# Patient Record
Sex: MALE | Race: WHITE | Employment: FULL TIME | URBAN - METROPOLITAN AREA
[De-identification: names, ages, dates, MRNs, and addresses within clinical notes are randomized per-mention and may not be internally consistent; named-entity substitution may affect disease eponyms.]

---

## 2017-07-24 ENCOUNTER — TRANSFERRED RECORDS (OUTPATIENT)
Dept: HEALTH INFORMATION MANAGEMENT | Facility: CLINIC | Age: 22
End: 2017-07-24

## 2017-07-25 ENCOUNTER — MEDICAL CORRESPONDENCE (OUTPATIENT)
Dept: HEALTH INFORMATION MANAGEMENT | Facility: CLINIC | Age: 22
End: 2017-07-25

## 2017-07-25 ENCOUNTER — PRE VISIT (OUTPATIENT)
Dept: SURGERY | Facility: CLINIC | Age: 22
End: 2017-07-25

## 2017-07-25 NOTE — TELEPHONE ENCOUNTER
1.  Date/reason for appt: 8/3/17- Pilonidal Cyst     2.  Referring provider: OZIEL FERMIN     3.  Call to patient (Yes / No - short description): No - pt is referred.     4.  Previous care at / records requested from:     1. [employer]- faxed cover sheet

## 2017-08-03 ENCOUNTER — ANESTHESIA EVENT (OUTPATIENT)
Dept: SURGERY | Facility: AMBULATORY SURGERY CENTER | Age: 22
End: 2017-08-03

## 2017-08-03 ENCOUNTER — ALLIED HEALTH/NURSE VISIT (OUTPATIENT)
Dept: SURGERY | Facility: CLINIC | Age: 22
End: 2017-08-03

## 2017-08-03 ENCOUNTER — OFFICE VISIT (OUTPATIENT)
Dept: SURGERY | Facility: CLINIC | Age: 22
End: 2017-08-03

## 2017-08-03 ENCOUNTER — TELEPHONE (OUTPATIENT)
Dept: SURGERY | Facility: CLINIC | Age: 22
End: 2017-08-03

## 2017-08-03 VITALS
WEIGHT: 187.4 LBS | DIASTOLIC BLOOD PRESSURE: 80 MMHG | HEART RATE: 83 BPM | TEMPERATURE: 98.3 F | BODY MASS INDEX: 27.76 KG/M2 | SYSTOLIC BLOOD PRESSURE: 124 MMHG | OXYGEN SATURATION: 96 % | HEIGHT: 69 IN

## 2017-08-03 VITALS
RESPIRATION RATE: 14 BRPM | TEMPERATURE: 98.3 F | OXYGEN SATURATION: 96 % | HEIGHT: 69 IN | BODY MASS INDEX: 27.76 KG/M2 | HEART RATE: 83 BPM | WEIGHT: 187.39 LBS | DIASTOLIC BLOOD PRESSURE: 88 MMHG | SYSTOLIC BLOOD PRESSURE: 124 MMHG

## 2017-08-03 DIAGNOSIS — L05.01 PILONIDAL CYST WITH ABSCESS: Primary | ICD-10-CM

## 2017-08-03 DIAGNOSIS — Z01.818 PREOP EXAMINATION: Primary | ICD-10-CM

## 2017-08-03 ASSESSMENT — ENCOUNTER SYMPTOMS: ORTHOPNEA: 0

## 2017-08-03 ASSESSMENT — PAIN SCALES - GENERAL: PAINLEVEL: NO PAIN (0)

## 2017-08-03 ASSESSMENT — LIFESTYLE VARIABLES: TOBACCO_USE: 0

## 2017-08-03 NOTE — ANESTHESIA PREPROCEDURE EVALUATION
Anesthesia Evaluation     . Pt has had prior anesthetic. Type: MAC and General (Seems to require a little more medication for MAC procedures.)    No history of anesthetic complications          ROS/MED HX    ENT/Pulmonary:  - neg pulmonary ROS    (-) tobacco use and recent URI   Neurologic:  - neg neurologic ROS     Cardiovascular:  - neg cardiovascular ROS      (-) taking anticoagulants/antiplatelets, MACIAS and orthopnea/PND   METS/Exercise Tolerance:     Hematologic:  - neg hematologic  ROS       Musculoskeletal:  - neg musculoskeletal ROS       GI/Hepatic:     (+) Other GI/Hepatic Pilonidal cyst      Renal/Genitourinary:  - ROS Renal section negative       Endo:  - neg endo ROS       Psychiatric:     (+) psychiatric history anxiety and depression      Infectious Disease:  - neg infectious disease ROS       Malignancy:      - no malignancy   Other:    - neg other ROS                 Physical Exam  Normal systems: dental    Airway   Mallampati: I  TM distance: >3 FB  Neck ROM: full    Dental     Cardiovascular   Rhythm and rate: regular and normal  (-) no peripheral edema and no murmur    Pulmonary    breath sounds clear to auscultation               PAC Discussion and Assessment    ASA Classification: 1  Case is suitable for: ASC  Anesthetic techniques and relevant risks discussed: MAC with GA as backup  Invasive monitoring and risk discussed: No  Types:   Possibility and Risk of blood transfusion discussed: No  NPO instructions given:   Additional anesthetic preparation and risks discussed:   Needs early admission to pre-op area:   Other:     PAC Resident/NP Anesthesia Assessment:  Neo Saeed is a 21 year old male scheduled to undergo Lay Open Pilonidal Cystectomy on 8/4/17 with Dr. Jenaro Cuba.    He has the following specific operative considerations:   The patient is a young, healthy, non-smoking, physically active male who is preparing for a relatively low-risk surgery.  No further evaluation needed  prior to this procedure.      Revised Cardiac Risk Index: 0.4% risk of major adverse cardiac event.  VTE risk: 0.5%  MAYKEL risk: Low  PONV risk score= 1.  (If > 2, anti-emetic intervention is recommended.)  Anesthesia considerations: Refer to PAC assessment in the anesthesia records.      Reviewed and Signed by PAC Mid-Level Provider/Resident  Mid-Level Provider/Resident: Mey Santiago CNP  Date: 8/3/17  Time: 1815    Attending Anesthesiologist Anesthesia Assessment:        Anesthesiologist:   Date:   Time:   Pass/Fail:   Disposition:     PAC Pharmacist Assessment:        Pharmacist:   Date:   Time:      Anesthesia Plan      History & Physical Review  History and physical reviewed and following examination; no interval change.    ASA Status:  1 .    NPO Status:  > 8 hours    Plan for MAC with Intravenous and Propofol induction. Maintenance will be TIVA.  Reason for MAC:  Deep or markedly invasive procedure (G8)  PONV prophylaxis:  Ondansetron (or other 5HT-3) and Dexamethasone or Solumedrol       Postoperative Care  Postoperative pain management:  IV analgesics, Oral pain medications and Multi-modal analgesia.      Consents  Anesthetic plan, risks, benefits and alternatives discussed with:  Patient..            ANESTHESIA PREOP EVALUATION    PROCEDURE: Procedure(s):  Lay Open Pilonidal Cystectomy - Wound Class: II-Clean Contaminated    HPI: Neo Saeed is a 21 year old male who presents for above procedure.    PAST MEDICAL HISTORY:    Past Medical History:   Diagnosis Date     Allergic state      Anxiety        PAST SURGICAL HISTORY:    Past Surgical History:   Procedure Laterality Date     DENTAL SURGERY      Yolyn teeth extraction     ENDOSCOPY       HERNIA REPAIR       HERNIA REPAIR         PAST ANESTHESIA HISTORY:     No personal or family h/o anesthesia problems    SOCIAL HISTORY:       Social History   Substance Use Topics     Smoking status: Never Smoker     Smokeless tobacco: Never Used     Alcohol use  Yes      Comment: Couple of drinks once a week       ALLERGIES:     No Known Allergies    MEDICATIONS:       (Not in a hospital admission)    Current Outpatient Prescriptions   Medication Sig Dispense Refill     VITAMIN D, CHOLECALCIFEROL, PO Take by mouth every morning       FEXOFENADINE HCL PO Take 180 mg by mouth every morning        FLUOXETINE HCL PO Take 20 mg by mouth every morning          Current Outpatient Prescriptions Ordered in [facility]   Medication Sig Dispense Refill     VITAMIN D, CHOLECALCIFEROL, PO Take by mouth every morning       FEXOFENADINE HCL PO Take 180 mg by mouth every morning        FLUOXETINE HCL PO Take 20 mg by mouth every morning        Current Facility-Administered Medications Ordered in Epic   Medication Dose Route Frequency Provider Last Rate Last Dose     cefoTEtan (CEFOTAN) 2 g vial to attach to  ml bag  2 g Intravenous Pre-Op/Pre-procedure x 1 dose Jenaro Cuba MD         cefoTEtan (CEFOTAN) 2 g vial to attach to  ml bag  2 g Intravenous See Admin Instructions Jenaro Cuba MD         lidocaine 1 % 1 mL  1 mL Other Q1H PRN Mando Jefferson MD         lidocaine (LMX4) kit   Topical Q1H PRN Mando Jefferson MD         sodium chloride (PF) 0.9% PF flush 3 mL  3 mL Intracatheter Q1H PRN Mando Jefferson MD         sodium chloride (PF) 0.9% PF flush 3 mL  3 mL Intracatheter Q8H Mando Jefferson MD         lactated ringers infusion   Intravenous Continuous Mando Jefferson MD           PHYSICAL EXAM:    Vitals: T 97.8, P 76, /81, R 16, SpO2 96%, Weight   Wt Readings from Last 2 Encounters:   17 83.9 kg (185 lb)   17 85 kg (187 lb 6.3 oz)       See doc flowsheet    Temp: 36.6  C (97.8  F) Temp  Min: 36.6  C (97.8  F)  Max: 36.8  C (98.3  F)  Resp: 16 Resp  Min: 14  Max: 16  SpO2: 96 % SpO2  Min: 96 %  Max: 96 %  Pulse: 76 Pulse  Min: 76  Max: 83    No Data Recorded  BP: 121/81 Systolic (24hrs), Av , Min:121 , Max:124   Diastolic (24hrs),  Av, Min:81, Max:88      NPO STATUS: see doc flowsheet    LABS:    BMP:  No results for input(s): NA, POTASSIUM, CHLORIDE, CO2, BUN, CR, GLC, GLORY in the last 14555 hours.    LFTs:   No results for input(s): PROTTOTAL, ALBUMIN, BILITOTAL, ALKPHOS, AST, ALT, BILIDIRECT in the last 20434 hours.    CBC:   No results for input(s): WBC, RBC, HGB, HCT, MCV, MCH, MCHC, RDW, PLT in the last 70229 hours.    Coags:  No results for input(s): INR, PTT, FIBR in the last 56806 hours.    Imaging:  No orders to display       Mando Jefferson MD  Anesthesiology Staff  Pager [phone]    2017  10:48 AM                  .

## 2017-08-03 NOTE — PATIENT INSTRUCTIONS
Preparing for Your Surgery      Name:  Neo Saeed   MRN:  [id number]   :  1995   Today's Date:  8/3/2017     Arriving for surgery:  Surgery date:  17  Surgery time:  11:10AM  Arrival time:  9:40AM  Please come to:     [facility]  [address] [place] [id number]     Parking is available in front of the [facility] building from 5:30AM to 8:00PM.  -  Proceed to the 5th floor to check into the Ambulatory Surgery Center.              >> There will be patient concierges on the 1st and 5th floor, for assistance or an escort, if you would like.              >> Please call [phone] with any questions.    What can I eat or drink?  -  You may have solid food or milk products until 8 hours prior to your surgery. 3AM  -  You may have water, apple juice or 7up/Sprite until 2 hours prior to your surgery. 9AM    Which medicines can I take?  -  Do NOT take these medications in the morning, the day of surgery:  Vitamin D    -  Please take these medications the day of surgery:  Please take your allegra and Fluoxetine the morning of your procedure, prior to arrival.    How do I prepare myself?  -  Take two showers: one the night before surgery; and one the morning of surgery.         Use Scrubcare or Hibiclens to wash from neck down.  You may use your own shampoo and conditioner. No other hair products.   -  Do NOT use lotion, powder, deodorant, or antiperspirant the day of your surgery.  -  Do NOT wear any jewelry.  -Do not bring your own medications to the hospital, except for inhalers and eye drops.  -  Bring your ID and insurance card.    Questions or Concerns:  If you have questions or concerns, please call the  Preoperative Assessment Center, Monday-Friday 7AM-7PM:  [phone]

## 2017-08-03 NOTE — LETTER
8/3/2017      RE: Noe Saeed  [address] [facility] [address]       Colon and Rectal Surgery Consult Clinic Note    Date: 8/3/2017     Referring provider:  Arron Murray MD  [address] [address]     RE: Neo Saeed  : 1995  ZONIA: 8/3/2017    Neo Saeed is a very pleasant 21 year old male without a significant past medical history with a recent diagnosis of pilonidal abscess.  Given these findings they were subsequently sent to the Colon and Rectal Surgery Clinic for an opinion on this and a new patient consultation.     Mr. Saeed reports having issues with a pilonidal cyst for the past year. This had drained blood and possibly some purulent drainage on and off for the past year. Last  after playing golf he developed increasing pain near his tailbone and the following day had quite a bit of drainage. He was seen at his PCP and started on antibiotics. Pain increased and he returned the following day with I&D performed. He continues to have a small amount of drainage but this has improved and pain is minimal. He denies any fevers or chills. He is not a smoker. He has not had any prior procedures at  This site. He is otherwise healthy and active. He runs and plays golf and tennis. 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     Assessment/Plan: 21 year old male without a significant past medical history with pilonidal abscess. Drained abscess present to the left of the roland cleft with several pilonidal pits in the roland cleft. We discussed management options including conservative management to try to prevent recurrence versus surgical management. Neo feels that he has been symptomatic for the past year and would like more definitive management. We discussed lay open pilonidal cystectomy since he will be starting classes soon and is fairly active. He feels he would be  "able to manage a wound well on his own or would be able to come to clinic if needed, although he does not have anyone here that could help him with wound care. He does not smoke. Discussed the risks of surgery including pain, infection, bleeding, poor wound healing, and the possibility of recurrence. He states an understanding of these risks and wishes to proceed with surgical management.  Will set him up for EUA with pilonidal cystectomy.    Medical history:  Past Medical History:   Diagnosis Date     Allergic state      Anxiety        Surgical history:  No past surgical history on file.    Problem list:  There are no active problems to display for this patient.      Medications:  Current Outpatient Prescriptions   Medication Sig Dispense Refill     FEXOFENADINE HCL PO        FLUOXETINE HCL PO          Allergies:  No Known Allergies    Family history:  No family history on file.    Social history:  Social History   Substance Use Topics     Smoking status: Never Smoker     Smokeless tobacco: Not on file     Alcohol use Yes    Marital status: single.  Occupation: student.    Nursing Notes:   Jaimee Franklin LPN  8/3/2017  7:09 AM  Signed  No chief complaint on file.      Vitals:    17 0707   BP: 124/80   Pulse: 83   Temp: 98.3  F (36.8  C)   TempSrc: Oral   SpO2: 96%   Weight: 187 lb 6.4 oz   Height: 5' 9\"       Body mass index is 27.67 kg/(m^2).      Jaimee RAI LPN                             Physical Examination:  /80  Pulse 83  Temp 98.3  F (36.8  C) (Oral)  Ht 5' 9\"  Wt 187 lb 6.4 oz  SpO2 96%  BMI 27.67 kg/m2  General: alert, oriented, in no acute distress, sitting comfortably  HEENT: mucous membranes moist  Perianal external examination:  Small open wound to the left of the roland cleft without underlying induration of erythema. Several pilonidal pits present in the  cleft. No surrounding erythema.    Total face to face time was 30 minutes, >50% counseling.    EFREN Carvajal, " NP-C  Colon and Rectal Surgery   [facility]    This note was created using speech recognition software and may contain unintended word substitutions.      Rossy Uribe APRN CNP

## 2017-08-03 NOTE — MR AVS SNAPSHOT
After Visit Summary   8/3/2017    Neo Saeed    MRN: [id number]           Patient Information     Date Of Birth          1995        Visit Information        Provider Department      8/3/2017 5:30 PM Rn, [facility] Preoperative Assessment [facility]        Care Instructions    Preparing for Your Surgery      Name:  Neo Saeed   MRN:  [id number]   :  1995   Today's Date:  8/3/2017     Arriving for surgery:  Surgery date:  17  Surgery time:  11:10AM  Arrival time:  9:40AM  Please come to:     [facility]  [address] [place] [id number]     Parking is available in front of the [facility] building from 5:30AM to 8:00PM.  -  Proceed to the 5th floor to check into the Ambulatory Surgery Center.              >> There will be patient concierges on the 1st and 5th floor, for assistance or an escort, if you would like.              >> Please call [phone] with any questions.    What can I eat or drink?  -  You may have solid food or milk products until 8 hours prior to your surgery. 3AM  -  You may have water, apple juice or 7up/Sprite until 2 hours prior to your surgery. 9AM    Which medicines can I take?  -  Do NOT take these medications in the morning, the day of surgery:  Vitamin D    -  Please take these medications the day of surgery:  Please take your allegra and Fluoxetine the morning of your procedure, prior to arrival.    How do I prepare myself?  -  Take two showers: one the night before surgery; and one the morning of surgery.         Use Scrubcare or Hibiclens to wash from neck down.  You may use your own shampoo and conditioner. No other hair products.   -  Do NOT use lotion, powder, deodorant, or antiperspirant the day of your surgery.  -  Do NOT wear any jewelry.  -Do not bring your own medications to the hospital, except for inhalers and eye drops.  -  Bring your ID and insurance card.    Questions or  Concerns:  If you have questions or concerns, please call the  Preoperative Assessment Center, Monday-Friday 7AM-7PM:  [phone]                    Follow-ups after your visit        Your next 10 appointments already scheduled     Aug 03, 2017  5:30 PM CDT   (Arrive by 5:15 PM)   PAC RN ASSESSMENT with Kaley Pac Rn   [facility] Preoperative [facility] ([facility])    [address] [place] [address]  4th Floor  [facility] [address] [phone]            Aug 04, 2017   Procedure with Jenaro Cuba MD   [facility] ([facility])    [address] [place] [address]  5th Floor  [facility] [address] [phone]           Located in the Clinics and Surgery Center at [address] [place] [address].   parking is very convenient and highly recommended.  is a $6 flat rate fee.  Both  and self parkers should enter the main arrival plaza from [facility]; parking attendants will direct you based on your parking preference.              Who to contact     Please call your clinic at [phone] to:    Ask questions about your health    Make or cancel appointments    Discuss your medicines    Learn about your test results    Speak to your doctor   If you have compliments or concerns about an experience at your clinic, or if you wish to file a complaint, please contact [facility] Patient Relations at [phone] or email us at [email or web address]         Additional Information About Your Visit        Public Solutionhart Information     1-800-DOCTORS is an electronic gateway that provides easy, online access to your medical records. With 1-800-DOCTORS, you can request a clinic appointment, read your test results, renew a prescription or communicate with your care team.     To sign up for 1-800-DOCTORS visit the website at [email or web address]   You will be asked to enter the access code listed below, as  well as some personal information. Please follow the directions to create your username and password.     Your access code is: [email or web address]  Expires: 10/23/2017  6:30 AM     Your access code will  in 90 days. If you need help or a new code, please contact your [facility] or call [phone] for assistance.        Care EveryWhere ID     This is your Care EveryWhere ID. This could be used by other organizations to access your [place] medical records  [id number]         Blood Pressure from Last 3 Encounters:   17 124/88   17 124/80   04/10/15 106/64    Weight from Last 3 Encounters:   17 85 kg (187 lb 6.3 oz)   17 85 kg (187 lb 6.4 oz)   04/10/15 74.8 kg (165 lb) (67 %)*     * Growth percentiles are based on [facility] 2-20 Years data.              Today, you had the following     No orders found for display       Primary Care Provider    Md Other Clinic                Equal Access to Services     [facility]ANA [facility] : Hadii aad ku hadasho Soomaali, waaxda luqadaha, qaybta kaalmada adeegyacandelaria, kelly call . So [facility] [phone].    ATENCIÓN: Si habla español, tiene a barrios disposición servicios gratuitos de asistencia lingüística. Llame al [phone].    We comply with applicable federal civil rights laws and [place] laws. We do not discriminate on the basis of race, color, national origin, age, disability sex, sexual orientation or gender identity.            Thank you!     Thank you for choosing [facility] PREOPERATIVE [facility]ESSMENT [facility]  for your care. Our goal is always to provide you with excellent care. Hearing back from our patients is one way we can continue to improve our services. Please take a few minutes to complete the written survey that you may receive in the mail after your visit with us. Thank you!             Your Updated Medication List - Protect others around you: Learn how to safely use, store and throw away your medicines  at www.disposemymeds.org.          This list is accurate as of: 8/3/17  4:57 PM.  Always use your most recent med list.                   Brand Name Dispense Instructions for use Diagnosis    FEXOFENADINE HCL PO      Take 180 mg by mouth every morning        FLUOXETINE HCL PO      Take 20 mg by mouth every morning        VITAMIN D (CHOLECALCIFEROL) PO      Take by mouth every morning

## 2017-08-03 NOTE — PROGRESS NOTES
Colon and Rectal Surgery Consult Clinic Note    Date: 8/3/2017     Referring provider:  Arron Murray MD  [address] [address]     RE: Neo Saeed  : 1995  ZONIA: 8/3/2017    Neo Saeed is a very pleasant 21 year old male without a significant past medical history with a recent diagnosis of pilonidal abscess.  Given these findings they were subsequently sent to the Colon and Rectal Surgery Clinic for an opinion on this and a new patient consultation.     Mr. Saeed reports having issues with a pilonidal cyst for the past year. This had drained blood and possibly some purulent drainage on and off for the past year. Last  after playing golf he developed increasing pain near his tailbone and the following day had quite a bit of drainage. He was seen at his PCP and started on antibiotics. Pain increased and he returned the following day with I&D performed. He continues to have a small amount of drainage but this has improved and pain is minimal. He denies any fevers or chills. He is not a smoker. He has not had any prior procedures at  This site. He is otherwise healthy and active. He runs and plays golf and tennis. He is finishing his last semester of undergrad here at the [employer] and will start classes again in one month.     Assessment/Plan: 21 year old male without a significant past medical history with pilonidal abscess. Drained abscess present to the left of the  cleft with several pilonidal pits in the roland cleft. We discussed management options including conservative management to try to prevent recurrence versus surgical management. Neo feels that he has been symptomatic for the past year and would like more definitive management. We discussed lay open pilonidal cystectomy since he will be starting classes soon and is fairly active. He feels he would be able to manage a wound well on his own or would be able to come to clinic if  "needed, although he does not have anyone here that could help him with wound care. He does not smoke. Discussed the risks of surgery including pain, infection, bleeding, poor wound healing, and the possibility of recurrence. He states an understanding of these risks and wishes to proceed with surgical management.  Will set him up for EUA with pilonidal cystectomy.    Medical history:  Past Medical History:   Diagnosis Date     Allergic state      Anxiety        Surgical history:  No past surgical history on file.    Problem list:  There are no active problems to display for this patient.      Medications:  Current Outpatient Prescriptions   Medication Sig Dispense Refill     FEXOFENADINE HCL PO        FLUOXETINE HCL PO          Allergies:  No Known Allergies    Family history:  No family history on file.    Social history:  Social History   Substance Use Topics     Smoking status: Never Smoker     Smokeless tobacco: Not on file     Alcohol use Yes    Marital status: single.  Occupation: student.    Nursing Notes:   Jaimee Franklin LPN  8/3/2017  7:09 AM  Signed  No chief complaint on file.      Vitals:    17 0707   BP: 124/80   Pulse: 83   Temp: 98.3  F (36.8  C)   TempSrc: Oral   SpO2: 96%   Weight: 187 lb 6.4 oz   Height: 5' 9\"       Body mass index is 27.67 kg/(m^2).      Jaimee RAI LPN                             Physical Examination:  /80  Pulse 83  Temp 98.3  F (36.8  C) (Oral)  Ht 5' 9\"  Wt 187 lb 6.4 oz  SpO2 96%  BMI 27.67 kg/m2  General: alert, oriented, in no acute distress, sitting comfortably  HEENT: mucous membranes moist  Perianal external examination:  Small open wound to the left of the  cleft without underlying induration of erythema. Several pilonidal pits present in the roland cleft. No surrounding erythema.    Total face to face time was 30 minutes, >50% counseling.    EFREN Carvajal, NP-C  Colon and Rectal Surgery   [facility]    This note was created using speech recognition software and may contain unintended word substitutions.

## 2017-08-03 NOTE — TELEPHONE ENCOUNTER
Per the request of Rossy Castillo NP, patient is scheduled for OR procedure with Dr. Cuba 08/04/17 at 11:00 am (9:30 am arrival).  Called and spoke with patient.  Patient confirms surgery date, time, and arrival time.  Informed patient he will need a  to bring him home after the procedure, and he will also need to get a pre-op physical done today.  Patient is scheduled to see PAC at 4:30 pm today.  Informed patient I will provide him with a surgery packet at that time.  Rossy Castillo NP and Dr. Cuba updated.

## 2017-08-03 NOTE — NURSING NOTE
"No chief complaint on file.      Vitals:    08/03/17 0707   BP: 124/80   Pulse: 83   Temp: 98.3  F (36.8  C)   TempSrc: Oral   SpO2: 96%   Weight: 187 lb 6.4 oz   Height: 5' 9\"       Body mass index is 27.67 kg/(m^2).      Jaimee RAI LPN                          "

## 2017-08-03 NOTE — MR AVS SNAPSHOT
"              After Visit Summary   8/3/2017    Neo Saeed    MRN: [id number]           Patient Information     Date Of Birth          1995        Visit Information        Provider Department      8/3/2017 7:00 AM Rossy Man APRN [facility] Rectal [facility]        Today's Diagnoses     Pilonidal cyst with abscess    -  1       Follow-ups after your visit        Who to contact     Please call your clinic at [phone] to:    Ask questions about your health    Make or cancel appointments    Discuss your medicines    Learn about your test results    Speak to your doctor   If you have compliments or concerns about an experience at your clinic, or if you wish to file a complaint, please contact [facility] Patient Relations at [phone] or email us at [email or web address]         Additional Information About Your Visit        MyChart Information     Graine de Cadeaux is an electronic gateway that provides easy, online access to your medical records. With Graine de Cadeaux, you can request a clinic appointment, read your test results, renew a prescription or communicate with your care team.     To sign up for Graine de Cadeaux visit the website at [email or web address]   You will be asked to enter the access code listed below, as well as some personal information. Please follow the directions to create your username and password.     Your access code is: [email or web address]  Expires: 10/23/2017  6:30 AM     Your access code will  in 90 days. If you need help or a new code, please contact your [facility] or call [phone] for assistance.        Care EveryWhere ID     This is your Care EveryWhere ID. This could be used by other organizations to access your [facility] medical records  [id number]        Your Vitals Were     Pulse Temperature Height Pulse Oximetry BMI (Body Mass Index)       83 98.3  F (36.8  C) (Oral) 5' 9\" 96% 27.67 " kg/m2        Blood Pressure from Last 3 Encounters:   08/03/17 124/80   04/10/15 106/64    Weight from Last 3 Encounters:   08/03/17 187 lb 6.4 oz   04/10/15 165 lb (67 %)*     * Growth percentiles are based on [facility] 2-20 Years data.              Today, you had the following     No orders found for display       Primary Care Provider    Md Other Clinic                Equal Access to Services     [facility]: Hadii aad ku hadasho Soomaali, waaxda luqadaha, qaybta kaalmada adeegyada, waxay idiin hayaan adeeg maryjudy lamehdi . So [facility] [phone].    ATENCIÓN: Si habla español, tiene a barrios disposición servicios gratuitos de asistencia lingüística. Llame al [phone].    We comply with applicable federal civil rights laws and [place] laws. We do not discriminate on the basis of race, color, national origin, age, disability sex, sexual orientation or gender identity.            Thank you!     Thank you for choosing [facility] RE[facility]  for your care. Our goal is always to provide you with excellent care. Hearing back from our patients is one way we can continue to improve our services. Please take a few minutes to complete the written survey that you may receive in the mail after your visit with us. Thank you!             Your Updated Medication List - Protect others around you: Learn how to safely use, store and throw away your medicines at www.disposemymeds.org.          This list is accurate as of: 8/3/17  8:05 AM.  Always use your most recent med list.                   Brand Name Dispense Instructions for use Diagnosis    FEXOFENADINE HCL PO           FLUOXETINE HCL PO

## 2017-08-03 NOTE — H&P
Pre-Operative H & P     Date of Encounter: 8/3/2017  Primary Care Physician:  Jas Ji Md    CC: Pilonidal Cyst.      HPI:  Neo Saeed is a 21 year old male who presents for pre-operative H & P in preparation for Lay Open Pilonidal Cystectomy on 17 with Dr. Jenaro Cuba at [facility].     The patient was evaluated by GAGE Man NP with Colon and Rectal Surgery on 8/3/17 in regards to a recent diagnosis of pilonidal abscess.  The patient reported that he has been having issues with a pilonidal cyst for the past year, with intermittent bloody and purulent drainage.  After playing golf last , he developed increasing pain near his tailbone, and had quite a bit of drainage the following day.  He was evaluated by his primary provider and started on antibiotics.  He returned the following day due to incresed pain, and an I & D was performed.  He continues to have some drainage, but the pain is minimal.  A small open wound to the left of the  cleft, without underlying induration or erythema, as well as several pilonidal pits in the roland cleft were noted on physical exam.  Arrangements are now being made for the above procedure.  History is obtained from the patient and the medical record.    Past Medical History:  Past Medical History:   Diagnosis Date     Allergic state      Anxiety      Past Surgical History:  Past Surgical History:   Procedure Laterality Date     DENTAL SURGERY      [place] teeth extraction     ENDOSCOPY       HERNIA REPAIR       HERNIA REPAIR       Hx of Blood transfusions/reactions: Denies.     Hx of abnormal bleeding or anti-platelet use: Denies.    Menstrual history: No LMP for male patient.    Steroid use in the last year: Denies.    Personal or FH of difficulty with anesthesia: Denies.    Prior to admission medications  Current Outpatient Prescriptions   Medication Sig Dispense Refill     VITAMIN D, CHOLECALCIFEROL, PO Take by mouth  every morning       FEXOFENADINE HCL PO Take 180 mg by mouth every morning        FLUOXETINE HCL PO Take 20 mg by mouth every morning        Allergies  No Known Allergies    Social History  Social History     Social History     Marital status: Single     Spouse name: N/A     Number of children: N/A     Years of education: N/A     Occupational History     Not on file.     Social History Main Topics     Smoking status: Never Smoker     Smokeless tobacco: Never Used     Alcohol use Yes      Comment: Couple of drinks once a week     Drug use: No     Sexual activity: Yes     Partners: Male     Birth control/ protection: Condom     Other Topics Concern     Not on file     Social History Narrative     Family History  Family History   Problem Relation Age of Onset     Hyperlipidemia Father      DIABETES Maternal Grandmother      Bipolar Disorder Maternal Grandmother      Parkinsonism Maternal Grandfather      Hyperlipidemia Paternal Grandfather      Coronary Artery Disease Paternal Grandfather      CANCER Paternal Grandfather      Review of Systems  Functional status: Independent in ADL's.  >4 METS.     The complete review of systems is negative other than noted in the HPI or here.   Constitutional: Denies recent changes in weight, sleeping patterns, or fevers/chills.  Eyes: No recent vision changes.  EENT: Denies recent changes in hearing, mouth pain, or difficulty swallowing.  Cardiovascular: Denies chest pain, MACIAS or orthopnea, or palpitations.  Respiratory: Denies shortness of breath or significant cough.    GI: Denies nausea/vomiting or diarrhea/constipation.    : Denies dysuria.    Musculoskeletal: Denies joint pain or swelling.    Skin: Denies rashes.  Pilonidal cyst to the left of the  cleft, with some bloody drainage.  Hematologic: Denies easy bruising or bleeding.    Neurologic: Denies migraines, seizures, dizziness, numbness/tingling.  Psychiatric: Denies changes in mood or affect.      /88  Pulse  "83  Temp 98.3  F (36.8  C) (Oral)  Resp 14  Ht 1.753 m (5' 9\")  Wt 85 kg (187 lb 6.3 oz)  SpO2 96%  BMI 27.67 kg/m2    187 lbs 6.26 oz  5' 9\"   Body mass index is 27.67 kg/(m^2).    Physical Exam  Constitutional: Patient awake, seated upright in a chair, in no apparent distress.  Appears stated age.  Eyes: Pupils equal, round and reactive to light.  Extra ocular muscles intact. Sclera clear.  Conjunctiva normal.  HENT: Head normocephalic.  Oral pharynx intact with moist mucous membranes.  Dentition intact.  No thyromegaly appreciated.   Respiratory: Lung sounds clear to auscultation bilaterally.  No rales, rhonchi, or wheezing noted.    Cardiovascular: S1, S2, regular rate and rhythm.  No murmurs, rubs, or gallops noted. Radial and pedal pulses palpable, bilaterally.  No edema noted.   GI: Bowel sounds present.  Abdomen rounded, soft, non-tender to light palpation.  No hepatosplenomegaly or masses palpated.   Genitourinary: Exam deferred.  Lymph/Hematologic: No cervical or supraclavicular lymphadenopathy noted.  No excessive bruising noted.    Skin: Color appropriate for race, warm, dry.    Musculoskeletal: Full extension of the neck.  No redness, warmth, or swelling of the joints noted. Gross motor strength is normal.    Neurologic: Alert, oriented to name, place and time. Cranial nerves II-XII are grossly intact. Gait is normal.      Neuropsychiatric: Calm, cooperative. Normal affect.     Assessment and Plan  Neo Saeed is a 21 year old male scheduled to undergo Lay Open Pilonidal Cystectomy on 8/4/17 with Dr. Jenaro Cuba.    He has the following specific operative considerations:   The patient is a young, healthy, non-smoking, physically active male who is preparing for a relatively low-risk surgery.  No further evaluation needed prior to this procedure.      Revised Cardiac Risk Index: 0.4% risk of major adverse cardiac event.  VTE risk: 0.5%  MAYKEL risk: Low  PONV risk score= 1.  (If > 2, " anti-emetic intervention is recommended.)  Anesthesia considerations: Refer to PAC assessment in the anesthesia records.    Mey Santiago NP  Preoperative Assessment Center  [facility]  Phone: [phone]  Fax: [phone]

## 2017-08-04 ENCOUNTER — SURGERY (OUTPATIENT)
Age: 22
End: 2017-08-04

## 2017-08-04 ENCOUNTER — ANESTHESIA (OUTPATIENT)
Dept: SURGERY | Facility: AMBULATORY SURGERY CENTER | Age: 22
End: 2017-08-04

## 2017-08-04 ENCOUNTER — HOSPITAL ENCOUNTER (OUTPATIENT)
Facility: AMBULATORY SURGERY CENTER | Age: 22
End: 2017-08-04
Attending: COLON & RECTAL SURGERY

## 2017-08-04 VITALS
TEMPERATURE: 98 F | SYSTOLIC BLOOD PRESSURE: 108 MMHG | BODY MASS INDEX: 27.4 KG/M2 | HEART RATE: 76 BPM | DIASTOLIC BLOOD PRESSURE: 64 MMHG | HEIGHT: 69 IN | OXYGEN SATURATION: 100 % | WEIGHT: 185 LBS | RESPIRATION RATE: 16 BRPM

## 2017-08-04 DIAGNOSIS — L05.91 PILONIDAL CYST: Primary | ICD-10-CM

## 2017-08-04 RX ORDER — ACETAMINOPHEN 325 MG/1
650 TABLET ORAL 4 TIMES DAILY
Qty: 100 TABLET | Refills: 0 | Status: SHIPPED | OUTPATIENT
Start: 2017-08-04 | End: 2017-08-18

## 2017-08-04 RX ORDER — BUPIVACAINE HYDROCHLORIDE 2.5 MG/ML
INJECTION, SOLUTION EPIDURAL; INFILTRATION; INTRACAUDAL PRN
Status: DISCONTINUED | OUTPATIENT
Start: 2017-08-04 | End: 2017-08-04 | Stop reason: HOSPADM

## 2017-08-04 RX ORDER — LIDOCAINE 40 MG/G
CREAM TOPICAL
Status: DISCONTINUED | OUTPATIENT
Start: 2017-08-04 | End: 2017-08-04 | Stop reason: HOSPADM

## 2017-08-04 RX ORDER — OXYCODONE HYDROCHLORIDE 5 MG/1
5 TABLET ORAL ONCE
Status: COMPLETED | OUTPATIENT
Start: 2017-08-04 | End: 2017-08-04

## 2017-08-04 RX ORDER — ACETAMINOPHEN 325 MG/1
975 TABLET ORAL ONCE
Status: COMPLETED | OUTPATIENT
Start: 2017-08-04 | End: 2017-08-04

## 2017-08-04 RX ORDER — ACETAMINOPHEN 160 MG
TABLET,DISINTEGRATING ORAL PRN
Status: DISCONTINUED | OUTPATIENT
Start: 2017-08-04 | End: 2017-08-04 | Stop reason: HOSPADM

## 2017-08-04 RX ORDER — ONDANSETRON 2 MG/ML
INJECTION INTRAMUSCULAR; INTRAVENOUS PRN
Status: DISCONTINUED | OUTPATIENT
Start: 2017-08-04 | End: 2017-08-04

## 2017-08-04 RX ORDER — PROPOFOL 10 MG/ML
INJECTION, EMULSION INTRAVENOUS PRN
Status: DISCONTINUED | OUTPATIENT
Start: 2017-08-04 | End: 2017-08-04

## 2017-08-04 RX ORDER — OXYCODONE HYDROCHLORIDE 5 MG/1
5-10 TABLET ORAL
Qty: 30 TABLET | Refills: 0 | Status: SHIPPED | OUTPATIENT
Start: 2017-08-04 | End: 2018-03-27

## 2017-08-04 RX ORDER — CEFOTETAN DISODIUM 2 G/20ML
2 INJECTION, POWDER, FOR SOLUTION INTRAMUSCULAR; INTRAVENOUS SEE ADMIN INSTRUCTIONS
Status: DISCONTINUED | OUTPATIENT
Start: 2017-08-04 | End: 2017-08-04 | Stop reason: HOSPADM

## 2017-08-04 RX ORDER — DEXAMETHASONE SODIUM PHOSPHATE 4 MG/ML
INJECTION, SOLUTION INTRA-ARTICULAR; INTRALESIONAL; INTRAMUSCULAR; INTRAVENOUS; SOFT TISSUE PRN
Status: DISCONTINUED | OUTPATIENT
Start: 2017-08-04 | End: 2017-08-04

## 2017-08-04 RX ORDER — BACITRACIN 500 [USP'U]/G
OINTMENT OPHTHALMIC PRN
Status: DISCONTINUED | OUTPATIENT
Start: 2017-08-04 | End: 2017-08-04 | Stop reason: HOSPADM

## 2017-08-04 RX ORDER — PROPOFOL 10 MG/ML
INJECTION, EMULSION INTRAVENOUS CONTINUOUS PRN
Status: DISCONTINUED | OUTPATIENT
Start: 2017-08-04 | End: 2017-08-04

## 2017-08-04 RX ORDER — CEFOTETAN DISODIUM 2 G/20ML
2 INJECTION, POWDER, FOR SOLUTION INTRAMUSCULAR; INTRAVENOUS
Status: COMPLETED | OUTPATIENT
Start: 2017-08-04 | End: 2017-08-04

## 2017-08-04 RX ORDER — METRONIDAZOLE 250 MG/1
250 TABLET ORAL 3 TIMES DAILY
Qty: 15 TABLET | Refills: 0 | Status: SHIPPED | OUTPATIENT
Start: 2017-08-04 | End: 2017-08-10

## 2017-08-04 RX ORDER — IBUPROFEN 800 MG/1
800 TABLET, FILM COATED ORAL 3 TIMES DAILY
Qty: 42 TABLET | Refills: 0 | Status: SHIPPED | OUTPATIENT
Start: 2017-08-04 | End: 2017-08-18

## 2017-08-04 RX ORDER — KETAMINE HYDROCHLORIDE 10 MG/ML
INJECTION, SOLUTION INTRAMUSCULAR; INTRAVENOUS PRN
Status: DISCONTINUED | OUTPATIENT
Start: 2017-08-04 | End: 2017-08-04

## 2017-08-04 RX ORDER — ONDANSETRON 4 MG/1
4 TABLET, ORALLY DISINTEGRATING ORAL
Status: DISCONTINUED | OUTPATIENT
Start: 2017-08-04 | End: 2017-08-05 | Stop reason: HOSPADM

## 2017-08-04 RX ORDER — SODIUM CHLORIDE, SODIUM LACTATE, POTASSIUM CHLORIDE, CALCIUM CHLORIDE 600; 310; 30; 20 MG/100ML; MG/100ML; MG/100ML; MG/100ML
INJECTION, SOLUTION INTRAVENOUS CONTINUOUS
Status: DISCONTINUED | OUTPATIENT
Start: 2017-08-04 | End: 2017-08-04 | Stop reason: HOSPADM

## 2017-08-04 RX ADMIN — KETAMINE HYDROCHLORIDE 20 MG: 10 INJECTION, SOLUTION INTRAMUSCULAR; INTRAVENOUS at 11:45

## 2017-08-04 RX ADMIN — BACITRACIN 1 TUBE: 500 OINTMENT OPHTHALMIC at 12:05

## 2017-08-04 RX ADMIN — PROPOFOL 30 MG: 10 INJECTION, EMULSION INTRAVENOUS at 11:37

## 2017-08-04 RX ADMIN — BUPIVACAINE HYDROCHLORIDE 30 ML: 2.5 INJECTION, SOLUTION EPIDURAL; INFILTRATION; INTRACAUDAL at 11:54

## 2017-08-04 RX ADMIN — Medication 100 ML: at 12:28

## 2017-08-04 RX ADMIN — PROPOFOL 50 MG: 10 INJECTION, EMULSION INTRAVENOUS at 11:25

## 2017-08-04 RX ADMIN — KETAMINE HYDROCHLORIDE 20 MG: 10 INJECTION, SOLUTION INTRAMUSCULAR; INTRAVENOUS at 11:28

## 2017-08-04 RX ADMIN — KETAMINE HYDROCHLORIDE 20 MG: 10 INJECTION, SOLUTION INTRAMUSCULAR; INTRAVENOUS at 11:37

## 2017-08-04 RX ADMIN — DEXAMETHASONE SODIUM PHOSPHATE 4 MG: 4 INJECTION, SOLUTION INTRA-ARTICULAR; INTRALESIONAL; INTRAMUSCULAR; INTRAVENOUS; SOFT TISSUE at 11:19

## 2017-08-04 RX ADMIN — CEFOTETAN DISODIUM 2 G: 2 INJECTION, POWDER, FOR SOLUTION INTRAMUSCULAR; INTRAVENOUS at 11:19

## 2017-08-04 RX ADMIN — ACETAMINOPHEN 975 MG: 325 TABLET ORAL at 10:45

## 2017-08-04 RX ADMIN — SODIUM CHLORIDE, SODIUM LACTATE, POTASSIUM CHLORIDE, CALCIUM CHLORIDE: 600; 310; 30; 20 INJECTION, SOLUTION INTRAVENOUS at 11:19

## 2017-08-04 RX ADMIN — PROPOFOL 30 MG: 10 INJECTION, EMULSION INTRAVENOUS at 11:45

## 2017-08-04 RX ADMIN — PROPOFOL 100 MCG/KG/MIN: 10 INJECTION, EMULSION INTRAVENOUS at 11:25

## 2017-08-04 RX ADMIN — ONDANSETRON 4 MG: 2 INJECTION INTRAMUSCULAR; INTRAVENOUS at 11:19

## 2017-08-04 RX ADMIN — OXYCODONE HYDROCHLORIDE 5 MG: 5 TABLET ORAL at 13:03

## 2017-08-04 NOTE — BRIEF OP NOTE
[facility]    Brief Operative Note    Pre-operative diagnosis: Pilonidal Cyst  Post-operative diagnosis * No post-op diagnosis entered *  Procedure: Procedure(s):  Lay Open Pilonidal Cystectomy - Wound Class: IV-Dirty or Infected  Surgeon: Surgeon(s) and Role:     * Jenaro Cuba MD - Primary  Anesthesia: Monitor Anesthesia Care   Estimated blood loss: 5mL.  Drains: None  Specimens: * No specimens in log *  Findings:   Small pilonidal cyst associated with midline pit/fistula. No active infection but abundant hair in cyst.  Complications: None.  Implants: None.

## 2017-08-04 NOTE — IP AVS SNAPSHOT
MRN:[id number]                      After Visit Summary   8/4/2017    Neo Saeed    MRN: [id number]           Thank you!     Thank you for choosing [facility] for your care. Our goal is always to provide you with excellent care. Hearing back from our patients is one way we can continue to improve our services. Please take a few minutes to complete the written survey that you may receive in the mail after you visit with us. Thank you!        Patient Information     Date Of Birth          1995        About your hospital stay     You were admitted on:  August 4, 2017 You last received care in the:  [facility]    You were discharged on:  August 4, 2017       Who to Call     For medical emergencies, please call 911.  For non-urgent questions about your medical care, please call your primary care provider or clinic, None  For questions related to your surgery, please call your surgery clinic        Attending Provider     Provider Specialty    Jenaro Cuba MD Colon and Rectal Surgery       Primary Care Provider    Md Other Clinic      After Care Instructions     Diet Instructions       Resume pre-procedure diet            Discharge Instructions       Follow up appointment as instructed by Surgeon and/or RN            Dressing       Pack moist 4x4 gauze in wound 1-2 times per day an cover with dry gauze and tape.            No Alcohol       For 24 hours after procedure and if taking narcotic pain medications or Metronidazole (FLAGYL).            No sitting       No sitting for 2 weeks after surgery.                  Further instructions from your care team       [facility]  Home Care Following Anesthesia  For 24 hours after surgery:  1. Get plenty of rest.  A responsible adult must stay with you for at least 24 hours after you leave the surgery center.  2. Do not drive or use heavy equipment.  If you have weakness or  tingling, don't drive or use heavy equipment until this feeling goes away.   3. Do not drink alcohol.   4. Avoid strenuous or risky activities.  Ask for help when climbing stairs.  5. You may feel lightheaded.  IF so, sit for a few minutes before standing.  Have someone help you get up.   6. If you have nausea (feel sick to your stomach): Drink only clear liquids such as apple juice, ginger ale, broth or 7-Up.  Rest may also help.  Be sure to drink enough fluids.  Move to a regular diet as you feel able.   7. You may have a slight fever.  Call the doctor if your fever is over 100 F (37.7 C) (taken under the tongue) or lasts longer than 24 hours.  8. You may have a dry mouth, a sore throat, muscle aches or trouble sleeping. These should go away after 24 hours.  9. Do not make important or legal decisions.               Tips for taking pain medications  To get the best pain relief possible, remember these points:    Take pain medications as directed, before pain becomes severe.    Pain medication can upset your stomach: taking it with food may help.    Constipation is a common side effect of pain medication. Drink plenty of  fluids.    Eat foods high in fiber. Take a stool softener if recommended by your doctor or pharmacist.    Do not drink alcohol, drive or operate machinery while taking pain medications.    Ask about other ways to control pain, such as with heat, ice or relaxation.    Tylenol/Acetaminophen Consumption  To help encourage the safe use of acetaminophen, the makers of TYLENOL  have lowered the maximum daily dose for single-ingredient Extra Strength TYLENOL  (acetaminophen) products sold in the [place] from 8 pills per day (4,000 mg) to 6 pills per day (3,000 mg). The dosing interval has also changed from 2 pills every 4-6 hours to 2 pills every 6 hours.    If you feel your pain relief is insufficient, you may take Tylenol/Acetaminophen in addition to your narcotic pain medication.     Be careful not to  "exceed 3,000 mg of Tylenol/Acetaminophen in a 24 hour period from all sources.    If you are taking extra strength Tylenol/acetaminophen (500 mg), the maximum dose is 6 tablets in 24 hours.    If you are taking regular strength acetaminophen (325 mg), the maximum dose is 9 tablets in 24 hours.    Call a doctor for any of the followin. Signs of infection (fever, growing tenderness at the surgery site, a large amount of drainage or bleeding, severe pain, foul-smelling drainage, redness, swelling).  2. It has been over 8 to 10 hours since surgery and you are still not able to urinate (pass water).  3. Headache for over 24 hours.  4. Numbness, tingling or weakness the day after surgery (if you had spinal anesthesia).  Your doctor is:  Dr. Jenaro Cuba, Colon Rectal: [phone]                    Or dial [phone] and ask for the resident on call for:  Colon Rectal  For emergency care, call the:  [place] [facility]:  [phone] (TTY for hearing impaired: [phone])                  Pending Results     No orders found from 2017 to 2017.            Admission Information     Date & Time Provider Department Dept. Phone    2017 Jenaro Cuba MD [facility] [phone]      Your Vitals Were     Blood Pressure Pulse Temperature Respirations Height Weight    107/64 76 97.1  F (36.2  C) (Temporal) 16 1.753 m (5' 9\") 83.9 kg (185 lb)    Pulse Oximetry BMI (Body Mass Index)                98% 27.32 kg/m2          Prescribe Wellness Information     Prescribe Wellness is an electronic gateway that provides easy, online access to your medical records. With Prescribe Wellness, you can request a clinic appointment, read your test results, renew a prescription or communicate with your care team.     To sign up for Prescribe Wellness visit the website at [email or web address]   You will be asked to enter the access code listed below, as well as some personal information. Please follow the " directions to create your username and password.     Your access code is: [email or web address]  Expires: 10/23/2017  6:30 AM     Your access code will  in 90 days. If you need help or a new code, please contact your [facility] or call [phone] for assistance.        Care EveryWhere ID     This is your Care EveryWhere ID. This could be used by other organizations to access your [facility] medical records  [id number]        Equal Access to Services     JOANA CLAYTON : Hadii malaika ku hadasho Soomaali, waaxda luqadaha, qaybta kaalmada adeegyada, waxay elbertin radha call . So [facility] [phone].    ATENCIÓN: Si habla español, tiene a barrios disposición servicios gratuitos de asistencia lingüística. Llame al [phone].    We comply with applicable federal civil rights laws and [place] laws. We do not discriminate on the basis of race, color, national origin, age, disability sex, sexual orientation or gender identity.               Review of your medicines      START taking        Dose / Directions    acetaminophen 325 MG tablet   Commonly known as:  TYLENOL   Used for:  Pilonidal cyst        Dose:  650 mg   Take 2 tablets (650 mg) by mouth 4 times daily for 14 days Alternate with ibuprofen (ADVIL/MOTRIN), IF ordered.   Quantity:  100 tablet   Refills:  0       ibuprofen 800 MG tablet   Commonly known as:  ADVIL/MOTRIN   Used for:  Pilonidal cyst        Dose:  800 mg   Take 1 tablet (800 mg) by mouth 3 times daily for 14 days Alternate with acetaminophen (TYLENOL), IF ordered.   Quantity:  42 tablet   Refills:  0       metroNIDAZOLE 250 MG tablet   Commonly known as:  FLAGYL   Used for:  Pilonidal cyst        Dose:  250 mg   Take 1 tablet (250 mg) by mouth 3 times daily for 5 days   Quantity:  15 tablet   Refills:  0       oxyCODONE 5 MG IR tablet   Commonly known as:  ROXICODONE   Used for:  Pilonidal cyst        Dose:  5-10 mg   Take 1-2 tablets (5-10 mg) by mouth every 3  hours as needed for severe pain   Quantity:  30 tablet   Refills:  0         CONTINUE these medicines which have NOT CHANGED        Dose / Directions    FEXOFENADINE HCL PO        Dose:  180 mg   Take 180 mg by mouth every morning   Refills:  0       FLUOXETINE HCL PO        Dose:  20 mg   Take 20 mg by mouth every morning   Refills:  0       VITAMIN D (CHOLECALCIFEROL) PO        Take by mouth every morning   Refills:  0            Where to get your medicines      These medications were sent to [place], [place] - [address]  [address], [facility] [address]    Hours:  TRANSPLANT PHONE NUMBER [phone] Phone:  [phone]     acetaminophen 325 MG tablet    ibuprofen 800 MG tablet    metroNIDAZOLE 250 MG tablet         Some of these will need a paper prescription and others can be bought over the counter. Ask your nurse if you have questions.     Bring a paper prescription for each of these medications     oxyCODONE 5 MG IR tablet                Protect others around you: Learn how to safely use, store and throw away your medicines at www.disposemymeds.org.             Medication List: This is a list of all your medications and when to take them. Check marks below indicate your daily home schedule. Keep this list as a reference.      Medications           Morning Afternoon Evening Bedtime As Needed    acetaminophen 325 MG tablet   Commonly known as:  TYLENOL   Take 2 tablets (650 mg) by mouth 4 times daily for 14 days Alternate with ibuprofen (ADVIL/MOTRIN), IF ordered.   Last time this was given:  975 mg on 8/4/2017 10:45 AM                                FEXOFENADINE HCL PO   Take 180 mg by mouth every morning                                FLUOXETINE HCL PO   Take 20 mg by mouth every morning                                ibuprofen 800 MG tablet   Commonly known as:  ADVIL/MOTRIN   Take 1 tablet (800 mg) by mouth 3 times daily for 14 days  Alternate with acetaminophen (TYLENOL), IF ordered.                                metroNIDAZOLE 250 MG tablet   Commonly known as:  FLAGYL   Take 1 tablet (250 mg) by mouth 3 times daily for 5 days                                oxyCODONE 5 MG IR tablet   Commonly known as:  ROXICODONE   Take 1-2 tablets (5-10 mg) by mouth every 3 hours as needed for severe pain                                VITAMIN D (CHOLECALCIFEROL) PO   Take by mouth every morning

## 2017-08-04 NOTE — ANESTHESIA CARE TRANSFER NOTE
Patient: Neo Saeed    Procedure(s):  Lay Open Pilonidal Cystectomy - Wound Class: IV-Dirty or Infected    Diagnosis: Pilonidal Cyst  Diagnosis Additional Information: No value filed.    Anesthesia Type:   MAC     Note:  Airway :Room Air  Patient transferred to:Phase II  Comments: atient to Phase II on RA/native airway and is somnolent but has a patent airway. Report to RN and transfer of care. BP: 107/64 HR: 81 Temp: 97.1 SpO2: 96% in RA RR: 13      Vitals: (Last set prior to Anesthesia Care Transfer)    CRNA VITALS  8/4/2017 1151 - 8/4/2017 1221      8/4/2017             Resp Rate (set): 10                Electronically Signed By: EFREN Andres CRNA  August 4, 2017  12:21 PM

## 2017-08-04 NOTE — IP AVS SNAPSHOT
[facility]    [address] [place] [id number]    Phone:  [phone]    Fax:  [phone]                                       After Visit Summary   8/4/2017    Neo Saeed    MRN: [id number]           After Visit Summary Signature Page     I have received my discharge instructions, and my questions have been answered. I have discussed any challenges I see with this plan with the nurse or doctor.    ..........................................................................................................................................  Patient/Patient Representative Signature      ..........................................................................................................................................  Patient Representative Print Name and Relationship to Patient    ..................................................               ................................................  Date                                            Time    ..........................................................................................................................................  Reviewed by Signature/Title    ...................................................              ..............................................  Date                                                            Time

## 2017-08-04 NOTE — ANESTHESIA POSTPROCEDURE EVALUATION
Patient: Neo Saeed    Procedure(s):  Lay Open Pilonidal Cystectomy - Wound Class: IV-Dirty or Infected    Diagnosis:Pilonidal Cyst  Diagnosis Additional Information: No value filed.    Anesthesia Type:  MAC    Note:  Anesthesia Post Evaluation    Patient location during evaluation: Phase 2  Patient participation: Able to fully participate in evaluation  Level of consciousness: awake and alert  Pain management: adequate  Airway patency: patent  Cardiovascular status: acceptable  Respiratory status: acceptable  Hydration status: acceptable  PONV: none     Anesthetic complications: None          Last vitals:  Vitals:    08/04/17 1220 08/04/17 1230 08/04/17 1245   BP: 107/64 114/63 115/60   Pulse:      Resp: 16 16 16   Temp: 36.2  C (97.1  F)  37  C (98.6  F)   SpO2: 98% 98% 98%         Electronically Signed By: Mando Jefferson MD  August 4, 2017  1:02 PM

## 2017-08-04 NOTE — DISCHARGE INSTRUCTIONS
[facility]  Home Care Following Anesthesia  For 24 hours after surgery:  1. Get plenty of rest.  A responsible adult must stay with you for at least 24 hours after you leave the surgery center.  2. Do not drive or use heavy equipment.  If you have weakness or tingling, don't drive or use heavy equipment until this feeling goes away.   3. Do not drink alcohol.   4. Avoid strenuous or risky activities.  Ask for help when climbing stairs.  5. You may feel lightheaded.  IF so, sit for a few minutes before standing.  Have someone help you get up.   6. If you have nausea (feel sick to your stomach): Drink only clear liquids such as apple juice, ginger ale, broth or 7-Up.  Rest may also help.  Be sure to drink enough fluids.  Move to a regular diet as you feel able.   7. You may have a slight fever.  Call the doctor if your fever is over 100 F (37.7 C) (taken under the tongue) or lasts longer than 24 hours.  8. You may have a dry mouth, a sore throat, muscle aches or trouble sleeping. These should go away after 24 hours.  9. Do not make important or legal decisions.               Tips for taking pain medications  To get the best pain relief possible, remember these points:    Take pain medications as directed, before pain becomes severe.    Pain medication can upset your stomach: taking it with food may help.    Constipation is a common side effect of pain medication. Drink plenty of  fluids.    Eat foods high in fiber. Take a stool softener if recommended by your doctor or pharmacist.    Do not drink alcohol, drive or operate machinery while taking pain medications.    Ask about other ways to control pain, such as with heat, ice or relaxation.    Tylenol/Acetaminophen Consumption  To help encourage the safe use of acetaminophen, the makers of TYLENOL  have lowered the maximum daily dose for single-ingredient Extra Strength TYLENOL  (acetaminophen) products sold in the [place] from 8  pills per day (4,000 mg) to 6 pills per day (3,000 mg). The dosing interval has also changed from 2 pills every 4-6 hours to 2 pills every 6 hours.    If you feel your pain relief is insufficient, you may take Tylenol/Acetaminophen in addition to your narcotic pain medication.     Be careful not to exceed 3,000 mg of Tylenol/Acetaminophen in a 24 hour period from all sources.    If you are taking extra strength Tylenol/acetaminophen (500 mg), the maximum dose is 6 tablets in 24 hours.    If you are taking regular strength acetaminophen (325 mg), the maximum dose is 9 tablets in 24 hours.    Call a doctor for any of the followin. Signs of infection (fever, growing tenderness at the surgery site, a large amount of drainage or bleeding, severe pain, foul-smelling drainage, redness, swelling).  2. It has been over 8 to 10 hours since surgery and you are still not able to urinate (pass water).  3. Headache for over 24 hours.  4. Numbness, tingling or weakness the day after surgery (if you had spinal anesthesia).  Your doctor is:  Dr. Jenaro Cuba, Colon Rectal: [phone]                    Or dial [phone] and ask for the resident on call for:  Colon Rectal  For emergency care, call the:  [place] [facility]:  [phone] (TTY for hearing impaired: [phone])

## 2017-08-04 NOTE — OP NOTE
"PREOPERATIVE DIAGNOSIS:  Pilonidal cyst.      POSTOPERATIVE DIAGNOSIS:  Pilonidal cyst.      ANESTHESIA:  MAC plus local anesthesia.      PROCEDURES PERFORMED:   1.  Evaluation under anesthesia.   2.  Pilonidal cystectomy.      SURGEON:  Jenaro Cuba MD      ASSISTANT:  None.     DESCRIPTION OF OPERATION:  After obtaining informed consent, the patient was brought to the operating room and placed in the prone jackknife position.  MAC anesthesia was gently induced without difficulty.  The patient received appropriate preoperative antibiotic prophylaxis as well as mechanical DVT prophylaxis.  Bilateral lower extremity pneumatic compression devices were applied, and all pressure points were cushioned.  The  cleft area was clipped.  This same area was prepped and draped in a standard sterile fashion.  A \"time-out\" was performed. A total of 30 mL of bupivacaine 0.25% without epinephrine was injected for local anesthesia. The patient had a small pilonidal cyst measuring approximately 1.5 x 1.5 cm at the upper left portion of the  cleft.  This was associated with a midline pit/fistula that was approximately 2-3 cm away.  The cyst was excised completely.  There was an abundant amount of hair in the cyst; this was all removed.  After we excised the cyst, it was clear that the fistula that was connecting to the midline pit was all one cavity.  The skin bridge, including the midline pit, was excised.  This led to a wound that was approximately 4.5 x 2 x 2 cm.  After removing all of the hair, the granulation tissue was curetted.  There were no additional fistulas or midline pits.  The wound was irrigated with dilute peroxide.  Hemostasis was corroborated.  After this, we released circumferential skin flaps for approximately 1.5 cm.  This was performed with monopolar electrocautery.  The entire wound was marsupialized using a running locking 3-0 chromic suture.  Hemostasis was corroborated.  Instrument, sponge and " needle counts were all correct as reported to me.  Bacitracin was applied as well as a sterile dressing.  The patient tolerated the procedure well.   COMPLICATIONS:  None immediately.      ESTIMATED BLOOD LOSS:  5 mL      REPLACEMENT:  300 mL of crystalloid      SPECIMENS:  None.      DRAINS/TUBES:  None.      FINDINGS:  Small pilonidal cyst associated with a midline pit/fistula.  No active infection, but there was an abundant amount of hair in the cyst.      DISPOSITION:  PACU.         DOUGIE TOPETE MD             D: 2017 12:24   T: 2017 12:45   MT: juan      Name:     MARCY ALFARO   MRN:      [id number]        Account:        [id number]   :      1995           Procedure Date: 2017      Document: [id number]       cc: Rossy Uribe MSN, NP-C       [facility] Surgery Billing

## 2017-08-05 ENCOUNTER — TELEPHONE (OUTPATIENT)
Dept: OTHER | Facility: CLINIC | Age: 22
End: 2017-08-05

## 2017-08-05 NOTE — TELEPHONE ENCOUNTER
"08/05/2017    General Surgery Telephone Note    Was called by patient at 0945 regarding bleeding from surgical site. Dr Cuba did a pilonidal cystectomy yesterday. Procedure went well. This AM he note that the site was bleeding \"a lot\" and blood was running down his leg. He heavily packed the area with gauze and then called. Doesn't know how long until the gauze saturates because he just packed it. Denies light headedness, dizziness, fevers, chills or worsening pain. I instructed the patient to keep the wound packed and apply direct pressure. If the bleeding does not improve in the next hour, he should present to a local ED to be further evaluated. He verbalized his understanding and agreement with the plan.    Zana Graves MD  General Surgery PGY-3    "

## 2017-08-07 ENCOUNTER — CARE COORDINATION (OUTPATIENT)
Dept: SURGERY | Facility: CLINIC | Age: 22
End: 2017-08-07

## 2017-08-07 NOTE — PROGRESS NOTES
Pt states he is doing well following pilonidal cyst surgery 8/4.  He is alternating tylenol with ibuprofen and this is giving him adequate pain relief.  He is doing dressing changes 2-3x/day without problem.  States there is pinkish drng on dressings when he removes it.      He asks about sitting after this procedure, and I told him his body should be his guide--if he is having too much pain by sitting, he should get up and move around.  Pt states understanding.

## 2017-08-07 NOTE — PROGRESS NOTES
Per task, called and spoke with patient to schedule post-op appointment with Rossy Castillo NP.  Patient is scheduled for this Thursday 08/10/17 at 8:45 am.  Informed patient that he does not need to see Dr. Cuba on Monday at 4:30 pm (per Dr. Cuab), and that we would schedule his next post-op appointment on Thursday.  Also instructed patient to check in at clinic 4K.   Patient is in agreement with this plan, and has my direct number for additional questions or concerns.

## 2017-08-10 ENCOUNTER — OFFICE VISIT (OUTPATIENT)
Dept: SURGERY | Facility: CLINIC | Age: 22
End: 2017-08-10

## 2017-08-10 VITALS
WEIGHT: 184.3 LBS | SYSTOLIC BLOOD PRESSURE: 113 MMHG | BODY MASS INDEX: 27.3 KG/M2 | DIASTOLIC BLOOD PRESSURE: 74 MMHG | HEIGHT: 69 IN | HEART RATE: 93 BPM | OXYGEN SATURATION: 97 % | TEMPERATURE: 97.4 F

## 2017-08-10 DIAGNOSIS — L05.01 PILONIDAL CYST WITH ABSCESS: ICD-10-CM

## 2017-08-10 DIAGNOSIS — Z09 FOLLOW-UP EXAMINATION FOLLOWING SURGERY: Primary | ICD-10-CM

## 2017-08-10 PROBLEM — S31.809A: Status: ACTIVE | Noted: 2017-08-10

## 2017-08-10 PROBLEM — Z87.2 H/O PILONIDAL CYST: Status: ACTIVE | Noted: 2017-08-10

## 2017-08-10 ASSESSMENT — PAIN SCALES - GENERAL: PAINLEVEL: NO PAIN (0)

## 2017-08-10 NOTE — MR AVS SNAPSHOT
After Visit Summary   8/10/2017    Neo Saeed    MRN: [id number]           Patient Information     Date Of Birth          1995        Visit Information        Provider Department      8/10/2017 8:45 AM Rossy Man APRN [facility] Colon and Rectal Surgery        Today's Diagnoses     Follow-up examination following surgery    -  1    Pilonidal cyst with abscess           Follow-ups after your visit        Your next 10 appointments already scheduled     Aug 11, 2017  7:00 AM CDT   RETURN WOUND NURSE VISIT with Vika Baer RN   [facility] Wound Ostomy ([facility])    [address]  [address] [facility] [address] [phone]              Who to contact     Please call your clinic at [phone] to:    Ask questions about your health    Make or cancel appointments    Discuss your medicines    Learn about your test results    Speak to your doctor   If you have compliments or concerns about an experience at your clinic, or if you wish to file a complaint, please contact [facility] Patient Relations at [phone] or email us at [email or web address]         Additional Information About Your Visit        MyChart Information     [employer] is an electronic gateway that provides easy, online access to your medical records. With [employer], you can request a clinic appointment, read your test results, renew a prescription or communicate with your care team.     To sign up for [employer] visit the website at [email or web address]   You will be asked to enter the access code listed below, as well as some personal information. Please follow the directions to create your username and password.     Your access code is: [email or web address]  Expires: 10/23/2017  6:30 AM     Your access code will  in 90 days. If you need help or a new code, please contact your [facility] Clinic or call  "[phone] for assistance.        Care EveryWhere ID     This is your Care EveryWhere ID. This could be used by other organizations to access your [place] medical records  [id number]        Your Vitals Were     Pulse Temperature Height Pulse Oximetry BMI (Body Mass Index)       93 97.4  F (36.3  C) (Oral) 5' 9\" 97% 27.22 kg/m2        Blood Pressure from Last 3 Encounters:   08/10/17 113/74   08/04/17 108/64   08/03/17 124/88    Weight from Last 3 Encounters:   08/10/17 184 lb 4.8 oz   08/04/17 185 lb   08/03/17 187 lb 6.3 oz              Today, you had the following     No orders found for display       Primary Care Provider    Md Other Clinic                Equal Access to Services     JOANA CLAYTON : Hadii malaika rayo Socortez, waaxda luqadaha, qaybta kaalmada adeegyada, kelly call . So [facility] [phone].    ATENCIÓN: Si habla español, tiene a barrios disposición servicios gratuitos de asistencia lingüística. Llame al [phone].    We comply with applicable federal civil rights laws and [place] laws. We do not discriminate on the basis of race, color, national origin, age, disability sex, sexual orientation or gender identity.            Thank you!     Thank you for choosing [facility] COLON AND RECTAL SURGERY  for your care. Our goal is always to provide you with excellent care. Hearing back from our patients is one way we can continue to improve our services. Please take a few minutes to complete the written survey that you may receive in the mail after your visit with us. Thank you!             Your Updated Medication List - Protect others around you: Learn how to safely use, store and throw away your medicines at www.disposemymeds.org.          This list is accurate as of: 8/10/17  9:35 AM.  Always use your most recent med list.                   Brand Name Dispense Instructions for use Diagnosis    acetaminophen 325 MG tablet    TYLENOL    100 tablet    Take 2 tablets (650 " mg) by mouth 4 times daily for 14 days Alternate with ibuprofen (ADVIL/MOTRIN), IF ordered.    Pilonidal cyst       FEXOFENADINE HCL PO      Take 180 mg by mouth every morning        FLUOXETINE HCL PO      Take 20 mg by mouth every morning        ibuprofen 800 MG tablet    ADVIL/MOTRIN    42 tablet    Take 1 tablet (800 mg) by mouth 3 times daily for 14 days Alternate with acetaminophen (TYLENOL), IF ordered.    Pilonidal cyst       oxyCODONE 5 MG IR tablet    ROXICODONE    30 tablet    Take 1-2 tablets (5-10 mg) by mouth every 3 hours as needed for severe pain    Pilonidal cyst       VITAMIN D (CHOLECALCIFEROL) PO      Take by mouth every morning

## 2017-08-10 NOTE — LETTER
8/10/2017      RE: Neo Saeed  [address] [facility] [address]       Colon and Rectal Surgery Postoperative Clinic Note    RE: Neo Saeed  : 1995  ZONIA: 8/10/2017    Neo Saeed is a very pleasant 21 year old male with pilonidal cyst who is now status post EUA, pilonidal cystectomy on 17 with Dr. Cuba. He presents today for follow up.    Interval history: Neo has been doing well since returning home. He had some bleeding initially after surgery but this has since stopped. He continues to have quite a bit of drainage, however. He is changing the dressing twice a day but has to put several gauze pads on due to the amount of drainage. He states that the drainage is clear. He denies any purulent drainage, fevers, or chills. He only has minor discomfort but no significant pain. No difficulties with bowel movements.    Assessment/Plan:  21 year old male with pilonidal cyst who is now status post EUA, pilonidal cystectomy on 17 with Dr. Cuba. On exam the wound bed looks healthy and remains open but he does have quite a bit of drainage and skin irritation surrounding the wound. The skin irritation is likely from the moisture on his skin. I asked our [facility] nurse, Vika, to see Neo as well. She will guide wound care and plans to see him closely in follow up. Advised him to avoid prolonged sitting but he can return to work next week. Would like to see him in follow up again in 2-3 weeks but will check in during his visits with Vika if there are any concerns.  Patient's questions were answered to his stated satisfaction and he is in agreement with this plan.    Medical history:  Past Medical History:   Diagnosis Date     Allergic state      Anxiety        Surgical history:  Past Surgical History:   Procedure Laterality Date     DENTAL SURGERY      [place] teeth extraction     ENDOSCOPY       HERNIA REPAIR       HERNIA REPAIR         Problem list:  There are no active  "problems to display for this patient.      Medications:  Current Outpatient Prescriptions   Medication Sig Dispense Refill     acetaminophen (TYLENOL) 325 MG tablet Take 2 tablets (650 mg) by mouth 4 times daily for 14 days Alternate with ibuprofen (ADVIL/MOTRIN), IF ordered. 100 tablet 0     ibuprofen (ADVIL/MOTRIN) 800 MG tablet Take 1 tablet (800 mg) by mouth 3 times daily for 14 days Alternate with acetaminophen (TYLENOL), IF ordered. 42 tablet 0     VITAMIN D, CHOLECALCIFEROL, PO Take by mouth every morning       FEXOFENADINE HCL PO Take 180 mg by mouth every morning        FLUOXETINE HCL PO Take 20 mg by mouth every morning        oxyCODONE (ROXICODONE) 5 MG IR tablet Take 1-2 tablets (5-10 mg) by mouth every 3 hours as needed for severe pain (Patient not taking: Reported on 8/10/2017) 30 tablet 0       Allergies:  No Known Allergies    Family history:  Family History   Problem Relation Age of Onset     Hyperlipidemia Father      DIABETES Maternal Grandmother      Bipolar Disorder Maternal Grandmother      Parkinsonism Maternal Grandfather      Hyperlipidemia Paternal Grandfather      Coronary Artery Disease Paternal Grandfather      CANCER Paternal Grandfather        Social history:  Social History   Substance Use Topics     Smoking status: Never Smoker     Smokeless tobacco: Never Used     Alcohol use Yes      Comment: Couple of drinks once a week     Marital status: single.    Nursing Notes:   Maricarmen Lewis CMA  8/10/2017  8:49 AM  Signed  Chief Complaint   Patient presents with     Surgical Followup     Post op       Vitals:    08/10/17 0846   BP: 113/74   Pulse: 93   Temp: 97.4  F (36.3  C)   TempSrc: Oral   SpO2: 97%   Weight: 184 lb 4.8 oz   Height: 5' 9\"       Body mass index is 27.22 kg/(m^2).  Maricarmen Lewis CMA                             Physical Examination:  /74  Pulse 93  Temp 97.4  F (36.3  C) (Oral)  Ht 5' 9\"  Wt 184 lb 4.8 oz  SpO2 97%  BMI 27.22 kg/m2  General: alert, " oriented, in no acute distress, sitting comfortably  HEENT: mucous membranes moist  Perianal external examination:  Surgical wound in the  cleft remains open with marsupialization sutures in place. Wound bed with some slough and granulation tissue present. A moderate amount of serous drainage present. Rash surrounding wound.            Digital rectal examination: Was deferred.    Anoscopy: Was deferred.    Total face to face time was 15 minutes, >50% counseling.  This is a postop visit.    EFREN Carvajal, NP-C  Colon and Rectal Surgery  [facility]    This note was created using speech recognition software and may contain unintended word substitutions.            EFREN Carvajal CNP

## 2017-08-10 NOTE — NURSING NOTE
"Chief Complaint   Patient presents with     Surgical Followup     Post op       Vitals:    08/10/17 0846   BP: 113/74   Pulse: 93   Temp: 97.4  F (36.3  C)   TempSrc: Oral   SpO2: 97%   Weight: 184 lb 4.8 oz   Height: 5' 9\"       Body mass index is 27.22 kg/(m^2).  Maricarmen Lewis CMA                          "

## 2017-08-10 NOTE — PROGRESS NOTES
Colon and Rectal Surgery Postoperative Clinic Note    RE: Neo Saeed  : 1995  ZONIA: 8/10/2017    Neo Saeed is a very pleasant 21 year old male with pilonidal cyst who is now status post EUA, pilonidal cystectomy on 17 with Dr. Cuba. He presents today for follow up.    Interval history: Neo has been doing well since returning home. He had some bleeding initially after surgery but this has since stopped. He continues to have quite a bit of drainage, however. He is changing the dressing twice a day but has to put several gauze pads on due to the amount of drainage. He states that the drainage is clear. He denies any purulent drainage, fevers, or chills. He only has minor discomfort but no significant pain. No difficulties with bowel movements.    Assessment/Plan:  21 year old male with pilonidal cyst who is now status post EUA, pilonidal cystectomy on 17 with Dr. Cuba. On exam the wound bed looks healthy and remains open but he does have quite a bit of drainage and skin irritation surrounding the wound. The skin irritation is likely from the moisture on his skin. I asked our [facility] nurse, Vika, to see Neo as well. She will guide wound care and plans to see him closely in follow up. Advised him to avoid prolonged sitting but he can return to work next week. Would like to see him in follow up again in 2-3 weeks but will check in during his visits with Vika if there are any concerns.  Patient's questions were answered to his stated satisfaction and he is in agreement with this plan.    Medical history:  Past Medical History:   Diagnosis Date     Allergic state      Anxiety        Surgical history:  Past Surgical History:   Procedure Laterality Date     DENTAL SURGERY      [place] teeth extraction     ENDOSCOPY       HERNIA REPAIR       HERNIA REPAIR         Problem list:  There are no active problems to display for this patient.      Medications:  Current Outpatient  "Prescriptions   Medication Sig Dispense Refill     acetaminophen (TYLENOL) 325 MG tablet Take 2 tablets (650 mg) by mouth 4 times daily for 14 days Alternate with ibuprofen (ADVIL/MOTRIN), IF ordered. 100 tablet 0     ibuprofen (ADVIL/MOTRIN) 800 MG tablet Take 1 tablet (800 mg) by mouth 3 times daily for 14 days Alternate with acetaminophen (TYLENOL), IF ordered. 42 tablet 0     VITAMIN D, CHOLECALCIFEROL, PO Take by mouth every morning       FEXOFENADINE HCL PO Take 180 mg by mouth every morning        FLUOXETINE HCL PO Take 20 mg by mouth every morning        oxyCODONE (ROXICODONE) 5 MG IR tablet Take 1-2 tablets (5-10 mg) by mouth every 3 hours as needed for severe pain (Patient not taking: Reported on 8/10/2017) 30 tablet 0       Allergies:  No Known Allergies    Family history:  Family History   Problem Relation Age of Onset     Hyperlipidemia Father      DIABETES Maternal Grandmother      Bipolar Disorder Maternal Grandmother      Parkinsonism Maternal Grandfather      Hyperlipidemia Paternal Grandfather      Coronary Artery Disease Paternal Grandfather      CANCER Paternal Grandfather        Social history:  Social History   Substance Use Topics     Smoking status: Never Smoker     Smokeless tobacco: Never Used     Alcohol use Yes      Comment: Couple of drinks once a week     Marital status: single.    Nursing Notes:   Maricarmen Lewis CMA  8/10/2017  8:49 AM  Signed  Chief Complaint   Patient presents with     Surgical Followup     Post op       Vitals:    08/10/17 0846   BP: 113/74   Pulse: 93   Temp: 97.4  F (36.3  C)   TempSrc: Oral   SpO2: 97%   Weight: 184 lb 4.8 oz   Height: 5' 9\"       Body mass index is 27.22 kg/(m^2).  Maricarmen Lewis CMA                             Physical Examination:  /74  Pulse 93  Temp 97.4  F (36.3  C) (Oral)  Ht 5' 9\"  Wt 184 lb 4.8 oz  SpO2 97%  BMI 27.22 kg/m2  General: alert, oriented, in no acute distress, sitting comfortably  HEENT: mucous membranes " moist  Perianal external examination:  Surgical wound in the roland cleft remains open with marsupialization sutures in place. Wound bed with some slough and granulation tissue present. A moderate amount of serous drainage present. Rash surrounding wound.            Digital rectal examination: Was deferred.    Anoscopy: Was deferred.    Total face to face time was 15 minutes, >50% counseling.  This is a postop visit.    EFREN Carvajal, NP-C  Colon and Rectal Surgery  [facility]    This note was created using speech recognition software and may contain unintended word substitutions.

## 2017-08-11 ENCOUNTER — OFFICE VISIT (OUTPATIENT)
Dept: WOUND CARE | Facility: CLINIC | Age: 22
End: 2017-08-11

## 2017-08-11 DIAGNOSIS — S31.809A: Primary | ICD-10-CM

## 2017-08-11 DIAGNOSIS — Z87.2 H/O PILONIDAL CYST: ICD-10-CM

## 2017-08-11 NOTE — MR AVS SNAPSHOT
After Visit Summary   8/11/2017    Neo Saeed    MRN: [id number]           Patient Information     Date Of Birth          1995        Visit Information        Provider Department      8/11/2017 7:00 AM Vika Baer RN [facility] Wound Ostomy        Today's Diagnoses     Open wnd of buttock, unspecified laterality, initial encounter    -  1    H/O pilonidal cyst           Follow-ups after your visit        Your next 10 appointments already scheduled     Aug 18, 2017  7:00 AM CDT   RETURN WOUND NURSE VISIT with Vika Baer RN   [facility] Wound Ostomy ([facility])    [address] [address] [phone]            Aug 25, 2017  7:00 AM CDT   RETURN WOUND NURSE VISIT with Vika Baer RN   [facility] Wound Ostomy ([facility])    [address] [place] [address] [phone]            Sep 01, 2017  7:00 AM CDT   RETURN WOUND NURSE VISIT with Vika Bare RN   [facility] Wound Ostomy ([facility])    [address] [place] [address] [phone]              Who to contact     Please call your clinic at [phone] to:    Ask questions about your health    Make or cancel appointments    Discuss your medicines    Learn about your test results    Speak to your doctor   If you have compliments or concerns about an experience at your clinic, or if you wish to file a complaint, please contact [facility] Patient Relations at [phone] or email us at [email or web address]         Additional Information About Your Visit        MyChart Information     Oxxy is an electronic gateway that provides easy, online access to your medical records. With Oxxy, you can request a clinic appointment, read your test results, renew a prescription or communicate with your care team.     To sign  up for Haztucestahart visit the website at [email or web address]   You will be asked to enter the access code listed below, as well as some personal information. Please follow the directions to create your username and password.     Your access code is: [email or web address]  Expires: 10/23/2017  6:30 AM     Your access code will  in 90 days. If you need help or a new code, please contact your [facility] or call [phone] for assistance.        Care EveryWhere ID     This is your Care EveryWhere ID. This could be used by other organizations to access your [place] medical records  [id number]         Blood Pressure from Last 3 Encounters:   08/10/17 113/74   17 108/64   17 124/88    Weight from Last 3 Encounters:   08/10/17 83.6 kg (184 lb 4.8 oz)   17 83.9 kg (185 lb)   17 85 kg (187 lb 6.3 oz)              Today, you had the following     No orders found for display       Primary Care Provider    Md Other Clinic                Equal Access to Services     [facility]: Hadii malaika Bryant, wanealda desire, qaybmariano arnett, kelly call . So [facility] [phone].    ATENCIÓN: Si habla español, tiene a barrios disposición servicios gratuitos de asistencia lingüística. Darriusame al [phone].    We comply with applicable federal civil rights laws and [place] laws. We do not discriminate on the basis of race, color, national origin, age, disability sex, sexual orientation or gender identity.            Thank you!     Thank you for choosing [facility] WOUND OSTOMY  for your care. Our goal is always to provide you with excellent care. Hearing back from our patients is one way we can continue to improve our services. Please take a few minutes to complete the written survey that you may receive in the mail after your visit with us. Thank you!             Your Updated Medication List - Protect others around you: Learn how to  safely use, store and throw away your medicines at www.disposemymeds.org.          This list is accurate as of: 8/11/17  7:18 AM.  Always use your most recent med list.                   Brand Name Dispense Instructions for use Diagnosis    acetaminophen 325 MG tablet    TYLENOL    100 tablet    Take 2 tablets (650 mg) by mouth 4 times daily for 14 days Alternate with ibuprofen (ADVIL/MOTRIN), IF ordered.    Pilonidal cyst       FEXOFENADINE HCL PO      Take 180 mg by mouth every morning        FLUOXETINE HCL PO      Take 20 mg by mouth every morning        ibuprofen 800 MG tablet    ADVIL/MOTRIN    42 tablet    Take 1 tablet (800 mg) by mouth 3 times daily for 14 days Alternate with acetaminophen (TYLENOL), IF ordered.    Pilonidal cyst       oxyCODONE 5 MG IR tablet    ROXICODONE    30 tablet    Take 1-2 tablets (5-10 mg) by mouth every 3 hours as needed for severe pain    Pilonidal cyst       VITAMIN D (CHOLECALCIFEROL) PO      Take by mouth every morning

## 2017-08-11 NOTE — PROGRESS NOTES
Patient comes to wound clinic for wound assessment per request of dr. Cuba. He has history of a Open surgical wound due to : PROCEDURES PERFORMED: 1.  Evaluation under anesthesia. 2.  Pilonidal cystectomy.  On 08/04/2014  Clean gloves are donned and current dressing removed. Previous treatment includes: seen by surgery  1 dayago  This is treatment week:1    Objective findings:      Location: midline gluteal cleft     Wound measured.: 5 cm x 2 cm x 1.5 cm , Full thickness.    Wound description: no sign of infection    Tenderness:usually    Odor: none     Drainage is copious, serosanguinous     Wound Base: granulation and dry cauterized tissue     Slough appearance:  adherent and dry   40  %    Periwound Skin: erosion of epidermis and erythema,      Color: pink and red     Subjective finding:     Pt states: sore and itchy    Patient is assessed for discomfort which is: moderate    Today's status of the wound: erythema from moisture improved from yesterday, wound bed dry and dark from previous causterization    Treatment: Removal of existing dressing, Visual inspection, Cleansing with scrubcare solution, Irrigation with saline solution, Wound packing with HydroferaBlue dressing ,Application of clean dressing, Non-excisional debridement (no cutting was done), however removal of debris was performed with swabs, gauze and/or wet to dry dressings.     Pt received the following instructions:    Cleansing with PCMX Soap.Irrigate with Normal Saline and water Primary Dressing HydroferaBlue dressing. Secondary Dressing: dry gauze  Secure with: no tape due to skin irriateion. Frequency:Twice daily but change outer gauze as needed for moisture control  Signs and symptoms of infection taught.  Patient needs to be seen 1 week.   Treated and follow-up appointment made. Rossy Castillo was available for supervision of care if needed or if questions should arise and regarding plan of care. Vika Baer RN,  CWON

## 2017-08-18 ENCOUNTER — OFFICE VISIT (OUTPATIENT)
Dept: WOUND CARE | Facility: CLINIC | Age: 22
End: 2017-08-18

## 2017-08-18 DIAGNOSIS — S31.809A: Primary | ICD-10-CM

## 2017-08-18 DIAGNOSIS — Z87.2 H/O PILONIDAL CYST: ICD-10-CM

## 2017-08-18 NOTE — PROGRESS NOTES
Patient comes to wound clinic for wound assessment per request of dr. Cuba. He has history of a Open surgical wound due to : PROCEDURES PERFORMED: 1.  Evaluation under anesthesia. 2.  Pilonidal cystectomy.  On 08/04/2014  Clean gloves are donned and current dressing removed. Previous treatment includes: seen by surgery  1 dayago  This is treatment week:1    Objective findings:      Location: midline gluteal cleft     Wound measured.: 3 cm x 2 cm x 1.5 cm , Full thickness.    Wound description: no sign of infection    Tenderness: improving     Odor: none     Drainage is copious, serosanguinous     Wound Base: granulation slough    Slough appearance:  adherent and dry 20%    Periwound Skin: much improved no more erythema,      Color: pink and red     Subjective finding:     Pt states: sore and itchy    Patient is assessed for discomfort which is: moderate    Today's status of the wound: erythema from moisture improved from last week, wound bed  but still shiney slough on the bottom of the wound    Treatment: Removal of existing dressing, Visual inspection, Cleansing with scrubcare solution, Irrigation with saline solution, Wound packing with mesalt dressing ,Application of clean dressing, Non-excisional debridement (no cutting was done), however removal of debris was performed with swabs, gauze and/or wet to dry dressings.     Pt received the following instructions:    Cleansing with PCMX Soap.Irrigate with Normal Saline and water Primary Dressing Mesalt. Secondary Dressing: dry gauze  Secure with: no tape due to skin irriateion. Frequency:Twice daily but change outer gauze as needed for moisture control  Signs and symptoms of infection taught.  Patient needs to be seen 1 week.   Treated and follow-up appointment made.Dr. Fuller was available for supervision of care if needed or if questions should arise and regarding plan of care. Vika Baer RN, CWON

## 2017-08-18 NOTE — MR AVS SNAPSHOT
After Visit Summary   8/18/2017    Neo Saeed    MRN: [id number]           Patient Information     Date Of Birth          1995        Visit Information        Provider Department      8/18/2017 7:00 AM Vika Baer RN [facility] Wound Ostomy        Today's Diagnoses     Open wnd of buttock, unspecified laterality, initial encounter    -  1    H/O pilonidal cyst           Follow-ups after your visit        Your next 10 appointments already scheduled     Aug 25, 2017  7:00 AM CDT   RETURN WOUND NURSE VISIT with Vika Baer RN   [facility] Wound Ostomy ([facility])    [address] [address] [phone]            Sep 01, 2017  7:00 AM CDT   RETURN WOUND NURSE VISIT with Vika Baer RN   [facility] Wound Ostomy ([facility])    [address] [place] [address] [phone]              Who to contact     Please call your clinic at [phone] to:    Ask questions about your health    Make or cancel appointments    Discuss your medicines    Learn about your test results    Speak to your doctor   If you have compliments or concerns about an experience at your clinic, or if you wish to file a complaint, please contact [facility] Physicians Patient Relations at [phone] or email us at [email or web address] [email or web address]         Additional Information About Your Visit        MyChart Information     OptiScan Biomedical is an electronic gateway that provides easy, online access to your medical records. With OptiScan Biomedical, you can request a clinic appointment, read your test results, renew a prescription or communicate with your care team.     To sign up for SeniorCaret visit the website at [email or web address]   You will be asked to enter the access code listed below, as well as some personal information. Please follow the directions to create your username and  password.     Your access code is: [email or web address]  Expires: 10/23/2017  6:30 AM     Your access code will  in 90 days. If you need help or a new code, please contact your [facility] or call [phone] for assistance.        Care EveryWhere ID     This is your Care EveryWhere ID. This could be used by other organizations to access your [place] medical records  [id number]         Blood Pressure from Last 3 Encounters:   08/10/17 113/74   17 108/64   17 124/88    Weight from Last 3 Encounters:   08/10/17 83.6 kg (184 lb 4.8 oz)   17 83.9 kg (185 lb)   17 85 kg (187 lb 6.3 oz)              Today, you had the following     No orders found for display       Primary Care Provider    Md Other Clinic                Equal Access to Services     [facility]: Hadii aad ku hadasho Socortez, waaxda luqadaha, qaybta kaalmada adekarynayada, kelly call . So [facility] [phone].    ATENCIÓN: Si habla español, tiene a barrios disposición servicios gratuitos de asistencia lingüística. Llame al [phone].    We comply with applicable federal civil rights laws and [place] laws. We do not discriminate on the basis of race, color, national origin, age, disability sex, sexual orientation or gender identity.            Thank you!     Thank you for choosing [facility] WOUND OSTOMY  for your care. Our goal is always to provide you with excellent care. Hearing back from our patients is one way we can continue to improve our services. Please take a few minutes to complete the written survey that you may receive in the mail after your visit with us. Thank you!             Your Updated Medication List - Protect others around you: Learn how to safely use, store and throw away your medicines at www.disposemymeds.org.          This list is accurate as of: 17  1:35 PM.  Always use your most recent med list.                   Brand Name Dispense Instructions for  use Diagnosis    acetaminophen 325 MG tablet    TYLENOL    100 tablet    Take 2 tablets (650 mg) by mouth 4 times daily for 14 days Alternate with ibuprofen (ADVIL/MOTRIN), IF ordered.    Pilonidal cyst       FEXOFENADINE HCL PO      Take 180 mg by mouth every morning        FLUOXETINE HCL PO      Take 20 mg by mouth every morning        ibuprofen 800 MG tablet    ADVIL/MOTRIN    42 tablet    Take 1 tablet (800 mg) by mouth 3 times daily for 14 days Alternate with acetaminophen (TYLENOL), IF ordered.    Pilonidal cyst       oxyCODONE 5 MG IR tablet    ROXICODONE    30 tablet    Take 1-2 tablets (5-10 mg) by mouth every 3 hours as needed for severe pain    Pilonidal cyst       VITAMIN D (CHOLECALCIFEROL) PO      Take by mouth every morning

## 2017-08-25 ENCOUNTER — OFFICE VISIT (OUTPATIENT)
Dept: WOUND CARE | Facility: CLINIC | Age: 22
End: 2017-08-25

## 2017-08-25 DIAGNOSIS — Z87.2 H/O PILONIDAL CYST: ICD-10-CM

## 2017-08-25 DIAGNOSIS — S31.809A: Primary | ICD-10-CM

## 2017-08-25 NOTE — PROGRESS NOTES
Patient comes to wound clinic for wound assessment per request of dr. Cuba. He has history of a Open surgical wound due to : PROCEDURES PERFORMED: 1.  Evaluation under anesthesia. 2.  Pilonidal cystectomy.  On 08/04/2014  Clean gloves are donned and current dressing removed. Previous treatment includes: HydroferaBlue dressing    This is treatment week: 4    Objective findings:      Location: midline gluteal cleft     Wound measured.: 3 cm x 2 cm x 1.5 cm , Full thickness.    Wound description: no sign of infection    Tenderness: none     Odor: none     Drainage is slight, serosanguinous some bleeding    Wound Base: granulation and non-granulation    Slough appearance:  none    Periwound Skin: much improved no more erythema,      Color: pink and red     Subjective finding:     Pt states: sore and itchy    Patient is assessed for discomfort which is: moderate    Today's status of the wound: erythema from moisture improved from last week, wound bed    Treatment: Removal of existing dressing, Visual inspection, Cleansing with Microklenz spray, , Wound packing with HydroferaBlue dressing  ,Application of clean dressing.    Pt received the following instructions:    Cleansing with PCMX Soap.Irrigate with Normal Saline and water Primary Dressing HydroferaBlue dressing. Secondary Dressing: dry gauze  Secure with: no tape due to skin irriateion. Frequency : once if HydroferaBlue dressing stays moist otherwise :Twice daily but change outer gauze as needed for moisture control  Signs and symptoms of infection taught.  Patient needs to be seen 1 week.   Treated and follow-up appointment made.Dr. Biggs was available for supervision of care if needed or if questions should arise and regarding plan of care. Vika Baer RN, CWON

## 2017-08-25 NOTE — MR AVS SNAPSHOT
After Visit Summary   2017    Neo Saeed    MRN: [id number]           Patient Information     Date Of Birth          1995        Visit Information        Provider Department      2017 7:00 AM Vika Baer RN [facility] Wound Ostomy        Today's Diagnoses     Open wnd of buttock, unspecified laterality, initial encounter    -  1    H/O pilonidal cyst           Follow-ups after your visit        Your next 10 appointments already scheduled     Sep 01, 2017  7:00 AM CDT   RETURN WOUND NURSE VISIT with JAUN [facility] Wound Ostomy ([facility])    [address] [facility]  4th Floor  [facility] [address] [phone]              Who to contact     Please call your clinic at [phone] to:    Ask questions about your health    Make or cancel appointments    Discuss your medicines    Learn about your test results    Speak to your doctor   If you have compliments or concerns about an experience at your clinic, or if you wish to file a complaint, please contact [facility] Physicians Patient Relations at [phone] or email us at Jean [email or web address]         Additional Information About Your Visit        MyChart Information     VirtualWorks Groupt is an electronic gateway that provides easy, online access to your medical records. With Native, you can request a clinic appointment, read your test results, renew a prescription or communicate with your care team.     To sign up for VirtualWorks Groupt visit the website at [email or web address]   You will be asked to enter the access code listed below, as well as some personal information. Please follow the directions to create your username and password.     Your access code is: [email or web address]  Expires: 10/23/2017  6:30 AM     Your access code will  in 90 days. If you need help or a new code, please contact your [facility] Clinic or call [phone] for  assistance.        Care EveryWhere ID     This is your Care EveryWhere ID. This could be used by other organizations to access your [place] medical records  [id number]         Blood Pressure from Last 3 Encounters:   08/10/17 113/74   08/04/17 108/64   08/03/17 124/88    Weight from Last 3 Encounters:   08/10/17 83.6 kg (184 lb 4.8 oz)   08/04/17 83.9 kg (185 lb)   08/03/17 85 kg (187 lb 6.3 oz)              Today, you had the following     No orders found for display       Primary Care Provider    Md Other Clinic                Equal Access to Services     [facility]: Hadii malaika Bryant, ronni phipps, reba arnett, kelly call . So [facility] [phone].    ATENCIÓN: Si habla español, tiene a barrios disposición servicios gratuitos de asistencia lingüística. Llame al [phone].    We comply with applicable federal civil rights laws and [place] laws. We do not discriminate on the basis of race, color, national origin, age, disability sex, sexual orientation or gender identity.            Thank you!     Thank you for choosing [facility] OSTOMY  for your care. Our goal is always to provide you with excellent care. Hearing back from our patients is one way we can continue to improve our services. Please take a few minutes to complete the written survey that you may receive in the mail after your visit with us. Thank you!             Your Updated Medication List - Protect others around you: Learn how to safely use, store and throw away your medicines at www.disposemymeds.org.          This list is accurate as of: 8/25/17  7:25 AM.  Always use your most recent med list.                   Brand Name Dispense Instructions for use Diagnosis    FEXOFENADINE HCL PO      Take 180 mg by mouth every morning        FLUOXETINE HCL PO      Take 20 mg by mouth every morning        oxyCODONE 5 MG IR tablet    ROXICODONE    30 tablet    Take 1-2 tablets (5-10 mg) by mouth  every 3 hours as needed for severe pain    Pilonidal cyst       VITAMIN D (CHOLECALCIFEROL) PO      Take by mouth every morning

## 2017-09-01 ENCOUNTER — OFFICE VISIT (OUTPATIENT)
Dept: WOUND CARE | Facility: CLINIC | Age: 22
End: 2017-09-01

## 2017-09-01 DIAGNOSIS — Z87.2 H/O PILONIDAL CYST: ICD-10-CM

## 2017-09-01 DIAGNOSIS — S31.809A: Primary | ICD-10-CM

## 2017-09-01 NOTE — MR AVS SNAPSHOT
After Visit Summary   2017    Neo Saeed    MRN: [id number]           Patient Information     Date Of Birth          1995        Visit Information        Provider Department      2017 7:00 AM Vika Baer RN [facility] Wound Ostomy        Today's Diagnoses     Open wnd of buttock, unspecified laterality, initial encounter    -  1    H/O pilonidal cyst           Follow-ups after your visit        Your next 10 appointments already scheduled     Sep 08, 2017  7:00 AM CDT   RETURN WOUND NURSE VISIT with JAUN [facility] Wound Ostomy ([facility] [place])    [address] [facility]  4th Floor  [facility] [address] [phone]              Who to contact     Please call your clinic at [phone] to:    Ask questions about your health    Make or cancel appointments    Discuss your medicines    Learn about your test results    Speak to your doctor   If you have compliments or concerns about an experience at your clinic, or if you wish to file a complaint, please contact [facility] Physicians Patient Relations at [phone] or email us at Jean [email or web address]         Additional Information About Your Visit        MyChart Information     5211gamet is an electronic gateway that provides easy, online access to your medical records. With [email or web address], you can request a clinic appointment, read your test results, renew a prescription or communicate with your care team.     To sign up for 5211gamet visit the website at [email or web address]   You will be asked to enter the access code listed below, as well as some personal information. Please follow the directions to create your username and password.     Your access code is: [email or web address]  Expires: 10/23/2017  6:30 AM     Your access code will  in 90 days. If you need help or a new code, please contact your [facility] or call [phone] for  assistance.        Care EveryWhere ID     This is your Care EveryWhere ID. This could be used by other organizations to access your [place] medical records  [id number]         Blood Pressure from Last 3 Encounters:   08/10/17 113/74   08/04/17 108/64   08/03/17 124/88    Weight from Last 3 Encounters:   08/10/17 83.6 kg (184 lb 4.8 oz)   08/04/17 83.9 kg (185 lb)   08/03/17 85 kg (187 lb 6.3 oz)              Today, you had the following     No orders found for display       Primary Care Provider    Md Other Clinic                Equal Access to Services     [facility]: Hadii malaika Bryant, ronni phipps, reba arnett, kelly call . So [facility] [phone].    ATENCIÓN: Si habla español, tiene a barrios disposición servicios gratuitos de asistencia lingüística. Llame al [phone].    We comply with applicable federal civil rights laws and [place] laws. We do not discriminate on the basis of race, color, national origin, age, disability sex, sexual orientation or gender identity.            Thank you!     Thank you for choosing [facility] OSTOMY  for your care. Our goal is always to provide you with excellent care. Hearing back from our patients is one way we can continue to improve our services. Please take a few minutes to complete the written survey that you may receive in the mail after your visit with us. Thank you!             Your Updated Medication List - Protect others around you: Learn how to safely use, store and throw away your medicines at www.disposemymeds.org.          This list is accurate as of: 9/1/17  8:06 AM.  Always use your most recent med list.                   Brand Name Dispense Instructions for use Diagnosis    FEXOFENADINE HCL PO      Take 180 mg by mouth every morning        FLUOXETINE HCL PO      Take 20 mg by mouth every morning        oxyCODONE 5 MG IR tablet    ROXICODONE    30 tablet    Take 1-2 tablets (5-10 mg) by mouth  every 3 hours as needed for severe pain    Pilonidal cyst       VITAMIN D (CHOLECALCIFEROL) PO      Take by mouth every morning

## 2017-09-01 NOTE — PROGRESS NOTES
Patient comes to wound clinic for wound assessment per request of dr. Cuba. He has history of a Open surgical wound due to : PROCEDURES PERFORMED: 1.  Evaluation under anesthesia. 2.  Pilonidal cystectomy.  On 08/04/2014  Clean gloves are donned and current dressing removed. Previous treatment includes: HydroferaBlue dressing    This is treatment week: 5    Objective findings:      Location: midline gluteal cleft     Wound measured.: 2.5 cm x 1.5 cm x 1.0 cm , Full thickness.    Wound description: no sign of infection    Tenderness: none     Odor: none     Drainage is slight, serosanguinous some bleeding    Wound Base: granulation and non-granulation    Slough appearance:  none    Periwound Skin: much improved no more erythema,      Color: pink and red     Subjective finding:     Pt states:not much discomfort    Patient is assessed for discomfort which is: moderate    Today's status of the wound: , wound bed  and more shallow  Treatment: Removal of existing dressing, Visual inspection, Cleansing with Microklenz spray, , Wound packing with HydroferaBlue dressing  ,Application of clean dressing.    Pt received the following instructions:    Cleansing with PCMX Soap.Irrigate with Normal Saline and water Primary Dressing HydroferaBlue dressing. Secondary Dressing: dry gauze  Secure with: no tape due to skin irriateion. Frequency : once if HydroferaBlue dressing stays moist otherwise :Twice daily but change outer gauze as needed for moisture control  Signs and symptoms of infection taught.  Patient needs to be seen 1 week.   Treated and follow-up appointment made.Dr. Biggs was available for supervision of care if needed or if questions should arise and regarding plan of care. Vika Baer RN, CWON

## 2017-09-08 ENCOUNTER — OFFICE VISIT (OUTPATIENT)
Dept: WOUND CARE | Facility: CLINIC | Age: 22
End: 2017-09-08

## 2017-09-08 DIAGNOSIS — S31.809A: Primary | ICD-10-CM

## 2017-09-08 DIAGNOSIS — Z87.2 H/O PILONIDAL CYST: ICD-10-CM

## 2017-09-08 NOTE — PROGRESS NOTES
Patient comes to wound clinic for wound assessment per request of dr. Cuba. He has history of a Open surgical wound due to : PROCEDURES PERFORMED: 1.  Evaluation under anesthesia. 2.  Pilonidal cystectomy.  On 08/04/2014  Clean gloves are donned and current dressing removed. Previous treatment includes: HydroferaBlue dressing  This is treatment week: 6    Objective findings:      Location: midline gluteal cleft     Wound measured.: 2. cm x 1.5 cm x 1.0 cm , Full thickness.    Wound description: no sign of infection    Tenderness: none     Odor: none     Drainage is slight, serosanguinous some bleeding    Wound Base: granulation and non-granulation    Slough appearance:  none    Periwound Skin: much improved no more erythema,      Color: pink and red     Subjective finding:     Pt states:not much discomfort    Patient is assessed for discomfort which is: moderate    Today's status of the wound: , wound bed  and more shallow  Treatment: Removal of existing dressing, Visual inspection, Cleansing with Microklenz spray, , Wound packing with HydroferaBlue dressing  ,Application of clean dressing.    Pt received the following instructions:    Cleansing with PCMX Soap.Irrigate with Normal Saline and water Primary Dressing HydroferaBlue dressing. Secondary Dressing: dry gauze  Secure with: no tape due to skin irriateion. Frequency : once if HydroferaBlue dressing stays moist otherwise :Twice daily but change outer gauze as needed for moisture control  Signs and symptoms of infection taught.  Patient needs to be seen 1 week.   Treated and follow-up appointment made.Dr. Biggs was available for supervision of care if needed or if questions should arise and regarding plan of care. Vika Baer RN, CWON

## 2017-09-08 NOTE — MR AVS SNAPSHOT
After Visit Summary   2017    Neo Saeed    MRN: [id number]           Patient Information     Date Of Birth          1995        Visit Information        Provider Department      2017 7:00 AM Vika Baer RN [facility] Wound Ostomy        Today's Diagnoses     Open wnd of buttock, unspecified laterality, initial encounter    -  1    H/O pilonidal cyst           Follow-ups after your visit        Your next 10 appointments already scheduled     Sep 21, 2017  7:00 AM CDT   RETURN WOUND NURSE VISIT with JAUN [facility] Wound Ostomy ([facility] [place])    [address]  4th Floor  [facility] [place] [address] [phone]              Who to contact     Please call your clinic at [phone] to:    Ask questions about your health    Make or cancel appointments    Discuss your medicines    Learn about your test results    Speak to your doctor   If you have compliments or concerns about an experience at your clinic, or if you wish to file a complaint, please contact [facility] Patient Relations at [phone] or email us at Jean [email or web address]         Additional Information About Your Visit        MyChart Information     What's Hott is an electronic gateway that provides easy, online access to your medical records. With iRates, you can request a clinic appointment, read your test results, renew a prescription or communicate with your care team.     To sign up for What's Hott visit the website at [email or web address]   You will be asked to enter the access code listed below, as well as some personal information. Please follow the directions to create your username and password.     Your access code is: [email or web address]  Expires: 10/23/2017  6:30 AM     Your access code will  in 90 days. If you need help or a new code, please contact your [facility] Clinic or call [phone] for  assistance.        Care EveryWhere ID     This is your Care EveryWhere ID. This could be used by other organizations to access your [place] medical records  [id number]         Blood Pressure from Last 3 Encounters:   08/10/17 113/74   08/04/17 108/64   08/03/17 124/88    Weight from Last 3 Encounters:   08/10/17 83.6 kg (184 lb 4.8 oz)   08/04/17 83.9 kg (185 lb)   08/03/17 85 kg (187 lb 6.3 oz)              Today, you had the following     No orders found for display       Primary Care Provider    Md Other Clinic                Equal Access to Services     [facility]: Hadii malaika Bryant, ronni phipps, reba arnett, kelly call . So [facility] [phone].    ATENCIÓN: Si habla español, tiene a barrios disposición servicios gratuitos de asistencia lingüística. Llame al [phone].    We comply with applicable federal civil rights laws and [place] laws. We do not discriminate on the basis of race, color, national origin, age, disability sex, sexual orientation or gender identity.            Thank you!     Thank you for choosing [facility]  for your care. Our goal is always to provide you with excellent care. Hearing back from our patients is one way we can continue to improve our services. Please take a few minutes to complete the written survey that you may receive in the mail after your visit with us. Thank you!             Your Updated Medication List - Protect others around you: Learn how to safely use, store and throw away your medicines at www.disposemymeds.org.          This list is accurate as of: 9/8/17  7:20 AM.  Always use your most recent med list.                   Brand Name Dispense Instructions for use Diagnosis    FEXOFENADINE HCL PO      Take 180 mg by mouth every morning        FLUOXETINE HCL PO      Take 20 mg by mouth every morning        oxyCODONE 5 MG IR tablet    ROXICODONE    30 tablet    Take 1-2 tablets (5-10 mg) by mouth  every 3 hours as needed for severe pain    Pilonidal cyst       VITAMIN D (CHOLECALCIFEROL) PO      Take by mouth every morning

## 2017-09-21 ENCOUNTER — OFFICE VISIT (OUTPATIENT)
Dept: WOUND CARE | Facility: CLINIC | Age: 22
End: 2017-09-21

## 2017-09-21 DIAGNOSIS — S31.809A: Primary | ICD-10-CM

## 2017-09-21 NOTE — PROGRESS NOTES
Patient comes to wound clinic for wound assessment per request of dr. Cuba. He has history of a Open surgical wound due to : PROCEDURES PERFORMED: 1.  Evaluation under anesthesia. 2.  Pilonidal cystectomy.  On 08/04/2014  Clean gloves are donned and current dressing removed. Previous treatment includes:  HydroferaBlue dressing  2 weeks ago  This is treatment week:8    Objective findings:      Location: midline gluteal cleft     Wound measured.: 1 cm x 0.5 cm x 0.2 cm , Full thickness.    Wound description: no sign of infection    Tenderness: none     Odor: none     Drainage is slight, serosanguinous some bleeding    Wound Base: granulation and non-granulation    Slough appearance:  none    Periwound Skin: much improved no more erythema,      Color: pink and red       Subjective finding:     Pt states:not much discomfort    Patient is assessed for discomfort which is: moderate    Today's status of the wound: shallow small area of granulation  Treatment: Removal of existing dressing, Visual inspection, Hair shaved Cleansing with Microklenz spray, , Wound covered  with HydroferaBlue dressing  ,Application of clean dressing.    Pt received the following instructions:    Cleansing with PCMX Soap.Irrigate with Normal Saline and water Primary Dressing HydroferaBlue dressing. Secondary Dressing: dry gauze  Secure with: no tape due to skin irriateion. Frequency : once if HydroferaBlue dressing stays moist otherwise :Twice daily but change outer gauze as needed for moisture control  Signs and symptoms of infection taught.  Patient needs to be seen 2 weeks.   Treated and follow-up appointment made.Dr. Biggs was available for supervision of care if needed or if questions should arise and regarding plan of care. Vika Baer RN, CWON

## 2017-09-21 NOTE — MR AVS SNAPSHOT
After Visit Summary   2017    Neo Saeed    MRN: [id number]           Patient Information     Date Of Birth          1995        Visit Information        Provider Department      2017 7:00 AM Vika Baer RN [facility] Wound Ostomy        Today's Diagnoses     Open wnd of buttock, unspecified laterality, initial encounter    -  1       Follow-ups after your visit        Your next 10 appointments already scheduled     Oct 05, 2017  7:00 AM CDT   RETURN WOUND NURSE VISIT with JAUN [facility] Wound Ostomy ([facility] [place])    [address] [place] [address]  [address] [facility] [address] [phone]              Who to contact     Please call your clinic at [phone] to:    Ask questions about your health    Make or cancel appointments    Discuss your medicines    Learn about your test results    Speak to your doctor   If you have compliments or concerns about an experience at your clinic, or if you wish to file a complaint, please contact [facility] Patient Relations at [phone] or email us at Jean [email or web address]         Additional Information About Your Visit        MyChart Information     Linksifyt is an electronic gateway that provides easy, online access to your medical records. With Tinfoil Security, you can request a clinic appointment, read your test results, renew a prescription or communicate with your care team.     To sign up for Linksifyt visit the website at [email or web address]   You will be asked to enter the access code listed below, as well as some personal information. Please follow the directions to create your username and password.     Your access code is: [email or web address]  Expires: 10/23/2017  6:30 AM     Your access code will  in 90 days. If you need help or a new code, please contact your [facility] or call [phone] for assistance.        Care  EveryWhere ID     This is your Care EveryWhere ID. This could be used by other organizations to access your [place] medical records  [id number]         Blood Pressure from Last 3 Encounters:   08/10/17 113/74   08/04/17 108/64   08/03/17 124/88    Weight from Last 3 Encounters:   08/10/17 83.6 kg (184 lb 4.8 oz)   08/04/17 83.9 kg (185 lb)   08/03/17 85 kg (187 lb 6.3 oz)              Today, you had the following     No orders found for display       Primary Care Provider    Provider Not In System                Equal Access to Services     [facility]: Hadii malaika Bryant, wacarie phipps, reba arnett, kelly call . So [facility] [phone].    ATENCIÓN: Si habla español, tiene a barrios disposición servicios gratuitos de asistencia lingüística. Llame al [phone].    We comply with applicable federal civil rights laws and [place] laws. We do not discriminate on the basis of race, color, national origin, age, disability sex, sexual orientation or gender identity.            Thank you!     Thank you for choosing [facility] OST[facility]  for your care. Our goal is always to provide you with excellent care. Hearing back from our patients is one way we can continue to improve our services. Please take a few minutes to complete the written survey that you may receive in the mail after your visit with us. Thank you!             Your Updated Medication List - Protect others around you: Learn how to safely use, store and throw away your medicines at www.disposemymeds.org.          This list is accurate as of: 9/21/17  7:43 AM.  Always use your most recent med list.                   Brand Name Dispense Instructions for use Diagnosis    FEXOFENADINE HCL PO      Take 180 mg by mouth every morning        FLUOXETINE HCL PO      Take 20 mg by mouth every morning        oxyCODONE 5 MG IR tablet    ROXICODONE    30 tablet    Take 1-2 tablets (5-10 mg) by mouth every 3 hours as  needed for severe pain    Pilonidal cyst       VITAMIN D (CHOLECALCIFEROL) PO      Take by mouth every morning

## 2017-10-05 ENCOUNTER — OFFICE VISIT (OUTPATIENT)
Dept: WOUND CARE | Facility: CLINIC | Age: 22
End: 2017-10-05

## 2017-10-05 DIAGNOSIS — S31.809A: Primary | ICD-10-CM

## 2017-10-05 NOTE — MR AVS SNAPSHOT
After Visit Summary   10/5/2017    Neo Saeed    MRN: [id number]           Patient Information     Date Of Birth          1995        Visit Information        Provider Department      10/5/2017 7:00 AM Vika Baer RN  [facility]ound Ostomy        Today's Diagnoses     Open wnd of buttock, unspecified laterality, initial encounter    -  1       Follow-ups after your visit        Who to contact     Please call your clinic at [phone] to:    Ask questions about your health    Make or cancel appointments    Discuss your medicines    Learn about your test results    Speak to your doctor   If you have compliments or concerns about an experience at your clinic, or if you wish to file a complaint, please contact [facility] Patient Relations at [phone] or email us at [email or web address]         Additional Information About Your Visit        MyChart Information     GemSharet is an electronic gateway that provides easy, online access to your medical records. With GiftRocket, you can request a clinic appointment, read your test results, renew a prescription or communicate with your care team.     To sign up for GemSharet visit the website at [email or web address]   You will be asked to enter the access code listed below, as well as some personal information. Please follow the directions to create your username and password.     Your access code is: [email or web address]  Expires: 10/23/2017  6:30 AM     Your access code will  in 90 days. If you need help or a new code, please contact your [facility] or call [phone] for assistance.        Care EveryWhere ID     This is your Care EveryWhere ID. This could be used by other organizations to access your [place] medical records  [id number]         Blood Pressure from Last 3 Encounters:   08/10/17 113/74   17 108/64   17 124/88    Weight from Last 3 Encounters:    08/10/17 83.6 kg (184 lb 4.8 oz)   08/04/17 83.9 kg (185 lb)   08/03/17 85 kg (187 lb 6.3 oz)              Today, you had the following     No orders found for display       Primary Care Provider    None Specified       No primary provider on file.        Equal Access to Services     [facility] : Hadii malaika dye hadhectoro Sovikkiali, waaxda luqadaha, qaybta kaalmada hope, kelly rizoannjudy call . So [facility] [phone].    ATENCIÓN: Si habla español, tiene a barrios disposición servicios gratuitos de asistencia lingüística. Llame al [phone].    We comply with applicable federal civil rights laws and [place] laws. We do not discriminate on the basis of race, color, national origin, age, disability, sex, sexual orientation, or gender identity.            Thank you!     Thank you for choosing [facility]  for your care. Our goal is always to provide you with excellent care. Hearing back from our patients is one way we can continue to improve our services. Please take a few minutes to complete the written survey that you may receive in the mail after your visit with us. Thank you!             Your Updated Medication List - Protect others around you: Learn how to safely use, store and throw away your medicines at www.disposemymeds.org.          This list is accurate as of: 10/5/17  7:23 AM.  Always use your most recent med list.                   Brand Name Dispense Instructions for use Diagnosis    FEXOFENADINE HCL PO      Take 180 mg by mouth every morning        FLUOXETINE HCL PO      Take 20 mg by mouth every morning        oxyCODONE 5 MG IR tablet    ROXICODONE    30 tablet    Take 1-2 tablets (5-10 mg) by mouth every 3 hours as needed for severe pain    Pilonidal cyst       VITAMIN D (CHOLECALCIFEROL) PO      Take by mouth every morning

## 2017-10-05 NOTE — PROGRESS NOTES
Patient comes to wound clinic for wound assessment per request of dr. Cuba. He has history of a Open surgical wound due to : PROCEDURES PERFORMED: 1.  Evaluation under anesthesia. 2.  Pilonidal cystectomy.  On 08/04/2014  Clean gloves are donned and current dressing removed. Previous treatment includes:  HydroferaBlue dressing  2 weeks ago  This is treatment week:10    Objective findings: healed      Location: midline gluteal cleft   Wound measured.: no measurable wound      Subjective finding:     Pt states:not much discomfort    Patient is assessed for discomfort which is: moderate    Today's status of the wound: slight maceration of scar tissue die to moisture but no wound or open epidermis observed  Treatment: Removal of existing dressing, Visual inspection, Hair shaved Cleansing with Microklenz spray, , Wound covered  with HydroferaBlue dressing  ,Application of clean dressing.    Pt received the following instructions:    Signs and symptoms of infection taught.  Keep gauze in gluteal cleft for another week to toughen up the scar. Rtc PRN.   Treated and follow-up appointment made.Dr. Stevens was available for supervision of care if needed or if questions should arise and regarding plan of care. Vika Baer RN, CWON

## 2018-03-27 ENCOUNTER — OFFICE VISIT (OUTPATIENT)
Dept: INTERNAL MEDICINE | Facility: CLINIC | Age: 23
End: 2018-03-27
Payer: COMMERCIAL

## 2018-03-27 VITALS
HEART RATE: 61 BPM | RESPIRATION RATE: 16 BRPM | SYSTOLIC BLOOD PRESSURE: 116 MMHG | DIASTOLIC BLOOD PRESSURE: 79 MMHG | BODY MASS INDEX: 29.43 KG/M2 | WEIGHT: 199.3 LBS

## 2018-03-27 DIAGNOSIS — Z23 NEED FOR PROPHYLACTIC VACCINATION WITH TETANUS-DIPHTHERIA (TD): Primary | ICD-10-CM

## 2018-03-27 DIAGNOSIS — F32.A DEPRESSION, UNSPECIFIED DEPRESSION TYPE: ICD-10-CM

## 2018-03-27 RX ORDER — FLUOXETINE 10 MG/1
20 CAPSULE ORAL EVERY MORNING
Qty: 90 CAPSULE | Refills: 3 | Status: SHIPPED | OUTPATIENT
Start: 2018-03-27 | End: 2018-07-25 | Stop reason: ALTCHOICE

## 2018-03-27 ASSESSMENT — ENCOUNTER SYMPTOMS
HOARSE VOICE: 1
TROUBLE SWALLOWING: 0
SINUS CONGESTION: 0
SMELL DISTURBANCE: 0
TASTE DISTURBANCE: 0
SINUS PAIN: 0
NECK MASS: 0
SORE THROAT: 0

## 2018-03-27 ASSESSMENT — PAIN SCALES - GENERAL: PAINLEVEL: NO PAIN (0)

## 2018-03-27 NOTE — PROGRESS NOTES
PRIMARY CARE CLINIC    Resident Clinic Note        Visit Date: March 27, 2018    Neo Saeed  MRN: [id number]  YOB: 1995    HPI:  Neo Saeed is a 22 year old male with PMH significant for depression/anxiety presenting to [facility].    He was bouncing between providers for his prescriptions while he was in college and was hoping to have a regular doctor.    Has been on fluoxetine since he was 15 years old. Has tried to come off it twice in the interim, both times he felt really terrible. He is not interested in trying this again today, but is in generally good spirits. Is concerned that he's been gaining weight over the last several years, wondering if fluoxetine is contributing to this.     ROS:  6 point ROS was reviewed and negative other than stated in HPI    Past medical history reviewed.    Past Medical History:   Diagnosis Date     Allergic state      Anxiety        No Known Allergies    Past Surgical History:   Procedure Laterality Date     CYSTECTOMY PILONIDAL N/A 8/4/2017    Procedure: CYSTECTOMY PILONIDAL;  Lay Open Pilonidal Cystectomy;  Surgeon: Jenaro Cuba MD;  Location:  OR     DENTAL SURGERY      [place] teeth extraction     ENDOSCOPY       HERNIA REPAIR       HERNIA REPAIR         Family History   Problem Relation Age of Onset     Hyperlipidemia Father      DIABETES Maternal Grandmother      Bipolar Disorder Maternal Grandmother      Parkinsonism Maternal Grandfather      Hyperlipidemia Paternal Grandfather      Coronary Artery Disease Paternal Grandfather      CANCER Paternal Grandfather        Social History     Social History     Marital status: Single     Spouse name: N/A     Number of children: N/A     Years of education: N/A     Occupational History     Not on file.     Social History Main Topics     Smoking status: Never Smoker     Smokeless tobacco: Never Used     Alcohol use Yes      Comment: Couple of drinks once a week     Drug use: No     Sexual  activity: Yes     Partners: Male     Birth control/ protection: Condom     Other Topics Concern     Not on file     Social History Narrative       Current Outpatient Prescriptions   Medication     FLUoxetine (PROZAC) 10 MG capsule     VITAMIN D, CHOLECALCIFEROL, PO     FEXOFENADINE HCL PO     No current facility-administered medications for this visit.        Vitals:  /79  Pulse 61  Resp 16  Wt 90.4 kg (199 lb 4.8 oz)  BMI 29.43 kg/m2    Physical Exam:  General: NAD  HEENT: Oral mucosa moist and non-erythematous, PERRLA, EOM intact  CV: RRR, normal S1S2, no m/c/r  Resp: Clear to auscultation bilaterally, no wheezes or crackles  Abd: Soft, non-tender, BS+, no masses appreciated  Extremities: WWP, no pedal edema  Neuro: AAOx3, no lateralizing symptoms or focal neurologic deficits    Assessment:  Neo Saeed is a 22 year old male with PMH significant for depression/anxiety presenting to [facility] care.    Plan:  #Anxiety/Depression  - Stable, will prescribe 6 months medications, follow up    #Weight loss  Patient concerned about weight, with BMI over 29. Despite weight, he appears healthy, with waist circumference between 32-34. Reviewed an average day of eating. Recommended tracking with [employer], avoiding alcohol.     Health Maintenance: TDAP today, patient declined STI screening.     Follow-up: 6months    Patient seen and discussed with Dr. Alfonso.     Huy Mendez MD, SHOBHA  PGY-2, Internal Medicine   [phone]       Pt was seen and examined with Dr. Mendez.  I agree with his documentation as noted above.    My additional comments: none    Chayito Alfonso MD

## 2018-03-27 NOTE — PATIENT INSTRUCTIONS
[facility]: [phone]     [facility] Medication Refill Request Information:  * Please contact your pharmacy regarding ANY request for medication refills.  ** [facility]ax = [phone]  * Please allow 3 business days for routine medication refills.  * Please allow 5 business days for controlled substance medication refills.     [facility] Test Result notification information:  *You will be notified with in 7-10 days of your appointment day regarding the results of your test.  If you are on MyChart you will be notified as soon as the provider has reviewed the results and signed off on them.      Follow up in 6 months

## 2018-03-27 NOTE — MR AVS SNAPSHOT
After Visit Summary   3/27/2018    Neo Saeed    MRN: [id number]           Patient Information     Date Of Birth          1995        Visit Information        Provider Department      3/27/2018 7:35 AM Huy Mendez MD [facility]        Today's Diagnoses     Need for prophylactic vaccination with tetanus-diphtheria (TD)    -  1    Depression, unspecified depression type          Care Instructions    Primary Care Center: [phone]     Primary Care Center Medication Refill Request Information:  * Please contact your pharmacy regarding ANY request for medication refills.  ** [facility] Prescription Fax = [phone]  * Please allow 3 business days for routine medication refills.  * Please allow 5 business days for controlled substance medication refills.     Primary Care Center Test Result notification information:  *You will be notified with in 7-10 days of your appointment day regarding the results of your test.  If you are on MyChart you will be notified as soon as the provider has reviewed the results and signed off on them.      Follow up in 6 months           Follow-ups after your visit        Who to contact     Please call your clinic at [phone] to:    Ask questions about your health    Make or cancel appointments    Discuss your medicines    Learn about your test results    Speak to your doctor            Additional Information About Your Visit        MyChart Information     MyChart is an electronic gateway that provides easy, online access to your medical records. With MyChart, you can request a clinic appointment, read your test results, renew a prescription or communicate with your care team.     To sign up for MyChart visit the website at [email or web address]   You will be asked to enter the access code listed below, as well as some personal information. Please follow the directions to create your username and password.     Your access code is:  [email or web address]  Expires: 2018  6:30 AM     Your access code will  in 90 days. If you need help or a new code, please contact your [facility] or call [phone] for assistance.        Care EveryWhere ID     This is your Care EveryWhere ID. This could be used by other organizations to access your [facility] medical records  [id number]        Your Vitals Were     Pulse Respirations BMI (Body Mass Index)             61 16 29.43 kg/m2          Blood Pressure from Last 3 Encounters:   18 116/79   08/10/17 113/74   17 108/64    Weight from Last 3 Encounters:   18 90.4 kg (199 lb 4.8 oz)   08/10/17 83.6 kg (184 lb 4.8 oz)   17 83.9 kg (185 lb)              We Performed the Following     TDAP ( BOOSTRIX AGES 10-64)          Today's Medication Changes          These changes are accurate as of 3/27/18  8:08 AM.  If you have any questions, ask your nurse or doctor.               These medicines have changed or have updated prescriptions.        Dose/Directions    FLUoxetine 10 MG capsule   Commonly known as:  PROzac   This may have changed:  medication strength   Used for:  Depression, unspecified depression type   Changed by:  Huy Mendez MD        Dose:  20 mg   Take 2 capsules (20 mg) by mouth every morning   Quantity:  90 capsule   Refills:  3         Stop taking these medicines if you haven't already. Please contact your care team if you have questions.     oxyCODONE IR 5 MG tablet   Commonly known as:  ROXICODONE   Stopped by:  Huy Mendez MD                Where to get your medicines      These medications were sent to [facility] - [facility], [place] - [address]  [address] [address]     Phone:  [phone]     FLUoxetine 10 MG capsule                Primary Care Provider Office Phone # Fax #    Huy Mendez [phone]       [address]  [address]  [facility] [address]        Equal Access to Services     [facility] FORREST : Hadii aad ku hadhectormagalys Bryant, wanealcandelaria phipps, harmeetkelly roque. So [facility] [phone].    ATENCIÓN: Si habla español, tiene a barrios disposición servicios gratuitos de asistencia lingüística. Llame al [phone].    We comply with applicable federal civil rights laws and [place] laws. We do not discriminate on the basis of race, color, national origin, age, disability, sex, sexual orientation, or gender identity.            Thank you!     Thank you for choosing [facility]  for your care. Our goal is always to provide you with excellent care. Hearing back from our patients is one way we can continue to improve our services. Please take a few minutes to complete the written survey that you may receive in the mail after your visit with us. Thank you!             Your Updated Medication List - Protect others around you: Learn how to safely use, store and throw away your medicines at www.disposemymeds.org.          This list is accurate as of 3/27/18  8:08 AM.  Always use your most recent med list.                   Brand Name Dispense Instructions for use Diagnosis    FEXOFENADINE HCL PO      Take 180 mg by mouth every morning        FLUoxetine 10 MG capsule    PROzac    90 capsule    Take 2 capsules (20 mg) by mouth every morning    Depression, unspecified depression type       VITAMIN D (CHOLECALCIFEROL) PO      Take by mouth every morning

## 2018-03-28 ASSESSMENT — PATIENT HEALTH QUESTIONNAIRE - PHQ9: SUM OF ALL RESPONSES TO PHQ QUESTIONS 1-9: 3

## 2018-07-24 NOTE — PROGRESS NOTES
[facility]  Primary Care Center   Rima LEFREN Rhodes CNP  07/25/2018      Chief Complaint:   Medication Request; Recheck Medication; and Consult       History of Present Illness:   Neo Saeed is a 22 year old male with a history of depression and anxiety who presents for evaluation of thyroid function and follow up on depression. His father has newly found hypothyroidism and he would like to be evaluated. Over the past two years the patient has been experiencing weight gain in spite of exercising three times a week and fatigue. He denies hair loss or skin changes. His bowel movements have never been normal, diarrhea is a long standing issue. +difficulty with temperature control. He denies feeling a rapid heart beat or palpitations.    He feels Prozac is not as effective as it has been in the past. He also tried to discontinue Prozac at one point which did not go well and he experienced withdrawal symptoms. He has difficulty staying asleep, he wakes around 3:00 and is not able to go back to sleep. His mother had a similar issue and was changed from Prozac to Wellbutrin with success. He travels often for work and hasn't seen a psychologist or therapist for a while.     PHQ-9 SCORE 3/27/2018 7/25/2018   Total Score 3 7     LETI-7 SCORE 7/25/2018   Total Score 5     Review of Systems:   Pertinent items are noted in HPI, remainder of complete ROS is negative.      Active Medications:     Current Outpatient Prescriptions:      buPROPion (WELLBUTRIN SR) 100 MG 12 hr tablet, Take 1 tablet (100 mg) by mouth 2 times daily, Disp: 60 tablet, Rfl: 1     FEXOFENADINE HCL PO, Take 180 mg by mouth every morning , Disp: , Rfl:     Prozac, 10mg per day    Allergies:   Review of patient's allergies indicates no known allergies.      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[place]  Stress relief through reading and exercise    Physical Exam:   /74  Pulse 76  Wt 90.5 kg (199 lb 9.6 oz)  BMI 29.48 kg/m2   Constitutional: Alert, oriented, pleasant, no acute distress  Head: Normocephalic, atraumatic  Ears: tympanic membranes pearly gray with positive light reflex  ENT: Oropharynx clear, moist mucus membranes, good dentition  Neck: Supple, no lymphadenopathy, no thyromegaly  Cardiovascular: Regular rate and rhythm, no murmurs, rubs or gallops, peripheral pulses full/symmetric  Respiratory: Good air movement bilaterally, lungs clear, no wheezes/rales/rhonchi  Psychiatric: normal mentation, affect and mood     Labs  Component      Latest Ref Rng & Units 7/25/2018   TSH      0.40 - 4.00 mU/L 1.24   Vitamin D Deficiency screening      20 - 75 ug/L 42   Hemoglobin      13.3 - 17.7 g/dL 15.7     Assessment and Plan:    Mild episode of recurrent major depressive disorder (H)   He agreed to consider seeing a therapist as his schedule allows, he often travels for work. Reviewed that medication is most effective when taken in conjunction with psychotherapy. Since he feels prozac is no longer working we agreed to wean off while adding Wellbutrin. Reviewed instructions for this.   - BEHAVIORAL / SPIRITUAL HEALTH ([facility] ONLY)  - buPROPion (WELLBUTRIN SR) 100 MG 12 hr tablet  Dispense: 60 tablet; Refill: 1    Fatigue, unspecified type  Patient notes fatigue, weight gain and has new found family history of hypothyroidism. Check thyroid function, along with vitamin D and Hb.   - TSH with free T4 reflex  - Vitamin D Deficiency  - Hemoglobin     Follow-up: in 4 weeks to reassess mood and med management         Scribe Disclosure:   I, Donnell Olson, am serving as a scribe to document services personally performed by EFREN Mark CNP at this visit, based upon the provider's statements to me. All documentation has been reviewed by the aforementioned provider prior to being entered into the official medical record.     Portions of this medical record were completed by a scribe. UPON MY REVIEW AND AUTHENTICATION BY ELECTRONIC SIGNATURE, this confirms (a) I performed the applicable clinical services, and (b) the record is accurate.     EFREN Mark CNP

## 2018-07-25 ENCOUNTER — OFFICE VISIT (OUTPATIENT)
Dept: INTERNAL MEDICINE | Facility: CLINIC | Age: 23
End: 2018-07-25
Payer: COMMERCIAL

## 2018-07-25 VITALS
SYSTOLIC BLOOD PRESSURE: 127 MMHG | DIASTOLIC BLOOD PRESSURE: 74 MMHG | HEART RATE: 76 BPM | BODY MASS INDEX: 29.48 KG/M2 | WEIGHT: 199.6 LBS

## 2018-07-25 DIAGNOSIS — R53.83 FATIGUE, UNSPECIFIED TYPE: ICD-10-CM

## 2018-07-25 DIAGNOSIS — F33.0 MILD EPISODE OF RECURRENT MAJOR DEPRESSIVE DISORDER (H): ICD-10-CM

## 2018-07-25 DIAGNOSIS — Z11.3 SCREENING EXAMINATION FOR VENEREAL DISEASE: Primary | ICD-10-CM

## 2018-07-25 LAB
DEPRECATED CALCIDIOL+CALCIFEROL SERPL-MC: 42 UG/L (ref 20–75)
HGB BLD-MCNC: 15.7 G/DL (ref 13.3–17.7)
TSH SERPL DL<=0.005 MIU/L-ACNC: 1.24 MU/L (ref 0.4–4)

## 2018-07-25 RX ORDER — FLUOXETINE 10 MG/1
20 CAPSULE ORAL EVERY MORNING
Qty: 90 CAPSULE | Refills: 3 | Status: CANCELLED | OUTPATIENT
Start: 2018-07-25

## 2018-07-25 RX ORDER — BUPROPION HYDROCHLORIDE 100 MG/1
100 TABLET, EXTENDED RELEASE ORAL 2 TIMES DAILY
Qty: 60 TABLET | Refills: 1 | Status: SHIPPED | OUTPATIENT
Start: 2018-07-25 | End: 2018-08-28

## 2018-07-25 ASSESSMENT — ANXIETY QUESTIONNAIRES
2. NOT BEING ABLE TO STOP OR CONTROL WORRYING: SEVERAL DAYS
7. FEELING AFRAID AS IF SOMETHING AWFUL MIGHT HAPPEN: SEVERAL DAYS
1. FEELING NERVOUS, ANXIOUS, OR ON EDGE: SEVERAL DAYS
GAD7 TOTAL SCORE: 5
IF YOU CHECKED OFF ANY PROBLEMS ON THIS QUESTIONNAIRE, HOW DIFFICULT HAVE THESE PROBLEMS MADE IT FOR YOU TO DO YOUR WORK, TAKE CARE OF THINGS AT HOME, OR GET ALONG WITH OTHER PEOPLE: NOT DIFFICULT AT ALL
5. BEING SO RESTLESS THAT IT IS HARD TO SIT STILL: NOT AT ALL
6. BECOMING EASILY ANNOYED OR IRRITABLE: SEVERAL DAYS
3. WORRYING TOO MUCH ABOUT DIFFERENT THINGS: SEVERAL DAYS

## 2018-07-25 ASSESSMENT — PATIENT HEALTH QUESTIONNAIRE - PHQ9: 5. POOR APPETITE OR OVEREATING: NOT AT ALL

## 2018-07-25 ASSESSMENT — PAIN SCALES - GENERAL: PAINLEVEL: NO PAIN (0)

## 2018-07-25 NOTE — PATIENT INSTRUCTIONS
[facility] Medication Refill Request Information:  * Please contact your pharmacy regarding ANY request for medication refills.  ** [facility] Prescription Fax = [phone]  * Please allow 3 business days for routine medication refills.  * Please allow 5 business days for controlled substance medication refills.     [facility] Test Result notification information:  *You will be notified with in 7-10 days of your appointment day regarding the results of your test.  If you are on MyChart you will be notified as soon as the provider has reviewed the results and signed off on them.    [facility]: [phone]     Bupropion Hydrochloride (Wellbutrin) Oral tablet [Depression/Mood Disorders]  What is this medicine?  BUPROPION (byoo PROE pee on) is used to treat depression.  This medicine may be used for other purposes; ask your health care provider or pharmacist if you have questions.  What should I tell my health care provider before I take this medicine?  They need to know if you have any of these conditions:    an eating disorder, such as anorexia or bulimia    bipolar disorder or psychosis    diabetes or high blood sugar, treated with medication    glaucoma    heart disease, previous heart attack, or irregular heart beat    head injury or brain tumor    high blood pressure    kidney or liver disease    seizures    suicidal thoughts or a previous suicide attempt    Tourette's syndrome    weight loss    an unusual or allergic reaction to bupropion, other medicines, foods, dyes, or preservatives    breast-feeding    pregnant or trying to become pregnant  How should I use this medicine?  Take this medicine by mouth with a glass of water. Follow the directions on the prescription label. You can take it with or without food. If it upsets your stomach, take it with food. Take your medicine at regular intervals. Do not take your medicine more often than directed. Do not stop taking this medicine suddenly  except upon the advice of your doctor. Stopping this medicine too quickly may cause serious side effects or your condition may worsen.  A special MedGuide will be given to you by the pharmacist with each prescription and refill. Be sure to read this information carefully each time.  Talk to your pediatrician regarding the use of this medicine in children. Special care may be needed.  Overdosage: If you think you have taken too much of this medicine contact a poison control center or emergency room at once.  NOTE: This medicine is only for you. Do not share this medicine with others.  What if I miss a dose?  If you miss a dose, take it as soon as you can. If it is less than four hours to your next dose, take only that dose and skip the missed dose. Do not take double or extra doses.  What may interact with this medicine?  Do not take this medicine with any of the following medications:    linezolid    MAOIs like Azilect, Carbex, Eldepryl, Marplan, Nardil, and Parnate    methylene blue (injected into a vein)    other medicines that contain bupropion like Zyban  This medicine may also interact with the following medications:    alcohol    certain medicines for anxiety or sleep    certain medicines for blood pressure like metoprolol, propranolol    certain medicines for depression or psychotic disturbances    certain medicines for HIV or AIDS like efavirenz, lopinavir, nelfinavir, ritonavir    certain medicines for irregular heart beat like propafenone, flecainide    certain medicines for Parkinson's disease like amantadine, levodopa    certain medicines for seizures like carbamazepine, phenytoin, phenobarbital    cimetidine    clopidogrel    cyclophosphamide    furazolidone    isoniazid    nicotine    orphenadrine    procarbazine    steroid medicines like prednisone or cortisone    stimulant medicines for attention disorders, weight loss, or to stay  awake    tamoxifen    theophylline    thiotepa    ticlopidine    tramadol    warfarin  This list may not describe all possible interactions. Give your health care provider a list of all the medicines, herbs, non-prescription drugs, or dietary supplements you use. Also tell them if you smoke, drink alcohol, or use illegal drugs. Some items may interact with your medicine.  What should I watch for while using this medicine?  Tell your doctor if your symptoms do not get better or if they get worse. Visit your doctor or health care professional for regular checks on your progress. Because it may take several weeks to see the full effects of this medicine, it is important to continue your treatment as prescribed by your doctor.  Patients and their families should watch out for new or worsening thoughts of suicide or depression. Also watch out for sudden changes in feelings such as feeling anxious, agitated, panicky, irritable, hostile, aggressive, impulsive, severely restless, overly excited and hyperactive, or not being able to sleep. If this happens, especially at the beginning of treatment or after a change in dose, call your health care professional.  Avoid alcoholic drinks while taking this medicine. Drinking excessive alcoholic beverages, using sleeping or anxiety medicines, or quickly stopping the use of these agents while taking this medicine may increase your risk for a seizure.  Do not drive or use heavy machinery until you know how this medicine affects you. This medicine can impair your ability to perform these tasks.  Do not take this medicine close to bedtime. It may prevent you from sleeping.  Your mouth may get dry. Chewing sugarless gum or sucking hard candy, and drinking plenty of water may help. Contact your doctor if the problem does not go away or is severe.  What side effects may I notice from receiving this medicine?  Side effects that you should report to your doctor or health care professional as  soon as possible:    allergic reactions like skin rash, itching or hives, swelling of the face, lips, or tongue    breathing problems    changes in vision    confusion    fast or irregular heartbeat    hallucinations    increased blood pressure    redness, blistering, peeling or loosening of the skin, including inside the mouth    seizures    suicidal thoughts or other mood changes    unusually weak or tired    vomiting  Side effects that usually do not require medical attention (report to your doctor or health care professional if they continue or are bothersome):    change in sex drive or performance    constipation    headache    loss of appetite    nausea    tremors    weight loss  This list may not describe all possible side effects. Call your doctor for medical advice about side effects. You may report side effects to FDA at 0-673-FDK-2688.  Where should I keep my medicine?  Keep out of the reach of children.  Store at room temperature between 15 and 25 degrees C (59 and 77 degrees F), away from direct sunlight and moisture. Keep tightly closed. Throw away any unused medicine after the expiration date.  NOTE:This sheet is a summary. It may not cover all possible information. If you have questions about this medicine, talk to your doctor, pharmacist, or health care provider. Copyright  2016 [employer]

## 2018-07-25 NOTE — NURSING NOTE
Chief Complaint   Patient presents with     Medication Request     pt here for med refill     Recheck Medication     pt would like to discuss medications     Consult     pt would like to have test done for thyroid       Deidre Cheema CMA at 6:58 AM on 7/25/2018.

## 2018-07-25 NOTE — MR AVS SNAPSHOT
After Visit Summary   7/25/2018    Neo Saeed    MRN: [id number]           Patient Information     Date Of Birth          1995        Visit Information        Provider Department      7/25/2018 7:00 AM Rima Sandoval APRN CNP M [facility]        Today's Diagnoses     Screening examination for venereal disease    -  1    Mild episode of recurrent major depressive disorder (H)        Fatigue, unspecified type          Care Instructions    Primary Care Center Medication Refill Request Information:  * Please contact your pharmacy regarding ANY request for medication refills.  ** [facility] Prescription Fax = [phone]  * Please allow 3 business days for routine medication refills.  * Please allow 5 business days for controlled substance medication refills.     Primary Care Center Test Result notification information:  *You will be notified with in 7-10 days of your appointment day regarding the results of your test.  If you are on MyChart you will be notified as soon as the provider has reviewed the results and signed off on them.    Primary Care Center: [phone]     Bupropion Hydrochloride (Wellbutrin) Oral tablet [Depression/Mood Disorders]  What is this medicine?  BUPROPION (byoo PROE pee on) is used to treat depression.  This medicine may be used for other purposes; ask your health care provider or pharmacist if you have questions.  What should I tell my health care provider before I take this medicine?  They need to know if you have any of these conditions:    an eating disorder, such as anorexia or bulimia    bipolar disorder or psychosis    diabetes or high blood sugar, treated with medication    glaucoma    heart disease, previous heart attack, or irregular heart beat    head injury or brain tumor    high blood pressure    kidney or liver disease    seizures    suicidal thoughts or a previous suicide attempt    Tourette's syndrome    weight loss    an unusual or  allergic reaction to bupropion, other medicines, foods, dyes, or preservatives    breast-feeding    pregnant or trying to become pregnant  How should I use this medicine?  Take this medicine by mouth with a glass of water. Follow the directions on the prescription label. You can take it with or without food. If it upsets your stomach, take it with food. Take your medicine at regular intervals. Do not take your medicine more often than directed. Do not stop taking this medicine suddenly except upon the advice of your doctor. Stopping this medicine too quickly may cause serious side effects or your condition may worsen.  A special MedGuide will be given to you by the pharmacist with each prescription and refill. Be sure to read this information carefully each time.  Talk to your pediatrician regarding the use of this medicine in children. Special care may be needed.  Overdosage: If you think you have taken too much of this medicine contact a poison control center or emergency room at once.  NOTE: This medicine is only for you. Do not share this medicine with others.  What if I miss a dose?  If you miss a dose, take it as soon as you can. If it is less than four hours to your next dose, take only that dose and skip the missed dose. Do not take double or extra doses.  What may interact with this medicine?  Do not take this medicine with any of the following medications:    linezolid    MAOIs like Azilect, Carbex, Eldepryl, Marplan, Nardil, and Parnate    methylene blue (injected into a vein)    other medicines that contain bupropion like Zyban  This medicine may also interact with the following medications:    alcohol    certain medicines for anxiety or sleep    certain medicines for blood pressure like metoprolol, propranolol    certain medicines for depression or psychotic disturbances    certain medicines for HIV or AIDS like efavirenz, lopinavir, nelfinavir, ritonavir    certain medicines for irregular heart beat  like propafenone, flecainide    certain medicines for Parkinson's disease like amantadine, levodopa    certain medicines for seizures like carbamazepine, phenytoin, phenobarbital    cimetidine    clopidogrel    cyclophosphamide    furazolidone    isoniazid    nicotine    orphenadrine    procarbazine    steroid medicines like prednisone or cortisone    stimulant medicines for attention disorders, weight loss, or to stay awake    tamoxifen    theophylline    thiotepa    ticlopidine    tramadol    warfarin  This list may not describe all possible interactions. Give your health care provider a list of all the medicines, herbs, non-prescription drugs, or dietary supplements you use. Also tell them if you smoke, drink alcohol, or use illegal drugs. Some items may interact with your medicine.  What should I watch for while using this medicine?  Tell your doctor if your symptoms do not get better or if they get worse. Visit your doctor or health care professional for regular checks on your progress. Because it may take several weeks to see the full effects of this medicine, it is important to continue your treatment as prescribed by your doctor.  Patients and their families should watch out for new or worsening thoughts of suicide or depression. Also watch out for sudden changes in feelings such as feeling anxious, agitated, panicky, irritable, hostile, aggressive, impulsive, severely restless, overly excited and hyperactive, or not being able to sleep. If this happens, especially at the beginning of treatment or after a change in dose, call your health care professional.  Avoid alcoholic drinks while taking this medicine. Drinking excessive alcoholic beverages, using sleeping or anxiety medicines, or quickly stopping the use of these agents while taking this medicine may increase your risk for a seizure.  Do not drive or use heavy machinery until you know how this medicine affects you. This medicine can impair your ability  to perform these tasks.  Do not take this medicine close to bedtime. It may prevent you from sleeping.  Your mouth may get dry. Chewing sugarless gum or sucking hard candy, and drinking plenty of water may help. Contact your doctor if the problem does not go away or is severe.  What side effects may I notice from receiving this medicine?  Side effects that you should report to your doctor or health care professional as soon as possible:    allergic reactions like skin rash, itching or hives, swelling of the face, lips, or tongue    breathing problems    changes in vision    confusion    fast or irregular heartbeat    hallucinations    increased blood pressure    redness, blistering, peeling or loosening of the skin, including inside the mouth    seizures    suicidal thoughts or other mood changes    unusually weak or tired    vomiting  Side effects that usually do not require medical attention (report to your doctor or health care professional if they continue or are bothersome):    change in sex drive or performance    constipation    headache    loss of appetite    nausea    tremors    weight loss  This list may not describe all possible side effects. Call your doctor for medical advice about side effects. You may report side effects to FDA at 9-238-FDA-7007.  Where should I keep my medicine?  Keep out of the reach of children.  Store at room temperature between 15 and 25 degrees C (59 and 77 degrees F), away from direct sunlight and moisture. Keep tightly closed. Throw away any unused medicine after the expiration date.  NOTE:This sheet is a summary. It may not cover all possible information. If you have questions about this medicine, talk to your doctor, pharmacist, or health care provider. Copyright  2016 Gold Standard                Follow-ups after your visit        Additional Services     BEHAVIORAL / SPIRITUAL HEALTH ([facility] ONLY)       The [facility] Behavioral / Spiritual Health Team  is available to support any patient who receives care at the [facility].  This team provides and/or connects patients and families to appropriate psychological, spiritual, and social work services.     The Behavioral / Spiritual Health Team  also provides consultation to medical providers and other care team members regarding patient behavioral/mental health issues, psychosocial concerns, or spiritual needs.  Services are provided by behavioral health clinicians, licensed psychologists, licensed social workers, and a .     The Behavioral / Spiritual Health Team will coordinate with the patient's medical care team to determine the appropriate response(s) to the current need.        Please provide the following information to assist us in addressing the current concern:    1. Reason for Referral? [facility], for therapy    2. Has clinical staff discussed this referral to the Behavioral / Spiritual Health Team with the patient and/or caregiver? Yes    3. Who should we contact on the patient's care team to discuss this issue further or to coordinate care? BRYON Sandoval CNP                  Follow-up notes from your care team     Return in about 4 weeks (around 8/22/2018).      Your next 10 appointments already scheduled     Jul 25, 2018  7:45 AM CDT   LAB with [facility] ([facility])    [address] [place] [address] [phone]           Please do not eat 10-12 hours before your appointment if you are coming in fasting for labs on lipids, cholesterol, or glucose (sugar). This does not apply to pregnant women. Water, hot tea and black coffee (with nothing added) are okay. Do not drink other fluids, diet soda or chew gum.            Aug 10, 2018 10:00 AM CDT   (Arrive by 9:45 AM)   [facility]    [address] [place] [address]  [address]   [phone]            Aug 17, 2018  7:15 AM CDT   (Arrive by 7:00 AM)   ACUTE/CHRONIC SINGLE CONDITION with EFREN Fox [facility] ([facility])    [address] [id number] [phone]              Future tests that were ordered for you today     Open Future Orders        Priority Expected Expires Ordered    TSH with free T4 reflex Routine 2018    Vitamin D Deficiency Routine 2018    Hemoglobin Routine 2018            Who to contact     Please call your clinic at [phone] to:    Ask questions about your health    Make or cancel appointments    Discuss your medicines    Learn about your test results    Speak to your doctor            Additional Information About Your Visit        Rormixhart Information     YinYangMap is an electronic gateway that provides easy, online access to your medical records. With YinYangMap, you can request a clinic appointment, read your test results, renew a prescription or communicate with your care team.     To sign up for YinYangMap visit the website at [email or web address]   You will be asked to enter the access code listed below, as well as some personal information. Please follow the directions to create your username and password.     Your access code is: [email or web address]  Expires: 10/8/2018  6:30 AM     Your access code will  in 90 days. If you need help or a new code, please contact your [facility] or call [phone] for assistance.        Care EveryWhere ID     This is your Care EveryWhere ID. This could be used by other organizations to access your [facility] medical records  [id number]        Your Vitals Were     Pulse BMI (Body Mass Index)                76 29.48 kg/m2           Blood Pressure from Last 3 Encounters:   18 127/74   18 116/79    08/10/17 113/74    Weight from Last 3 Encounters:   07/25/18 90.5 kg (199 lb 9.6 oz)   03/27/18 90.4 kg (199 lb 4.8 oz)   08/10/17 83.6 kg (184 lb 4.8 oz)              We Performed the Following     BEHAVIORAL / SPIRITUAL HEALTH ([facility] ONLY)          Today's Medication Changes          These changes are accurate as of 7/25/18  7:31 AM.  If you have any questions, ask your nurse or doctor.               Start taking these medicines.        Dose/Directions    buPROPion 100 MG 12 hr tablet   Commonly known as:  WELLBUTRIN SR   Used for:  Mild episode of recurrent major depressive disorder (H)   Started by:  Rima Sandoval APRN CNP        Dose:  100 mg   Take 1 tablet (100 mg) by mouth 2 times daily   Quantity:  60 tablet   Refills:  1         Stop taking these medicines if you haven't already. Please contact your care team if you have questions.     FLUoxetine 10 MG capsule   Commonly known as:  PROzac   Stopped by:  Rima Sandoval APRN CNP           VITAMIN D (CHOLECALCIFEROL) PO   Stopped by:  Rima Sandoval APRN CNP                Where to get your medicines      These medications were sent to [facility] [address] [address]     Phone:  [phone]     buPROPion 100 MG 12 hr tablet                Primary Care Provider Office Phone # Fax #    Huy Mendez [phone]       [address] [address]        Equal Access to Services     JOANA CLAYTON AH: Hadii aad ku hadasho Soomaali, waaxda luqadaha, qaybta kaalmada adeegyacandelaria, kelly idirenata perales. So [facility] [phone].    ATENCIÓN: Si habla español, tiene a barrios disposición servicios gratuitos de asistencia lingüística. Lois astorga [phone].    We comply with applicable federal civil rights laws and [place] laws. We do not discriminate on the basis of race, color, national origin, age,  disability, sex, sexual orientation, or gender identity.            Thank you!     Thank you for choosing [facility]  for your care. Our goal is always to provide you with excellent care. Hearing back from our patients is one way we can continue to improve our services. Please take a few minutes to complete the written survey that you may receive in the mail after your visit with us. Thank you!             Your Updated Medication List - Protect others around you: Learn how to safely use, store and throw away your medicines at www.disposemymeds.org.          This list is accurate as of 7/25/18  7:31 AM.  Always use your most recent med list.                   Brand Name Dispense Instructions for use Diagnosis    buPROPion 100 MG 12 hr tablet    WELLBUTRIN SR    60 tablet    Take 1 tablet (100 mg) by mouth 2 times daily    Mild episode of recurrent major depressive disorder (H)       FEXOFENADINE HCL PO      Take 180 mg by mouth every morning

## 2018-07-26 ASSESSMENT — PATIENT HEALTH QUESTIONNAIRE - PHQ9: SUM OF ALL RESPONSES TO PHQ QUESTIONS 1-9: 7

## 2018-07-26 ASSESSMENT — ANXIETY QUESTIONNAIRES: GAD7 TOTAL SCORE: 5

## 2018-08-10 ENCOUNTER — OFFICE VISIT (OUTPATIENT)
Dept: BEHAVIORAL HEALTH | Facility: CLINIC | Age: 23
End: 2018-08-10
Payer: COMMERCIAL

## 2018-08-10 DIAGNOSIS — F33.1 MAJOR DEPRESSIVE DISORDER, RECURRENT EPISODE, MODERATE (H): Primary | ICD-10-CM

## 2018-08-10 ASSESSMENT — PATIENT HEALTH QUESTIONNAIRE - PHQ9
SUM OF ALL RESPONSES TO PHQ QUESTIONS 1-9: 2
SUM OF ALL RESPONSES TO PHQ QUESTIONS 1-9: 2
10. IF YOU CHECKED OFF ANY PROBLEMS, HOW DIFFICULT HAVE THESE PROBLEMS MADE IT FOR YOU TO DO YOUR WORK, TAKE CARE OF THINGS AT HOME, OR GET ALONG WITH OTHER PEOPLE: NOT DIFFICULT AT ALL

## 2018-08-10 ASSESSMENT — ANXIETY QUESTIONNAIRES
GAD7 TOTAL SCORE: 2
7. FEELING AFRAID AS IF SOMETHING AWFUL MIGHT HAPPEN: NOT AT ALL
6. BECOMING EASILY ANNOYED OR IRRITABLE: SEVERAL DAYS
5. BEING SO RESTLESS THAT IT IS HARD TO SIT STILL: NOT AT ALL
GAD7 TOTAL SCORE: 2
3. WORRYING TOO MUCH ABOUT DIFFERENT THINGS: NOT AT ALL
1. FEELING NERVOUS, ANXIOUS, OR ON EDGE: NOT AT ALL
7. FEELING AFRAID AS IF SOMETHING AWFUL MIGHT HAPPEN: NOT AT ALL
4. TROUBLE RELAXING: SEVERAL DAYS
2. NOT BEING ABLE TO STOP OR CONTROL WORRYING: NOT AT ALL
GAD7 TOTAL SCORE: 2

## 2018-08-11 ASSESSMENT — ANXIETY QUESTIONNAIRES: GAD7 TOTAL SCORE: 2

## 2018-08-11 ASSESSMENT — PATIENT HEALTH QUESTIONNAIRE - PHQ9: SUM OF ALL RESPONSES TO PHQ QUESTIONS 1-9: 2

## 2018-08-17 ENCOUNTER — OFFICE VISIT (OUTPATIENT)
Dept: BEHAVIORAL HEALTH | Facility: CLINIC | Age: 23
End: 2018-08-17
Payer: COMMERCIAL

## 2018-08-17 ENCOUNTER — OFFICE VISIT (OUTPATIENT)
Dept: INTERNAL MEDICINE | Facility: CLINIC | Age: 23
End: 2018-08-17
Payer: COMMERCIAL

## 2018-08-17 VITALS
DIASTOLIC BLOOD PRESSURE: 70 MMHG | SYSTOLIC BLOOD PRESSURE: 121 MMHG | BODY MASS INDEX: 28.99 KG/M2 | RESPIRATION RATE: 18 BRPM | HEART RATE: 67 BPM | WEIGHT: 196.3 LBS

## 2018-08-17 DIAGNOSIS — R21 RASH AND NONSPECIFIC SKIN ERUPTION: ICD-10-CM

## 2018-08-17 DIAGNOSIS — F41.9 ANXIETY: ICD-10-CM

## 2018-08-17 DIAGNOSIS — F33.0 MAJOR DEPRESSIVE DISORDER, RECURRENT EPISODE, MILD (H): Primary | ICD-10-CM

## 2018-08-17 DIAGNOSIS — F33.1 MAJOR DEPRESSIVE DISORDER, RECURRENT EPISODE, MODERATE (H): Primary | ICD-10-CM

## 2018-08-17 ASSESSMENT — ANXIETY QUESTIONNAIRES
5. BEING SO RESTLESS THAT IT IS HARD TO SIT STILL: NOT AT ALL
3. WORRYING TOO MUCH ABOUT DIFFERENT THINGS: NOT AT ALL
7. FEELING AFRAID AS IF SOMETHING AWFUL MIGHT HAPPEN: NOT AT ALL
2. NOT BEING ABLE TO STOP OR CONTROL WORRYING: NOT AT ALL
GAD7 TOTAL SCORE: 2
1. FEELING NERVOUS, ANXIOUS, OR ON EDGE: SEVERAL DAYS
6. BECOMING EASILY ANNOYED OR IRRITABLE: NOT AT ALL
IF YOU CHECKED OFF ANY PROBLEMS ON THIS QUESTIONNAIRE, HOW DIFFICULT HAVE THESE PROBLEMS MADE IT FOR YOU TO DO YOUR WORK, TAKE CARE OF THINGS AT HOME, OR GET ALONG WITH OTHER PEOPLE: NOT DIFFICULT AT ALL

## 2018-08-17 ASSESSMENT — PATIENT HEALTH QUESTIONNAIRE - PHQ9: 5. POOR APPETITE OR OVEREATING: SEVERAL DAYS

## 2018-08-17 ASSESSMENT — PAIN SCALES - GENERAL: PAINLEVEL: NO PAIN (0)

## 2018-08-17 NOTE — MR AVS SNAPSHOT
After Visit Summary   8/17/2018    Neo Saeed    MRN: [id number]           Patient Information     Date Of Birth          1995        Visit Information        Provider Department      8/17/2018 7:15 AM Rima Sandoval APRN [facility]        Care Instructions    Primary Care Center Medication Refill Request Information:  * Please contact your pharmacy regarding ANY request for medication refills.  ** [facility] Prescription Fax = [phone]  * Please allow 3 business days for routine medication refills.  * Please allow 5 business days for controlled substance medication refills.     Primary Care Center Test Result notification information:  *You will be notified with in 7-10 days of your appointment day regarding the results of your test.  If you are on MyChart you will be notified as soon as the provider has reviewed the results and signed off on them.    [facility] [phone]             Follow-ups after your visit        Your next 10 appointments already scheduled     Aug 17, 2018  3:00 PM CDT   (Arrive by 2:45 PM)   Return Visit with GAGE Ulloa   [facility] ([facility])    [address] [place] [address] [phone]              Who to contact     Please call your clinic at [phone] to:    Ask questions about your health    Make or cancel appointments    Discuss your medicines    Learn about your test results    Speak to your doctor            Additional Information About Your Visit        MyChart Information     Moxiehart gives you secure access to your electronic health record. If you see a primary care provider, you can also send messages to your care team and make appointments. If you have questions, please call your primary care clinic.  If you do not have a primary care provider, please call [phone] and they will assist you.      InflowControl is an electronic  gateway that provides easy, online access to your medical records. With Seen, you can request a clinic appointment, read your test results, renew a prescription or communicate with your care team.     To access your existing account, please contact your [facility] or call [phone] for assistance.        Care EveryWhere ID     This is your Care EveryWhere ID. This could be used by other organizations to access your [facility] medical records  [id number]        Your Vitals Were     Pulse Respirations BMI (Body Mass Index)             67 18 28.99 kg/m2          Blood Pressure from Last 3 Encounters:   08/17/18 121/70   07/25/18 127/74   03/27/18 116/79    Weight from Last 3 Encounters:   08/17/18 89 kg (196 lb 4.8 oz)   07/25/18 90.5 kg (199 lb 9.6 oz)   03/27/18 90.4 kg (199 lb 4.8 oz)              Today, you had the following     No orders found for display       Primary Care Provider Office Phone # Fax #    Huy Mendez [phone]       [address]  [facility] [address]        Equal Access to Services     BELINDA CLAYTON : Hadii malaika rayo Annette, waaxda lutanwyaadaha, qaybta kaalmada adekarynayada, kelly call . So [facility] [phone].    ATENCIÓN: Si habla español, tiene a barrios disposición servicios gratuitos de asistencia lingüística. Llame al [phone].    We comply with applicable federal civil rights laws and [place] laws. We do not discriminate on the basis of race, color, national origin, age, disability, sex, sexual orientation, or gender identity.            Thank you!     Thank you for choosing [facility]  for your care. Our goal is always to provide you with excellent care. Hearing back from our patients is one way we can continue to improve our services. Please take a few minutes to complete the written survey that you may receive in the mail after your visit with us. Thank  you!             Your Updated Medication List - Protect others around you: Learn how to safely use, store and throw away your medicines at www.disposemymeds.org.          This list is accurate as of 8/17/18  7:43 AM.  Always use your most recent med list.                   Brand Name Dispense Instructions for use Diagnosis    buPROPion 100 MG 12 hr tablet    WELLBUTRIN SR    60 tablet    Take 1 tablet (100 mg) by mouth 2 times daily    Mild episode of recurrent major depressive disorder (H)       FEXOFENADINE HCL PO      Take 180 mg by mouth every morning

## 2018-08-17 NOTE — NURSING NOTE
Chief Complaint   Patient presents with     Recheck Medication     Patient is here for medication recheck/follow up on bupropion       Evangelista Jung CMA (AAMA) at 7:38 AM on 8/17/2018

## 2018-08-17 NOTE — PROGRESS NOTES
"[facility] (P[facility] referral)  August 17, 2018      Behavioral Health Clinician Progress Note    Patient Name: Neo Saeed              Service Type:  Individual      Service Location:   Face to Face in Clinic     Session Start Time: 310pm  Session End Time: 405pm      Session Length: 53 - 60      Attendees: Patient    Visit Activities (Refresh list every visit): [facility] Only    Diagnostic Assessment Date: none on file  Treatment Plan Review Date: none on file  See Flowsheets for today's PHQ-9 and LETI-7 results  Previous PHQ-9:   PHQ-9 SCORE 7/25/2018 8/10/2018 8/17/2018   Total Score MyChart - 2 (Minimal depression) -   Total Score 7 2 2     Previous LETI-7:   LETI-7 SCORE 7/25/2018 8/10/2018 8/17/2018   Total Score - 2 (minimal anxiety) -   Total Score 5 2 2       RAYMON LEVEL:  No flowsheet data found.    DATA  Extended Session (60+ minutes): No  Interactive Complexity: No  Crisis: No  [facility] Patient: No    Treatment Objective(s) Addressed in This Session:  Target Behavior(s): disease management/lifestyle changes      Patient  will experience a reduction in depressed mood, will develop more effective coping skills to manage depressive symptoms and will develop healthy cognitive patterns and beliefs, will experience a reduction in anxiety and will develop more effective coping skills to manage anxiety symptoms and will develop coping/problem-solving skills to facilitate more adaptive adjustment    Current Stressors / Issues:  [facility] met with Neo to provide psychotherapy support regarding depression, life adjustment, anxiety. Continued our conversation about his current stressors, hx of depression, life transitions..      Moving this weekend in with friends - feels good about it and excited.  Transition with med's is making things a \"bit off\" but overall he feels optimistic about this, as well.    Our initial conversation left him in a place of thinking about what he needs to deal with, he says. He has " "always viewed himself as \"the maladjusted one.\" He wants to work on continuing to understand his depression patterns, explore relationships and building a positive sense of himself, explore what makes a meaningful and satisfying life for himself. He says that he usually experiences more intense emotional dynamics during a transition of meds - about a month in his experience - and he is in this now.    Talked at some length about Dexter's college experience, social life with close friends, and upcoming meeting with a friend who has moved away.  Explored his sense of himself in these contexts, his goals.      I affirmed the steps this patient has taken to address physical and behavioral health issues, and offered continued behavioral health services or referral, now or in the future, as needed by the patient.    Progress on Treatment Objective(s) / Homework:  Satisfactory progress - ACTION (Actively working towards change); Intervened by reinforcing change plan / affirming steps taken    Psycho-education regarding mental health diagnoses and treatment options    Motivational Interviewing    Skills training    Explored specific skills useful to client in current situation    Skill areas include assertiveness, communication, conflict management, problem-solving, relaxation, etc.     Solution-Focused Therapy    Explored patterns in patient's behaviors and relationships and discussed options for new behaviors    Explored new options for problem-solving, communication, managing stress, etc.    Cognitive-behavioral Therapy    Discussed common cognitive distortions, identified them in patient's life    Explored ways to challenge, replace, and act against these cognitions    Explore behavioral changes that might benefit patient in improving mood and engage in meaningful activity    Acceptance and Commitment Therapy    Explored and identified important values in patient's life    Discussed ways to commit to behavioral activation " around these values    Psychodynamic psychotherapy    Discussed patient's emotional dynamics and issues and how they impact behaviors    Explored patient's history of relationships and how they impact present behaviors    Explored how to work with and make changes in these schemas and patterns    Narrative Therapy    Explored the patient's story of their life from their perspective,     Explored alternate ways of understanding their experience, identifying exceptions, developing new themes    Interpersonal Psychotherapy    Explored patterns in relationships that are effective or ineffective at helping patient reach their goals, find satisfying experience.    Discussed new patterns or behaviors to engage in for improved social functioning.    Behavioral Activation    Discussed steps patient can take to become more involved in meaningful activity    Identified barriers to these activities and explored possible solutions    Mindfulness-Based Strategies    Discussed skills based on development and application of mindfulness    Skills drawn from compassion-focused therapy, dialectical behavior therapy, mindfulness-based stress reduction, mindfulness-based cognitive therapy, etc.    Medication Review:  No problems reported to [facility] today.    Medication Compliance:  No problems reported to [facility] today.    Changes in Health Issues:  No problems reported to [facility] today.    Chemical Use Review:   Substance Use: Chemical use reviewed, no active concerns identified      Tobacco Use: No current tobacco use.      Assessment: Current Emotional / Mental Status (status of significant symptoms):  Risk status (Self / Other harm or suicidal ideation)  Patient denies a history of suicidal ideation, suicide attempts, self-injurious behavior, homicidal ideation, homicidal behavior and and other safety concerns  Patient denies current fears or concerns for personal safety.  Patient denies current or recent suicidal ideation or  behaviors.  Patient denies current or recent homicidal ideation or behaviors.  Patient denies current or recent self injurious behavior or ideation.  Patient denies other safety concerns.  A safety and risk management plan has not been developed at this time, however patient was encouraged to call [place] [phone] should there be a change in any of these risk factors.    Appearance:   Appropriate   Eye Contact:   Good   Psychomotor Behavior: Normal   Attitude:   Cooperative   Orientation:   All  Speech   Rate / Production: Normal    Volume:  Normal   Mood:    Anxious  Depressed - fluctuates  Affect:    Appropriate   Thought Content:  Clear  Rumination   Thought Form:  Coherent  Logical   Insight:    Good     Diagnoses:  1. Major depressive disorder, recurrent episode, moderate (H)      Collateral Reports Completed:  Will collaborate with care team as indicated during treatment    Plan: (Homework, other):  Patient was given information about behavioral services and encouraged to schedule a follow up appointment with the clinic [facility] as needed.  He was also given information about mental health symptoms and treatment options .  CD Recommendations: No indications of CD issues.  We agree to continue psychotherapy to address his depression, anxiety, life adjustment. GAGE Sears, [facility]

## 2018-08-17 NOTE — PROGRESS NOTES
[facility]  Primary Care Center   Rima Sandoval, EFREN CNP  08/17/2018      Chief Complaint:   Recheck Medication       History of Present Illness:   Neo Saeed is a 22 year old male with a history of anxiety and depression who presents alone for recheck of medication. The patient was last seen about 4 weeks ago for evaluation of depression. At that time we began to wean him off of Prozac while transitioning to Wellbutrin. He reports that he completed this wean four days ago and is now only taking Wellbutrin twice daily to manage his depression. He was able to meet with Javier Corley of behavioral health last week and is meeting with him again today to help with his depression/anxiety management as well.     Other concerns discussed:  1. Dry reddened patch on left side of nose   2. Moving to new apartment this weekend     Review of Systems:   Pertinent items are noted in HPI, remainder of complete ROS is negative.      Active Medications:      buPROPion (WELLBUTRIN SR) 100 MG 12 hr tablet, Take 1 tablet (100 mg) by mouth 2 times daily, Disp: 60 tablet, Rfl: 1     FEXOFENADINE HCL PO, Take 180 mg by mouth every morning , Disp: , Rfl:       Allergies:   Review of patient's allergies indicates no known allergies.      Past Medical History:  Allergic state  Anxiety  Pilonidal cyst     Past Surgical History:  Cystectomy pilonidal- 8/4/2017  Dental surgery, wisdom teeth extraction  Endoscopy  Hernia repair x2    Family History:   Hyperlipidemia- father, paternal grandfather  Hypothyroidism- father  Diabetes- maternal grandmother  Bipolar disorder- maternal grandmother  Parkinsonism- maternal grandmother  Coronary artery disease- paternal grandfather  Cancer- paternal grandfather      Social History:   The patient is single, a nonsmoker, and consumes a couple of drinks of alcohol weekly.      Physical Exam:   /70  Pulse 67  Resp 18  Wt 89 kg (196 lb 4.8 oz)  BMI 28.99 kg/m2   Constitutional: no distress,  comfortable, pleasant, well-groomed  Skin: no concerning lesions, no jaundice, temp normal, mildly erythematous dry patch adjacent to left maloney   Psychological: appropriate mood, demonstrates intact judgment and logical thought process        Assessment and Plan:  1. Major depressive disorder, recurrent episode, mild, Anxiety   Patient completed wean off of Prozac four days ago and is now taking Wellbutrin twice daily in conjunction with seeing Javier Corley of behavioral health to manage his depression. He was told that taking this medication and therapy together is generally effective, however if he feels this is not managing his symptoms well, there is room to increase medication. He was given documents that outlined red flags and what to do if he experiences those symptoms.     2. Rash and nonspecific skin eruption  He expressed minor concern for a dry patch of skin on the left side of his nose. Advised over the counter hydrocortisone cream, such as Cortaid, and follow up if does not resolve or worsens. Encouraged sunscreen use.        Follow-up: Patient should follow up in clinic in one year or on a PRN basis.        Scribe Disclosure:   I, Emma Fuentes, am serving as a scribe to document services personally performed by EFREN Mark CNP at this visit, based upon the provider's statements to me. All documentation has been reviewed by the aforementioned provider prior to being entered into the official medical record.     Portions of this medical record were completed by a scribe. UPON MY REVIEW AND AUTHENTICATION BY ELECTRONIC SIGNATURE, this confirms (a) I performed the applicable clinical services, and (b) the record is accurate.     EFREN Mark CNP

## 2018-08-17 NOTE — PATIENT INSTRUCTIONS
[facility] Medication Refill Request Information:  * Please contact your pharmacy regarding ANY request for medication refills.  ** [facility] Fax = [phone]  * Please allow 3 business days for routine medication refills.  * Please allow 5 business days for controlled substance medication refills.     [facility] Test Result notification information:  *You will be notified with in 7-10 days of your appointment day regarding the results of your test.  If you are on MyChart you will be notified as soon as the provider has reviewed the results and signed off on them.    [facility] [phone]

## 2018-08-17 NOTE — LETTER
My Depression Action Plan  Name: Neo Saeed   Date of Birth 1995  Date: 8/17/2018    My doctor: Huy Mendez   My clinic: [facility]  [address] [place] [address]          GREEN    ZONE   Good Control    What it looks like:     Things are going generally well. You have normal up s and down s. You may even feel depressed from time to time, but bad moods usually last less than a day.   What you need to do:  1. Continue to care for yourself (see self care plan)  2. Check your depression survival kit and update it as needed  3. Follow your physician s recommendations including any medication.  4. Do not stop taking medication unless you consult with your physician first.           YELLOW         ZONE Getting Worse    What it looks like:     Depression is starting to interfere with your life.     It may be hard to get out of bed; you may be starting to isolate yourself from others.    Symptoms of depression are starting to last most all day and this has happened for several days.     You may have suicidal thoughts but they are not constant.   What you need to do:     1. Call your care team, your response to treatment will improve if you keep your care team informed of your progress. Yellow periods are signs an adjustment may need to be made.     2. Continue your self-care, even if you have to fake it!    3. Talk to someone in your support network    4. Open up your depression survival kit           RED    ZONE Medical Alert - Get Help    What it looks like:     Depression is seriously interfering with your life.     You may experience these or other symptoms: You can t get out of bed most days, can t work or engage in other necessary activities, you have trouble taking care of basic hygiene, or basic responsibilities, thoughts of suicide or death that will not go away, self-injurious behavior.     What you need to do:  1. Call your care team and  request a same-day appointment. If they are not available (weekends or after hours) call your local crisis line, emergency room or 911.            Depression Self Care Plan / Survival Kit    Self-Care for Depression  Here s the deal. Your body and mind are really not as separate as most people think.  What you do and think affects how you feel and how you feel influences what you do and think. This means if you do things that people who feel good do, it will help you feel better.  Sometimes this is all it takes.  There is also a place for medication and therapy depending on how severe your depression is, so be sure to consult with your medical provider and/ or Behavioral Health Consultant if your symptoms are worsening or not improving.     In order to better manage my stress, I will:    Exercise  Get some form of exercise, every day. This will help reduce pain and release endorphins, the  feel good  chemicals in your brain. This is almost as good as taking antidepressants!  This is not the same as joining a gym and then never going! (they count on that by the way ) It can be as simple as just going for a walk or doing some gardening, anything that will get you moving.      Hygiene   Maintain good hygiene (Get out of bed in the morning, Make your bed, Brush your teeth, Take a shower, and Get dressed like you were going to work, even if you are unemployed).  If your clothes don't fit try to get ones that do.    Diet  I will strive to eat foods that are good for me, drink plenty of water, and avoid excessive sugar, caffeine, alcohol, and other mood-altering substances.  Some foods that are helpful in depression are: complex carbohydrates, B vitamins, flaxseed, fish or fish oil, fresh fruits and vegetables.    Psychotherapy  I agree to participate in Individual Therapy (if recommended).    Medication  If prescribed medications, I agree to take them.  Missing doses can result in serious side effects.  I understand that  drinking alcohol, or other illicit drug use, may cause potential side effects.  I will not stop my medication abruptly without first discussing it with my provider.    Staying Connected With Others  I will stay in touch with my friends, family members, and my primary care provider/team.    Use your imagination  Be creative.  We all have a creative side; it doesn t matter if it s oil painting, sand castles, or mud pies! This will also kick up the endorphins.    Witness Beauty  (AKA stop and smell the roses) Take a look outside, even in mid-winter. Notice colors, textures. Watch the squirrels and birds.     Service to others  Be of service to others.  There is always someone else in need.  By helping others we can  get out of ourselves  and remember the really important things.  This also provides opportunities for practicing all the other parts of the program.    Humor  Laugh and be silly!  Adjust your TV habits for less news and crime-drama and more comedy.    Control your stress  Try breathing deep, massage therapy, biofeedback, and meditation. Find time to relax each day.     My support system    Clinic Contact:  Phone number:    Contact 1: Dr. Huy Mendez Phone number: [phone]   Contact 2: Rima Sandoval CNP Phone number:    [facility]/:  Phone number:    Therapist: Javier Corley Phone number:    [facility] crisis center:    Phone number:    Other community support:  Phone number:

## 2018-08-18 ASSESSMENT — PATIENT HEALTH QUESTIONNAIRE - PHQ9: SUM OF ALL RESPONSES TO PHQ QUESTIONS 1-9: 2

## 2018-08-18 ASSESSMENT — ANXIETY QUESTIONNAIRES: GAD7 TOTAL SCORE: 2

## 2018-08-25 ENCOUNTER — NURSE TRIAGE (OUTPATIENT)
Dept: NURSING | Facility: CLINIC | Age: 23
End: 2018-08-25

## 2018-08-25 NOTE — TELEPHONE ENCOUNTER
"Pt states he needs doctor to write a prescription for Wellbutrin to replace an Rx that \"got lost in the mail\".  This is confusing because EHR shows Rx for Wellbutrin SR 100mg - take 1 tab BID Disp #60, Rx1 was sent by e-script to [facility] on 7/25. Explained to pt all he needs to do is contact [facility] for a refill because there is one refill on the Rx. Pt states he is not currently out of med but he is going out of town on Mon 8/27 so he needs an early refill as he will run out before his return. Advised pt to call clinic during open hours to request this early refill. He asked about on-call doctor and explained since he is not out of medication he should call his own clinic when they reopen on Mon 8/27. We only page an on-call doctor on the weekend for issues that cannot wait until the next clinic day. Pt voiced understanding and agreement. Melissa Garcia RN/FNA      Reason for Disposition    Caller requesting a NON-URGENT new prescription or refill and triager unable to refill per unit policy    Additional Information    Negative: Drug overdose and nurse unable to answer question    Negative: Caller requesting information not related to medicine    Negative: Caller requesting a prescription for Strep throat and has a positive culture result    Negative: Rash while taking a medication or within 3 days of stopping it    Negative: Immunization reaction suspected    Negative: [1] Asthma and [2] having symptoms of asthma (cough, wheezing, etc)    Negative: MORE THAN A DOUBLE DOSE of a prescription or over-the-counter (OTC) drug    Negative: [1] DOUBLE DOSE (an extra dose or lesser amount) of over-the-counter (OTC) drug AND [2] any symptoms (e.g., dizziness, nausea, pain, sleepiness)    Negative: [1] DOUBLE DOSE (an extra dose or lesser amount) of prescription drug AND [2] any symptoms (e.g., dizziness, nausea, pain, sleepiness)    Negative: Took another person's prescription drug    Negative: [1] DOUBLE " "DOSE (an extra dose or lesser amount) of prescription drug AND [2] NO symptoms (Exception: a double dose of antibiotics)    Negative: Diabetes drug error or overdose (e.g., insulin or extra dose)    Negative: [1] Request for URGENT new prescription or refill of \"essential\" medication (i.e., likelihood of harm to patient if not taken) AND [2] triager unable to fill per unit policy    Negative: [1] Prescription not at pharmacy AND [2] was prescribed today by PCP    Negative: Pharmacy calling with prescription questions and triager unable to answer question    Negative: Caller has URGENT medication question about med that PCP prescribed and triager unable to answer question    Negative: Caller has NON-URGENT medication question about med that PCP prescribed and triager unable to answer question    Protocols used: MEDICATION QUESTION CALL-ADULT-    "

## 2018-08-27 DIAGNOSIS — F33.0 MILD EPISODE OF RECURRENT MAJOR DEPRESSIVE DISORDER (H): ICD-10-CM

## 2018-08-27 RX ORDER — BUPROPION HYDROCHLORIDE 100 MG/1
100 TABLET, EXTENDED RELEASE ORAL 2 TIMES DAILY
Qty: 60 TABLET | Refills: 1 | Status: CANCELLED | OUTPATIENT
Start: 2018-08-27

## 2018-08-27 NOTE — TELEPHONE ENCOUNTER
[facility]    Phone Message    May a detailed message be left on voicemail: yes    Reason for Call: Medication Refill Request    Has the patient contacted the pharmacy for the refill? Yes   Name of medication being requested: buPROPion (WELLBUTRIN SR) 100 MG 12 hr tablet  Provider who prescribed the medication: Jaime  Pharmacy: [facility]  Date medication is needed: 8/30/18         Action Taken: Message routed to:  [facility] The last [facility] that was [facility] was lost in the mail - in order for them to fill a new one they need a new script - Please call the Pt with any questions

## 2018-08-28 RX ORDER — BUPROPION HYDROCHLORIDE 100 MG/1
100 TABLET, EXTENDED RELEASE ORAL 2 TIMES DAILY
Qty: 60 TABLET | Refills: 0 | Status: SHIPPED | OUTPATIENT
Start: 2018-08-28 | End: 2018-08-30

## 2018-08-28 NOTE — TELEPHONE ENCOUNTER
[facility] was called to confirm they have current order but when RX was mailed - it came back as undeliverable.    Patient was called to be given this information. He is aware of the mailing issue and will work with [facility] and the postal service on the issue.  However in the mean time is out of medication and out of town and would like a 1 month supply sent to pharmacy in [place].    1 Time order to avoid decompensation pended and routed high priority to clinic RN.      Kathleen M Doege RN

## 2018-08-28 NOTE — TELEPHONE ENCOUNTER
[facility]    Phone Message    May a detailed message be left on voicemail: yes    Reason for Call: Other: Raquel did not get the new prescription sent over yesterday for his Wellbutrin. Please resend & let him know via [employer]. Thank you     Action Taken: Message routed to:  [facility] ([facility]): Refill Team

## 2018-08-29 ENCOUNTER — MYC MEDICAL ADVICE (OUTPATIENT)
Dept: INTERNAL MEDICINE | Facility: CLINIC | Age: 23
End: 2018-08-29

## 2018-08-30 ENCOUNTER — TELEPHONE (OUTPATIENT)
Dept: INTERNAL MEDICINE | Facility: CLINIC | Age: 23
End: 2018-08-30

## 2018-08-30 DIAGNOSIS — F33.0 MILD EPISODE OF RECURRENT MAJOR DEPRESSIVE DISORDER (H): ICD-10-CM

## 2018-08-30 RX ORDER — BUPROPION HYDROCHLORIDE 100 MG/1
100 TABLET, EXTENDED RELEASE ORAL 2 TIMES DAILY
Qty: 60 TABLET | Refills: 0 | Status: SHIPPED | OUTPATIENT
Start: 2018-08-30 | End: 2018-10-25

## 2018-08-30 NOTE — TELEPHONE ENCOUNTER
[facility]    Phone Message    May a detailed message be left on voicemail: yes    Reason for Call: Other: [facility] [place] waiting for prescription of Welbutran to be faxed to [phone] for pt     Action Taken: Message routed to:  [facility] ([facility] Primary Care Clinic

## 2018-08-30 NOTE — TELEPHONE ENCOUNTER
I called Dexter this morning to confirm pharmacy. Dexter stated he would like the script sent to the [facility] pharmacy rather than the [facility] pharmacy. Refill sent.    Melissa Jessica RN

## 2018-09-07 ENCOUNTER — OFFICE VISIT (OUTPATIENT)
Dept: BEHAVIORAL HEALTH | Facility: CLINIC | Age: 23
End: 2018-09-07
Payer: COMMERCIAL

## 2018-09-07 DIAGNOSIS — F33.1 MAJOR DEPRESSIVE DISORDER, RECURRENT EPISODE, MODERATE (H): Primary | ICD-10-CM

## 2018-09-07 NOTE — PROGRESS NOTES
[facility] ([facility] referral)  September 7, 2018      Behavioral Health Clinician Progress Note    Patient Name: Neo Saeed              Service Type:  Individual      Service Location:   Face to Face in Clinic     Session Start Time: 810am  Session End Time: 910am      Session Length: 53 - 60      Attendees: Patient    Visit Activities (Refresh list every visit): [facility] Only    Diagnostic Assessment Date: none on file  Treatment Plan Review Date: 9/7/2018  See Flowsheets for today's PHQ-9 and LETI-7 results  Previous PHQ-9:   PHQ-9 SCORE 7/25/2018 8/10/2018 8/17/2018   Total Score MyChart - 2 (Minimal depression) -   Total Score 7 2 2     Previous LEIT-7:   LETI-7 SCORE 7/25/2018 8/10/2018 8/17/2018   Total Score - 2 (minimal anxiety) -   Total Score 5 2 2       RAYMON LEVEL:  No flowsheet data found.    DATA  Extended Session (60+ minutes): No  Interactive Complexity: No  Crisis: No  [facility] Patient: No    Treatment Objective(s) Addressed in This Session:  Target Behavior(s): disease management/lifestyle changes      Patient  will experience a reduction in depressed mood, will develop more effective coping skills to manage depressive symptoms and will develop healthy cognitive patterns and beliefs, will experience a reduction in anxiety and will develop more effective coping skills to manage anxiety symptoms and will develop coping/problem-solving skills to facilitate more adaptive adjustment    Current Stressors / Issues:  [facility] met with Neo to provide psychotherapy support regarding depression, life adjustment, anxiety. Continued our conversation about his current stressors, hx of depression, life transitions..      Dexter [place] a [place] Discussed this and his sense of this important relationship.    He reports that he is still adjusting to his new medication. Discussed what he has seen as he pays attention to his moods and emotions, what the goals of medication  "are, what his sense is of what \"healthy normalcy\" is, anyway.  Discussed the complexity of his emotional life over time up to today, and how he has struggled. Explored \"emotional intelligence\" ideas.    \"Normally emotionally adjusted\" - discussed this term and his sense of his what his goals are in his own therapy and life development.    He has moved and is still adjusting to the new situation he is living in.He has some new roommates.    Self-compassion conversation. Negativity bias conversation.  Encouraged cultivation of compassion.  Began early conversation about CBT.    From previous sessions for reference:  Our initial conversation left him in a place of thinking about what he needs to deal with, he says. He has always viewed himself as \"the maladjusted one.\" He wants to work on continuing to understand his depression patterns, explore relationships and building a positive sense of himself, explore what makes a meaningful and satisfying life for himself. He says that he usually experiences more intense emotional dynamics during a transition of meds - about a month in his experience - and he is in this now.    I affirmed the steps this patient has taken to address physical and behavioral health issues, and offered continued behavioral health services or referral, now or in the future, as needed by the patient.    Progress on Treatment Objective(s) / Homework:  Satisfactory progress - ACTION (Actively working towards change); Intervened by reinforcing change plan / affirming steps taken    Psycho-education regarding mental health diagnoses and treatment options    Motivational Interviewing    Skills training    Explored specific skills useful to client in current situation    Skill areas include assertiveness, communication, conflict management, problem-solving, relaxation, etc.     Solution-Focused Therapy    Explored patterns in patient's behaviors and relationships and discussed options for new " behaviors    Explored new options for problem-solving, communication, managing stress, etc.    Cognitive-behavioral Therapy    Discussed common cognitive distortions, identified them in patient's life    Explored ways to challenge, replace, and act against these cognitions    Explore behavioral changes that might benefit patient in improving mood and engage in meaningful activity    Acceptance and Commitment Therapy    Explored and identified important values in patient's life    Discussed ways to commit to behavioral activation around these values    Psychodynamic psychotherapy    Discussed patient's emotional dynamics and issues and how they impact behaviors    Explored patient's history of relationships and how they impact present behaviors    Explored how to work with and make changes in these schemas and patterns    Narrative Therapy    Explored the patient's story of their life from their perspective,     Explored alternate ways of understanding their experience, identifying exceptions, developing new themes    Interpersonal Psychotherapy    Explored patterns in relationships that are effective or ineffective at helping patient reach their goals, find satisfying experience.    Discussed new patterns or behaviors to engage in for improved social functioning.    Behavioral Activation    Discussed steps patient can take to become more involved in meaningful activity    Identified barriers to these activities and explored possible solutions    Mindfulness-Based Strategies    Discussed skills based on development and application of mindfulness    Skills drawn from compassion-focused therapy, dialectical behavior therapy, mindfulness-based stress reduction, mindfulness-based cognitive therapy, etc.    Medication Review:  No problems reported to [facility] today.    Medication Compliance:  No problems reported to [facility] today.    Changes in Health Issues:  No problems reported to [facility] today.    Chemical Use Review:   Substance  Use: Chemical use reviewed, no active concerns identified      Tobacco Use: No current tobacco use.      Assessment: Current Emotional / Mental Status (status of significant symptoms):  Risk status (Self / Other harm or suicidal ideation)  Patient denies a history of suicidal ideation, suicide attempts, self-injurious behavior, homicidal ideation, homicidal behavior and and other safety concerns  Patient denies current fears or concerns for personal safety.  Patient denies current or recent suicidal ideation or behaviors.  Patient denies current or recent homicidal ideation or behaviors.  Patient denies current or recent self injurious behavior or ideation.  Patient denies other safety concerns.  A safety and risk management plan has not been developed at this time, however patient was encouraged to call [place] [phone] should there be a change in any of these risk factors.    Appearance:   Appropriate   Eye Contact:   Good   Psychomotor Behavior: Normal   Attitude:   Cooperative   Orientation:   All  Speech   Rate / Production: Normal    Volume:  Normal   Mood:    Anxious  Depressed - fluctuates  Affect:    Appropriate   Thought Content:  Clear  Rumination   Thought Form:  Coherent  Logical   Insight:    Good     Diagnoses:  1. Major depressive disorder, recurrent episode, moderate (H)      Collateral Reports Completed:  Will collaborate with care team as indicated during treatment    Plan: (Homework, other):  Patient was given information about behavioral services and encouraged to schedule a follow up appointment with the clinic [facility] as needed.  He was also given information about mental health symptoms and treatment options .  CD Recommendations: No indications of CD issues.  We agree to continue psychotherapy to address his depression, anxiety, life adjustment. GAGE Sears, [facility]    ______________________________________________________________________    [facility] Integrated Behavioral Health Treatment  Plan    Client's Name: Neo Saeed  YOB: 1995    Date: 9/7/2018    DSM-V Diagnoses: 296.32 (F33.1) Major Depressive Disorder, Recurrent Episode, Moderate With anxious distress;  Psychosocial / Contextual Factors: transitions in life - college to work force, friends leaving, etc    Referral / Collaboration:  Will collaborate with care team as indicated during treatment.    Anticipated number of session or this episode of care: 12+      MeasurableTreatment Goal(s) related to diagnosis / functional impairment(s)  Goal 1:  Patient will experience a reduction in depressive and anxious symptoms, along with a corresponding increase in positive emotion and life satisfaction.    Objective #A: Patient will experience a reduction in depressed mood, will develop more effective coping skills to manage depressive symptoms, will develop healthy cognitive patterns and beliefs, will increase ability to function adaptively and will continue to take medications as prescribed / participate in supportive activities and services    Status: Continued - Date(s): 9/7/2018    Objective #B: Patient will experience a reduction in anxiety, will develop more effective coping skills to manage anxiety symptoms, will develop healthy cognitive patterns and beliefs and will increase ability to function adaptively  Status: Continued - Date(s): 9/7/2018    Objective #C: Patient will develop better understanding of triggers and coping strategies to stabilize mood  Status: Continued - Date(s): 9/7/2018    Goal 2:  Patient will identify and increase engagement in valued activity, i.e. improving social connections/relationships, pursuing occupational goals or personally meaningful pursuits, exploration of meaning in life.     Objective #A: Patient will identify meaningful activity in social, occupational and  personal goals, and increase behavioral activation around these goals   Status: Continued - Date(s): 9/7/2018    Objective #B:  Patient will address relationship difficulties in a more adaptive manner  Status: Continued - Date(s): 9/7/2018    Objective #C: Patient will develop coping/problem-solving skills to facilitate more adaptive adjustment and will effectively address problems that interfere with adaptive functioning  Status: Continued - Date(s): 9/7/2018    Possible Therapeutic Intervention(s)  Psycho-education regarding mental health diagnoses and treatment options    Eye-Movement Desensitization and Reprocessing Therapy    Clinical Hypnosis    Skills training    Explore skills useful to client in current situation.    Skills include assertiveness, communication, conflict management, problem-solving, relaxation, etc.    Solution-Focused Therapy    Explore patterns in patient's relationships and discuss options for new behaviors.    Explore patterns in patient's actions and choices and discuss options for new behaviors.    Cognitive-behavioral Therapy    Discuss common cognitive distortions, identify them in patient's life.    Explore ways to challenge, replace, and act against these cognitions.    Acceptance and Commitment Therapy    Explore and identify important values in patient's life.    Discuss ways to commit to behavioral activation around these values.    Psychodynamic psychotherapy    Discuss patient's emotional dynamics and issues and how they impact behaviors.    Explore patient's history of relationships and how they impact present behaviors.    Explore how to work with and make changes in these schemas and patterns.    Narrative Therapy    Explore the patient's story of his/her life from his/her perspective.    Explore alternate ways of understanding their experience, identifying exceptions, developing new themes.    Interpersonal Psychotherapy    Explore patterns in relationships that are effective or ineffective at helping patient reach their goals, find satisfying experience.    Discuss new patterns or behaviors to  engage in for improved social functioning.    Behavioral Activation    Discuss steps patient can take to become more involved in meaningful activity.    Identify barriers to these activities and explore possible solutions.    Mindfulness-Based Strategies    Discuss skills based on development and application of mindfulness.    Skills drawn from compassion-focused therapy, dialectical behavior therapy, mindfulness-based stress reduction, mindfulness-based cognitive therapy, etc.      We have developed these goals together during our work to this point. Patient has assisted in the development of these goals and has agreed to this treatment plan.       GAGE Ulloa, [facility]  September 7, 2018

## 2018-09-28 ENCOUNTER — OFFICE VISIT (OUTPATIENT)
Dept: BEHAVIORAL HEALTH | Facility: CLINIC | Age: 23
End: 2018-09-28
Payer: COMMERCIAL

## 2018-09-28 DIAGNOSIS — F33.1 MAJOR DEPRESSIVE DISORDER, RECURRENT EPISODE, MODERATE (H): Primary | ICD-10-CM

## 2018-09-28 DIAGNOSIS — F41.9 ANXIETY: ICD-10-CM

## 2018-09-28 NOTE — PROGRESS NOTES
[facility] ([facility] referral)  September 28, 2018      Behavioral Health Clinician Progress Note    Patient Name: Neo Saeed              Service Type:  Individual      Service Location:   Face to Face in Clinic     Session Start Time: 215pm  Session End Time: 315pm      Session Length: 53 - 60      Attendees: Patient    Visit Activities (Refresh list every visit): [facility] Only    Diagnostic Assessment Date: none on file  Treatment Plan Review Date: 9/7/2018  See Flowsheets for today's PHQ-9 and LETI-7 results  Previous PHQ-9:   PHQ-9 SCORE 7/25/2018 8/10/2018 8/17/2018   Total Score MyChart - 2 (Minimal depression) -   Total Score 7 2 2     Previous LETI-7:   LETI-7 SCORE 7/25/2018 8/10/2018 8/17/2018   Total Score - 2 (minimal anxiety) -   Total Score 5 2 2       RAYMON LEVEL:  No flowsheet data found.    DATA  Extended Session (60+ minutes): No  Interactive Complexity: No  Crisis: No  [facility] Patient: No    Treatment Objective(s) Addressed in This Session:  Target Behavior(s): disease management/lifestyle changes      Patient  will experience a reduction in depressed mood, will develop more effective coping skills to manage depressive symptoms and will develop healthy cognitive patterns and beliefs, will experience a reduction in anxiety and will develop more effective coping skills to manage anxiety symptoms and will develop coping/problem-solving skills to facilitate more adaptive adjustment    Current Stressors / Issues:  [facility] met with Neo to provide psychotherapy support regarding depression, life adjustment, anxiety. Continued our conversation about his current stressors, hx of depression, life transitions..      He has been traveling quite a bit, enjoying it. His new house has been working out well. He feels as if he is settling into his new life stage a bit. Overall his mood has improved and he is feeling good/better. 1.5 points higher than he has been before.     He attributes his  "improvement in mood somewhat to the medications.  He also names some mindfulness skills and cognitive/behavioral skills. Worked to identify and emphasize his own contribution to his improvement.  He has evidently thought about and applied self-compassion.  He has been reading and working with this really quite a bit it seems from our conversation.  This has supported some awareness of mindfulness on his own.    He identifies a goal of continuing to explore improvements in his psychological skills for feeling good about himself.      Conversation about a sense of embarrassment that lingers behind each of his improvements. Explored it as a sense of shame (?) that he doesn't quite belong at the \"neurotypical party\" - or is a \"Faustian thing\" about needing the depression in order to become something...like a bad trade or a pay off. Existential questioning at some length, explored.      Self-compassion is a key them to explore, continue developing, and he agrees, will focus on this.    From previous sessions for reference:  Our initial conversation left him in a place of thinking about what he needs to deal with, he says. He has always viewed himself as \"the maladjusted one.\" He wants to work on continuing to understand his depression patterns, explore relationships and building a positive sense of himself, explore what makes a meaningful and satisfying life for himself. He says that he usually experiences more intense emotional dynamics during a transition of meds - about a month in his experience - and he is in this now.    I affirmed the steps this patient has taken to address physical and behavioral health issues, and offered continued behavioral health services or referral, now or in the future, as needed by the patient.    Progress on Treatment Objective(s) / Homework:  Satisfactory progress - ACTION (Actively working towards change); Intervened by reinforcing change plan / affirming steps taken    Psycho-education " regarding mental health diagnoses and treatment options    Motivational Interviewing    Skills training    Explored specific skills useful to client in current situation    Skill areas include assertiveness, communication, conflict management, problem-solving, relaxation, etc.     Solution-Focused Therapy    Explored patterns in patient's behaviors and relationships and discussed options for new behaviors    Explored new options for problem-solving, communication, managing stress, etc.    Cognitive-behavioral Therapy    Discussed common cognitive distortions, identified them in patient's life    Explored ways to challenge, replace, and act against these cognitions    Explore behavioral changes that might benefit patient in improving mood and engage in meaningful activity    Acceptance and Commitment Therapy    Explored and identified important values in patient's life    Discussed ways to commit to behavioral activation around these values    Psychodynamic psychotherapy    Discussed patient's emotional dynamics and issues and how they impact behaviors    Explored patient's history of relationships and how they impact present behaviors    Explored how to work with and make changes in these schemas and patterns    Narrative Therapy    Explored the patient's story of their life from their perspective,     Explored alternate ways of understanding their experience, identifying exceptions, developing new themes    Interpersonal Psychotherapy    Explored patterns in relationships that are effective or ineffective at helping patient reach their goals, find satisfying experience.    Discussed new patterns or behaviors to engage in for improved social functioning.    Behavioral Activation    Discussed steps patient can take to become more involved in meaningful activity    Identified barriers to these activities and explored possible solutions    Mindfulness-Based Strategies    Discussed skills based on development and  application of mindfulness    Skills drawn from compassion-focused therapy, dialectical behavior therapy, mindfulness-based stress reduction, mindfulness-based cognitive therapy, etc.    Medication Review:  No problems reported to [facility] today.    Medication Compliance:  No problems reported to [facility] today.    Changes in Health Issues:  No problems reported to [facility] today.    Chemical Use Review:   Substance Use: Chemical use reviewed, no active concerns identified      Tobacco Use: No current tobacco use.      Assessment: Current Emotional / Mental Status (status of significant symptoms):  Risk status (Self / Other harm or suicidal ideation)  Patient denies a history of suicidal ideation, suicide attempts, self-injurious behavior, homicidal ideation, homicidal behavior and and other safety concerns  Patient denies current fears or concerns for personal safety.  Patient denies current or recent suicidal ideation or behaviors.  Patient denies current or recent homicidal ideation or behaviors.  Patient denies current or recent self injurious behavior or ideation.  Patient denies other safety concerns.  A safety and risk management plan has not been developed at this time, however patient was encouraged to call [place] [phone] should there be a change in any of these risk factors.    Appearance:   Appropriate   Eye Contact:   Good   Psychomotor Behavior: Normal   Attitude:   Cooperative   Orientation:   All  Speech   Rate / Production: Normal    Volume:  Normal   Mood:    Anxious  Depressed - fluctuates  Affect:    Appropriate   Thought Content:  Clear  Rumination   Thought Form:  Coherent  Logical   Insight:    Good     Diagnoses:  1. Major depressive disorder, recurrent episode, moderate (H)    2. Anxiety      Collateral Reports Completed:  Will collaborate with care team as indicated during treatment    Plan: (Homework, other):  Patient was given information about behavioral services and encouraged to schedule a follow  up appointment with the clinic [facility] as needed.  He was also given information about mental health symptoms and treatment options .  CD Recommendations: No indications of CD issues.  We agree to continue psychotherapy to address his depression, anxiety, life adjustment. GAGE Sears, [facility]    ______________________________________________________________________    [facility] [place] [facility] Integrated Behavioral Health Treatment Plan    Client's Name: Neo Saeed  YOB: 1995    Date: 9/7/2018    DSM-V Diagnoses: 296.32 (F33.1) Major Depressive Disorder, Recurrent Episode, Moderate With anxious distress;  Psychosocial / Contextual Factors: transitions in life - college to work force, friends leaving, etc    Referral / Collaboration:  Will collaborate with care team as indicated during treatment.    Anticipated number of session or this episode of care: 12+      MeasurableTreatment Goal(s) related to diagnosis / functional impairment(s)  Goal 1:  Patient will experience a reduction in depressive and anxious symptoms, along with a corresponding increase in positive emotion and life satisfaction.    Objective #A: Patient will experience a reduction in depressed mood, will develop more effective coping skills to manage depressive symptoms, will develop healthy cognitive patterns and beliefs, will increase ability to function adaptively and will continue to take medications as prescribed / participate in supportive activities and services    Status: Continued - Date(s): 9/7/2018    Objective #B: Patient will experience a reduction in anxiety, will develop more effective coping skills to manage anxiety symptoms, will develop healthy cognitive patterns and beliefs and will increase ability to function adaptively  Status: Continued - Date(s): 9/7/2018    Objective #C: Patient will develop better understanding of triggers and coping strategies to stabilize mood  Status: Continued - Date(s): 9/7/2018    Goal 2:  Patient will  identify and increase engagement in valued activity, i.e. improving social connections/relationships, pursuing occupational goals or personally meaningful pursuits, exploration of meaning in life.     Objective #A: Patient will identify meaningful activity in social, occupational and  personal goals, and increase behavioral activation around these goals   Status: Continued - Date(s): 9/7/2018    Objective #B: Patient will address relationship difficulties in a more adaptive manner  Status: Continued - Date(s): 9/7/2018    Objective #C: Patient will develop coping/problem-solving skills to facilitate more adaptive adjustment and will effectively address problems that interfere with adaptive functioning  Status: Continued - Date(s): 9/7/2018    Possible Therapeutic Intervention(s)  Psycho-education regarding mental health diagnoses and treatment options    Eye-Movement Desensitization and Reprocessing Therapy    Clinical Hypnosis    Skills training    Explore skills useful to client in current situation.    Skills include assertiveness, communication, conflict management, problem-solving, relaxation, etc.    Solution-Focused Therapy    Explore patterns in patient's relationships and discuss options for new behaviors.    Explore patterns in patient's actions and choices and discuss options for new behaviors.    Cognitive-behavioral Therapy    Discuss common cognitive distortions, identify them in patient's life.    Explore ways to challenge, replace, and act against these cognitions.    Acceptance and Commitment Therapy    Explore and identify important values in patient's life.    Discuss ways to commit to behavioral activation around these values.    Psychodynamic psychotherapy    Discuss patient's emotional dynamics and issues and how they impact behaviors.    Explore patient's history of relationships and how they impact present behaviors.    Explore how to work with and make changes in these schemas and  patterns.    Narrative Therapy    Explore the patient's story of his/her life from his/her perspective.    Explore alternate ways of understanding their experience, identifying exceptions, developing new themes.    Interpersonal Psychotherapy    Explore patterns in relationships that are effective or ineffective at helping patient reach their goals, find satisfying experience.    Discuss new patterns or behaviors to engage in for improved social functioning.    Behavioral Activation    Discuss steps patient can take to become more involved in meaningful activity.    Identify barriers to these activities and explore possible solutions.    Mindfulness-Based Strategies    Discuss skills based on development and application of mindfulness.    Skills drawn from compassion-focused therapy, dialectical behavior therapy, mindfulness-based stress reduction, mindfulness-based cognitive therapy, etc.      We have developed these goals together during our work to this point. Patient has assisted in the development of these goals and has agreed to this treatment plan.       GAGE Ulloa, [facility]  September 7, 2018

## 2018-10-05 ENCOUNTER — OFFICE VISIT (OUTPATIENT)
Dept: BEHAVIORAL HEALTH | Facility: CLINIC | Age: 23
End: 2018-10-05
Payer: COMMERCIAL

## 2018-10-05 DIAGNOSIS — F33.1 MAJOR DEPRESSIVE DISORDER, RECURRENT EPISODE, MODERATE (H): Primary | ICD-10-CM

## 2018-10-05 DIAGNOSIS — F41.9 ANXIETY: ICD-10-CM

## 2018-10-05 NOTE — PROGRESS NOTES
[facility] (P[facility] referral)  October 5, 2018      Behavioral Health Clinician Progress Note    Patient Name: Neo Saeed              Service Type:  Individual      Service Location:   Face to Face in Clinic     Session Start Time: 230pm  Session End Time: 315pm      Session Length: 38 - 52      Attendees: Patient    Visit Activities (Refresh list every visit): [facility] Only    Diagnostic Assessment Date: none on file  Treatment Plan Review Date: 9/7/2018  See Flowsheets for today's PHQ-9 and LETI-7 results  Previous PHQ-9:   PHQ-9 SCORE 7/25/2018 8/10/2018 8/17/2018   Total Score MyChart - 2 (Minimal depression) -   Total Score 7 2 2     Previous LETI-7:   LETI-7 SCORE 7/25/2018 8/10/2018 8/17/2018   Total Score - 2 (minimal anxiety) -   Total Score 5 2 2       RAYMON LEVEL:  No flowsheet data found.    DATA  Extended Session (60+ minutes): No  Interactive Complexity: No  Crisis: No  [facility] Patient: No    Treatment Objective(s) Addressed in This Session:  Target Behavior(s): disease management/lifestyle changes      Patient  will experience a reduction in depressed mood, will develop more effective coping skills to manage depressive symptoms and will develop healthy cognitive patterns and beliefs, will experience a reduction in anxiety and will develop more effective coping skills to manage anxiety symptoms and will develop coping/problem-solving skills to facilitate more adaptive adjustment    Current Stressors / Issues:  [facility] met with Neo to provide psychotherapy support regarding depression, life adjustment, anxiety. Continued our conversation about his current stressors, hx of depression, life transitions..      Discussed some of his emotional dynamics - irritability as a sign of depression?  He identifies a pervasive negative reactivity to small things. Explored his complex meta-analysis about how things should be versus how they are for him. These lead to bigger questions about his own larger  "sense of always being depressed his whole life - in order to find the change he needs in order to feel good/better.      Self-compassion is a key them to explore, continue developing, and he agrees, will focus on this.    From previous sessions for reference:  Our initial conversation left him in a place of thinking about what he needs to deal with, he says. He has always viewed himself as \"the maladjusted one.\" He wants to work on continuing to understand his depression patterns, explore relationships and building a positive sense of himself, explore what makes a meaningful and satisfying life for himself. He says that he usually experiences more intense emotional dynamics during a transition of meds - about a month in his experience - and he is in this now.    I affirmed the steps this patient has taken to address physical and behavioral health issues, and offered continued behavioral health services or referral, now or in the future, as needed by the patient.    Progress on Treatment Objective(s) / Homework:  Satisfactory progress - ACTION (Actively working towards change); Intervened by reinforcing change plan / affirming steps taken    Psycho-education regarding mental health diagnoses and treatment options    Motivational Interviewing    Skills training    Explored specific skills useful to client in current situation    Skill areas include assertiveness, communication, conflict management, problem-solving, relaxation, etc.     Solution-Focused Therapy    Explored patterns in patient's behaviors and relationships and discussed options for new behaviors    Explored new options for problem-solving, communication, managing stress, etc.    Cognitive-behavioral Therapy    Discussed common cognitive distortions, identified them in patient's life    Explored ways to challenge, replace, and act against these cognitions    Explore behavioral changes that might benefit patient in improving mood and engage in meaningful " activity    Acceptance and Commitment Therapy    Explored and identified important values in patient's life    Discussed ways to commit to behavioral activation around these values    Psychodynamic psychotherapy    Discussed patient's emotional dynamics and issues and how they impact behaviors    Explored patient's history of relationships and how they impact present behaviors    Explored how to work with and make changes in these schemas and patterns    Narrative Therapy    Explored the patient's story of their life from their perspective,     Explored alternate ways of understanding their experience, identifying exceptions, developing new themes    Interpersonal Psychotherapy    Explored patterns in relationships that are effective or ineffective at helping patient reach their goals, find satisfying experience.    Discussed new patterns or behaviors to engage in for improved social functioning.    Behavioral Activation    Discussed steps patient can take to become more involved in meaningful activity    Identified barriers to these activities and explored possible solutions    Mindfulness-Based Strategies    Discussed skills based on development and application of mindfulness    Skills drawn from compassion-focused therapy, dialectical behavior therapy, mindfulness-based stress reduction, mindfulness-based cognitive therapy, etc.    Medication Review:  No problems reported to [facility] today.    Medication Compliance:  No problems reported to [facility] today.    Changes in Health Issues:  No problems reported to [facility] today.    Chemical Use Review:   Substance Use: Chemical use reviewed, no active concerns identified      Tobacco Use: No current tobacco use.      Assessment: Current Emotional / Mental Status (status of significant symptoms):  Risk status (Self / Other harm or suicidal ideation)  Patient denies a history of suicidal ideation, suicide attempts, self-injurious behavior, homicidal ideation, homicidal behavior and  and other safety concerns  Patient denies current fears or concerns for personal safety.  Patient denies current or recent suicidal ideation or behaviors.  Patient denies current or recent homicidal ideation or behaviors.  Patient denies current or recent self injurious behavior or ideation.  Patient denies other safety concerns.  A safety and risk management plan has not been developed at this time, however patient was encouraged to call [place] [phone] should there be a change in any of these risk factors.    Appearance:   Appropriate   Eye Contact:   Good   Psychomotor Behavior: Normal   Attitude:   Cooperative   Orientation:   All  Speech   Rate / Production: Normal    Volume:  Normal   Mood:    Anxious  Depressed - fluctuates  Affect:    Appropriate   Thought Content:  Clear  Rumination   Thought Form:  Coherent  Logical   Insight:    Good     Diagnoses:  1. Major depressive disorder, recurrent episode, moderate (H)    2. Anxiety      Collateral Reports Completed:  Will collaborate with care team as indicated during treatment    Plan: (Homework, other):  Patient was given information about behavioral services and encouraged to schedule a follow up appointment with the clinic [facility] as needed.  He was also given information about mental health symptoms and treatment options .  CD Recommendations: No indications of CD issues.  We agree to continue psychotherapy to address his depression, anxiety, life adjustment. GAGE Sears, [facility]    ______________________________________________________________________    [facility] Integrated Behavioral Health Treatment Plan    Client's Name: Neo Saeed  YOB: 1995    Date: 9/7/2018    DSM-V Diagnoses: 296.32 (F33.1) Major Depressive Disorder, Recurrent Episode, Moderate With anxious distress;  Psychosocial / Contextual Factors: transitions in life - college to work force, friends leaving, etc    Referral / Collaboration:  Will collaborate with care team as  indicated during treatment.    Anticipated number of session or this episode of care: 12+      MeasurableTreatment Goal(s) related to diagnosis / functional impairment(s)  Goal 1:  Patient will experience a reduction in depressive and anxious symptoms, along with a corresponding increase in positive emotion and life satisfaction.    Objective #A: Patient will experience a reduction in depressed mood, will develop more effective coping skills to manage depressive symptoms, will develop healthy cognitive patterns and beliefs, will increase ability to function adaptively and will continue to take medications as prescribed / participate in supportive activities and services    Status: Continued - Date(s): 9/7/2018    Objective #B: Patient will experience a reduction in anxiety, will develop more effective coping skills to manage anxiety symptoms, will develop healthy cognitive patterns and beliefs and will increase ability to function adaptively  Status: Continued - Date(s): 9/7/2018    Objective #C: Patient will develop better understanding of triggers and coping strategies to stabilize mood  Status: Continued - Date(s): 9/7/2018    Goal 2:  Patient will identify and increase engagement in valued activity, i.e. improving social connections/relationships, pursuing occupational goals or personally meaningful pursuits, exploration of meaning in life.     Objective #A: Patient will identify meaningful activity in social, occupational and  personal goals, and increase behavioral activation around these goals   Status: Continued - Date(s): 9/7/2018    Objective #B: Patient will address relationship difficulties in a more adaptive manner  Status: Continued - Date(s): 9/7/2018    Objective #C: Patient will develop coping/problem-solving skills to facilitate more adaptive adjustment and will effectively address problems that interfere with adaptive functioning  Status: Continued - Date(s): 9/7/2018    Possible Therapeutic  Intervention(s)  Psycho-education regarding mental health diagnoses and treatment options    Eye-Movement Desensitization and Reprocessing Therapy    Clinical Hypnosis    Skills training    Explore skills useful to client in current situation.    Skills include assertiveness, communication, conflict management, problem-solving, relaxation, etc.    Solution-Focused Therapy    Explore patterns in patient's relationships and discuss options for new behaviors.    Explore patterns in patient's actions and choices and discuss options for new behaviors.    Cognitive-behavioral Therapy    Discuss common cognitive distortions, identify them in patient's life.    Explore ways to challenge, replace, and act against these cognitions.    Acceptance and Commitment Therapy    Explore and identify important values in patient's life.    Discuss ways to commit to behavioral activation around these values.    Psychodynamic psychotherapy    Discuss patient's emotional dynamics and issues and how they impact behaviors.    Explore patient's history of relationships and how they impact present behaviors.    Explore how to work with and make changes in these schemas and patterns.    Narrative Therapy    Explore the patient's story of his/her life from his/her perspective.    Explore alternate ways of understanding their experience, identifying exceptions, developing new themes.    Interpersonal Psychotherapy    Explore patterns in relationships that are effective or ineffective at helping patient reach their goals, find satisfying experience.    Discuss new patterns or behaviors to engage in for improved social functioning.    Behavioral Activation    Discuss steps patient can take to become more involved in meaningful activity.    Identify barriers to these activities and explore possible solutions.    Mindfulness-Based Strategies    Discuss skills based on development and application of mindfulness.    Skills drawn from  compassion-focused therapy, dialectical behavior therapy, mindfulness-based stress reduction, mindfulness-based cognitive therapy, etc.      We have developed these goals together during our work to this point. Patient has assisted in the development of these goals and has agreed to this treatment plan.       GAGE Ulloa, [facility] [place]  September 7, 2018

## 2018-10-12 ENCOUNTER — OFFICE VISIT (OUTPATIENT)
Dept: BEHAVIORAL HEALTH | Facility: CLINIC | Age: 23
End: 2018-10-12
Payer: COMMERCIAL

## 2018-10-12 DIAGNOSIS — F41.9 ANXIETY: ICD-10-CM

## 2018-10-12 DIAGNOSIS — F33.1 MAJOR DEPRESSIVE DISORDER, RECURRENT EPISODE, MODERATE (H): Primary | ICD-10-CM

## 2018-10-12 NOTE — PROGRESS NOTES
"[facility] (P[facility] referral)  October 12, 2018      Behavioral Health Clinician Progress Note    Patient Name: Neo Saeed              Service Type:  Individual      Service Location:   Face to Face in Clinic     Session Start Time: 220pm  Session End Time: 320pm      Session Length: 53 - 60      Attendees: Patient    Visit Activities (Refresh list every visit): [facility] Only    Diagnostic Assessment Date: none on file  Treatment Plan Review Date: 9/7/2018  See Flowsheets for today's PHQ-9 and LETI-7 results  Previous PHQ-9:   PHQ-9 SCORE 7/25/2018 8/10/2018 8/17/2018   Total Score MyChart - 2 (Minimal depression) -   Total Score 7 2 2     Previous LETI-7:   LETI-7 SCORE 7/25/2018 8/10/2018 8/17/2018   Total Score - 2 (minimal anxiety) -   Total Score 5 2 2       RAYMON LEVEL:  No flowsheet data found.    DATA  Extended Session (60+ minutes): No  Interactive Complexity: No  Crisis: No  [facility] Patient: No    Treatment Objective(s) Addressed in This Session:  Target Behavior(s): disease management/lifestyle changes      Patient  will experience a reduction in depressed mood, will develop more effective coping skills to manage depressive symptoms and will develop healthy cognitive patterns and beliefs, will experience a reduction in anxiety and will develop more effective coping skills to manage anxiety symptoms and will develop coping/problem-solving skills to facilitate more adaptive adjustment    Current Stressors / Issues:  [facility] met with Neo to provide psychotherapy support regarding depression, life adjustment, anxiety. Continued our conversation about his current stressors, hx of depression, life transitions..      Started today with a working model of depression - or at least his working model.  Chemical balance, biological factors, etc.  These are the \"causes\" - they result in negative thinking and mood and difficulty getting out of that cycle. Passive, irritable, negativity.  He is less sure " "about psychological variables.  He cannot see connections between his mood fluctuations and \"situational factors.\" This allow cordell to feel it is \"not his fault\" and he will just \"feel this way sometimes\" so he can work to minimize the impact of the underlying issue.     Explored the bio-psycho-social model of depression, mental health.  Discussed Sara's idea of the rider on the elephant as another angle in.  Discussed book A General Theory of Love - he will explore this for more insight into the complex development of emotional patterns, etc.    He is finding the self-compassion work helpful.    From previous sessions for reference:  Our initial conversation left him in a place of thinking about what he needs to deal with, he says. He has always viewed himself as \"the maladjusted one.\" He wants to work on continuing to understand his depression patterns, explore relationships and building a positive sense of himself, explore what makes a meaningful and satisfying life for himself. He says that he usually experiences more intense emotional dynamics during a transition of meds - about a month in his experience - and he is in this now.    I affirmed the steps this patient has taken to address physical and behavioral health issues, and offered continued behavioral health services or referral, now or in the future, as needed by the patient.    Progress on Treatment Objective(s) / Homework:  Satisfactory progress - ACTION (Actively working towards change); Intervened by reinforcing change plan / affirming steps taken    Psycho-education regarding mental health diagnoses and treatment options    Motivational Interviewing    Skills training    Explored specific skills useful to client in current situation    Skill areas include assertiveness, communication, conflict management, problem-solving, relaxation, etc.     Solution-Focused Therapy    Explored patterns in patient's behaviors and relationships and discussed options " for new behaviors    Explored new options for problem-solving, communication, managing stress, etc.    Cognitive-behavioral Therapy    Discussed common cognitive distortions, identified them in patient's life    Explored ways to challenge, replace, and act against these cognitions    Explore behavioral changes that might benefit patient in improving mood and engage in meaningful activity    Acceptance and Commitment Therapy    Explored and identified important values in patient's life    Discussed ways to commit to behavioral activation around these values    Psychodynamic psychotherapy    Discussed patient's emotional dynamics and issues and how they impact behaviors    Explored patient's history of relationships and how they impact present behaviors    Explored how to work with and make changes in these schemas and patterns    Narrative Therapy    Explored the patient's story of their life from their perspective,     Explored alternate ways of understanding their experience, identifying exceptions, developing new themes    Interpersonal Psychotherapy    Explored patterns in relationships that are effective or ineffective at helping patient reach their goals, find satisfying experience.    Discussed new patterns or behaviors to engage in for improved social functioning.    Behavioral Activation    Discussed steps patient can take to become more involved in meaningful activity    Identified barriers to these activities and explored possible solutions    Mindfulness-Based Strategies    Discussed skills based on development and application of mindfulness    Skills drawn from compassion-focused therapy, dialectical behavior therapy, mindfulness-based stress reduction, mindfulness-based cognitive therapy, etc.    Medication Review:  No problems reported to [facility] today.    Medication Compliance:  No problems reported to [facility] today.    Changes in Health Issues:  No problems reported to [facility] today.    Chemical Use  Review:   Substance Use: Chemical use reviewed, no active concerns identified      Tobacco Use: No current tobacco use.      Assessment: Current Emotional / Mental Status (status of significant symptoms):  Risk status (Self / Other harm or suicidal ideation)  Patient denies a history of suicidal ideation, suicide attempts, self-injurious behavior, homicidal ideation, homicidal behavior and and other safety concerns  Patient denies current fears or concerns for personal safety.  Patient denies current or recent suicidal ideation or behaviors.  Patient denies current or recent homicidal ideation or behaviors.  Patient denies current or recent self injurious behavior or ideation.  Patient denies other safety concerns.  A safety and risk management plan has not been developed at this time, however patient was encouraged to call [place] [phone] should there be a change in any of these risk factors.    Appearance:   Appropriate   Eye Contact:   Good   Psychomotor Behavior: Normal   Attitude:   Cooperative   Orientation:   All  Speech   Rate / Production: Normal    Volume:  Normal   Mood:    Anxious  Depressed - fluctuates  Affect:    Appropriate   Thought Content:  Clear  Rumination   Thought Form:  Coherent  Logical   Insight:    Good     Diagnoses:  1. Major depressive disorder, recurrent episode, moderate (H)    2. Anxiety      Collateral Reports Completed:  Will collaborate with care team as indicated during treatment    Plan: (Homework, other):  Patient was given information about behavioral services and encouraged to schedule a follow up appointment with the clinic [facility] as needed.  He was also given information about mental health symptoms and treatment options .  CD Recommendations: No indications of CD issues.  We agree to continue psychotherapy to address his depression, anxiety, life adjustment. GAGE Sears, [facility]    ______________________________________________________________________    [facility] Integrated  Behavioral Health Treatment Plan    Client's Name: Neo Saeed  YOB: 1995    Date: 9/7/2018    DSM-V Diagnoses: 296.32 (F33.1) Major Depressive Disorder, Recurrent Episode, Moderate With anxious distress;  Psychosocial / Contextual Factors: transitions in life - college to work force, friends leaving, etc    Referral / Collaboration:  Will collaborate with care team as indicated during treatment.    Anticipated number of session or this episode of care: 12+      MeasurableTreatment Goal(s) related to diagnosis / functional impairment(s)  Goal 1:  Patient will experience a reduction in depressive and anxious symptoms, along with a corresponding increase in positive emotion and life satisfaction.    Objective #A: Patient will experience a reduction in depressed mood, will develop more effective coping skills to manage depressive symptoms, will develop healthy cognitive patterns and beliefs, will increase ability to function adaptively and will continue to take medications as prescribed / participate in supportive activities and services    Status: Continued - Date(s): 9/7/2018    Objective #B: Patient will experience a reduction in anxiety, will develop more effective coping skills to manage anxiety symptoms, will develop healthy cognitive patterns and beliefs and will increase ability to function adaptively  Status: Continued - Date(s): 9/7/2018    Objective #C: Patient will develop better understanding of triggers and coping strategies to stabilize mood  Status: Continued - Date(s): 9/7/2018    Goal 2:  Patient will identify and increase engagement in valued activity, i.e. improving social connections/relationships, pursuing occupational goals or personally meaningful pursuits, exploration of meaning in life.     Objective #A: Patient will identify meaningful activity in social, occupational and  personal goals, and increase behavioral activation around these goals   Status: Continued - Date(s):  9/7/2018    Objective #B: Patient will address relationship difficulties in a more adaptive manner  Status: Continued - Date(s): 9/7/2018    Objective #C: Patient will develop coping/problem-solving skills to facilitate more adaptive adjustment and will effectively address problems that interfere with adaptive functioning  Status: Continued - Date(s): 9/7/2018    Possible Therapeutic Intervention(s)  Psycho-education regarding mental health diagnoses and treatment options    Eye-Movement Desensitization and Reprocessing Therapy    Clinical Hypnosis    Skills training    Explore skills useful to client in current situation.    Skills include assertiveness, communication, conflict management, problem-solving, relaxation, etc.    Solution-Focused Therapy    Explore patterns in patient's relationships and discuss options for new behaviors.    Explore patterns in patient's actions and choices and discuss options for new behaviors.    Cognitive-behavioral Therapy    Discuss common cognitive distortions, identify them in patient's life.    Explore ways to challenge, replace, and act against these cognitions.    Acceptance and Commitment Therapy    Explore and identify important values in patient's life.    Discuss ways to commit to behavioral activation around these values.    Psychodynamic psychotherapy    Discuss patient's emotional dynamics and issues and how they impact behaviors.    Explore patient's history of relationships and how they impact present behaviors.    Explore how to work with and make changes in these schemas and patterns.    Narrative Therapy    Explore the patient's story of his/her life from his/her perspective.    Explore alternate ways of understanding their experience, identifying exceptions, developing new themes.    Interpersonal Psychotherapy    Explore patterns in relationships that are effective or ineffective at helping patient reach their goals, find satisfying experience.    Discuss new  patterns or behaviors to engage in for improved social functioning.    Behavioral Activation    Discuss steps patient can take to become more involved in meaningful activity.    Identify barriers to these activities and explore possible solutions.    Mindfulness-Based Strategies    Discuss skills based on development and application of mindfulness.    Skills drawn from compassion-focused therapy, dialectical behavior therapy, mindfulness-based stress reduction, mindfulness-based cognitive therapy, etc.      We have developed these goals together during our work to this point. Patient has assisted in the development of these goals and has agreed to this treatment plan.       GAGE Ulloa, [facility]  September 7, 2018

## 2018-10-25 DIAGNOSIS — F33.0 MILD EPISODE OF RECURRENT MAJOR DEPRESSIVE DISORDER (H): ICD-10-CM

## 2018-10-25 RX ORDER — BUPROPION HYDROCHLORIDE 100 MG/1
100 TABLET, EXTENDED RELEASE ORAL 2 TIMES DAILY
Qty: 180 TABLET | Refills: 3 | Status: SHIPPED | OUTPATIENT
Start: 2018-10-25 | End: 2021-02-26

## 2018-10-25 NOTE — TELEPHONE ENCOUNTER
Health Call Center    Phone Message    May a detailed message be left on voicemail: yes    Reason for Call: Medication Refill Request    Has the patient contacted the pharmacy for the refill? Yes   Name of medication being requested: buPROPion (WELLBUTRIN SR) 100 MG 12 hr tablet [[id number]]/   Provider who prescribed the medication: Melissa Jessica RN  Pharmacy: call pt at [phone]  Date medication is needed: As soon as possible     Action Taken: Message routed to:  [facility] ([facility]

## 2018-10-25 NOTE — TELEPHONE ENCOUNTER
buPROPion (WELLBUTRIN SR) 100 MG 12 hr tablet   Last Written Prescription Date:  8/30/18  Last Fill Quantity: 60,   # refills: 0  Last Office Visit :8/17/18  Future Office visit: none

## 2018-11-02 ENCOUNTER — OFFICE VISIT (OUTPATIENT)
Dept: BEHAVIORAL HEALTH | Facility: CLINIC | Age: 23
End: 2018-11-02
Payer: COMMERCIAL

## 2018-11-02 DIAGNOSIS — F33.1 MAJOR DEPRESSIVE DISORDER, RECURRENT EPISODE, MODERATE (H): Primary | ICD-10-CM

## 2018-11-02 DIAGNOSIS — F41.9 ANXIETY: ICD-10-CM

## 2018-11-02 NOTE — PROGRESS NOTES
[facility] (PCC referral)  November 1, 2018      Behavioral Health Clinician Progress Note    Patient Name: Neo Saeed              Service Type:  Individual      Service Location:   Face to Face in Clinic     Session Start Time: 1205pm  Session End Time: 105pm      Session Length: 53 - 60      Attendees: Patient    Visit Activities (Refresh list every visit): [facility] Only    Diagnostic Assessment Date: none on file  Treatment Plan Review Date: 9/7/2018  See Flowsheets for today's PHQ-9 and LETI-7 results  Previous PHQ-9:   PHQ-9 SCORE 7/25/2018 8/10/2018 8/17/2018   Total Score MyChart - 2 (Minimal depression) -   Total Score 7 2 2     Previous LETI-7:   LETI-7 SCORE 7/25/2018 8/10/2018 8/17/2018   Total Score - 2 (minimal anxiety) -   Total Score 5 2 2       RAYMON LEVEL:  No flowsheet data found.    DATA  Extended Session (60+ minutes): No  Interactive Complexity: No  Crisis: No  Merged with [facility] Patient: No    Treatment Objective(s) Addressed in This Session:  Target Behavior(s): disease management/lifestyle changes      Patient  will experience a reduction in depressed mood, will develop more effective coping skills to manage depressive symptoms and will develop healthy cognitive patterns and beliefs, will experience a reduction in anxiety and will develop more effective coping skills to manage anxiety symptoms and will develop coping/problem-solving skills to facilitate more adaptive adjustment    Current Stressors / Issues:  [facility] met with Neo to provide psychotherapy support regarding depression, life adjustment, anxiety. Continued our conversation about his current stressors, hx of depression, life transitions..      Discussed his reading in A General Theory of Love - he is finding some parts of it interesting, but has some issues. Reviewed ideas about depression, including rather simple biochemical up to the more complex bio-psyco-social model. He is enjoying the reading.  Discussed his tendency to  "pathologize his own depressive experience and perhaps exploring other lenses for understanding himself and his experience.    Explored his \"origin story\" of depression - early high school, after a [place] Year's party is his first memory of feeling and noticing \"depression.\"   He felt a need for \"absolution.\"    He only came into awareness of his sexuality in the last year or so, he reports.     Continued conversation about a working model of depression. Explored the bio-psycho-social model of depression, mental health.  Discussed Sara's idea of the rider on the elephant as another angle in.     He is finding the self-compassion work helpful. Discussed the idea of a need for \"existential peace\" as opposed to simply being \"happy.\"    From previous sessions for reference:  Our initial conversation left him in a place of thinking about what he needs to deal with, he says. He has always viewed himself as \"the maladjusted one.\" He wants to work on continuing to understand his depression patterns, explore relationships and building a positive sense of himself, explore what makes a meaningful and satisfying life for himself. He says that he usually experiences more intense emotional dynamics during a transition of meds - about a month in his experience - and he is in this now.    I affirmed the steps this patient has taken to address physical and behavioral health issues, and offered continued behavioral health services or referral, now or in the future, as needed by the patient.    Progress on Treatment Objective(s) / Homework:  Satisfactory progress - ACTION (Actively working towards change); Intervened by reinforcing change plan / affirming steps taken    Psycho-education regarding mental health diagnoses and treatment options    Motivational Interviewing    Skills training    Explored specific skills useful to client in current situation    Skill areas include assertiveness, communication, conflict management, " problem-solving, relaxation, etc.     Solution-Focused Therapy    Explored patterns in patient's behaviors and relationships and discussed options for new behaviors    Explored new options for problem-solving, communication, managing stress, etc.    Cognitive-behavioral Therapy    Discussed common cognitive distortions, identified them in patient's life    Explored ways to challenge, replace, and act against these cognitions    Explore behavioral changes that might benefit patient in improving mood and engage in meaningful activity    Acceptance and Commitment Therapy    Explored and identified important values in patient's life    Discussed ways to commit to behavioral activation around these values    Psychodynamic psychotherapy    Discussed patient's emotional dynamics and issues and how they impact behaviors    Explored patient's history of relationships and how they impact present behaviors    Explored how to work with and make changes in these schemas and patterns    Narrative Therapy    Explored the patient's story of their life from their perspective,     Explored alternate ways of understanding their experience, identifying exceptions, developing new themes    Interpersonal Psychotherapy    Explored patterns in relationships that are effective or ineffective at helping patient reach their goals, find satisfying experience.    Discussed new patterns or behaviors to engage in for improved social functioning.    Behavioral Activation    Discussed steps patient can take to become more involved in meaningful activity    Identified barriers to these activities and explored possible solutions    Mindfulness-Based Strategies    Discussed skills based on development and application of mindfulness    Skills drawn from compassion-focused therapy, dialectical behavior therapy, mindfulness-based stress reduction, mindfulness-based cognitive therapy, etc.    Medication Review:  No problems reported to [facility]  today.    Medication Compliance:  No problems reported to [facility] today.    Changes in Health Issues:  No problems reported to [facility] today.    Chemical Use Review:   Substance Use: Chemical use reviewed, no active concerns identified      Tobacco Use: No current tobacco use.      Assessment: Current Emotional / Mental Status (status of significant symptoms):  Risk status (Self / Other harm or suicidal ideation)  Patient denies a history of suicidal ideation, suicide attempts, self-injurious behavior, homicidal ideation, homicidal behavior and and other safety concerns  Patient denies current fears or concerns for personal safety.  Patient denies current or recent suicidal ideation or behaviors.  Patient denies current or recent homicidal ideation or behaviors.  Patient denies current or recent self injurious behavior or ideation.  Patient denies other safety concerns.  A safety and risk management plan has not been developed at this time, however patient was encouraged to call [place] [phone] should there be a change in any of these risk factors.    Appearance:   Appropriate   Eye Contact:   Good   Psychomotor Behavior: Normal   Attitude:   Cooperative   Orientation:   All  Speech   Rate / Production: Normal    Volume:  Normal   Mood:    Anxious  Depressed - fluctuates  Affect:    Appropriate   Thought Content:  Clear  Rumination   Thought Form:  Coherent  Logical   Insight:    Good     Diagnoses:  1. Major depressive disorder, recurrent episode, moderate (H)    2. Anxiety      Collateral Reports Completed:  Will collaborate with care team as indicated during treatment    Plan: (Homework, other):  Patient was given information about behavioral services and encouraged to schedule a follow up appointment with the clinic [facility] as needed.  He was also given information about mental health symptoms and treatment options .  CD Recommendations: No indications of CD issues.  We agree to continue psychotherapy to address his  depression, anxiety, life adjustment. GAGE Sears, [facility]    ______________________________________________________________________    [facility] Integrated Behavioral Health Treatment Plan    Client's Name: Neo Saeed  YOB: 1995    Date: 9/7/2018    DSM-V Diagnoses: 296.32 (F33.1) Major Depressive Disorder, Recurrent Episode, Moderate With anxious distress;  Psychosocial / Contextual Factors: transitions in life - college to work force, friends leaving, etc    Referral / Collaboration:  Will collaborate with care team as indicated during treatment.    Anticipated number of session or this episode of care: 12+      MeasurableTreatment Goal(s) related to diagnosis / functional impairment(s)  Goal 1:  Patient will experience a reduction in depressive and anxious symptoms, along with a corresponding increase in positive emotion and life satisfaction.    Objective #A: Patient will experience a reduction in depressed mood, will develop more effective coping skills to manage depressive symptoms, will develop healthy cognitive patterns and beliefs, will increase ability to function adaptively and will continue to take medications as prescribed / participate in supportive activities and services    Status: Continued - Date(s): 9/7/2018    Objective #B: Patient will experience a reduction in anxiety, will develop more effective coping skills to manage anxiety symptoms, will develop healthy cognitive patterns and beliefs and will increase ability to function adaptively  Status: Continued - Date(s): 9/7/2018    Objective #C: Patient will develop better understanding of triggers and coping strategies to stabilize mood  Status: Continued - Date(s): 9/7/2018    Goal 2:  Patient will identify and increase engagement in valued activity, i.e. improving social connections/relationships, pursuing occupational goals or personally meaningful pursuits, exploration of meaning in life.     Objective #A: Patient will  identify meaningful activity in social, occupational and  personal goals, and increase behavioral activation around these goals   Status: Continued - Date(s): 9/7/2018    Objective #B: Patient will address relationship difficulties in a more adaptive manner  Status: Continued - Date(s): 9/7/2018    Objective #C: Patient will develop coping/problem-solving skills to facilitate more adaptive adjustment and will effectively address problems that interfere with adaptive functioning  Status: Continued - Date(s): 9/7/2018    Possible Therapeutic Intervention(s)  Psycho-education regarding mental health diagnoses and treatment options    Eye-Movement Desensitization and Reprocessing Therapy    Clinical Hypnosis    Skills training    Explore skills useful to client in current situation.    Skills include assertiveness, communication, conflict management, problem-solving, relaxation, etc.    Solution-Focused Therapy    Explore patterns in patient's relationships and discuss options for new behaviors.    Explore patterns in patient's actions and choices and discuss options for new behaviors.    Cognitive-behavioral Therapy    Discuss common cognitive distortions, identify them in patient's life.    Explore ways to challenge, replace, and act against these cognitions.    Acceptance and Commitment Therapy    Explore and identify important values in patient's life.    Discuss ways to commit to behavioral activation around these values.    Psychodynamic psychotherapy    Discuss patient's emotional dynamics and issues and how they impact behaviors.    Explore patient's history of relationships and how they impact present behaviors.    Explore how to work with and make changes in these schemas and patterns.    Narrative Therapy    Explore the patient's story of his/her life from his/her perspective.    Explore alternate ways of understanding their experience, identifying exceptions, developing new themes.    Interpersonal  Psychotherapy    Explore patterns in relationships that are effective or ineffective at helping patient reach their goals, find satisfying experience.    Discuss new patterns or behaviors to engage in for improved social functioning.    Behavioral Activation    Discuss steps patient can take to become more involved in meaningful activity.    Identify barriers to these activities and explore possible solutions.    Mindfulness-Based Strategies    Discuss skills based on development and application of mindfulness.    Skills drawn from compassion-focused therapy, dialectical behavior therapy, mindfulness-based stress reduction, mindfulness-based cognitive therapy, etc.      We have developed these goals together during our work to this point. Patient has assisted in the development of these goals and has agreed to this treatment plan.       GAGE Ulloa, [facility] [place]  September 7, 2018

## 2018-11-09 ENCOUNTER — OFFICE VISIT (OUTPATIENT)
Dept: BEHAVIORAL HEALTH | Facility: CLINIC | Age: 23
End: 2018-11-09
Payer: COMMERCIAL

## 2018-11-09 DIAGNOSIS — F41.9 ANXIETY: ICD-10-CM

## 2018-11-09 DIAGNOSIS — F33.1 MAJOR DEPRESSIVE DISORDER, RECURRENT EPISODE, MODERATE (H): Primary | ICD-10-CM

## 2018-11-09 NOTE — PROGRESS NOTES
[facility] ([facility] referral)  November 9, 2018      Behavioral Health Clinician Progress Note    Patient Name: Neo Saeed              Service Type:  Individual      Service Location:   Face to Face in Clinic     Session Start Time: 315pm  Session End Time: 415pm      Session Length: 53 - 60      Attendees: Patient    Visit Activities (Refresh list every visit): [facility] Only    Diagnostic Assessment Date: none on file  Treatment Plan Review Date: 9/7/2018  See Flowsheets for today's PHQ-9 and LETI-7 results  Previous PHQ-9:   PHQ-9 SCORE 7/25/2018 8/10/2018 8/17/2018   Total Score MyChart - 2 (Minimal depression) -   Total Score 7 2 2     Previous LETI-7:   LETI-7 SCORE 7/25/2018 8/10/2018 8/17/2018   Total Score - 2 (minimal anxiety) -   Total Score 5 2 2       RAYMON LEVEL:  No flowsheet data found.    DATA  Extended Session (60+ minutes): No  Interactive Complexity: No  Crisis: No  [facility] Patient: No    Treatment Objective(s) Addressed in This Session:  Target Behavior(s): disease management/lifestyle changes      Patient  will experience a reduction in depressed mood, will develop more effective coping skills to manage depressive symptoms and will develop healthy cognitive patterns and beliefs, will experience a reduction in anxiety and will develop more effective coping skills to manage anxiety symptoms and will develop coping/problem-solving skills to facilitate more adaptive adjustment    Current Stressors / Issues:  [facility] met with Neo to provide psychotherapy support regarding depression, life adjustment, anxiety. Continued our conversation about his current stressors, hx of depression, life transitions..      Dexter has finished reading A General Theory of Love. Discussed his ideas and thoughts about it. Reassuring and startling he said that they don't have a prescriptive approach to fixing problems. Continued our conversation about how he understands what depression is, a developing model of  "depression that he feels makes sense to him, beginning to explore methods for working to \"feel better/\"    Identifying \"existential angst\" as the way to describe his depression. IN that case, what does \"feeling better\" look like, feel like, live like. Tried to explore specific triggers and how they connect with his \"existential churning of negativity.\" Explored his experience of journaling so far, as a way to monitor, observe, track these moods. He is not sure what happiness feels like.    On a -10 to +10 scale, 90% of the time he is between -5 to 0/1.  A sense of peace is best. Versus feeling super positive.  He is after more of the first.    Began a conversation about mindfulness and its uses in emotional life, regulation.  Began to explore Yazidi thoughts and ideas.  He is interested in acceptance as an idea he has come to on his own. He is finding the self-compassion work helpful. Discussed the idea of a need for \"existential peace\" as opposed to simply being \"happy.\"    From previous sessions for reference:  Our initial conversation left him in a place of thinking about what he needs to deal with, he says. He has always viewed himself as \"the maladjusted one.\" He wants to work on continuing to understand his depression patterns, explore relationships and building a positive sense of himself, explore what makes a meaningful and satisfying life for himself. He says that he usually experiences more intense emotional dynamics during a transition of meds - about a month in his experience - and he is in this now.    I affirmed the steps this patient has taken to address physical and behavioral health issues, and offered continued behavioral health services or referral, now or in the future, as needed by the patient.    Progress on Treatment Objective(s) / Homework:  Satisfactory progress - ACTION (Actively working towards change); Intervened by reinforcing change plan / affirming steps taken    Psycho-education " regarding mental health diagnoses and treatment options    Motivational Interviewing    Skills training    Explored specific skills useful to client in current situation    Skill areas include assertiveness, communication, conflict management, problem-solving, relaxation, etc.     Solution-Focused Therapy    Explored patterns in patient's behaviors and relationships and discussed options for new behaviors    Explored new options for problem-solving, communication, managing stress, etc.    Cognitive-behavioral Therapy    Discussed common cognitive distortions, identified them in patient's life    Explored ways to challenge, replace, and act against these cognitions    Explore behavioral changes that might benefit patient in improving mood and engage in meaningful activity    Acceptance and Commitment Therapy    Explored and identified important values in patient's life    Discussed ways to commit to behavioral activation around these values    Psychodynamic psychotherapy    Discussed patient's emotional dynamics and issues and how they impact behaviors    Explored patient's history of relationships and how they impact present behaviors    Explored how to work with and make changes in these schemas and patterns    Narrative Therapy    Explored the patient's story of their life from their perspective,     Explored alternate ways of understanding their experience, identifying exceptions, developing new themes    Interpersonal Psychotherapy    Explored patterns in relationships that are effective or ineffective at helping patient reach their goals, find satisfying experience.    Discussed new patterns or behaviors to engage in for improved social functioning.    Behavioral Activation    Discussed steps patient can take to become more involved in meaningful activity    Identified barriers to these activities and explored possible solutions    Mindfulness-Based Strategies    Discussed skills based on development and  application of mindfulness    Skills drawn from compassion-focused therapy, dialectical behavior therapy, mindfulness-based stress reduction, mindfulness-based cognitive therapy, etc.    Medication Review:  No problems reported to [facility] today.    Medication Compliance:  No problems reported to [facility] today.    Changes in Health Issues:  No problems reported to [facility] today.    Chemical Use Review:   Substance Use: Chemical use reviewed, no active concerns identified      Tobacco Use: No current tobacco use.      Assessment: Current Emotional / Mental Status (status of significant symptoms):  Risk status (Self / Other harm or suicidal ideation)  Patient denies a history of suicidal ideation, suicide attempts, self-injurious behavior, homicidal ideation, homicidal behavior and and other safety concerns  Patient denies current fears or concerns for personal safety.  Patient denies current or recent suicidal ideation or behaviors.  Patient denies current or recent homicidal ideation or behaviors.  Patient denies current or recent self injurious behavior or ideation.  Patient denies other safety concerns.  A safety and risk management plan has not been developed at this time, however patient was encouraged to call [place] [id number] should there be a change in any of these risk factors.    Appearance:   Appropriate   Eye Contact:   Good   Psychomotor Behavior: Normal   Attitude:   Cooperative   Orientation:   All  Speech   Rate / Production: Normal    Volume:  Normal   Mood:    Anxious  Depressed - fluctuates  Affect:    Appropriate   Thought Content:  Clear  Rumination   Thought Form:  Coherent  Logical   Insight:    Good     Diagnoses:  1. Major depressive disorder, recurrent episode, moderate (H)    2. Anxiety      Collateral Reports Completed:  Will collaborate with care team as indicated during treatment    Plan: (Homework, other):  Patient was given information about behavioral services and encouraged to schedule a follow  up appointment with the clinic [facility] as needed.  He was also given information about mental health symptoms and treatment options .  CD Recommendations: No indications of CD issues.  We agree to continue psychotherapy to address his depression, anxiety, life adjustment. GAGE Sears, [facility]    ______________________________________________________________________    [facility] Integrated Behavioral Health Treatment Plan    Client's Name: Neo Saeed  YOB: 1995    Date: 9/7/2018    DSM-V Diagnoses: 296.32 (F33.1) Major Depressive Disorder, Recurrent Episode, Moderate With anxious distress;  Psychosocial / Contextual Factors: transitions in life - college to work force, friends leaving, etc    Referral / Collaboration:  Will collaborate with care team as indicated during treatment.    Anticipated number of session or this episode of care: 12+      MeasurableTreatment Goal(s) related to diagnosis / functional impairment(s)  Goal 1:  Patient will experience a reduction in depressive and anxious symptoms, along with a corresponding increase in positive emotion and life satisfaction.    Objective #A: Patient will experience a reduction in depressed mood, will develop more effective coping skills to manage depressive symptoms, will develop healthy cognitive patterns and beliefs, will increase ability to function adaptively and will continue to take medications as prescribed / participate in supportive activities and services    Status: Continued - Date(s): 9/7/2018    Objective #B: Patient will experience a reduction in anxiety, will develop more effective coping skills to manage anxiety symptoms, will develop healthy cognitive patterns and beliefs and will increase ability to function adaptively  Status: Continued - Date(s): 9/7/2018    Objective #C: Patient will develop better understanding of triggers and coping strategies to stabilize mood  Status: Continued - Date(s): 9/7/2018    Goal 2:  Patient will  identify and increase engagement in valued activity, i.e. improving social connections/relationships, pursuing occupational goals or personally meaningful pursuits, exploration of meaning in life.     Objective #A: Patient will identify meaningful activity in social, occupational and  personal goals, and increase behavioral activation around these goals   Status: Continued - Date(s): 9/7/2018    Objective #B: Patient will address relationship difficulties in a more adaptive manner  Status: Continued - Date(s): 9/7/2018    Objective #C: Patient will develop coping/problem-solving skills to facilitate more adaptive adjustment and will effectively address problems that interfere with adaptive functioning  Status: Continued - Date(s): 9/7/2018    Possible Therapeutic Intervention(s)  Psycho-education regarding mental health diagnoses and treatment options    Eye-Movement Desensitization and Reprocessing Therapy    Clinical Hypnosis    Skills training    Explore skills useful to client in current situation.    Skills include assertiveness, communication, conflict management, problem-solving, relaxation, etc.    Solution-Focused Therapy    Explore patterns in patient's relationships and discuss options for new behaviors.    Explore patterns in patient's actions and choices and discuss options for new behaviors.    Cognitive-behavioral Therapy    Discuss common cognitive distortions, identify them in patient's life.    Explore ways to challenge, replace, and act against these cognitions.    Acceptance and Commitment Therapy    Explore and identify important values in patient's life.    Discuss ways to commit to behavioral activation around these values.    Psychodynamic psychotherapy    Discuss patient's emotional dynamics and issues and how they impact behaviors.    Explore patient's history of relationships and how they impact present behaviors.    Explore how to work with and make changes in these schemas and  patterns.    Narrative Therapy    Explore the patient's story of his/her life from his/her perspective.    Explore alternate ways of understanding their experience, identifying exceptions, developing new themes.    Interpersonal Psychotherapy    Explore patterns in relationships that are effective or ineffective at helping patient reach their goals, find satisfying experience.    Discuss new patterns or behaviors to engage in for improved social functioning.    Behavioral Activation    Discuss steps patient can take to become more involved in meaningful activity.    Identify barriers to these activities and explore possible solutions.    Mindfulness-Based Strategies    Discuss skills based on development and application of mindfulness.    Skills drawn from compassion-focused therapy, dialectical behavior therapy, mindfulness-based stress reduction, mindfulness-based cognitive therapy, etc.      We have developed these goals together during our work to this point. Patient has assisted in the development of these goals and has agreed to this treatment plan.       GAGE Ulloa, [facility]  September 7, 2018

## 2018-11-16 ENCOUNTER — OFFICE VISIT (OUTPATIENT)
Dept: BEHAVIORAL HEALTH | Facility: CLINIC | Age: 23
End: 2018-11-16
Payer: COMMERCIAL

## 2018-11-16 DIAGNOSIS — F33.1 MAJOR DEPRESSIVE DISORDER, RECURRENT EPISODE, MODERATE (H): Primary | ICD-10-CM

## 2018-11-16 DIAGNOSIS — F41.9 ANXIETY: ICD-10-CM

## 2018-11-16 NOTE — PROGRESS NOTES
[facility] ([facility] referral)  November 16, 2018      Behavioral Health Clinician Progress Note    Patient Name: Neo Saeed              Service Type:  Individual      Service Location:   Face to Face in Clinic     Session Start Time: 310pm  Session End Time: 405pm     Session Length: 53 - 60      Attendees: Patient    Visit Activities (Refresh list every visit): [facility] [place] Only    Diagnostic Assessment Date: none on file  Treatment Plan Review Date: 9/7/2018  See Flowsheets for today's PHQ-9 and LETI-7 results  Previous PHQ-9:   PHQ-9 SCORE 7/25/2018 8/10/2018 8/17/2018   Total Score MyChart - 2 (Minimal depression) -   Total Score 7 2 2     Previous LETI-7:   LETI-7 SCORE 7/25/2018 8/10/2018 8/17/2018   Total Score - 2 (minimal anxiety) -   Total Score 5 2 2       RAYMON LEVEL:  No flowsheet data found.    DATA  Extended Session (60+ minutes): No  Interactive Complexity: No  Crisis: No  [facility] Patient: No    Treatment Objective(s) Addressed in This Session:  Target Behavior(s): disease management/lifestyle changes      Patient  will experience a reduction in depressed mood, will develop more effective coping skills to manage depressive symptoms and will develop healthy cognitive patterns and beliefs, will experience a reduction in anxiety and will develop more effective coping skills to manage anxiety symptoms and will develop coping/problem-solving skills to facilitate more adaptive adjustment    Current Stressors / Issues:  [facility] [place] met with Neo to provide psychotherapy support regarding depression, life adjustment, anxiety. Continued our conversation about his current stressors, hx of depression, life transitions..      Discussed a Neo Guerrero and [employer] podcast that Dexter has been listening to. Discussed the thoughts on depression and loneliness laid out in this podcast. Discussed Dexter's experience of loneliness. Explored his history of relationships.  Discussed these in some depth. He continues to  "explore new ways of understanding his experience of depression and the possibilities of healing/progress.    From previous sessions for reference:  Our initial conversation left him in a place of thinking about what he needs to deal with, he says. He has always viewed himself as \"the maladjusted one.\" He wants to work on continuing to understand his depression patterns, explore relationships and building a positive sense of himself, explore what makes a meaningful and satisfying life for himself. He says that he usually experiences more intense emotional dynamics during a transition of meds - about a month in his experience - and he is in this now.    I affirmed the steps this patient has taken to address physical and behavioral health issues, and offered continued behavioral health services or referral, now or in the future, as needed by the patient.    Progress on Treatment Objective(s) / Homework:  Satisfactory progress - ACTION (Actively working towards change); Intervened by reinforcing change plan / affirming steps taken    Psycho-education regarding mental health diagnoses and treatment options    Motivational Interviewing    Skills training    Explored specific skills useful to client in current situation    Skill areas include assertiveness, communication, conflict management, problem-solving, relaxation, etc.     Solution-Focused Therapy    Explored patterns in patient's behaviors and relationships and discussed options for new behaviors    Explored new options for problem-solving, communication, managing stress, etc.    Cognitive-behavioral Therapy    Discussed common cognitive distortions, identified them in patient's life    Explored ways to challenge, replace, and act against these cognitions    Explore behavioral changes that might benefit patient in improving mood and engage in meaningful activity    Acceptance and Commitment Therapy    Explored and identified important values in patient's " life    Discussed ways to commit to behavioral activation around these values    Psychodynamic psychotherapy    Discussed patient's emotional dynamics and issues and how they impact behaviors    Explored patient's history of relationships and how they impact present behaviors    Explored how to work with and make changes in these schemas and patterns    Narrative Therapy    Explored the patient's story of their life from their perspective,     Explored alternate ways of understanding their experience, identifying exceptions, developing new themes    Interpersonal Psychotherapy    Explored patterns in relationships that are effective or ineffective at helping patient reach their goals, find satisfying experience.    Discussed new patterns or behaviors to engage in for improved social functioning.    Behavioral Activation    Discussed steps patient can take to become more involved in meaningful activity    Identified barriers to these activities and explored possible solutions    Mindfulness-Based Strategies    Discussed skills based on development and application of mindfulness    Skills drawn from compassion-focused therapy, dialectical behavior therapy, mindfulness-based stress reduction, mindfulness-based cognitive therapy, etc.    Medication Review:  No problems reported to [facility] today.    Medication Compliance:  No problems reported to [facility] today.    Changes in Health Issues:  No problems reported to [facility] today.    Chemical Use Review:   Substance Use: Chemical use reviewed, no active concerns identified      Tobacco Use: No current tobacco use.      Assessment: Current Emotional / Mental Status (status of significant symptoms):  Risk status (Self / Other harm or suicidal ideation)  Patient denies a history of suicidal ideation, suicide attempts, self-injurious behavior, homicidal ideation, homicidal behavior and and other safety concerns  Patient denies current fears or concerns for personal safety.  Patient denies  current or recent suicidal ideation or behaviors.  Patient denies current or recent homicidal ideation or behaviors.  Patient denies current or recent self injurious behavior or ideation.  Patient denies other safety concerns.  A safety and risk management plan has not been developed at this time, however patient was encouraged to call [place] [phone] should there be a change in any of these risk factors.    Appearance:   Appropriate   Eye Contact:   Good   Psychomotor Behavior: Normal   Attitude:   Cooperative   Orientation:   All  Speech   Rate / Production: Normal    Volume:  Normal   Mood:    Anxious  Depressed - fluctuates  Affect:    Appropriate   Thought Content:  Clear  Rumination   Thought Form:  Coherent  Logical   Insight:    Good     Diagnoses:  1. Major depressive disorder, recurrent episode, moderate (H)    2. Anxiety      Collateral Reports Completed:  Will collaborate with care team as indicated during treatment    Plan: (Homework, other):  Patient was given information about behavioral services and encouraged to schedule a follow up appointment with the clinic [facility] as needed.  He was also given information about mental health symptoms and treatment options .  CD Recommendations: No indications of CD issues.  We agree to continue psychotherapy to address his depression, anxiety, life adjustment. GAGE Sears, [facility]    ______________________________________________________________________    [facility] Integrated Behavioral Health Treatment Plan    Client's Name: Neo Saeed  YOB: 1995    Date: 9/7/2018    DSM-V Diagnoses: 296.32 (F33.1) Major Depressive Disorder, Recurrent Episode, Moderate With anxious distress;  Psychosocial / Contextual Factors: transitions in life - college to work force, friends leaving, etc    Referral / Collaboration:  Will collaborate with care team as indicated during treatment.    Anticipated number of session or this episode of care:  12+      MeasurableTreatment Goal(s) related to diagnosis / functional impairment(s)  Goal 1:  Patient will experience a reduction in depressive and anxious symptoms, along with a corresponding increase in positive emotion and life satisfaction.    Objective #A: Patient will experience a reduction in depressed mood, will develop more effective coping skills to manage depressive symptoms, will develop healthy cognitive patterns and beliefs, will increase ability to function adaptively and will continue to take medications as prescribed / participate in supportive activities and services    Status: Continued - Date(s): 9/7/2018    Objective #B: Patient will experience a reduction in anxiety, will develop more effective coping skills to manage anxiety symptoms, will develop healthy cognitive patterns and beliefs and will increase ability to function adaptively  Status: Continued - Date(s): 9/7/2018    Objective #C: Patient will develop better understanding of triggers and coping strategies to stabilize mood  Status: Continued - Date(s): 9/7/2018    Goal 2:  Patient will identify and increase engagement in valued activity, i.e. improving social connections/relationships, pursuing occupational goals or personally meaningful pursuits, exploration of meaning in life.     Objective #A: Patient will identify meaningful activity in social, occupational and  personal goals, and increase behavioral activation around these goals   Status: Continued - Date(s): 9/7/2018    Objective #B: Patient will address relationship difficulties in a more adaptive manner  Status: Continued - Date(s): 9/7/2018    Objective #C: Patient will develop coping/problem-solving skills to facilitate more adaptive adjustment and will effectively address problems that interfere with adaptive functioning  Status: Continued - Date(s): 9/7/2018    Possible Therapeutic Intervention(s)  Psycho-education regarding mental health diagnoses and treatment  options    Eye-Movement Desensitization and Reprocessing Therapy    Clinical Hypnosis    Skills training    Explore skills useful to client in current situation.    Skills include assertiveness, communication, conflict management, problem-solving, relaxation, etc.    Solution-Focused Therapy    Explore patterns in patient's relationships and discuss options for new behaviors.    Explore patterns in patient's actions and choices and discuss options for new behaviors.    Cognitive-behavioral Therapy    Discuss common cognitive distortions, identify them in patient's life.    Explore ways to challenge, replace, and act against these cognitions.    Acceptance and Commitment Therapy    Explore and identify important values in patient's life.    Discuss ways to commit to behavioral activation around these values.    Psychodynamic psychotherapy    Discuss patient's emotional dynamics and issues and how they impact behaviors.    Explore patient's history of relationships and how they impact present behaviors.    Explore how to work with and make changes in these schemas and patterns.    Narrative Therapy    Explore the patient's story of his/her life from his/her perspective.    Explore alternate ways of understanding their experience, identifying exceptions, developing new themes.    Interpersonal Psychotherapy    Explore patterns in relationships that are effective or ineffective at helping patient reach their goals, find satisfying experience.    Discuss new patterns or behaviors to engage in for improved social functioning.    Behavioral Activation    Discuss steps patient can take to become more involved in meaningful activity.    Identify barriers to these activities and explore possible solutions.    Mindfulness-Based Strategies    Discuss skills based on development and application of mindfulness.    Skills drawn from compassion-focused therapy, dialectical behavior therapy, mindfulness-based stress reduction,  mindfulness-based cognitive therapy, etc.      We have developed these goals together during our work to this point. Patient has assisted in the development of these goals and has agreed to this treatment plan.       GAGE Ulloa, [facility]  September 7, 2018

## 2018-12-14 ENCOUNTER — OFFICE VISIT (OUTPATIENT)
Dept: BEHAVIORAL HEALTH | Facility: CLINIC | Age: 23
End: 2018-12-14
Payer: COMMERCIAL

## 2018-12-14 DIAGNOSIS — F33.1 MAJOR DEPRESSIVE DISORDER, RECURRENT EPISODE, MODERATE (H): Primary | ICD-10-CM

## 2018-12-14 NOTE — PROGRESS NOTES
[facility] ([facility] referral)  December 14, 2018      Behavioral Health Clinician Progress Note    Patient Name: Neo Saeed              Service Type:  Individual      Service Location:   Face to Face in Clinic     Session Start Time: 315pm  Session End Time: 410pm     Session Length: 53 - 60      Attendees: Patient    Visit Activities (Refresh list every visit): [facility] Only    Diagnostic Assessment Date: none on file  Treatment Plan Review Date: 9/7/2018  See Flowsheets for today's PHQ-9 and LETI-7 results  Previous PHQ-9:   PHQ-9 SCORE 7/25/2018 8/10/2018 8/17/2018   PHQ-9 Total Score MyChart - 2 (Minimal depression) -   PHQ-9 Total Score 7 2 2     Previous LETI-7:   LETI-7 SCORE 7/25/2018 8/10/2018 8/17/2018   Total Score - 2 (minimal anxiety) -   Total Score 5 2 2       RAYMON LEVEL:  No flowsheet data found.    DATA  Extended Session (60+ minutes): No  Interactive Complexity: No  Crisis: No  [facility] Patient: No    Treatment Objective(s) Addressed in This Session:  Target Behavior(s): disease management/lifestyle changes      Patient  will experience a reduction in depressed mood, will develop more effective coping skills to manage depressive symptoms and will develop healthy cognitive patterns and beliefs, will experience a reduction in anxiety and will develop more effective coping skills to manage anxiety symptoms and will develop coping/problem-solving skills to facilitate more adaptive adjustment    Current Stressors / Issues:  [facility] met with Neo to provide psychotherapy support regarding depression, life adjustment, anxiety. Continued our conversation about his current stressors, hx of depression, life transitions..      Dexter has been working at managing his emotions. Using a version of the STOP technique. He has been using the Headspace meditation ursula.  Made connections to CBT.      Discussed changes since we first met. He says he feels more empowered vis a vis a biological-only model.  He  "has discovered some steps he can take for feeling better, more often.  Feels a lot less fuzzy than when he was on Prozac, too.     Discussed his next few weeks.   Discussed mindfulness and applications of these ideas.  Discussed MBCT and ongoing application of this.    From previous sessions for reference:  Our initial conversation left him in a place of thinking about what he needs to deal with, he says. He has always viewed himself as \"the maladjusted one.\" He wants to work on continuing to understand his depression patterns, explore relationships and building a positive sense of himself, explore what makes a meaningful and satisfying life for himself. He says that he usually experiences more intense emotional dynamics during a transition of meds - about a month in his experience - and he is in this now.    I affirmed the steps this patient has taken to address physical and behavioral health issues, and offered continued behavioral health services or referral, now or in the future, as needed by the patient.    Progress on Treatment Objective(s) / Homework:  Satisfactory progress - ACTION (Actively working towards change); Intervened by reinforcing change plan / affirming steps taken    Psycho-education regarding mental health diagnoses and treatment options    Motivational Interviewing    Skills training    Explored specific skills useful to client in current situation    Skill areas include assertiveness, communication, conflict management, problem-solving, relaxation, etc.     Solution-Focused Therapy    Explored patterns in patient's behaviors and relationships and discussed options for new behaviors    Explored new options for problem-solving, communication, managing stress, etc.    Cognitive-behavioral Therapy    Discussed common cognitive distortions, identified them in patient's life    Explored ways to challenge, replace, and act against these cognitions    Explore behavioral changes that might benefit " patient in improving mood and engage in meaningful activity    Acceptance and Commitment Therapy    Explored and identified important values in patient's life    Discussed ways to commit to behavioral activation around these values    Psychodynamic psychotherapy    Discussed patient's emotional dynamics and issues and how they impact behaviors    Explored patient's history of relationships and how they impact present behaviors    Explored how to work with and make changes in these schemas and patterns    Narrative Therapy    Explored the patient's story of their life from their perspective,     Explored alternate ways of understanding their experience, identifying exceptions, developing new themes    Interpersonal Psychotherapy    Explored patterns in relationships that are effective or ineffective at helping patient reach their goals, find satisfying experience.    Discussed new patterns or behaviors to engage in for improved social functioning.    Behavioral Activation    Discussed steps patient can take to become more involved in meaningful activity    Identified barriers to these activities and explored possible solutions    Mindfulness-Based Strategies    Discussed skills based on development and application of mindfulness    Skills drawn from compassion-focused therapy, dialectical behavior therapy, mindfulness-based stress reduction, mindfulness-based cognitive therapy, etc.    Medication Review:  No problems reported to [facility] today.    Medication Compliance:  No problems reported to [facility] today.    Changes in Health Issues:  No problems reported to [facility] today.    Chemical Use Review:   Substance Use: Chemical use reviewed, no active concerns identified      Tobacco Use: No current tobacco use.      Assessment: Current Emotional / Mental Status (status of significant symptoms):  Risk status (Self / Other harm or suicidal ideation)  Patient denies a history of suicidal ideation, suicide attempts, self-injurious  behavior, homicidal ideation, homicidal behavior and and other safety concerns  Patient denies current fears or concerns for personal safety.  Patient denies current or recent suicidal ideation or behaviors.  Patient denies current or recent homicidal ideation or behaviors.  Patient denies current or recent self injurious behavior or ideation.  Patient denies other safety concerns.  A safety and risk management plan has not been developed at this time, however patient was encouraged to call [place] [phone] should there be a change in any of these risk factors.    Appearance:   Appropriate   Eye Contact:   Good   Psychomotor Behavior: Normal   Attitude:   Cooperative   Orientation:   All  Speech   Rate / Production: Normal    Volume:  Normal   Mood:    Anxious  Depressed - fluctuates  Affect:    Appropriate   Thought Content:  Clear  Rumination   Thought Form:  Coherent  Logical   Insight:    Good     Diagnoses:  1. Major depressive disorder, recurrent episode, moderate (H)      Collateral Reports Completed:  Will collaborate with care team as indicated during treatment    Plan: (Homework, other):  Patient was given information about behavioral services and encouraged to schedule a follow up appointment with the clinic [facility] as needed.  He was also given information about mental health symptoms and treatment options .  CD Recommendations: No indications of CD issues.  We agree to continue psychotherapy to address his depression, anxiety, life adjustment. GAGE Sears, [facility]    ______________________________________________________________________    [facility] Treatment Plan    Client's Name: Neo Saeed  YOB: 1995    Date: 9/7/2018    DSM-V Diagnoses: 296.32 (F33.1) Major Depressive Disorder, Recurrent Episode, Moderate With anxious distress;  Psychosocial / Contextual Factors: transitions in life - college to work force, friends leaving, etc    Referral /  Collaboration:  Will collaborate with care team as indicated during treatment.    Anticipated number of session or this episode of care: 12+      MeasurableTreatment Goal(s) related to diagnosis / functional impairment(s)  Goal 1:  Patient will experience a reduction in depressive and anxious symptoms, along with a corresponding increase in positive emotion and life satisfaction.    Objective #A: Patient will experience a reduction in depressed mood, will develop more effective coping skills to manage depressive symptoms, will develop healthy cognitive patterns and beliefs, will increase ability to function adaptively and will continue to take medications as prescribed / participate in supportive activities and services    Status: Continued - Date(s): 9/7/2018    Objective #B: Patient will experience a reduction in anxiety, will develop more effective coping skills to manage anxiety symptoms, will develop healthy cognitive patterns and beliefs and will increase ability to function adaptively  Status: Continued - Date(s): 9/7/2018    Objective #C: Patient will develop better understanding of triggers and coping strategies to stabilize mood  Status: Continued - Date(s): 9/7/2018    Goal 2:  Patient will identify and increase engagement in valued activity, i.e. improving social connections/relationships, pursuing occupational goals or personally meaningful pursuits, exploration of meaning in life.     Objective #A: Patient will identify meaningful activity in social, occupational and  personal goals, and increase behavioral activation around these goals   Status: Continued - Date(s): 9/7/2018    Objective #B: Patient will address relationship difficulties in a more adaptive manner  Status: Continued - Date(s): 9/7/2018    Objective #C: Patient will develop coping/problem-solving skills to facilitate more adaptive adjustment and will effectively address problems that interfere with adaptive functioning  Status:  Continued - Date(s): 9/7/2018    Possible Therapeutic Intervention(s)  Psycho-education regarding mental health diagnoses and treatment options    Eye-Movement Desensitization and Reprocessing Therapy    Clinical Hypnosis    Skills training    Explore skills useful to client in current situation.    Skills include assertiveness, communication, conflict management, problem-solving, relaxation, etc.    Solution-Focused Therapy    Explore patterns in patient's relationships and discuss options for new behaviors.    Explore patterns in patient's actions and choices and discuss options for new behaviors.    Cognitive-behavioral Therapy    Discuss common cognitive distortions, identify them in patient's life.    Explore ways to challenge, replace, and act against these cognitions.    Acceptance and Commitment Therapy    Explore and identify important values in patient's life.    Discuss ways to commit to behavioral activation around these values.    Psychodynamic psychotherapy    Discuss patient's emotional dynamics and issues and how they impact behaviors.    Explore patient's history of relationships and how they impact present behaviors.    Explore how to work with and make changes in these schemas and patterns.    Narrative Therapy    Explore the patient's story of his/her life from his/her perspective.    Explore alternate ways of understanding their experience, identifying exceptions, developing new themes.    Interpersonal Psychotherapy    Explore patterns in relationships that are effective or ineffective at helping patient reach their goals, find satisfying experience.    Discuss new patterns or behaviors to engage in for improved social functioning.    Behavioral Activation    Discuss steps patient can take to become more involved in meaningful activity.    Identify barriers to these activities and explore possible solutions.    Mindfulness-Based Strategies    Discuss skills based on development and application  of mindfulness.    Skills drawn from compassion-focused therapy, dialectical behavior therapy, mindfulness-based stress reduction, mindfulness-based cognitive therapy, etc.      We have developed these goals together during our work to this point. Patient has assisted in the development of these goals and has agreed to this treatment plan.       GAGE Ulloa, [facility]  September 7, 2018

## 2019-01-22 ENCOUNTER — TELEPHONE (OUTPATIENT)
Dept: FAMILY MEDICINE | Facility: CLINIC | Age: 24
End: 2019-01-22

## 2019-01-22 ENCOUNTER — OFFICE VISIT (OUTPATIENT)
Dept: FAMILY MEDICINE | Facility: CLINIC | Age: 24
End: 2019-01-22
Payer: COMMERCIAL

## 2019-01-22 ENCOUNTER — ANCILLARY PROCEDURE (OUTPATIENT)
Dept: GENERAL RADIOLOGY | Facility: CLINIC | Age: 24
End: 2019-01-22
Payer: COMMERCIAL

## 2019-01-22 VITALS
HEIGHT: 70 IN | SYSTOLIC BLOOD PRESSURE: 123 MMHG | WEIGHT: 194.8 LBS | HEART RATE: 79 BPM | OXYGEN SATURATION: 97 % | BODY MASS INDEX: 27.89 KG/M2 | DIASTOLIC BLOOD PRESSURE: 80 MMHG | TEMPERATURE: 98.9 F

## 2019-01-22 DIAGNOSIS — M25.532 LEFT WRIST PAIN: Primary | ICD-10-CM

## 2019-01-22 PROCEDURE — 73110 X-RAY EXAM OF WRIST: CPT | Mod: LT

## 2019-01-22 PROCEDURE — 99213 OFFICE O/P EST LOW 20 MIN: CPT | Performed by: PHYSICIAN ASSISTANT

## 2019-01-22 ASSESSMENT — MIFFLIN-ST. JEOR: SCORE: 1884.86

## 2019-01-23 NOTE — PROGRESS NOTES
"  SUBJECTIVE:   Neo Saeed is a 23 year old male who presents to clinic today for the following health issues:      Musculoskeletal problem/pain      Duration: x2 days    Description  Location: LT wrist     Intensity:  moderate    Accompanying signs and symptoms: swelling and bruising     History  Previous similar problem: no   Previous evaluation:  none    Precipitating or alleviating factors:  Trauma or overuse: YES- fell on Sunday and landed on wrist   Aggravating factors include: moving wrist and grabbing stuff     Therapies tried and outcome: ice and Ibuprofen - helps with sx somewhat           Problem list and histories reviewed & adjusted, as indicated.  Additional history: Dexter did FOOSH while playing dodgeball over the weekend.  Had immediate pain.  Ice, motrin have helped.  Mostly tender over wrist.  No numbness into hand.     BP Readings from Last 3 Encounters:   01/22/19 123/80   08/17/18 121/70   07/25/18 127/74    Wt Readings from Last 3 Encounters:   01/22/19 88.4 kg (194 lb 12.8 oz)   08/17/18 89 kg (196 lb 4.8 oz)   07/25/18 90.5 kg (199 lb 9.6 oz)                    Reviewed and updated as needed this visit by clinical staff       Reviewed and updated as needed this visit by Provider         ROS:  Constitutional, HEENT, cardiovascular, pulmonary, gi and gu systems are negative, except as otherwise noted.    OBJECTIVE:     /80   Pulse 79   Temp 98.9  F (37.2  C) (Oral)   Ht 1.778 m (5' 10\")   Wt 88.4 kg (194 lb 12.8 oz)   SpO2 97%   BMI 27.95 kg/m    Body mass index is 27.95 kg/m .  GENERAL: alert and no distress  EYES: Eyes grossly normal to inspection  RESP: lungs clear to auscultation - no rales, rhonchi or wheezes  CV: regular rate and rhythm, normal S1 S2, no S3 or S4, no murmur, click or rub, no peripheral edema and peripheral pulses strong  MS: swelling over left wrist into hand.  Tender distal radius into wrist.  Non tender into hand, full ROM in fingers.  Cap refill " normal.  PSYCH: mentation appears normal, affect normal/bright    Diagnostic Test Results:  Results for orders placed or performed in visit on 01/22/19   XR Wrist Left G/E 3 Views    Narrative    XR WRIST LEFT G/E 3 VIEWS 1/22/2019 7:11 PM    HISTORY: Left wrist pain.    COMPARISON: None.    FINDINGS: No fracture or malalignment. No significant osseous  degenerative change. Osseous structures appear normal.      Impression    IMPRESSION: No acute osseous abnormality.    LUIS CASIANO MD       ASSESSMENT/PLAN:             1. Left wrist pain  No sign of fracture.  ACE wrap given.  Continue rest, ice, and motrin.  Follow up if symptoms persist or worsen   - XR Wrist Left G/E 3 Views        Lebron Capps PA-C  [facility]

## 2019-01-23 NOTE — RESULT ENCOUNTER NOTE
Dear Neo    Your test results are attached, feel free to contact me via [employer]     Good news, the radiologist agrees with us, and that no fractures are seen.  Continue to wear ACE wrap, ice, and rest.  Let me know if symptoms persist or worsen     David Capps PA-C

## 2019-01-23 NOTE — NURSING NOTE
"Chief Complaint   Patient presents with     Musculoskeletal Problem     /80   Pulse 79   Temp 98.9  F (37.2  C) (Oral)   Ht 1.778 m (5' 10\")   Wt 88.4 kg (194 lb 12.8 oz)   SpO2 97%   BMI 27.95 kg/m   Estimated body mass index is 27.95 kg/m  as calculated from the following:    Height as of this encounter: 1.778 m (5' 10\").    Weight as of this encounter: 88.4 kg (194 lb 12.8 oz).  Medication Reconciliation: complete      Health Maintenance that is potentially due pending provider review:  NONE    n/a    JULISSA Evans  "

## 2019-02-01 ENCOUNTER — OFFICE VISIT (OUTPATIENT)
Dept: INTERNAL MEDICINE | Facility: CLINIC | Age: 24
End: 2019-02-01
Payer: COMMERCIAL

## 2019-02-01 VITALS
SYSTOLIC BLOOD PRESSURE: 119 MMHG | BODY MASS INDEX: 27.1 KG/M2 | RESPIRATION RATE: 20 BRPM | WEIGHT: 188.9 LBS | OXYGEN SATURATION: 99 % | DIASTOLIC BLOOD PRESSURE: 76 MMHG | HEART RATE: 70 BPM

## 2019-02-01 DIAGNOSIS — Z00.00 ROUTINE HISTORY AND PHYSICAL EXAMINATION OF ADULT: Primary | ICD-10-CM

## 2019-02-01 DIAGNOSIS — Z13.220 SCREENING FOR HYPERLIPIDEMIA: ICD-10-CM

## 2019-02-01 DIAGNOSIS — Z11.3 ROUTINE SCREENING FOR STI (SEXUALLY TRANSMITTED INFECTION): ICD-10-CM

## 2019-02-01 DIAGNOSIS — F33.0 MILD EPISODE OF RECURRENT MAJOR DEPRESSIVE DISORDER (H): ICD-10-CM

## 2019-02-01 LAB
CHOLEST SERPL-MCNC: 185 MG/DL
HBV SURFACE AG SERPL QL IA: NONREACTIVE
HDLC SERPL-MCNC: 35 MG/DL
HIV 1+2 AB+HIV1 P24 AG SERPL QL IA: NONREACTIVE
LDLC SERPL CALC-MCNC: 116 MG/DL
NONHDLC SERPL-MCNC: 150 MG/DL
T PALLIDUM AB SER QL: NONREACTIVE
TRIGL SERPL-MCNC: 167 MG/DL

## 2019-02-01 ASSESSMENT — ANXIETY QUESTIONNAIRES
5. BEING SO RESTLESS THAT IT IS HARD TO SIT STILL: NOT AT ALL
1. FEELING NERVOUS, ANXIOUS, OR ON EDGE: NOT AT ALL
6. BECOMING EASILY ANNOYED OR IRRITABLE: NOT AT ALL
GAD7 TOTAL SCORE: 2
3. WORRYING TOO MUCH ABOUT DIFFERENT THINGS: SEVERAL DAYS
2. NOT BEING ABLE TO STOP OR CONTROL WORRYING: NOT AT ALL
7. FEELING AFRAID AS IF SOMETHING AWFUL MIGHT HAPPEN: NOT AT ALL
IF YOU CHECKED OFF ANY PROBLEMS ON THIS QUESTIONNAIRE, HOW DIFFICULT HAVE THESE PROBLEMS MADE IT FOR YOU TO DO YOUR WORK, TAKE CARE OF THINGS AT HOME, OR GET ALONG WITH OTHER PEOPLE: NOT DIFFICULT AT ALL

## 2019-02-01 ASSESSMENT — PAIN SCALES - GENERAL: PAINLEVEL: NO PAIN (0)

## 2019-02-01 ASSESSMENT — PATIENT HEALTH QUESTIONNAIRE - PHQ9
SUM OF ALL RESPONSES TO PHQ QUESTIONS 1-9: 3
5. POOR APPETITE OR OVEREATING: SEVERAL DAYS

## 2019-02-01 NOTE — PROGRESS NOTES
"[facility] [place]   Rima Romeroronykristen, EFREN CNP  02/01/2019      Chief Complaint:   Physical       History of Present Illness:   Neo Saeed is a 23 year old male with a history of anxiety and depression who presents alone for a physical. He notes that his Wellbutrin is working well for him. He did have a \"weird\" adjustment period initially, but is doing fine now. Feels mood is in a good place. He denies panic attacks and is continuing to do therapy once a month. In regards to exercise, he has been running and doing hot yoga. He also notes eating a relatively healthy diet with roughly 5-7 servings of fruits and vegetables daily. He did sprain his left wrist a couple of weeks ago which is improving with use of a brace.     Other concerns discussed:  1. Update STI screening  2. Cholesterol screening--has never been checked. FMH of hyperlipidemia in his father.     Review of Systems:   Pertinent items are noted in HPI or as in patient entered ROS below, remainder of complete ROS is negative.  Answers for HPI/ROS submitted by the patient on 2/1/2019   General Symptoms: No  Skin Symptoms: No  HENT Symptoms: No  EYE SYMPTOMS: No  HEART SYMPTOMS: No  LUNG SYMPTOMS: No  INTESTINAL SYMPTOMS: No  URINARY SYMPTOMS: No  REPRODUCTIVE SYMPTOMS: No  SKELETAL SYMPTOMS: No  BLOOD SYMPTOMS: No  NERVOUS SYSTEM SYMPTOMS: No  MENTAL HEALTH SYMPTOMS: No        Active Medications:      buPROPion (WELLBUTRIN SR) 100 MG 12 hr tablet, Take 1 tablet (100 mg) by mouth 2 times daily, Disp: 180 tablet, Rfl: 3     FEXOFENADINE HCL PO, Take 180 mg by mouth every morning , Disp: , Rfl:       Allergies:   Patient has no known allergies.      Past Medical History:  Allergic state  Anxiety  Pilonidal cyst  Open wound of buttock, unspecified laterality, initial encounter  Major depressive disorder     Past Surgical History:  Cystectomy pilonidal - 8/4/2017  [place] teeth extraction  Hernia repair   Endscopy    Family " History:  Hyperlipidemia - father, paternal grandfather  Hypothyroidism - father   Diabetes - maternal grandmother   Bipolar disorder - maternal grandmother   Parkinsonism - maternal grandmother   Coronary artery disease - paternal grandfather  Cancer - paternal grandfather      Social History:   The patient is single, a nonsmoker, and does consume alcohol. He graduated 12/2017 and works for [employer].       Physical Exam:   /76 (BP Location: Right arm, Patient Position: Sitting, Cuff Size: Adult Regular)   Pulse 70   Resp 20   Wt 85.7 kg (188 lb 14.4 oz)   SpO2 99%   BMI 27.10 kg/m     Constitutional: no distress, comfortable, pleasant, well-groomed  Eyes: anicteric, conjunctiva pink, normal extra-ocular movements   Ears, Nose and Throat: tympanic membranes pearly gray with positive light reflex, EACs clear bilaterally, nose clear and free of lesions, throat clear, mucosa pink and moist.   Neck: supple with full range of motion, no thyromegaly.   Cardiovascular: regular rate and rhythm, normal S1 and S2, no murmurs, rubs or gallops, peripheral pulses full and symmetric  Respiratory: clear to auscultation with good air movement bilaterally, no wheezes or crackles, non-labored  Gastrointestinal: positive bowel sounds, nontender, no hepatosplenomegaly, no masses   : no inguinal hernias, normal scrotum, penis, testes normal  Musculoskeletal: full range of motion, strength 5/5, no edema   Skin: no concerning lesions or rash, no jaundice, temp normal   Neurological: cranial nerves intact, normal strength and sensation, 2+ patellar reflexes, gait is steady with intact balance, speech is clear, no tremor   Psychological: appropriate mood, demonstrates intact judgment and logical thought process  LYMPH: no axillary, cervical,  supraclavicular, or infraclavicular nodes    Assessment and Plan:  Mild episode of recurrent major depressive disorder (H)  He notes his mood is stable with his current dosage of  Wellbutrin, denying any panic attacks or side affects. He has been going to therapy about once a month which he notes has been helpful. Will continue with current regimen.    Routine screening for STI (sexually transmitted infection)  Will have him complete STI screening today as he has had new sexual partners within the past year. Pending results will follow-up as needed. Encouraged ongoing condom use to help prevent STI.  - C. trachomatis PCR - Urine, Lab Collect  - N. gonorrhea PCR - Urine, Lab Collect  - Treponema Abs w Reflex to RPR and Titer  - HIV Antigen Antibody Combo  - Hepatitis B surface antigen    Screening for hyperlipidemia  His father has a history of high cholesterol. Will have his cholesterol checked today and follow-up as needed. Continue working on healthy lifestyle with regular physical activity, nutritious diet, stress management, and safety.  - Lipid panel reflex to direct LDL Fasting    Follow-up: Patient should follow up in clinic in one year or on an as needed basis.         Scribe Disclosure:   I, Emma Fuentes, am serving as a scribe to document services personally performed by EFREN Mark CNP at this visit, based upon the provider's statements to me. All documentation has been reviewed by the aforementioned provider prior to being entered into the official medical record.     Portions of this medical record were completed by a scribe. UPON MY REVIEW AND AUTHENTICATION BY ELECTRONIC SIGNATURE, this confirms (a) I performed the applicable clinical services, and (b) the record is accurate.     EFREN Mark CNP

## 2019-02-01 NOTE — NURSING NOTE
Chief Complaint   Patient presents with     Physical     annual physica   Shanice Swanson LPN 7:46 AM on 2/1/2019    Has not been screened for HIV.Shanice Swanson LPN 7:47 AM on 2/1/2019    PHQ-9 and LETI-7 given to patient to fill out.Shanice Swanson LPN 7:47 AM on 2/1/2019  Rooming Note  Health Maintenance   Health Maintenance Due   Topic Date Due     HIV SCREEN (SYSTEM ASSIGNED)  12/22/2013     PHQ-9 Q6 MONTHS  02/17/2019    All health maintenance items discussed and pended.  Shanice Swanson LPN 7:47 AM on 2/1/2019

## 2019-02-02 ASSESSMENT — ANXIETY QUESTIONNAIRES: GAD7 TOTAL SCORE: 2

## 2019-02-03 LAB
C TRACH DNA SPEC QL NAA+PROBE: NEGATIVE
N GONORRHOEA DNA SPEC QL NAA+PROBE: NEGATIVE
SPECIMEN SOURCE: NORMAL
SPECIMEN SOURCE: NORMAL

## 2019-02-15 ENCOUNTER — OFFICE VISIT (OUTPATIENT)
Dept: BEHAVIORAL HEALTH | Facility: CLINIC | Age: 24
End: 2019-02-15
Payer: COMMERCIAL

## 2019-02-15 DIAGNOSIS — F33.0 MAJOR DEPRESSIVE DISORDER, RECURRENT EPISODE, MILD (H): Primary | ICD-10-CM

## 2019-02-15 NOTE — NURSING NOTE
Chief Complaint   Patient presents with     Establish Care     Patient states he is here to establish care     GITA MCNEILL at 7:24 AM on 3/27/2018.     Helical Rim Advancement Flap Text: The defect edges were debeveled with a #15 blade scalpel.  Given the location of the defect and the proximity to free margins (helical rim) a double helical rim advancement flap was deemed most appropriate.  Using a sterile surgical marker, the appropriate advancement flaps were drawn incorporating the defect and placing the expected incisions between the helical rim and antihelix where possible.  The area thus outlined was incised through and through with a #15 scalpel blade.  With a skin hook and iris scissors, the flaps were gently and sharply undermined and freed up.

## 2019-02-15 NOTE — PROGRESS NOTES
[facility] ([facility] referral)  February 15, 2019      Behavioral Health Clinician Progress Note    Patient Name: Neo Saeed              Service Type:  Individual      Service Location:   Face to Face in Clinic     Session Start Time: 1pm  Session End Time: 2pm     Session Length: 53 - 60      Attendees: Patient    Visit Activities (Refresh list every visit): [facility] Only    Diagnostic Assessment Date: none on file  Treatment Plan Review Date: 9/7/2018  See Flowsheets for today's PHQ-9 and LETI-7 results  Previous PHQ-9:   PHQ-9 SCORE 8/10/2018 8/17/2018 2/1/2019   PHQ-9 Total Score MyChart 2 (Minimal depression) - -   PHQ-9 Total Score 2 2 3     Previous LETI-7:   LETI-7 SCORE 8/10/2018 8/17/2018 2/1/2019   Total Score 2 (minimal anxiety) - -   Total Score 2 2 2       RAYMON LEVEL:  No flowsheet data found.    DATA  Extended Session (60+ minutes): No  Interactive Complexity: No  Crisis: No  [facility] Patient: No    Treatment Objective(s) Addressed in This Session:  Target Behavior(s): disease management/lifestyle changes      Patient  will experience a reduction in depressed mood, will develop more effective coping skills to manage depressive symptoms and will develop healthy cognitive patterns and beliefs, will experience a reduction in anxiety and will develop more effective coping skills to manage anxiety symptoms and will develop coping/problem-solving skills to facilitate more adaptive adjustment    Current Stressors / Issues:  [facility] met with Neo to provide psychotherapy support regarding depression, life adjustment, anxiety. Continued our conversation about his current stressors, hx of depression, life transitions..      Dexter reports he is feeling consistently better overall per his mood.  Discussed the possible reasons why. He continues to take his medications.  He has gotten to a new normal in terms of the transitions he was living through.  He is happy in his job. He has some things to look  "forward to and this helps a lot. (He is looking forward to two trips In April.)  He has continued to make use of meditation occasionally, is exercising regularly.  Reviewed some of the psychological insights and skills he has learned about and experimented with during our conversations.      Discussed Marjan's research on well-being and explored the idea of PERMA form his work. Explored various ides about human flourishing and well-being and how they may fit into his own search for understanding, meaning, his own happiness and optimal mental health.    From previous sessions for reference:  Our initial conversation left him in a place of thinking about what he needs to deal with, he says. He has always viewed himself as \"the maladjusted one.\" He wants to work on continuing to understand his depression patterns, explore relationships and building a positive sense of himself, explore what makes a meaningful and satisfying life for himself. He says that he usually experiences more intense emotional dynamics during a transition of meds - about a month in his experience - and he is in this now.    I affirmed the steps this patient has taken to address physical and behavioral health issues, and offered continued behavioral health services or referral, now or in the future, as needed by the patient.    Progress on Treatment Objective(s) / Homework:  Satisfactory progress - ACTION (Actively working towards change); Intervened by reinforcing change plan / affirming steps taken    Psycho-education regarding mental health diagnoses and treatment options    Motivational Interviewing    Skills training    Explored specific skills useful to client in current situation    Skill areas include assertiveness, communication, conflict management, problem-solving, relaxation, etc.     Solution-Focused Therapy    Explored patterns in patient's behaviors and relationships and discussed options for new behaviors    Explored new options " for problem-solving, communication, managing stress, etc.    Cognitive-behavioral Therapy    Discussed common cognitive distortions, identified them in patient's life    Explored ways to challenge, replace, and act against these cognitions    Explore behavioral changes that might benefit patient in improving mood and engage in meaningful activity    Acceptance and Commitment Therapy    Explored and identified important values in patient's life    Discussed ways to commit to behavioral activation around these values    Psychodynamic psychotherapy    Discussed patient's emotional dynamics and issues and how they impact behaviors    Explored patient's history of relationships and how they impact present behaviors    Explored how to work with and make changes in these schemas and patterns    Narrative Therapy    Explored the patient's story of their life from their perspective,     Explored alternate ways of understanding their experience, identifying exceptions, developing new themes    Interpersonal Psychotherapy    Explored patterns in relationships that are effective or ineffective at helping patient reach their goals, find satisfying experience.    Discussed new patterns or behaviors to engage in for improved social functioning.    Behavioral Activation    Discussed steps patient can take to become more involved in meaningful activity    Identified barriers to these activities and explored possible solutions    Mindfulness-Based Strategies    Discussed skills based on development and application of mindfulness    Skills drawn from compassion-focused therapy, dialectical behavior therapy, mindfulness-based stress reduction, mindfulness-based cognitive therapy, etc.    Medication Review:  No problems reported to [facility] today.    Medication Compliance:  No problems reported to [facility] today.    Changes in Health Issues:  No problems reported to [facility] today.    Chemical Use Review:   Substance Use: Chemical use reviewed, no  active concerns identified      Tobacco Use: No current tobacco use.      Assessment: Current Emotional / Mental Status (status of significant symptoms):  Risk status (Self / Other harm or suicidal ideation)  Patient denies a history of suicidal ideation, suicide attempts, self-injurious behavior, homicidal ideation, homicidal behavior and and other safety concerns  Patient denies current fears or concerns for personal safety.  Patient denies current or recent suicidal ideation or behaviors.  Patient denies current or recent homicidal ideation or behaviors.  Patient denies current or recent self injurious behavior or ideation.  Patient denies other safety concerns.  A safety and risk management plan has not been developed at this time, however patient was encouraged to call [place] [phone] should there be a change in any of these risk factors.    Appearance:   Appropriate   Eye Contact:   Good   Psychomotor Behavior: Normal   Attitude:   Cooperative   Orientation:   All  Speech   Rate / Production: Normal    Volume:  Normal   Mood:    Anxious  Depressed - fluctuates  Affect:    Appropriate   Thought Content:  Clear  Rumination   Thought Form:  Coherent  Logical   Insight:    Good     Diagnoses:  1. Major depressive disorder, recurrent episode, mild (H)      Collateral Reports Completed:  Will collaborate with care team as indicated during treatment    Plan: (Homework, other):  Patient was given information about behavioral services and encouraged to schedule a follow up appointment with the clinic [facility] as needed.  He was also given information about mental health symptoms and treatment options .  CD Recommendations: No indications of CD issues.  We agree to continue psychotherapy to address his depression, anxiety, life adjustment. 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    Anticipated number of session or this episode of care: 12+      MeasurableTreatment Goal(s) related to diagnosis / functional impairment(s)  Goal 1:  Patient will experience a reduction in depressive and anxious symptoms, along with a corresponding increase in positive emotion and life satisfaction.    Objective #A: Patient will experience a reduction in depressed mood, will develop more effective coping skills to manage depressive symptoms, will develop healthy cognitive patterns and beliefs, will increase ability to function adaptively and will continue to take medications as prescribed / participate in supportive activities and services    Status: Continued - Date(s): 9/7/2018    Objective #B: Patient will experience a reduction in anxiety, will develop more effective coping skills to manage anxiety symptoms, will develop healthy cognitive patterns and beliefs and will increase ability to function adaptively  Status: Continued - Date(s): 9/7/2018    Objective #C: Patient will develop better understanding of triggers and coping strategies to stabilize mood  Status: Continued - Date(s): 9/7/2018    Goal 2:  Patient will identify and increase engagement in valued activity, i.e. improving social connections/relationships, pursuing occupational goals or personally meaningful pursuits, exploration of meaning in life.     Objective #A: Patient will identify meaningful activity in social, occupational and  personal goals, and increase behavioral activation around these goals   Status: Continued - Date(s): 9/7/2018    Objective #B: Patient will address relationship  difficulties in a more adaptive manner  Status: Continued - Date(s): 9/7/2018    Objective #C: Patient will develop coping/problem-solving skills to facilitate more adaptive adjustment and will effectively address problems that interfere with adaptive functioning  Status: Continued - Date(s): 9/7/2018    Possible Therapeutic Intervention(s)  Psycho-education regarding mental health diagnoses and treatment options    Eye-Movement Desensitization and Reprocessing Therapy    Clinical Hypnosis    Skills training    Explore skills useful to client in current situation.    Skills include assertiveness, communication, conflict management, problem-solving, relaxation, etc.    Solution-Focused Therapy    Explore patterns in patient's relationships and discuss options for new behaviors.    Explore patterns in patient's actions and choices and discuss options for new behaviors.    Cognitive-behavioral Therapy    Discuss common cognitive distortions, identify them in patient's life.    Explore ways to challenge, replace, and act against these cognitions.    Acceptance and Commitment Therapy    Explore and identify important values in patient's life.    Discuss ways to commit to behavioral activation around these values.    Psychodynamic psychotherapy    Discuss patient's emotional dynamics and issues and how they impact behaviors.    Explore patient's history of relationships and how they impact present behaviors.    Explore how to work with and make changes in these schemas and patterns.    Narrative Therapy    Explore the patient's story of his/her life from his/her perspective.    Explore alternate ways of understanding their experience, identifying exceptions, developing new themes.    Interpersonal Psychotherapy    Explore patterns in relationships that are effective or ineffective at helping patient reach their goals, find satisfying experience.    Discuss new patterns or behaviors to engage in for improved social  functioning.    Behavioral Activation    Discuss steps patient can take to become more involved in meaningful activity.    Identify barriers to these activities and explore possible solutions.    Mindfulness-Based Strategies    Discuss skills based on development and application of mindfulness.    Skills drawn from compassion-focused therapy, dialectical behavior therapy, mindfulness-based stress reduction, mindfulness-based cognitive therapy, etc.      We have developed these goals together during our work to this point. Patient has assisted in the development of these goals and has agreed to this treatment plan.       GAGE Ulloa, [facility]  September 7, 2018

## 2019-03-01 ENCOUNTER — OFFICE VISIT (OUTPATIENT)
Dept: BEHAVIORAL HEALTH | Facility: CLINIC | Age: 24
End: 2019-03-01
Payer: COMMERCIAL

## 2019-03-01 DIAGNOSIS — F41.9 ANXIETY: ICD-10-CM

## 2019-03-01 DIAGNOSIS — F33.0 MAJOR DEPRESSIVE DISORDER, RECURRENT EPISODE, MILD (H): Primary | ICD-10-CM

## 2019-03-01 NOTE — PROGRESS NOTES
[facility] ([facility] referral)  March 1, 2019      Behavioral Health Clinician Progress Note    Patient Name: Neo Saeed              Service Type:  Individual      Service Location:   Face to Face in Clinic     Session Start Time: 110pm  Session End Time: 210pm     Session Length: 53 - 60      Attendees: Patient    Visit Activities (Refresh list every visit): [facility] Only    Diagnostic Assessment Date: none on file  Treatment Plan Review Date: 9/7/2018  See Flowsheets for today's PHQ-9 and LETI-7 results  Previous PHQ-9:   PHQ-9 SCORE 8/10/2018 8/17/2018 2/1/2019   PHQ-9 Total Score MyChart 2 (Minimal depression) - -   PHQ-9 Total Score 2 2 3     Previous LETI-7:   LETI-7 SCORE 8/10/2018 8/17/2018 2/1/2019   Total Score 2 (minimal anxiety) - -   Total Score 2 2 2       RAYMON LEVEL:  No flowsheet data found.    DATA  Extended Session (60+ minutes): No  Interactive Complexity: No  Crisis: No  [facility] Patient: No    Treatment Objective(s) Addressed in This Session:  Target Behavior(s): disease management/lifestyle changes      Patient  will experience a reduction in depressed mood, will develop more effective coping skills to manage depressive symptoms and will develop healthy cognitive patterns and beliefs, will experience a reduction in anxiety and will develop more effective coping skills to manage anxiety symptoms and will develop coping/problem-solving skills to facilitate more adaptive adjustment    Current Stressors / Issues:  [facility] met with Neo to provide psychotherapy support regarding depression, life adjustment, anxiety. Continued our conversation about his current stressors, hx of depression, life transitions..      Dexter reports that he has found that his sense of well-being is holding steady. Discussed and affirmed the reasons why this is happening with this and why. Continued exploration of his questions about stopping his medications.  Reviewed some of the psychological insights and skills  "he has learned about and experimented with during our conversations.  Explored the impact in his relationships as he changes/experinces changes in his own sense of self. Explored goals and practices for increasing positive social connection, exploring the meaning of intimacy. Dexter appears to enjoy and benefit from complex conversations about psychological theories, existential questions/philosophy and we explore these types of questions about evolutionary psychology ,the construction of the self, questions of value choices, etc - with application as we go to his own situation and the goals in front of him.    From previous sessions for reference:  Our initial conversation left him in a place of thinking about what he needs to deal with, he says. He has always viewed himself as \"the maladjusted one.\" He wants to work on continuing to understand his depression patterns, explore relationships and building a positive sense of himself, explore what makes a meaningful and satisfying life for himself. He says that he usually experiences more intense emotional dynamics during a transition of meds - about a month in his experience - and he is in this now.    I affirmed the steps this patient has taken to address physical and behavioral health issues, and offered continued behavioral health services or referral, now or in the future, as needed by the patient.    Progress on Treatment Objective(s) / Homework:  Satisfactory progress - ACTION (Actively working towards change); Intervened by reinforcing change plan / affirming steps taken    Psycho-education regarding mental health diagnoses and treatment options    Motivational Interviewing    Skills training    Explored specific skills useful to client in current situation    Skill areas include assertiveness, communication, conflict management, problem-solving, relaxation, etc.     Solution-Focused Therapy    Explored patterns in patient's behaviors and relationships and " discussed options for new behaviors    Explored new options for problem-solving, communication, managing stress, etc.    Cognitive-behavioral Therapy    Discussed common cognitive distortions, identified them in patient's life    Explored ways to challenge, replace, and act against these cognitions    Explore behavioral changes that might benefit patient in improving mood and engage in meaningful activity    Acceptance and Commitment Therapy    Explored and identified important values in patient's life    Discussed ways to commit to behavioral activation around these values    Psychodynamic psychotherapy    Discussed patient's emotional dynamics and issues and how they impact behaviors    Explored patient's history of relationships and how they impact present behaviors    Explored how to work with and make changes in these schemas and patterns    Narrative Therapy    Explored the patient's story of their life from their perspective,     Explored alternate ways of understanding their experience, identifying exceptions, developing new themes    Interpersonal Psychotherapy    Explored patterns in relationships that are effective or ineffective at helping patient reach their goals, find satisfying experience.    Discussed new patterns or behaviors to engage in for improved social functioning.    Behavioral Activation    Discussed steps patient can take to become more involved in meaningful activity    Identified barriers to these activities and explored possible solutions    Mindfulness-Based Strategies    Discussed skills based on development and application of mindfulness    Skills drawn from compassion-focused therapy, dialectical behavior therapy, mindfulness-based stress reduction, mindfulness-based cognitive therapy, etc.    Medication Review:  No problems reported to [facility] today.    Medication Compliance:  No problems reported to [facility] today.    Changes in Health Issues:  No problems reported to [facility]  today.    Chemical Use Review:   Substance Use: Chemical use reviewed, no active concerns identified      Tobacco Use: No current tobacco use.      Assessment: Current Emotional / Mental Status (status of significant symptoms):  Risk status (Self / Other harm or suicidal ideation)  Patient denies a history of suicidal ideation, suicide attempts, self-injurious behavior, homicidal ideation, homicidal behavior and and other safety concerns  Patient denies current fears or concerns for personal safety.  Patient denies current or recent suicidal ideation or behaviors.  Patient denies current or recent homicidal ideation or behaviors.  Patient denies current or recent self injurious behavior or ideation.  Patient denies other safety concerns.  A safety and risk management plan has not been developed at this time, however patient was encouraged to call [place] [phone] should there be a change in any of these risk factors.    Appearance:   Appropriate   Eye Contact:   Good   Psychomotor Behavior: Normal   Attitude:   Cooperative   Orientation:   All  Speech   Rate / Production: Normal    Volume:  Normal   Mood:    Anxious  Depressed - fluctuates  Affect:    Appropriate   Thought Content:  Clear  Rumination   Thought Form:  Coherent  Logical   Insight:    Good     Diagnoses:  1. Major depressive disorder, recurrent episode, mild (H)    2. Anxiety      Collateral Reports Completed:  Will collaborate with care team as indicated during treatment    Plan: (Homework, other):  Patient was given information about behavioral services and encouraged to schedule a follow up appointment with the clinic [facility] [place] as needed.  He was also given information about mental health symptoms and treatment options .  CD Recommendations: No indications of CD issues.  We agree to continue psychotherapy to address his depression, anxiety, life adjustment. GAGE Sears,  [facility]    ______________________________________________________________________    [facility] Integrated Behavioral Health Treatment Plan    Client's Name: Neo Saeed  YOB: 1995    Date: 9/7/2018    DSM-V Diagnoses: 296.32 (F33.1) Major Depressive Disorder, Recurrent Episode, Moderate With anxious distress;  Psychosocial / Contextual Factors: transitions in life - college to work force, friends leaving, etc    Referral / Collaboration:  Will collaborate with care team as indicated during treatment.    Anticipated number of session or this episode of care: 12+      MeasurableTreatment Goal(s) related to diagnosis / functional impairment(s)  Goal 1:  Patient will experience a reduction in depressive and anxious symptoms, along with a corresponding increase in positive emotion and life satisfaction.    Objective #A: Patient will experience a reduction in depressed mood, will develop more effective coping skills to manage depressive symptoms, will develop healthy cognitive patterns and beliefs, will increase ability to function adaptively and will continue to take medications as prescribed / participate in supportive activities and services    Status: Continued - Date(s): 9/7/2018    Objective #B: Patient will experience a reduction in anxiety, will develop more effective coping skills to manage anxiety symptoms, will develop healthy cognitive patterns and beliefs and will increase ability to function adaptively  Status: Continued - Date(s): 9/7/2018    Objective #C: Patient will develop better understanding of triggers and coping strategies to stabilize mood  Status: Continued - Date(s): 9/7/2018    Goal 2:  Patient will identify and increase engagement in valued activity, i.e. improving social connections/relationships, pursuing occupational goals or personally meaningful pursuits, exploration of meaning in life.     Objective #A: Patient will identify meaningful activity in social, occupational and   personal goals, and increase behavioral activation around these goals   Status: Continued - Date(s): 9/7/2018    Objective #B: Patient will address relationship difficulties in a more adaptive manner  Status: Continued - Date(s): 9/7/2018    Objective #C: Patient will develop coping/problem-solving skills to facilitate more adaptive adjustment and will effectively address problems that interfere with adaptive functioning  Status: Continued - Date(s): 9/7/2018    Possible Therapeutic Intervention(s)  Psycho-education regarding mental health diagnoses and treatment options    Eye-Movement Desensitization and Reprocessing Therapy    Clinical Hypnosis    Skills training    Explore skills useful to client in current situation.    Skills include assertiveness, communication, conflict management, problem-solving, relaxation, etc.    Solution-Focused Therapy    Explore patterns in patient's relationships and discuss options for new behaviors.    Explore patterns in patient's actions and choices and discuss options for new behaviors.    Cognitive-behavioral Therapy    Discuss common cognitive distortions, identify them in patient's life.    Explore ways to challenge, replace, and act against these cognitions.    Acceptance and Commitment Therapy    Explore and identify important values in patient's life.    Discuss ways to commit to behavioral activation around these values.    Psychodynamic psychotherapy    Discuss patient's emotional dynamics and issues and how they impact behaviors.    Explore patient's history of relationships and how they impact present behaviors.    Explore how to work with and make changes in these schemas and patterns.    Narrative Therapy    Explore the patient's story of his/her life from his/her perspective.    Explore alternate ways of understanding their experience, identifying exceptions, developing new themes.    Interpersonal Psychotherapy    Explore patterns in relationships that are  effective or ineffective at helping patient reach their goals, find satisfying experience.    Discuss new patterns or behaviors to engage in for improved social functioning.    Behavioral Activation    Discuss steps patient can take to become more involved in meaningful activity.    Identify barriers to these activities and explore possible solutions.    Mindfulness-Based Strategies    Discuss skills based on development and application of mindfulness.    Skills drawn from compassion-focused therapy, dialectical behavior therapy, mindfulness-based stress reduction, mindfulness-based cognitive therapy, etc.      We have developed these goals together during our work to this point. Patient has assisted in the development of these goals and has agreed to this treatment plan.       GAGE Ulloa, [facility]  September 7, 2018

## 2019-04-25 ENCOUNTER — OFFICE VISIT (OUTPATIENT)
Dept: BEHAVIORAL HEALTH | Facility: CLINIC | Age: 24
End: 2019-04-25
Payer: COMMERCIAL

## 2019-04-25 DIAGNOSIS — F33.0 MAJOR DEPRESSIVE DISORDER, RECURRENT EPISODE, MILD (H): Primary | ICD-10-CM

## 2019-04-25 NOTE — PROGRESS NOTES
[facility] ([facility] referral)  April 25, 2019      Behavioral Health Clinician Progress Note    Patient Name: Neo Saeed              Service Type:  Individual      Service Location:   Face to Face in Clinic     Session Start Time: 305pm  Session End Time: 405pm     Session Length: 53 - 60      Attendees: Patient    Visit Activities (Refresh list every visit): [facility] Only    Diagnostic Assessment Date: none on file  Treatment Plan Review Date: 9/7/2018  See Flowsheets for today's PHQ-9 and LETI-7 results  Previous PHQ-9:   PHQ-9 SCORE 8/10/2018 8/17/2018 2/1/2019   PHQ-9 Total Score MyChart 2 (Minimal depression) - -   PHQ-9 Total Score 2 2 3     Previous LETI-7:   LETI-7 SCORE 8/10/2018 8/17/2018 2/1/2019   Total Score 2 (minimal anxiety) - -   Total Score 2 2 2       RAYMON LEVEL:  No flowsheet data found.    DATA  Extended Session (60+ minutes): No  Interactive Complexity: No  Crisis: No  [facility] Patient: No    Treatment Objective(s) Addressed in This Session:  Target Behavior(s): disease management/lifestyle changes      Patient  will experience a reduction in depressed mood, will develop more effective coping skills to manage depressive symptoms and will develop healthy cognitive patterns and beliefs, will experience a reduction in anxiety and will develop more effective coping skills to manage anxiety symptoms and will develop coping/problem-solving skills to facilitate more adaptive adjustment    Current Stressors / Issues:  [facility] met with Neo to provide psychotherapy support regarding depression, life adjustment, anxiety. Continued our conversation about his current stressors, hx of depression, life transitions..      Dexter reports his mood has varied, but overall has been holding better in a solid way. Discussed what he does during the time he finds his mood shifting down some. He notes catastrophizing and some other cognitive patterns. Explored how he works with them, emphasizing CBT and ACT  "approaches.  Emphasized the psychological models for depression and emotional self-regulation, discussed Dexter's evolving ideas about his own mental health over time.    Discussed positive psychology and growth as a next step for his therapy and process in life. Explored and reviewed some resources for this conversation.    From previous sessions for reference:  Our initial conversation left him in a place of thinking about what he needs to deal with, he says. He has always viewed himself as \"the maladjusted one.\" He wants to work on continuing to understand his depression patterns, explore relationships and building a positive sense of himself, explore what makes a meaningful and satisfying life for himself. He says that he usually experiences more intense emotional dynamics during a transition of meds - about a month in his experience - and he is in this now.    I affirmed the steps this patient has taken to address physical and behavioral health issues, and offered continued behavioral health services or referral, now or in the future, as needed by the patient.    Progress on Treatment Objective(s) / Homework:  Satisfactory progress - ACTION (Actively working towards change); Intervened by reinforcing change plan / affirming steps taken    Psycho-education regarding mental health diagnoses and treatment options    Motivational Interviewing    Skills training    Explored specific skills useful to client in current situation    Skill areas include assertiveness, communication, conflict management, problem-solving, relaxation, etc.     Solution-Focused Therapy    Explored patterns in patient's behaviors and relationships and discussed options for new behaviors    Explored new options for problem-solving, communication, managing stress, etc.    Cognitive-behavioral Therapy    Discussed common cognitive distortions, identified them in patient's life    Explored ways to challenge, replace, and act against these " cognitions    Explore behavioral changes that might benefit patient in improving mood and engage in meaningful activity    Acceptance and Commitment Therapy    Explored and identified important values in patient's life    Discussed ways to commit to behavioral activation around these values    Psychodynamic psychotherapy    Discussed patient's emotional dynamics and issues and how they impact behaviors    Explored patient's history of relationships and how they impact present behaviors    Explored how to work with and make changes in these schemas and patterns    Narrative Therapy    Explored the patient's story of their life from their perspective,     Explored alternate ways of understanding their experience, identifying exceptions, developing new themes    Interpersonal Psychotherapy    Explored patterns in relationships that are effective or ineffective at helping patient reach their goals, find satisfying experience.    Discussed new patterns or behaviors to engage in for improved social functioning.    Behavioral Activation    Discussed steps patient can take to become more involved in meaningful activity    Identified barriers to these activities and explored possible solutions    Mindfulness-Based Strategies    Discussed skills based on development and application of mindfulness    Skills drawn from compassion-focused therapy, dialectical behavior therapy, mindfulness-based stress reduction, mindfulness-based cognitive therapy, etc.    Medication Review:  No problems reported to [facility] today.    Medication Compliance:  No problems reported to [facility] today.    Changes in Health Issues:  No problems reported to [facility] today.    Chemical Use Review:   Substance Use: Chemical use reviewed, no active concerns identified      Tobacco Use: No current tobacco use.      Assessment: Current Emotional / Mental Status (status of significant symptoms):  Risk status (Self / Other harm or suicidal ideation)  Patient denies a  history of suicidal ideation, suicide attempts, self-injurious behavior, homicidal ideation, homicidal behavior and and other safety concerns  Patient denies current fears or concerns for personal safety.  Patient denies current or recent suicidal ideation or behaviors.  Patient denies current or recent homicidal ideation or behaviors.  Patient denies current or recent self injurious behavior or ideation.  Patient denies other safety concerns.  A safety and risk management plan has not been developed at this time, however patient was encouraged to call [place] [phone] should there be a change in any of these risk factors.    Appearance:   Appropriate   Eye Contact:   Good   Psychomotor Behavior: Normal   Attitude:   Cooperative   Orientation:   All  Speech   Rate / Production: Normal    Volume:  Normal   Mood:    Anxious  Depressed - fluctuates  Affect:    Appropriate   Thought Content:  Clear  Rumination   Thought Form:  Coherent  Logical   Insight:    Good     Diagnoses:  1. Major depressive disorder, recurrent episode, mild (H)      Collateral Reports Completed:  Will collaborate with care team as indicated during treatment    Plan: (Homework, other):  Patient was given information about behavioral services and encouraged to schedule a follow up appointment with the clinic [facility] as needed.  He was also given information about mental health symptoms and treatment options .  CD Recommendations: No indications of CD issues.  We agree to continue psychotherapy to address his depression, anxiety, life adjustment. GAGE Sears, [facility]    ______________________________________________________________________    [facility] [place] [facility]    Client's Name: Neo Saeed  YOB: 1995    Date: 9/7/2018    DSM-V Diagnoses: 296.32 (F33.1) Major Depressive Disorder, Recurrent Episode, Moderate With anxious distress;  Psychosocial / Contextual Factors: transitions in life - college to  work force, friends leaving, etc    Referral / Collaboration:  Will collaborate with care team as indicated during treatment.    Anticipated number of session or this episode of care: 12+      MeasurableTreatment Goal(s) related to diagnosis / functional impairment(s)  Goal 1:  Patient will experience a reduction in depressive and anxious symptoms, along with a corresponding increase in positive emotion and life satisfaction.    Objective #A: Patient will experience a reduction in depressed mood, will develop more effective coping skills to manage depressive symptoms, will develop healthy cognitive patterns and beliefs, will increase ability to function adaptively and will continue to take medications as prescribed / participate in supportive activities and services    Status: Continued - Date(s): 9/7/2018    Objective #B: Patient will experience a reduction in anxiety, will develop more effective coping skills to manage anxiety symptoms, will develop healthy cognitive patterns and beliefs and will increase ability to function adaptively  Status: Continued - Date(s): 9/7/2018    Objective #C: Patient will develop better understanding of triggers and coping strategies to stabilize mood  Status: Continued - Date(s): 9/7/2018    Goal 2:  Patient will identify and increase engagement in valued activity, i.e. improving social connections/relationships, pursuing occupational goals or personally meaningful pursuits, exploration of meaning in life.     Objective #A: Patient will identify meaningful activity in social, occupational and  personal goals, and increase behavioral activation around these goals   Status: Continued - Date(s): 9/7/2018    Objective #B: Patient will address relationship difficulties in a more adaptive manner  Status: Continued - Date(s): 9/7/2018    Objective #C: Patient will develop coping/problem-solving skills to facilitate more adaptive adjustment and will effectively address problems that  interfere with adaptive functioning  Status: Continued - Date(s): 9/7/2018    Possible Therapeutic Intervention(s)  Psycho-education regarding mental health diagnoses and treatment options    Eye-Movement Desensitization and Reprocessing Therapy    Clinical Hypnosis    Skills training    Explore skills useful to client in current situation.    Skills include assertiveness, communication, conflict management, problem-solving, relaxation, etc.    Solution-Focused Therapy    Explore patterns in patient's relationships and discuss options for new behaviors.    Explore patterns in patient's actions and choices and discuss options for new behaviors.    Cognitive-behavioral Therapy    Discuss common cognitive distortions, identify them in patient's life.    Explore ways to challenge, replace, and act against these cognitions.    Acceptance and Commitment Therapy    Explore and identify important values in patient's life.    Discuss ways to commit to behavioral activation around these values.    Psychodynamic psychotherapy    Discuss patient's emotional dynamics and issues and how they impact behaviors.    Explore patient's history of relationships and how they impact present behaviors.    Explore how to work with and make changes in these schemas and patterns.    Narrative Therapy    Explore the patient's story of his/her life from his/her perspective.    Explore alternate ways of understanding their experience, identifying exceptions, developing new themes.    Interpersonal Psychotherapy    Explore patterns in relationships that are effective or ineffective at helping patient reach their goals, find satisfying experience.    Discuss new patterns or behaviors to engage in for improved social functioning.    Behavioral Activation    Discuss steps patient can take to become more involved in meaningful activity.    Identify barriers to these activities and explore possible solutions.    Mindfulness-Based Strategies    Discuss  skills based on development and application of mindfulness.    Skills drawn from compassion-focused therapy, dialectical behavior therapy, mindfulness-based stress reduction, mindfulness-based cognitive therapy, etc.      We have developed these goals together during our work to this point. Patient has assisted in the development of these goals and has agreed to this treatment plan.       GAGE Ulloa, [facility]  September 7, 2018

## 2019-05-16 ENCOUNTER — OFFICE VISIT (OUTPATIENT)
Dept: INTERNAL MEDICINE | Facility: CLINIC | Age: 24
End: 2019-05-16
Payer: COMMERCIAL

## 2019-05-16 VITALS
RESPIRATION RATE: 16 BRPM | HEART RATE: 82 BPM | DIASTOLIC BLOOD PRESSURE: 78 MMHG | SYSTOLIC BLOOD PRESSURE: 131 MMHG | BODY MASS INDEX: 25.68 KG/M2 | OXYGEN SATURATION: 96 % | WEIGHT: 179 LBS

## 2019-05-16 DIAGNOSIS — Z01.818 PREOP GENERAL PHYSICAL EXAM: Primary | ICD-10-CM

## 2019-05-16 ASSESSMENT — PAIN SCALES - GENERAL: PAINLEVEL: NO PAIN (0)

## 2019-05-16 NOTE — PROGRESS NOTES
[facility]  [address]  [address] [place] [id number] [phone]  Dept: [phone]    PRE-OP EVALUATION:  Today's date: 2019    Neo Saeed (: 1995) presents for pre-operative evaluation assessment as requested by Dr. Ceferino Hernández.  He requires evaluation and anesthesia risk assessment prior to undergoing surgery/procedure for treatment of internal fixation of left scaphoid fracture .    Fax number for surgical facility: 2019  Primary Physician: Huy Mendez  Type of Anesthesia Anticipated: General    Patient has a Health Care Directive or Living Will:  NO    Preop Questions 2019   Who is doing your surgery? ceferino hernández   What are you having done? internal fixation of left scaphoid fracture   Date of Surgery/Procedure: 21 may   Facility or Hospital where procedure/surgery will be performed: [facility]         HPI:     HPI related to upcoming procedure: while playing dodge ball. He fell on extended arms in January. Went to the ED did Xrays were normal at that time. But patient continued to have pain. He went to orthopedics 3 weeks ago repeated Xray found to have scaphoid fracture. No decreased mobility or swelling.       See problem list for active medical problems.  Problems all longstanding and stable, except as noted/documented.  See ROS for pertinent symptoms related to these conditions.                                                                                                                                                          .    MEDICAL HISTORY:     Patient Active Problem List    Diagnosis Date Noted     Anxiety 2018     Priority: Medium     Major depressive disorder, recurrent episode, mild (H) 2018     Priority: Medium     H/O pilonidal cyst 08/10/2017     Priority: Medium     Open wnd of buttock, unspecified laterality, initial encounter 08/10/2017     Priority: Medium      Past Medical  History:   Diagnosis Date     Allergic state      Anxiety      Past Surgical History:   Procedure Laterality Date     CYSTECTOMY PILONIDAL N/A 8/4/2017    Procedure: CYSTECTOMY PILONIDAL;  Lay Open Pilonidal Cystectomy;  Surgeon: Jenaro Cuba MD;  Location: [facility] OR     DENTAL SURGERY      [place] teeth extraction     ENDOSCOPY       HERNIA REPAIR       HERNIA REPAIR       Current Outpatient Medications   Medication Sig Dispense Refill     buPROPion (WELLBUTRIN SR) 100 MG 12 hr tablet Take 1 tablet (100 mg) by mouth 2 times daily 180 tablet 3     FEXOFENADINE HCL PO Take 180 mg by mouth every morning        OTC products: None, except as noted above    No Known Allergies   Latex Allergy: NO    Social History     Tobacco Use     Smoking status: Never Smoker     Smokeless tobacco: Never Used   Substance Use Topics     Alcohol use: Yes     Comment: Couple of drinks once a week     History   Drug Use No       REVIEW OF SYSTEMS:   Constitutional, HEENT, cardiovascular, pulmonary, gi and gu systems are negative, except as otherwise noted.    EXAM:   There were no vitals taken for this visit.    GENERAL APPEARANCE: healthy, alert and no distress     EYES: EOMI,  PERRL     HENT: ear canals and TM's normal and nose and mouth without ulcers or lesions     NECK: no adenopathy, no asymmetry, masses, or scars and thyroid normal to palpation     RESP: lungs clear to auscultation - no rales, rhonchi or wheezes     CV: regular rates and rhythm, normal S1 S2, no S3 or S4 and no murmur, click or rub     ABDOMEN:  soft, nontender, no HSM or masses and bowel sounds normal     MS: extremities normal- no gross deformities noted, no evidence of inflammation in joints, FROM in all extremities.     SKIN: no suspicious lesions or rashes     NEURO: Normal strength and tone, sensory exam grossly normal, mentation intact and speech normal     PSYCH: mentation appears normal. and affect normal/bright     LYMPHATICS: No cervical  adenopathy    DIAGNOSTICS:   No labs or EKG required patient has no active medical problems and is not on any medications. ROS is negative     Recent Labs   Lab Test 07/25/18  0745   HGB 15.7        IMPRESSION:   Reason for surgery/procedure: internal fixation of left scaphoid fracture   Diagnosis/reason for consult: pre-op evaluation     The proposed surgical procedure is considered INTERMEDIATE risk.    REVISED CARDIAC RISK INDEX  The patient has the following serious cardiovascular risks for perioperative complications such as (MI, PE, VFib and 3  AV Block):  No serious cardiac risks  INTERPRETATION: 0 risks: Class I (very low risk - 0.4% complication rate)    The patient has the following additional risks for perioperative complications:  No identified additional risks      ICD-10-CM    1. Preop general physical exam Z01.818        RECOMMENDATIONS:     Patient is a healthy 22 yo with no significant pmh and only on Wellbutrin and allegra. ROS is negative and exam is unremarkable. No additional testing is required for this intermediate risk procedure. Patient was instructed to hold wellbutrin and allegra on day of surgery     APPROVAL GIVEN to proceed with proposed procedure, without further diagnostic evaluation       Signed Electronically by: Joyec Olivo MD    Copy of this evaluation report is provided to requesting physician.    [place] Preop Guidelines    Revised Cardiac Risk Index  Answers for HPI/ROS submitted by the patient on 5/16/2019   General Symptoms: No  Skin Symptoms: No  HENT Symptoms: No  EYE SYMPTOMS: No  HEART SYMPTOMS: No  LUNG SYMPTOMS: No  INTESTINAL SYMPTOMS: No  URINARY SYMPTOMS: No  REPRODUCTIVE SYMPTOMS: No  SKELETAL SYMPTOMS: No  BLOOD SYMPTOMS: No  NERVOUS SYSTEM SYMPTOMS: No  MENTAL HEALTH SYMPTOMS: No    Pt was seen and examined by me.  I agree with the detailed A/P documentation above.    Alexa Forte MD

## 2019-05-16 NOTE — NURSING NOTE
"Chief Complaint   Patient presents with     Pre-Op Exam     pt is here for a pre op exam       Vital signs:      BP: 131/78 Pulse: 82   Resp: 16 SpO2: 96 %       Weight: 81.2 kg (179 lb)  Estimated body mass index is 25.68 kg/m  as calculated from the following:    Height as of 1/22/19: 1.778 m (5' 10\").    Weight as of this encounter: 81.2 kg (179 lb).      Neelam Groves CMA at 8:04 AM on 5/16/2019  "

## 2019-05-16 NOTE — PATIENT INSTRUCTIONS
Primary Care Center Phone Number [phone]  Primary Care Center Medication Refill Request Information:  * Please contact your pharmacy regarding ANY request for medication refills.  ** [facility] Prescription Fax = [phone]  * Please allow 3 business days for routine medication refills.  * Please allow 5 business days for controlled substance medication refills.     Primary Care Center Test Result notification information:  *You will be notified with in 7-10 days of your appointment day regarding the results of your test.  If you are on MyChart you will be notified as soon as the provider has reviewed the results and signed off on them.               [facility]         Internal Medicine Resident                   Continuity Clinic    Who We Are    Resident Continuity Clinic is a part of the [facility] Primary [facility].  Resident physicians see patients independently and establish a relationship with them over the course of their three-year residency program.  As with the Primary Care Clinic, our Resident Continuity Clinic models a group practice.  If your doctor is not available, you will be able to see another resident physician.  At the end of a resident s training, patients will be transitioned to a new resident physician for ongoing care.     We treat patients with a wide array of medical needs from routine physicals, to acute illnesses, to diabetes and blood pressure management, to complex medical illness.  What is a Resident Physician?    Resident physicians hold medical degrees and are doctors. They are training to become specialists in Internal Medicine. They work under the supervision of board-certified faculty physicians.  Expectations for Your Care    We strive to provide accessible, quality care at all times.    In order to provide this care, it is best to see your primary care resident doctor consistently rather switching between providers.  In the event you do see another physician, you  should schedule a follow-up visit with your usual primary care doctor.    If you are transitioning your care from another clinic, it is helpful to have your records available for your doctor to review.    We do not prescribe controlled substances, such as ADD medications or narcotic pain medications, on your first visit.  We will review your health records and concerns prior to devising a treatment plan with you in order to provide the best care.      Clinic Services     Extended clinic hours; patient  to help navigate your visit;  parking; laboratory and imaging services with evening and weekend hours    Multiple medical and surgical specialties in one building    Complementary services, including Nutrition, Integrative Medicine, Pharmacy consultations, Mental and Behavioral Health, Sports Medicine and Physical Therapy    Thank You    We would like to thank you for choosing [facility] for your primary care. You are making a priceless contribution to the training of the next generation of health care practitioners.     Contact us at [phone] for appointments or questions.    Resident Clinic Hours are Tuesdays and Thursdays, 7:30am-5:00pm    Residents   Addison Arnett MD  (Male)   Kika Becerra MD (Female)  Neelam Vargas MD   (Female)   Kami Looney MD   (Female)   Clay Edwards MD  (Male)   Zana Lopez MD  (Male)   Moris Mcginnis MD    (Female)   Francisco Jimenez MD  (Male)   Omid Jackson MD  (Male)    Leah Thompson MD  (Female)   Huy Mendez MD  (Male)   Denver Shaw MD  (Female)   Willie Gill MD   (Female)   Guy Li MD  (Male)   Angelito Slade MD  (Male)   Zana Portillo MD (Male)   Giovani Frazier MD  (Male)   Louise Marks MD (Female)    Vanessa Chen MD (Female)   Ailyn Olivo MD  (Male)   Gudelia Jeong MD    (Female)   Paula Wyman MD  (Female)    Supervising  Physicians   MD Valentine Hidalgo, MD Rafa Ashley, MD Jaimee Cooper MD Tanya Melnik, MD Charles Moldow, MD Heather Thompson Buum, MD Kathleen Watson, MD          Before Your Surgery      Call your surgeon if there is any change in your health. This includes signs of a cold or flu (such as a sore throat, runny nose, cough, rash or fever).    Do not smoke, drink alcohol or take over the counter medicine (unless your surgeon or primary care doctor tells you to) for the 24 hours before and after surgery.    If you take prescribed drugs: Follow your doctor s orders about which medicines to take and which to stop until after surgery.    Eating and drinking prior to surgery: follow the instructions from your surgeon  Take a shower or bath the night before surgery. Use the soap your surgeon gave you to gently clean your skin. If you do not have soap from your surgeon, use your regular soap. Do not shave or scrub the surgery site.  Wear clean pajamas and have clean sheets on your bed.

## 2019-05-16 NOTE — PROGRESS NOTES
Rooming Note      Pre-Operative Information    [facility] PREOP PROCEDURE RS 5/16/2019   Date? 21 may   Time? 2pm   Location? [facility] surgery center   Surgeon? nadia hernández   Surgical Procedure? internal fixation of left scaphoid fracture   Fax [phone]

## 2019-05-24 ENCOUNTER — OFFICE VISIT (OUTPATIENT)
Dept: BEHAVIORAL HEALTH | Facility: CLINIC | Age: 24
End: 2019-05-24
Payer: COMMERCIAL

## 2019-05-24 DIAGNOSIS — F41.9 ANXIETY: ICD-10-CM

## 2019-05-24 DIAGNOSIS — F33.0 MAJOR DEPRESSIVE DISORDER, RECURRENT EPISODE, MILD (H): Primary | ICD-10-CM

## 2019-05-24 NOTE — PROGRESS NOTES
[facility] ([facility] referral)  May 24, 2019      Behavioral Health Clinician Progress Note    Patient Name: Neo Saeed              Service Type:  Individual      Service Location:   Face to Face in Clinic     Session Start Time: 310pm  Session End Time: 410pm     Session Length: 53 - 60      Attendees: Patient    Visit Activities (Refresh list every visit): [facility] Only    Diagnostic Assessment Date: none on file  Treatment Plan Review Date: 9/7/2018  See Flowsheets for today's PHQ-9 and LETI-7 results  Previous PHQ-9:   PHQ-9 SCORE 8/10/2018 8/17/2018 2/1/2019   PHQ-9 Total Score MyChart 2 (Minimal depression) - -   PHQ-9 Total Score 2 2 3     Previous LETI-7:   LETI-7 SCORE 8/10/2018 8/17/2018 2/1/2019   Total Score 2 (minimal anxiety) - -   Total Score 2 2 2       RAYMON LEVEL:  No flowsheet data found.    DATA  Extended Session (60+ minutes): No  Interactive Complexity: No  Crisis: No  [facility] Patient: No    Treatment Objective(s) Addressed in This Session:  Target Behavior(s): disease management/lifestyle changes      Patient  will experience a reduction in depressed mood, will develop more effective coping skills to manage depressive symptoms and will develop healthy cognitive patterns and beliefs, will experience a reduction in anxiety and will develop more effective coping skills to manage anxiety symptoms and will develop coping/problem-solving skills to facilitate more adaptive adjustment    Current Stressors / Issues:  [facility] met with Neo to provide psychotherapy support regarding depression, life adjustment, anxiety. Continued our conversation about his current stressors, hx of depression, life transitions..      Dexter had hand surgery this week. He is doing okay.    He went to [place] for the [facility].  Overall he has been feeling pretty good, he says. He still intends to trial going without his psychiatric medications this summer. He intends to live a healthy lifestyle to be ready.   "We discuss his thoughts about what to watch for when he is trying this experiment.      He is anxious about being without antidepressants and fearful he would not know that he was tanking.  Also, he has some fear about finding that he doesn't need them and what this would mean about who he is. Continued our exploration of his understanding of depression, mental health in general, how to go about this process of getting off of meds. Discussed positive psychology and growth as a next step for his therapy and process in life. Explored and reviewed some resources for this conversation.    From previous sessions for reference:  Our initial conversation left him in a place of thinking about what he needs to deal with, he says. He has always viewed himself as \"the maladjusted one.\" He wants to work on continuing to understand his depression patterns, explore relationships and building a positive sense of himself, explore what makes a meaningful and satisfying life for himself. He says that he usually experiences more intense emotional dynamics during a transition of meds - about a month in his experience - and he is in this now.    I affirmed the steps this patient has taken to address physical and behavioral health issues, and offered continued behavioral health services or referral, now or in the future, as needed by the patient.    Progress on Treatment Objective(s) / Homework:  Satisfactory progress - ACTION (Actively working towards change); Intervened by reinforcing change plan / affirming steps taken    Psycho-education regarding mental health diagnoses and treatment options    Motivational Interviewing    Skills training    Explored specific skills useful to client in current situation    Skill areas include assertiveness, communication, conflict management, problem-solving, relaxation, etc.     Solution-Focused Therapy    Explored patterns in patient's behaviors and relationships and discussed options for new " behaviors    Explored new options for problem-solving, communication, managing stress, etc.    Cognitive-behavioral Therapy    Discussed common cognitive distortions, identified them in patient's life    Explored ways to challenge, replace, and act against these cognitions    Explore behavioral changes that might benefit patient in improving mood and engage in meaningful activity    Acceptance and Commitment Therapy    Explored and identified important values in patient's life    Discussed ways to commit to behavioral activation around these values    Psychodynamic psychotherapy    Discussed patient's emotional dynamics and issues and how they impact behaviors    Explored patient's history of relationships and how they impact present behaviors    Explored how to work with and make changes in these schemas and patterns    Narrative Therapy    Explored the patient's story of their life from their perspective,     Explored alternate ways of understanding their experience, identifying exceptions, developing new themes    Interpersonal Psychotherapy    Explored patterns in relationships that are effective or ineffective at helping patient reach their goals, find satisfying experience.    Discussed new patterns or behaviors to engage in for improved social functioning.    Behavioral Activation    Discussed steps patient can take to become more involved in meaningful activity    Identified barriers to these activities and explored possible solutions    Mindfulness-Based Strategies    Discussed skills based on development and application of mindfulness    Skills drawn from compassion-focused therapy, dialectical behavior therapy, mindfulness-based stress reduction, mindfulness-based cognitive therapy, etc.    Medication Review:  No problems reported to [facility] today.    Medication Compliance:  No problems reported to [facility] today.    Changes in Health Issues:  No problems reported to [facility] today.    Chemical Use Review:   Substance  Use: Chemical use reviewed, no active concerns identified      Tobacco Use: No current tobacco use.      Assessment: Current Emotional / Mental Status (status of significant symptoms):  Risk status (Self / Other harm or suicidal ideation)  Patient denies a history of suicidal ideation, suicide attempts, self-injurious behavior, homicidal ideation, homicidal behavior and and other safety concerns  Patient denies current fears or concerns for personal safety.  Patient denies current or recent suicidal ideation or behaviors.  Patient denies current or recent homicidal ideation or behaviors.  Patient denies current or recent self injurious behavior or ideation.  Patient denies other safety concerns.  A safety and risk management plan has not been developed at this time, however patient was encouraged to call [place] [phone] should there be a change in any of these risk factors.    Appearance:   Appropriate   Eye Contact:   Good   Psychomotor Behavior: Normal   Attitude:   Cooperative   Orientation:   All  Speech   Rate / Production: Normal    Volume:  Normal   Mood:    Anxious  Depressed - fluctuates  Affect:    Appropriate   Thought Content:  Clear  Rumination   Thought Form:  Coherent  Logical   Insight:    Good     Diagnoses:  1. Major depressive disorder, recurrent episode, mild (H)    2. Anxiety      Collateral Reports Completed:  Will collaborate with care team as indicated during treatment    Plan: (Homework, other):  Patient was given information about behavioral services and encouraged to schedule a follow up appointment with the clinic [facility] as needed.  He was also given information about mental health symptoms and treatment options .  CD Recommendations: No indications of CD issues.  We agree to continue psychotherapy to address his depression, anxiety, life adjustment. GAGE Sears, [facility]    ______________________________________________________________________    [facility]  Treatment Plan    Client's Name: Neo Saeed  YOB: 1995    Date: 9/7/2018    DSM-V Diagnoses: 296.32 (F33.1) Major Depressive Disorder, Recurrent Episode, Moderate With anxious distress;  Psychosocial / Contextual Factors: transitions in life - college to work force, friends leaving, etc    Referral / Collaboration:  Will collaborate with care team as indicated during treatment.    Anticipated number of session or this episode of care: 12+      MeasurableTreatment Goal(s) related to diagnosis / functional impairment(s)  Goal 1:  Patient will experience a reduction in depressive and anxious symptoms, along with a corresponding increase in positive emotion and life satisfaction.    Objective #A: Patient will experience a reduction in depressed mood, will develop more effective coping skills to manage depressive symptoms, will develop healthy cognitive patterns and beliefs, will increase ability to function adaptively and will continue to take medications as prescribed / participate in supportive activities and services    Status: Continued - Date(s): 9/7/2018    Objective #B: Patient will experience a reduction in anxiety, will develop more effective coping skills to manage anxiety symptoms, will develop healthy cognitive patterns and beliefs and will increase ability to function adaptively  Status: Continued - Date(s): 9/7/2018    Objective #C: Patient will develop better understanding of triggers and coping strategies to stabilize mood  Status: Continued - Date(s): 9/7/2018    Goal 2:  Patient will identify and increase engagement in valued activity, i.e. improving social connections/relationships, pursuing occupational goals or personally meaningful pursuits, exploration of meaning in life.     Objective #A: Patient will identify meaningful activity in social, occupational and  personal goals, and increase behavioral activation around these goals   Status: Continued - Date(s):  9/7/2018    Objective #B: Patient will address relationship difficulties in a more adaptive manner  Status: Continued - Date(s): 9/7/2018    Objective #C: Patient will develop coping/problem-solving skills to facilitate more adaptive adjustment and will effectively address problems that interfere with adaptive functioning  Status: Continued - Date(s): 9/7/2018    Possible Therapeutic Intervention(s)  Psycho-education regarding mental health diagnoses and treatment options    Eye-Movement Desensitization and Reprocessing Therapy    Clinical Hypnosis    Skills training    Explore skills useful to client in current situation.    Skills include assertiveness, communication, conflict management, problem-solving, relaxation, etc.    Solution-Focused Therapy    Explore patterns in patient's relationships and discuss options for new behaviors.    Explore patterns in patient's actions and choices and discuss options for new behaviors.    Cognitive-behavioral Therapy    Discuss common cognitive distortions, identify them in patient's life.    Explore ways to challenge, replace, and act against these cognitions.    Acceptance and Commitment Therapy    Explore and identify important values in patient's life.    Discuss ways to commit to behavioral activation around these values.    Psychodynamic psychotherapy    Discuss patient's emotional dynamics and issues and how they impact behaviors.    Explore patient's history of relationships and how they impact present behaviors.    Explore how to work with and make changes in these schemas and patterns.    Narrative Therapy    Explore the patient's story of his/her life from his/her perspective.    Explore alternate ways of understanding their experience, identifying exceptions, developing new themes.    Interpersonal Psychotherapy    Explore patterns in relationships that are effective or ineffective at helping patient reach their goals, find satisfying experience.    Discuss new  patterns or behaviors to engage in for improved social functioning.    Behavioral Activation    Discuss steps patient can take to become more involved in meaningful activity.    Identify barriers to these activities and explore possible solutions.    Mindfulness-Based Strategies    Discuss skills based on development and application of mindfulness.    Skills drawn from compassion-focused therapy, dialectical behavior therapy, mindfulness-based stress reduction, mindfulness-based cognitive therapy, etc.      We have developed these goals together during our work to this point. Patient has assisted in the development of these goals and has agreed to this treatment plan.       GAGE Ulloa, [facility]  September 7, 2018

## 2019-06-21 ENCOUNTER — OFFICE VISIT (OUTPATIENT)
Dept: BEHAVIORAL HEALTH | Facility: CLINIC | Age: 24
End: 2019-06-21
Payer: COMMERCIAL

## 2019-06-21 DIAGNOSIS — F33.0 MAJOR DEPRESSIVE DISORDER, RECURRENT EPISODE, MILD (H): Primary | ICD-10-CM

## 2019-06-21 DIAGNOSIS — F41.9 ANXIETY: ICD-10-CM

## 2019-06-21 NOTE — PROGRESS NOTES
[facility] ([facility] referral)  June 21, 2019      Behavioral Health Clinician Progress Note    Patient Name: Neo Saeed              Service Type:  Individual      Service Location:   Face to Face in Clinic     Session Start Time: 330pm  Session End Time: 430pm     Session Length: 53 - 60      Attendees: Patient    Visit Activities (Refresh list every visit): [facility] [place] Only    Diagnostic Assessment Date: none on file  Treatment Plan Review Date: 9/7/2018  See Flowsheets for today's PHQ-9 and LETI-7 results  Previous PHQ-9:   PHQ-9 SCORE 8/10/2018 8/17/2018 2/1/2019   PHQ-9 Total Score MyChart 2 (Minimal depression) - -   PHQ-9 Total Score 2 2 3     Previous LETI-7:   LETI-7 SCORE 8/10/2018 8/17/2018 2/1/2019   Total Score 2 (minimal anxiety) - -   Total Score 2 2 2       RAYMON LEVEL:  No flowsheet data found.    DATA  Extended Session (60+ minutes): No  Interactive Complexity: No  Crisis: No  [facility] Patient: No    Treatment Objective(s) Addressed in This Session:  Target Behavior(s): disease management/lifestyle changes      Patient  will experience a reduction in depressed mood, will develop more effective coping skills to manage depressive symptoms and will develop healthy cognitive patterns and beliefs, will experience a reduction in anxiety and will develop more effective coping skills to manage anxiety symptoms and will develop coping/problem-solving skills to facilitate more adaptive adjustment    Current Stressors / Issues:  [facility] [place] met with Neo to provide psychotherapy support regarding depression, life adjustment, anxiety. Continued our conversation about his current stressors, hx of depression, life transitions..      Checked in on how he has been since we last met. There is some work stress, is still dealing with recovery from his hand/wrist. He has a lot of weddings coming up, which is both good and bad. He rates his current level of life satisfaction at 6.7 out of 10.  Had a good discussion  "about happiness, well-being, what he wants and needs in his life.  Discussed models of well-being or human flourishing. Explored evolutionary theory and psychological models.    Continued conversation about his developing ideas about human nature, mental health, meaning, purpose, philosophy, etc. These are the roots of his own improving sense of well-being.    Had a discussion about Yazdanism and his friends who are shifting into conservative beliefs. This is challenging for him. Explored ways to find equanimity in relationship to difficult people and ideas. Discussed the work of Neo Ruiz, moral psychologist.    From previous sessions for reference:  Our initial conversation left him in a place of thinking about what he needs to deal with, he says. He has always viewed himself as \"the maladjusted one.\" He wants to work on continuing to understand his depression patterns, explore relationships and building a positive sense of himself, explore what makes a meaningful and satisfying life for himself. He says that he usually experiences more intense emotional dynamics during a transition of meds - about a month in his experience - and he is in this now.    I affirmed the steps this patient has taken to address physical and behavioral health issues, and offered continued behavioral health services or referral, now or in the future, as needed by the patient.    Progress on Treatment Objective(s) / Homework:  Satisfactory progress - ACTION (Actively working towards change); Intervened by reinforcing change plan / affirming steps taken    Psycho-education regarding mental health diagnoses and treatment options    Motivational Interviewing    Skills training    Explored specific skills useful to client in current situation    Skill areas include assertiveness, communication, conflict management, problem-solving, relaxation, etc.     Solution-Focused Therapy    Explored patterns in patient's behaviors and relationships " and discussed options for new behaviors    Explored new options for problem-solving, communication, managing stress, etc.    Cognitive-behavioral Therapy    Discussed common cognitive distortions, identified them in patient's life    Explored ways to challenge, replace, and act against these cognitions    Explore behavioral changes that might benefit patient in improving mood and engage in meaningful activity    Acceptance and Commitment Therapy    Explored and identified important values in patient's life    Discussed ways to commit to behavioral activation around these values    Psychodynamic psychotherapy    Discussed patient's emotional dynamics and issues and how they impact behaviors    Explored patient's history of relationships and how they impact present behaviors    Explored how to work with and make changes in these schemas and patterns    Narrative Therapy    Explored the patient's story of their life from their perspective,     Explored alternate ways of understanding their experience, identifying exceptions, developing new themes    Interpersonal Psychotherapy    Explored patterns in relationships that are effective or ineffective at helping patient reach their goals, find satisfying experience.    Discussed new patterns or behaviors to engage in for improved social functioning.    Behavioral Activation    Discussed steps patient can take to become more involved in meaningful activity    Identified barriers to these activities and explored possible solutions    Mindfulness-Based Strategies    Discussed skills based on development and application of mindfulness    Skills drawn from compassion-focused therapy, dialectical behavior therapy, mindfulness-based stress reduction, mindfulness-based cognitive therapy, etc.    Medication Review:  No problems reported to [facility] today.    Medication Compliance:  No problems reported to [facility] today.    Changes in Health Issues:  No problems reported to [facility]  today.    Chemical Use Review:   Substance Use: Chemical use reviewed, no active concerns identified      Tobacco Use: No current tobacco use.      Assessment: Current Emotional / Mental Status (status of significant symptoms):  Risk status (Self / Other harm or suicidal ideation)  Patient denies a history of suicidal ideation, suicide attempts, self-injurious behavior, homicidal ideation, homicidal behavior and and other safety concerns  Patient denies current fears or concerns for personal safety.  Patient denies current or recent suicidal ideation or behaviors.  Patient denies current or recent homicidal ideation or behaviors.  Patient denies current or recent self injurious behavior or ideation.  Patient denies other safety concerns.  A safety and risk management plan has not been developed at this time, however patient was encouraged to call [place] [phone] should there be a change in any of these risk factors.    Appearance:   Appropriate   Eye Contact:   Good   Psychomotor Behavior: Normal   Attitude:   Cooperative   Orientation:   All  Speech   Rate / Production: Normal    Volume:  Normal   Mood:    Anxious  Depressed - fluctuates  Affect:    Appropriate   Thought Content:  Clear  Rumination   Thought Form:  Coherent  Logical   Insight:    Good     Diagnoses:  1. Major depressive disorder, recurrent episode, mild (H)    2. Anxiety      Collateral Reports Completed:  Will collaborate with care team as indicated during treatment    Plan: (Homework, other):  Patient was given information about behavioral services and encouraged to schedule a follow up appointment with the clinic [facility] as needed.  He was also given information about mental health symptoms and treatment options .  CD Recommendations: No indications of CD issues.  We agree to continue psychotherapy to address his depression, anxiety, life adjustment. GAGE Sears,  [facility]    ______________________________________________________________________    [facility] [place] [facility] Treatment Plan    Client's Name: Neo Saeed  YOB: 1995    Date: 9/7/2018    DSM-V Diagnoses: 296.32 (F33.1) Major Depressive Disorder, Recurrent Episode, Moderate With anxious distress;  Psychosocial / Contextual Factors: transitions in life - college to work force, friends leaving, etc    Referral / Collaboration:  Will collaborate with care team as indicated during treatment.    Anticipated number of session or this episode of care: 12+      MeasurableTreatment Goal(s) related to diagnosis / functional impairment(s)  Goal 1:  Patient will experience a reduction in depressive and anxious symptoms, along with a corresponding increase in positive emotion and life satisfaction.    Objective #A: Patient will experience a reduction in depressed mood, will develop more effective coping skills to manage depressive symptoms, will develop healthy cognitive patterns and beliefs, will increase ability to function adaptively and will continue to take medications as prescribed / participate in supportive activities and services    Status: Continued - Date(s): 9/7/2018    Objective #B: Patient will experience a reduction in anxiety, will develop more effective coping skills to manage anxiety symptoms, will develop healthy cognitive patterns and beliefs and will increase ability to function adaptively  Status: Continued - Date(s): 9/7/2018    Objective #C: Patient will develop better understanding of triggers and coping strategies to stabilize mood  Status: Continued - Date(s): 9/7/2018    Goal 2:  Patient will identify and increase engagement in valued activity, i.e. improving social connections/relationships, pursuing occupational goals or personally meaningful pursuits, exploration of meaning in life.     Objective #A: Patient will identify meaningful activity in social, occupational and   personal goals, and increase behavioral activation around these goals   Status: Continued - Date(s): 9/7/2018    Objective #B: Patient will address relationship difficulties in a more adaptive manner  Status: Continued - Date(s): 9/7/2018    Objective #C: Patient will develop coping/problem-solving skills to facilitate more adaptive adjustment and will effectively address problems that interfere with adaptive functioning  Status: Continued - Date(s): 9/7/2018    Possible Therapeutic Intervention(s)  Psycho-education regarding mental health diagnoses and treatment options    Eye-Movement Desensitization and Reprocessing Therapy    Clinical Hypnosis    Skills training    Explore skills useful to client in current situation.    Skills include assertiveness, communication, conflict management, problem-solving, relaxation, etc.    Solution-Focused Therapy    Explore patterns in patient's relationships and discuss options for new behaviors.    Explore patterns in patient's actions and choices and discuss options for new behaviors.    Cognitive-behavioral Therapy    Discuss common cognitive distortions, identify them in patient's life.    Explore ways to challenge, replace, and act against these cognitions.    Acceptance and Commitment Therapy    Explore and identify important values in patient's life.    Discuss ways to commit to behavioral activation around these values.    Psychodynamic psychotherapy    Discuss patient's emotional dynamics and issues and how they impact behaviors.    Explore patient's history of relationships and how they impact present behaviors.    Explore how to work with and make changes in these schemas and patterns.    Narrative Therapy    Explore the patient's story of his/her life from his/her perspective.    Explore alternate ways of understanding their experience, identifying exceptions, developing new themes.    Interpersonal Psychotherapy    Explore patterns in relationships that are  effective or ineffective at helping patient reach their goals, find satisfying experience.    Discuss new patterns or behaviors to engage in for improved social functioning.    Behavioral Activation    Discuss steps patient can take to become more involved in meaningful activity.    Identify barriers to these activities and explore possible solutions.    Mindfulness-Based Strategies    Discuss skills based on development and application of mindfulness.    Skills drawn from compassion-focused therapy, dialectical behavior therapy, mindfulness-based stress reduction, mindfulness-based cognitive therapy, etc.      We have developed these goals together during our work to this point. Patient has assisted in the development of these goals and has agreed to this treatment plan.       GAGE Ulloa, [facility]  September 7, 2018

## 2019-08-16 ENCOUNTER — OFFICE VISIT (OUTPATIENT)
Dept: BEHAVIORAL HEALTH | Facility: CLINIC | Age: 24
End: 2019-08-16
Payer: COMMERCIAL

## 2019-08-16 DIAGNOSIS — F33.41 RECURRENT MAJOR DEPRESSION IN PARTIAL REMISSION (H): Primary | ICD-10-CM

## 2019-08-16 ASSESSMENT — ANXIETY QUESTIONNAIRES
7. FEELING AFRAID AS IF SOMETHING AWFUL MIGHT HAPPEN: NOT AT ALL
GAD7 TOTAL SCORE: 3
4. TROUBLE RELAXING: SEVERAL DAYS
1. FEELING NERVOUS, ANXIOUS, OR ON EDGE: SEVERAL DAYS
5. BEING SO RESTLESS THAT IT IS HARD TO SIT STILL: NOT AT ALL
3. WORRYING TOO MUCH ABOUT DIFFERENT THINGS: SEVERAL DAYS
GAD7 TOTAL SCORE: 3
2. NOT BEING ABLE TO STOP OR CONTROL WORRYING: NOT AT ALL
7. FEELING AFRAID AS IF SOMETHING AWFUL MIGHT HAPPEN: NOT AT ALL
6. BECOMING EASILY ANNOYED OR IRRITABLE: NOT AT ALL
GAD7 TOTAL SCORE: 3

## 2019-08-16 ASSESSMENT — PATIENT HEALTH QUESTIONNAIRE - PHQ9
SUM OF ALL RESPONSES TO PHQ QUESTIONS 1-9: 2
SUM OF ALL RESPONSES TO PHQ QUESTIONS 1-9: 2

## 2019-08-16 NOTE — PROGRESS NOTES
[facility] ([facility] referral)  August 16, 2019      Behavioral Health Clinician Progress Note    Patient Name: Neo Saeed              Service Type:  Individual      Service Location:   Face to Face in Clinic     Session Start Time: 310pm  Session End Time: 410pm     Session Length: 53 - 60      Attendees: Patient    Visit Activities (Refresh list every visit): [facility] Only    Diagnostic Assessment Date: none on file  Treatment Plan Review Date: 9/7/2018  See Flowsheets for today's PHQ-9 and LETI-7 results  Previous PHQ-9:   PHQ-9 SCORE 8/17/2018 2/1/2019 8/16/2019   PHQ-9 Total Score MyChart - - 2 (Minimal depression)   PHQ-9 Total Score 2 3 2     Previous LETI-7:   LETI-7 SCORE 8/17/2018 2/1/2019 8/16/2019   Total Score - - 3 (minimal anxiety)   Total Score 2 2 3       RAYMON LEVEL:  No flowsheet data found.    DATA  Extended Session (60+ minutes): No  Interactive Complexity: No  Crisis: No  [facility] Patient: No    Treatment Objective(s) Addressed in This Session:  Target Behavior(s): disease management/lifestyle changes      Patient  will experience a reduction in depressed mood, will develop more effective coping skills to manage depressive symptoms and will develop healthy cognitive patterns and beliefs, will experience a reduction in anxiety and will develop more effective coping skills to manage anxiety symptoms and will develop coping/problem-solving skills to facilitate more adaptive adjustment    Current Stressors / Issues:  [facility] met with Neo to provide psychotherapy support regarding depression, life adjustment, anxiety. Continued our conversation about his current stressors, hx of depression, life transitions..      Answers for HPI/ROS submitted by the patient on 8/16/2019   PHQ9 TOTAL SCORE: 2  LETI 7 TOTAL SCORE: 3    Dexter reports that his summer has boon good.  His wrist is largely healed and ready for use.  His sister had a depressive episode and he was there to help her out. She is doing  "better now. Had a conversation about the use of the impact of her cell phone use on her mood and depression. The social interaction piece of this is the key, he says. Discussed his family and family dynamics.     Explored his ongoing investigation into his own emotional life and patterns, his own psychology.  Discussed relationships with people at work, his own ongoing learning about his personality, his needs, his management of feelings. Continued conversation about his developing ideas about human nature, mental health, meaning, purpose, philosophy, etc. These are the roots of his own improving sense of well-being.    Discussed existential tensions, questions of meaning and purpose and \"what it's all about.\" Long conversation about existential concerns, existential psychotherapy, Theodore Raoms.     From previous sessions for reference:  Our initial conversation left him in a place of thinking about what he needs to deal with, he says. He has always viewed himself as \"the maladjusted one.\" He wants to work on continuing to understand his depression patterns, explore relationships and building a positive sense of himself, explore what makes a meaningful and satisfying life for himself. He says that he usually experiences more intense emotional dynamics during a transition of meds - about a month in his experience - and he is in this now.    I affirmed the steps this patient has taken to address physical and behavioral health issues, and offered continued behavioral health services or referral, now or in the future, as needed by the patient.    Progress on Treatment Objective(s) / Homework:  Satisfactory progress - ACTION (Actively working towards change); Intervened by reinforcing change plan / affirming steps taken    Psycho-education regarding mental health diagnoses and treatment options    Motivational Interviewing    Skills training    Explored specific skills useful to client in current situation    Skill areas " include assertiveness, communication, conflict management, problem-solving, relaxation, etc.     Solution-Focused Therapy    Explored patterns in patient's behaviors and relationships and discussed options for new behaviors    Explored new options for problem-solving, communication, managing stress, etc.    Cognitive-behavioral Therapy    Discussed common cognitive distortions, identified them in patient's life    Explored ways to challenge, replace, and act against these cognitions    Explore behavioral changes that might benefit patient in improving mood and engage in meaningful activity    Acceptance and Commitment Therapy    Explored and identified important values in patient's life    Discussed ways to commit to behavioral activation around these values    Psychodynamic psychotherapy    Discussed patient's emotional dynamics and issues and how they impact behaviors    Explored patient's history of relationships and how they impact present behaviors    Explored how to work with and make changes in these schemas and patterns    Narrative Therapy    Explored the patient's story of their life from their perspective,     Explored alternate ways of understanding their experience, identifying exceptions, developing new themes    Interpersonal Psychotherapy    Explored patterns in relationships that are effective or ineffective at helping patient reach their goals, find satisfying experience.    Discussed new patterns or behaviors to engage in for improved social functioning.    Behavioral Activation    Discussed steps patient can take to become more involved in meaningful activity    Identified barriers to these activities and explored possible solutions    Mindfulness-Based Strategies    Discussed skills based on development and application of mindfulness    Skills drawn from compassion-focused therapy, dialectical behavior therapy, mindfulness-based stress reduction, mindfulness-based cognitive therapy,  etc.    Medication Review:  No problems reported to [facility] today.    Medication Compliance:  No problems reported to [facility] today.    Changes in Health Issues:  No problems reported to [facility] today.    Chemical Use Review:   Substance Use: Chemical use reviewed, no active concerns identified      Tobacco Use: No current tobacco use.      Assessment: Current Emotional / Mental Status (status of significant symptoms):  Risk status (Self / Other harm or suicidal ideation)  Patient denies a history of suicidal ideation, suicide attempts, self-injurious behavior, homicidal ideation, homicidal behavior and and other safety concerns  Patient denies current fears or concerns for personal safety.  Patient denies current or recent suicidal ideation or behaviors.  Patient denies current or recent homicidal ideation or behaviors.  Patient denies current or recent self injurious behavior or ideation.  Patient denies other safety concerns.  A safety and risk management plan has not been developed at this time, however patient was encouraged to call [place] [phone] should there be a change in any of these risk factors.    Appearance:   Appropriate   Eye Contact:   Good   Psychomotor Behavior: Normal   Attitude:   Cooperative   Orientation:   All  Speech   Rate / Production: Normal    Volume:  Normal   Mood:    Anxious  Depressed - fluctuates  Affect:    Appropriate   Thought Content:  Clear  Rumination   Thought Form:  Coherent  Logical   Insight:    Good     Diagnoses:  1. Recurrent major depression in partial remission (H)      Collateral Reports Completed:  Will collaborate with care team as indicated during treatment    Plan: (Homework, other):  Patient was given information about behavioral services and encouraged to schedule a follow up appointment with the clinic [facility] as needed.  He was also given information about mental health symptoms and treatment options .  CD Recommendations: No indications of CD issues.  We agree to  continue psychotherapy to address his depression, anxiety, life adjustment. GAGE Sears, [facility] [place]    ______________________________________________________________________    [facility] Integrated Behavioral Health Treatment Plan    Client's Name: Neo Saeed  YOB: 1995    Date: 9/7/2018    DSM-V Diagnoses: 296.32 (F33.1) Major Depressive Disorder, Recurrent Episode, Moderate With anxious distress;  Psychosocial / Contextual Factors: transitions in life - college to work force, friends leaving, etc    Referral / Collaboration:  Will collaborate with care team as indicated during treatment.    Anticipated number of session or this episode of care: 12+      MeasurableTreatment Goal(s) related to diagnosis / functional impairment(s)  Goal 1:  Patient will experience a reduction in depressive and anxious symptoms, along with a corresponding increase in positive emotion and life satisfaction.    Objective #A: Patient will experience a reduction in depressed mood, will develop more effective coping skills to manage depressive symptoms, will develop healthy cognitive patterns and beliefs, will increase ability to function adaptively and will continue to take medications as prescribed / participate in supportive activities and services    Status: Continued - Date(s): 9/7/2018    Objective #B: Patient will experience a reduction in anxiety, will develop more effective coping skills to manage anxiety symptoms, will develop healthy cognitive patterns and beliefs and will increase ability to function adaptively  Status: Continued - Date(s): 9/7/2018    Objective #C: Patient will develop better understanding of triggers and coping strategies to stabilize mood  Status: Continued - Date(s): 9/7/2018    Goal 2:  Patient will identify and increase engagement in valued activity, i.e. improving social connections/relationships, pursuing occupational goals or personally meaningful pursuits, exploration of meaning in  life.     Objective #A: Patient will identify meaningful activity in social, occupational and  personal goals, and increase behavioral activation around these goals   Status: Continued - Date(s): 9/7/2018    Objective #B: Patient will address relationship difficulties in a more adaptive manner  Status: Continued - Date(s): 9/7/2018    Objective #C: Patient will develop coping/problem-solving skills to facilitate more adaptive adjustment and will effectively address problems that interfere with adaptive functioning  Status: Continued - Date(s): 9/7/2018    Possible Therapeutic Intervention(s)  Psycho-education regarding mental health diagnoses and treatment options    Eye-Movement Desensitization and Reprocessing Therapy    Clinical Hypnosis    Skills training    Explore skills useful to client in current situation.    Skills include assertiveness, communication, conflict management, problem-solving, relaxation, etc.    Solution-Focused Therapy    Explore patterns in patient's relationships and discuss options for new behaviors.    Explore patterns in patient's actions and choices and discuss options for new behaviors.    Cognitive-behavioral Therapy    Discuss common cognitive distortions, identify them in patient's life.    Explore ways to challenge, replace, and act against these cognitions.    Acceptance and Commitment Therapy    Explore and identify important values in patient's life.    Discuss ways to commit to behavioral activation around these values.    Psychodynamic psychotherapy    Discuss patient's emotional dynamics and issues and how they impact behaviors.    Explore patient's history of relationships and how they impact present behaviors.    Explore how to work with and make changes in these schemas and patterns.    Narrative Therapy    Explore the patient's story of his/her life from his/her perspective.    Explore alternate ways of understanding their experience, identifying exceptions, developing  new themes.    Interpersonal Psychotherapy    Explore patterns in relationships that are effective or ineffective at helping patient reach their goals, find satisfying experience.    Discuss new patterns or behaviors to engage in for improved social functioning.    Behavioral Activation    Discuss steps patient can take to become more involved in meaningful activity.    Identify barriers to these activities and explore possible solutions.    Mindfulness-Based Strategies    Discuss skills based on development and application of mindfulness.    Skills drawn from compassion-focused therapy, dialectical behavior therapy, mindfulness-based stress reduction, mindfulness-based cognitive therapy, etc.      We have developed these goals together during our work to this point. Patient has assisted in the development of these goals and has agreed to this treatment plan.       GAGE Ulloa, [facility]  September 7, 2018

## 2019-08-17 ASSESSMENT — PATIENT HEALTH QUESTIONNAIRE - PHQ9: SUM OF ALL RESPONSES TO PHQ QUESTIONS 1-9: 2

## 2019-08-17 ASSESSMENT — ANXIETY QUESTIONNAIRES: GAD7 TOTAL SCORE: 3

## 2019-08-22 ENCOUNTER — OFFICE VISIT (OUTPATIENT)
Dept: FAMILY MEDICINE | Facility: CLINIC | Age: 24
End: 2019-08-22
Payer: COMMERCIAL

## 2019-08-22 ENCOUNTER — TELEPHONE (OUTPATIENT)
Dept: SURGERY | Facility: CLINIC | Age: 24
End: 2019-08-22

## 2019-08-22 VITALS
DIASTOLIC BLOOD PRESSURE: 84 MMHG | TEMPERATURE: 97.9 F | HEIGHT: 69 IN | HEART RATE: 75 BPM | RESPIRATION RATE: 14 BRPM | SYSTOLIC BLOOD PRESSURE: 122 MMHG | WEIGHT: 197 LBS | OXYGEN SATURATION: 97 % | BODY MASS INDEX: 29.18 KG/M2

## 2019-08-22 DIAGNOSIS — E66.3 OVERWEIGHT (BMI 25.0-29.9): ICD-10-CM

## 2019-08-22 DIAGNOSIS — E78.2 MIXED HYPERLIPIDEMIA: ICD-10-CM

## 2019-08-22 DIAGNOSIS — Z87.2 H/O PILONIDAL CYST: Primary | ICD-10-CM

## 2019-08-22 PROCEDURE — 99213 OFFICE O/P EST LOW 20 MIN: CPT | Performed by: FAMILY MEDICINE

## 2019-08-22 RX ORDER — CEPHALEXIN 500 MG/1
500 CAPSULE ORAL 4 TIMES DAILY
Qty: 40 CAPSULE | Refills: 0 | Status: SHIPPED | OUTPATIENT
Start: 2019-08-22 | End: 2021-02-26

## 2019-08-22 ASSESSMENT — MIFFLIN-ST. JEOR: SCORE: 1878.97

## 2019-08-22 NOTE — PROGRESS NOTES
Subjective     Neo Saeed is a 23 year old male who presents to clinic today for the following health issues:    HPI   PILONIDAL CYST      Duration: x2 weeks    Description (location/character/radiation): by carrie, some drainage& tender     Intensity:  mild    Accompanying signs and symptoms: some discomfort    History (similar episodes/previous evaluation): Yes..fall 2017 surgical removal     Precipitating or alleviating factors: None    Therapies tried and outcome: None       OVERWEIGHT    --BMI = 29  -comorbid hi LDL   -inactive       MIxed Hyperlipidemia Follow-Up      Are you having any of the following symptoms? (Select all that apply)  No complaints of shortness of breath, chest pain or pressure.  No increased sweating or nausea with activity.  No left-sided neck or arm pain.  No complaints of pain in calves when walking 1-2 blocks.    Are you regularly taking any medication or supplement to lower your cholesterol?   No    Are you having muscle aches or other side effects that you think could be caused by your cholesterol lowering medication?  No              Reviewed and updated as needed this visit by Provider  Tobacco  Allergies  Meds  Problems  Med Hx  Surg Hx  Fam Hx         Review of Systems   ROS COMP: CONSTITUTIONAL: NEGATIVE for fever, chills, change in weight  INTEGUMENTARY/SKIN: NEGATIVE for worrisome rashes, moles or lesions  EYES: NEGATIVE for vision changes or irritation  ENT/MOUTH: NEGATIVE for ear, mouth and throat problems  RESP: NEGATIVE for significant cough or SOB  BREAST: NEGATIVE for masses, tenderness or discharge  CV: NEGATIVE for chest pain, palpitations or peripheral edema  GI: NEGATIVE for nausea, abdominal pain, heartburn, or change in bowel habits POS tender draining post coccyx area   : NEGATIVE for frequency, dysuria, or hematuria  MUSCULOSKELETAL: NEGATIVE for significant arthralgias or myalgia  NEURO: NEGATIVE for weakness, dizziness or paresthesias  ENDOCRINE:  "NEGATIVE for temperature intolerance, skin/hair changes  HEME: NEGATIVE for bleeding problems  PSYCHIATRIC: NEGATIVE for changes in mood or affect      Objective    /84 (BP Location: Right arm, Patient Position: Sitting, Cuff Size: Adult Regular)   Pulse 75   Temp 97.9  F (36.6  C) (Tympanic)   Resp 14   Ht 1.753 m (5' 9\")   Wt 89.4 kg (197 lb)   SpO2 97%   BMI 29.09 kg/m    Body mass index is 29.09 kg/m .  Physical Exam   GENERAL: healthy, alert and no distress  EYES: Eyes grossly normal to inspection, PERRL and conjunctivae and sclerae normal  RESP: lungs clear to auscultation - no rales, rhonchi or wheezes  ABDOMEN: soft, nontender, no hepatosplenomegaly, no masses and bowel sounds normal POS faintly red and tender dist end of pilonidal incision   MS: no gross musculoskeletal defects noted, no edema  SKIN: no suspicious lesions or rashes  NEURO: Normal strength and tone, mentation intact and speech normal  PSYCH: mentation appears normal, affect normal/bright    Diagnostic Test Results:  Labs reviewed in Epic        Assessment & Plan       ICD-10-CM    1. H/O pilonidal cyst 2017 and now recurrent  Z87.2 cephALEXin (KEFLEX) 500 MG capsule   2. Mixed hyperlipidemia E78.2    3. Overweight (BMI 25.0-29.9) E66.3         BMI:   Estimated body mass index is 29.09 kg/m  as calculated from the following:    Height as of this encounter: 1.753 m (5' 9\").    Weight as of this encounter: 89.4 kg (197 lb).   Weight management plan: Discussed healthy diet and exercise guidelines        Patient Instructions   1. Warm soaks 10min 2 times a day and press dry     2. Call Dr Jenaro Cuba   Re f/u  [phone]    3.  Weight Loss Tips  1. Do not eat after 6 hrs before your expected bedtime  2. Have your heaviest meal for breakfast, a slightly lighter meal at lunch and a snack 6 hrs before bed  3. No sugar/calorie drinks except milk ie no fruit juice, pop, alcohol.  4. Drink milk 30min before meals to decrease your " hunger. Also it is excellent as part of your last meal of the day snack  5. Drink lots of water  6. Increase fiber in diet: all bran cereal, salads, popcorn etc  7. Have only one small serving of fruit a day about 1/2 cup (as this is high in sugar)  8. EXERCISE is the bottom line. Without it, you will gain weight even on a low calorie diet. Best if done 2-3X a day as can    Being overweight contributes to high blood pressure and high cholesterol, both of which cause heart attacks, strokes and kidney failure, prediabetes and diabetes, arthritis, and liver disease           Return in about 3 months (around 11/22/2019) for cholesterol.    Gwen Malin MD  [facility]

## 2019-08-22 NOTE — TELEPHONE ENCOUNTER
[facility] Call Center    Phone Message    May a detailed message be left on voicemail: yes    Reason for Call: Other: Pt called in and stated that he had surgery for this diagnosis two years ago, Pilonidal Cyst. Pt stated that heis having some of the same issues and concerns and he is needing negrita seen as soon as possible. Please follow up with pt when available. Thank you      Action Taken: Message routed to:  [facility] Rect [facility]urg

## 2019-08-22 NOTE — PATIENT INSTRUCTIONS
1. Warm soaks 10min 2 times a day and press dry     2. Call Dr Jenaro Cuba   Re f/u  [phone]    3.  Weight Loss Tips  1. Do not eat after 6 hrs before your expected bedtime  2. Have your heaviest meal for breakfast, a slightly lighter meal at lunch and a snack 6 hrs before bed  3. No sugar/calorie drinks except milk ie no fruit juice, pop, alcohol.  4. Drink milk 30min before meals to decrease your hunger. Also it is excellent as part of your last meal of the day snack  5. Drink lots of water  6. Increase fiber in diet: all bran cereal, salads, popcorn etc  7. Have only one small serving of fruit a day about 1/2 cup (as this is high in sugar)  8. EXERCISE is the bottom line. Without it, you will gain weight even on a low calorie diet. Best if done 2-3X a day as can    Being overweight contributes to high blood pressure and high cholesterol, both of which cause heart attacks, strokes and kidney failure, prediabetes and diabetes, arthritis, and liver disease

## 2019-08-23 NOTE — TELEPHONE ENCOUNTER
Called patient to offer appointment for recurrent pilonidal cyst. Patient reports that continued drainage and no fever and chills. Patient did not mind seeing a different surgeon then Dr. Cuba. Patient was scheduled with Dr. Pepe on 9/4/2019 at 3:00 pm.    Nerissa Forbes, EMT  Colon and Rectal Surgery

## 2019-09-05 ENCOUNTER — OFFICE VISIT (OUTPATIENT)
Dept: SURGERY | Facility: CLINIC | Age: 24
End: 2019-09-05
Payer: COMMERCIAL

## 2019-09-05 VITALS
OXYGEN SATURATION: 98 % | HEIGHT: 69 IN | HEART RATE: 68 BPM | SYSTOLIC BLOOD PRESSURE: 135 MMHG | BODY MASS INDEX: 29.47 KG/M2 | DIASTOLIC BLOOD PRESSURE: 75 MMHG | WEIGHT: 199 LBS

## 2019-09-05 DIAGNOSIS — Z98.890 HISTORY OF EXCISION OF PILONIDAL CYST: ICD-10-CM

## 2019-09-05 DIAGNOSIS — L98.8 PILONIDAL DISEASE: Primary | ICD-10-CM

## 2019-09-05 ASSESSMENT — PAIN SCALES - GENERAL: PAINLEVEL: NO PAIN (0)

## 2019-09-05 ASSESSMENT — MIFFLIN-ST. JEOR: SCORE: 1888.04

## 2019-09-05 NOTE — NURSING NOTE
"Chief Complaint   Patient presents with     Consult     Recurrent pilonidal.       Vitals:    09/05/19 1444   BP: 135/75   BP Location: Left arm   Patient Position: Sitting   Cuff Size: Adult Regular   Pulse: 68   SpO2: 98%   Weight: 199 lb   Height: 5' 9\"       Body mass index is 29.39 kg/m .      Nerissa Forbes, EMT                      "

## 2019-09-05 NOTE — LETTER
2019       RE: Neo Saeed  [address]  [facility] [address]     Dear Colleague,    Thank you for referring your patient, Neo Saeed, to the [facility] COLON AND RECTAL SURGERY at [facility]. Please see a copy of my visit note below.    Colon and Rectal Surgery Clinic Note    RE: Neo Saeed.  : 1995.  ZONIA: 2019.    Reason for visit: Recurrent pilonidal cyst.    HPI:   Neo is a 23 year old male with history of pilonidal cyst s/p lay open pilonidal cystectomy on 17 with Dr. Jenaro Cuba. He had some difficulty with wound healing and was seeing our wound ostomy nurse ALBERT Eden. The wound was fully healed by 2017 and he has not needed to follow up since. About 2-3 weeks ago he noticed clear drainage from the area. He denies pain or swelling. No purulent or bloody drainage. He was prescribed a course of keflex by his PCP. He reports that the drainage has decreased since it started. No fever or chills. Bowel habits are normal.      Medical history:  Past Medical History:   Diagnosis Date     Allergic state      Anxiety        Surgical history:  Past Surgical History:   Procedure Laterality Date     CYSTECTOMY PILONIDAL N/A 2017    Procedure: CYSTECTOMY PILONIDAL;  Lay Open Pilonidal Cystectomy;  Surgeon: Jenaro Cuba MD;  Location: [facility] OR     DENTAL SURGERY      [place] teeth extraction     ENDOSCOPY       HERNIA REPAIR       HERNIA REPAIR         Family history:  Family History   Problem Relation Age of Onset     Hyperlipidemia Father      Hypothyroidism Father      Diabetes Maternal Grandmother      Bipolar Disorder Maternal Grandmother      Parkinsonism Maternal Grandfather      Hyperlipidemia Paternal Grandfather      Coronary Artery Disease Paternal Grandfather      Cancer Paternal Grandfather        Medications:  Current Outpatient Medications   Medication Sig Dispense Refill     buPROPion  "(WELLBUTRIN SR) 100 MG 12 hr tablet Take 1 tablet (100 mg) by mouth 2 times daily 180 tablet 3     cephALEXin (KEFLEX) 500 MG capsule Take 1 capsule (500 mg) by mouth 4 times daily 40 capsule 0     FEXOFENADINE HCL PO Take 180 mg by mouth every morning          Allergies:  No Known Allergies    Social history:   Social History     Tobacco Use     Smoking status: Never Smoker     Smokeless tobacco: Never Used   Substance Use Topics     Alcohol use: Yes     Comment: Couple of drinks once a week     Marital status: single.    ROS:  Complete review of systems was performed with the patient and negative except as per HPI.    Physical Examination:  /75 (BP Location: Left arm, Patient Position: Sitting, Cuff Size: Adult Regular)   Pulse 68   Ht 5' 9\"   Wt 199 lb   SpO2 98%   BMI 29.39 kg/m     General: Well hydrated. No acute distress.  Abdomen: Soft, nontender  Perianal external examination:  Perianal skin: intact. Well healed scar at apex of gluteal cleft. No induration, erythema, or fluctuance. No pits or openings noted. No drainage or skin irritation.  Lesions: None  Eversion of buttocks: There was no evidence of an anal fissure.   Digital rectal examination: Was deferred.    ASSESSMENT  23 year old male with history of pilonidal cyst s/p lay open pilonidal cystectomy 2 years ago. Clear drainage noted 2 weeks ago, no bloody or purulent drainage. No evidence of recurrent disease or drainage on exam today.    PLAN  1. No intervention indicated  2. Patient with questions on preventing recurrence, discussed hair removal is not shown to reliably prevent recurrence and so is not routinely recommended. Discussed routine skin protection, keeping the area dry, and preventing trauma  3. Return to clinic as needed or if symptoms return/worsen    Time spent: 20 minutes.  >50% spent in discussing, counseling and coordinating care.    Berhane Pepe M.D    Division of Colon and Rectal " Surgery  [facility]    Referring Provider:  Referred Self, MD  No address on file     Primary Care Provider:  Clinic, [facility]

## 2019-09-05 NOTE — PROGRESS NOTES
Colon and Rectal Surgery Clinic Note    RE: Neo Saeed.  : 1995.  ZONIA: 2019.    Reason for visit: Recurrent pilonidal cyst.    HPI:   Neo is a 23 year old male with history of pilonidal cyst s/p lay open pilonidal cystectomy on 17 with Dr. Jenaro Cuba. He had some difficulty with wound healing and was seeing our wound ostomy nurse ALBERT Eden. The wound was fully healed by 2017 and he has not needed to follow up since. About 2-3 weeks ago he noticed clear drainage from the area. He denies pain or swelling. No purulent or bloody drainage. He was prescribed a course of keflex by his PCP. He reports that the drainage has decreased since it started. No fever or chills. Bowel habits are normal.      Medical history:  Past Medical History:   Diagnosis Date     Allergic state      Anxiety        Surgical history:  Past Surgical History:   Procedure Laterality Date     CYSTECTOMY PILONIDAL N/A 2017    Procedure: CYSTECTOMY PILONIDAL;  Lay Open Pilonidal Cystectomy;  Surgeon: Jenaro Cuba MD;  Location: [facility] OR     DENTAL SURGERY      [place] teeth extraction     ENDOSCOPY       HERNIA REPAIR       HERNIA REPAIR         Family history:  Family History   Problem Relation Age of Onset     Hyperlipidemia Father      Hypothyroidism Father      Diabetes Maternal Grandmother      Bipolar Disorder Maternal Grandmother      Parkinsonism Maternal Grandfather      Hyperlipidemia Paternal Grandfather      Coronary Artery Disease Paternal Grandfather      Cancer Paternal Grandfather        Medications:  Current Outpatient Medications   Medication Sig Dispense Refill     buPROPion (WELLBUTRIN SR) 100 MG 12 hr tablet Take 1 tablet (100 mg) by mouth 2 times daily 180 tablet 3     cephALEXin (KEFLEX) 500 MG capsule Take 1 capsule (500 mg) by mouth 4 times daily 40 capsule 0     FEXOFENADINE HCL PO Take 180 mg by mouth every morning          Allergies:  No Known  "Allergies    Social history:   Social History     Tobacco Use     Smoking status: Never Smoker     Smokeless tobacco: Never Used   Substance Use Topics     Alcohol use: Yes     Comment: Couple of drinks once a week     Marital status: single.    ROS:  Complete review of systems was performed with the patient and negative except as per HPI.    Physical Examination:  /75 (BP Location: Left arm, Patient Position: Sitting, Cuff Size: Adult Regular)   Pulse 68   Ht 5' 9\"   Wt 199 lb   SpO2 98%   BMI 29.39 kg/m    General: Well hydrated. No acute distress.  Abdomen: Soft, nontender  Perianal external examination:  Perianal skin: intact. Well healed scar at apex of gluteal cleft. No induration, erythema, or fluctuance. No pits or openings noted. No drainage or skin irritation.  Lesions: None  Eversion of buttocks: There was no evidence of an anal fissure.   Digital rectal examination: Was deferred.    ASSESSMENT  23 year old male with history of pilonidal cyst s/p lay open pilonidal cystectomy 2 years ago. Clear drainage noted 2 weeks ago, no bloody or purulent drainage. No evidence of recurrent disease or drainage on exam today.    PLAN  1. No intervention indicated  2. Patient with questions on preventing recurrence, discussed hair removal is not shown to reliably prevent recurrence and so is not routinely recommended. Discussed routine skin protection, keeping the area dry, and preventing trauma  3. Return to clinic as needed or if symptoms return/worsen    Time spent: 20 minutes.  >50% spent in discussing, counseling and coordinating care.    Berhane Pepe M.D    Division of Colon and Rectal Surgery  [facility]    Referring Provider:  Referred Self, MD  No address on file     Primary Care Provider:  [facility]"

## 2019-09-05 NOTE — PATIENT INSTRUCTIONS
Follow up: as needed    Please call with any questions or concerns regarding your clinic visit today.    It is a pleasure being involved in your health care.    Contacts post-consultation depending on your need:    Radiology Appointments [phone]    Schedule Clinic Appointments [phone] # 1   M-F 7:30 - 5 pm    JAUN Torres [phone]    Clinic Fax Number [phone]    Surgery Scheduling [phone]    My Chart is available 24 hours a day and is a secure way to access your records and communicate with your care team.  I strongly recommend signing up if you haven't already done so, if you are comfortable with computers.  If you would like to inquire about this or are having problems with My Chart access, you may call [phone] or go online at [email or web address].  Please allow at least 24 hours for a response and extra time on weekends and Holidays.

## 2019-09-13 ENCOUNTER — OFFICE VISIT (OUTPATIENT)
Dept: BEHAVIORAL HEALTH | Facility: CLINIC | Age: 24
End: 2019-09-13
Payer: COMMERCIAL

## 2019-09-13 DIAGNOSIS — F33.41 RECURRENT MAJOR DEPRESSION IN PARTIAL REMISSION (H): Primary | ICD-10-CM

## 2019-09-13 NOTE — PROGRESS NOTES
[facility] ([facility] referral)  September 13, 2019      Behavioral Health Clinician Progress Note    Patient Name: Neo Saeed              Service Type:  Individual      Service Location:   Face to Face in Clinic     Session Start Time: 310pm  Session End Time: 410pm     Session Length: 53 - 60      Attendees: Patient    Visit Activities (Refresh list every visit): [facility] Only    Diagnostic Assessment Date: none on file  Treatment Plan Review Date: 9/7/2018  See Flowsheets for today's PHQ-9 and LETI-7 results  Previous PHQ-9:   PHQ-9 SCORE 8/17/2018 2/1/2019 8/16/2019   PHQ-9 Total Score MyChart - - 2 (Minimal depression)   PHQ-9 Total Score 2 3 2     Previous LETI-7:   LETI-7 SCORE 8/17/2018 2/1/2019 8/16/2019   Total Score - - 3 (minimal anxiety)   Total Score 2 2 3       RAYMON LEVEL:  No flowsheet data found.    DATA  Extended Session (60+ minutes): No  Interactive Complexity: No  Crisis: No  [facility] Patient: No    Treatment Objective(s) Addressed in This Session:  Target Behavior(s): disease management/lifestyle changes      Patient  will experience a reduction in depressed mood, will develop more effective coping skills to manage depressive symptoms and will develop healthy cognitive patterns and beliefs, will experience a reduction in anxiety and will develop more effective coping skills to manage anxiety symptoms and will develop coping/problem-solving skills to facilitate more adaptive adjustment    Current Stressors / Issues:  [facility] met with Neo to provide psychotherapy support regarding depression, life adjustment, anxiety. Continued our conversation about his current stressors, hx of depression, life transitions..      Dexter reports that he has kiah feeling okay, with some changes as the fall comes on.  Reviewed his work to this point. He is definitely feeling better overall and feeling increasingly solid in this level of resilience.      Spent the session discussing his work, education hx and  "work hx, what he wants out of work. 0-10 life satisfaction scale = 7. His job is  4/10 but he I satisfied with it for now. Explored the values of his job and values in life more generally. Discussed Marjan's ideas about well-being, based on the acronym PERMA (positive emotions, engagement, relationships, meaning, achievement).     As at previous sessions, explored his ongoing investigation into his own emotional life and patterns, his own psychology.  Discussed relationships with people at work, his own ongoing learning about his personality, his needs, his management of feelings. Continued conversation about his developing ideas about human nature, mental health, meaning, purpose, philosophy, etc. These are the roots of his own improving sense of well-being.    From previous sessions for reference:  Our initial conversation left him in a place of thinking about what he needs to deal with, he says. He has always viewed himself as \"the maladjusted one.\" He wants to work on continuing to understand his depression patterns, explore relationships and building a positive sense of himself, explore what makes a meaningful and satisfying life for himself. He says that he usually experiences more intense emotional dynamics during a transition of meds - about a month in his experience - and he is in this now.    I affirmed the steps this patient has taken to address physical and behavioral health issues, and offered continued behavioral health services or referral, now or in the future, as needed by the patient.    Progress on Treatment Objective(s) / Homework:  Satisfactory progress - ACTION (Actively working towards change); Intervened by reinforcing change plan / affirming steps taken    Psycho-education regarding mental health diagnoses and treatment options    Motivational Interviewing    Skills training    Explored specific skills useful to client in current situation    Skill areas include assertiveness, " communication, conflict management, problem-solving, relaxation, etc.     Solution-Focused Therapy    Explored patterns in patient's behaviors and relationships and discussed options for new behaviors    Explored new options for problem-solving, communication, managing stress, etc.    Cognitive-behavioral Therapy    Discussed common cognitive distortions, identified them in patient's life    Explored ways to challenge, replace, and act against these cognitions    Explore behavioral changes that might benefit patient in improving mood and engage in meaningful activity    Acceptance and Commitment Therapy    Explored and identified important values in patient's life    Discussed ways to commit to behavioral activation around these values    Psychodynamic psychotherapy    Discussed patient's emotional dynamics and issues and how they impact behaviors    Explored patient's history of relationships and how they impact present behaviors    Explored how to work with and make changes in these schemas and patterns    Narrative Therapy    Explored the patient's story of their life from their perspective,     Explored alternate ways of understanding their experience, identifying exceptions, developing new themes    Interpersonal Psychotherapy    Explored patterns in relationships that are effective or ineffective at helping patient reach their goals, find satisfying experience.    Discussed new patterns or behaviors to engage in for improved social functioning.    Behavioral Activation    Discussed steps patient can take to become more involved in meaningful activity    Identified barriers to these activities and explored possible solutions    Mindfulness-Based Strategies    Discussed skills based on development and application of mindfulness    Skills drawn from compassion-focused therapy, dialectical behavior therapy, mindfulness-based stress reduction, mindfulness-based cognitive therapy, etc.    Medication Review:  No  problems reported to [facility] today.    Medication Compliance:  No problems reported to [facility] today.    Changes in Health Issues:  No problems reported to [facility] today.    Chemical Use Review:   Substance Use: Chemical use reviewed, no active concerns identified      Tobacco Use: No current tobacco use.      Assessment: Current Emotional / Mental Status (status of significant symptoms):  Risk status (Self / Other harm or suicidal ideation)  Patient denies a history of suicidal ideation, suicide attempts, self-injurious behavior, homicidal ideation, homicidal behavior and and other safety concerns  Patient denies current fears or concerns for personal safety.  Patient denies current or recent suicidal ideation or behaviors.  Patient denies current or recent homicidal ideation or behaviors.  Patient denies current or recent self injurious behavior or ideation.  Patient denies other safety concerns.  A safety and risk management plan has not been developed at this time, however patient was encouraged to call [place] [phone] should there be a change in any of these risk factors.    Appearance:   Appropriate   Eye Contact:   Good   Psychomotor Behavior: Normal   Attitude:   Cooperative   Orientation:   All  Speech   Rate / Production: Normal    Volume:  Normal   Mood:    Anxious  Depressed - fluctuates  Affect:    Appropriate   Thought Content:  Clear  Rumination   Thought Form:  Coherent  Logical   Insight:    Good     Diagnoses:  1. Recurrent major depression in partial remission (H)      Collateral Reports Completed:  Will collaborate with care team as indicated during treatment    Plan: (Homework, other):  Patient was given information about behavioral services and encouraged to schedule a follow up appointment with the clinic [facility] as needed.  He was also given information about mental health symptoms and treatment options .  CD Recommendations: No indications of CD issues.  We agree to continue psychotherapy to address his  depression, anxiety, life adjustment. GAGE Sears, [facility] [place]    ______________________________________________________________________    [facility] [place]    Client's Name: Neo Saeed  YOB: 1995    Date: 9/7/2018    DSM-V Diagnoses: 296.32 (F33.1) Major Depressive Disorder, Recurrent Episode, Moderate With anxious distress;  Psychosocial / Contextual Factors: transitions in life - college to work force, friends leaving, etc    Referral / Collaboration:  Will collaborate with care team as indicated during treatment.    Anticipated number of session or this episode of care: 12+      MeasurableTreatment Goal(s) related to diagnosis / functional impairment(s)  Goal 1:  Patient will experience a reduction in depressive and anxious symptoms, along with a corresponding increase in positive emotion and life satisfaction.    Objective #A: Patient will experience a reduction in depressed mood, will develop more effective coping skills to manage depressive symptoms, will develop healthy cognitive patterns and beliefs, will increase ability to function adaptively and will continue to take medications as prescribed / participate in supportive activities and services    Status: Continued - Date(s): 9/7/2018    Objective #B: Patient will experience a reduction in anxiety, will develop more effective coping skills to manage anxiety symptoms, will develop healthy cognitive patterns and beliefs and will increase ability to function adaptively  Status: Continued - Date(s): 9/7/2018    Objective #C: Patient will develop better understanding of triggers and coping strategies to stabilize mood  Status: Continued - Date(s): 9/7/2018    Goal 2:  Patient will identify and increase engagement in valued activity, i.e. improving social connections/relationships, pursuing occupational goals or personally meaningful pursuits, exploration of meaning in life.     Objective #A: Patient will  identify meaningful activity in social, occupational and  personal goals, and increase behavioral activation around these goals   Status: Continued - Date(s): 9/7/2018    Objective #B: Patient will address relationship difficulties in a more adaptive manner  Status: Continued - Date(s): 9/7/2018    Objective #C: Patient will develop coping/problem-solving skills to facilitate more adaptive adjustment and will effectively address problems that interfere with adaptive functioning  Status: Continued - Date(s): 9/7/2018    Possible Therapeutic Intervention(s)  Psycho-education regarding mental health diagnoses and treatment options    Eye-Movement Desensitization and Reprocessing Therapy    Clinical Hypnosis    Skills training    Explore skills useful to client in current situation.    Skills include assertiveness, communication, conflict management, problem-solving, relaxation, etc.    Solution-Focused Therapy    Explore patterns in patient's relationships and discuss options for new behaviors.    Explore patterns in patient's actions and choices and discuss options for new behaviors.    Cognitive-behavioral Therapy    Discuss common cognitive distortions, identify them in patient's life.    Explore ways to challenge, replace, and act against these cognitions.    Acceptance and Commitment Therapy    Explore and identify important values in patient's life.    Discuss ways to commit to behavioral activation around these values.    Psychodynamic psychotherapy    Discuss patient's emotional dynamics and issues and how they impact behaviors.    Explore patient's history of relationships and how they impact present behaviors.    Explore how to work with and make changes in these schemas and patterns.    Narrative Therapy    Explore the patient's story of his/her life from his/her perspective.    Explore alternate ways of understanding their experience, identifying exceptions, developing new themes.    Interpersonal  Psychotherapy    Explore patterns in relationships that are effective or ineffective at helping patient reach their goals, find satisfying experience.    Discuss new patterns or behaviors to engage in for improved social functioning.    Behavioral Activation    Discuss steps patient can take to become more involved in meaningful activity.    Identify barriers to these activities and explore possible solutions.    Mindfulness-Based Strategies    Discuss skills based on development and application of mindfulness.    Skills drawn from compassion-focused therapy, dialectical behavior therapy, mindfulness-based stress reduction, mindfulness-based cognitive therapy, etc.      We have developed these goals together during our work to this point. Patient has assisted in the development of these goals and has agreed to this treatment plan.       GAGE Ulloa, [facility]  September 7, 2018

## 2019-10-11 ENCOUNTER — OFFICE VISIT (OUTPATIENT)
Dept: BEHAVIORAL HEALTH | Facility: CLINIC | Age: 24
End: 2019-10-11
Payer: COMMERCIAL

## 2019-10-11 DIAGNOSIS — F33.41 RECURRENT MAJOR DEPRESSION IN PARTIAL REMISSION (H): Primary | ICD-10-CM

## 2019-10-11 NOTE — PROGRESS NOTES
[facility] ([facility] referral)  October 11, 2019      Behavioral Health Clinician Progress Note    Patient Name: Neo Saeed              Service Type:  Individual      Service Location:   Face to Face in Clinic     Session Start Time: 325pm  Session End Time: 430pm     Session Length: 53 - 60      Attendees: Patient    Visit Activities (Refresh list every visit): [facility] Only    Diagnostic Assessment Date: none on file  Treatment Plan Review Date: 9/7/2018  See Flowsheets for today's PHQ-9 and LETI-7 results  Previous PHQ-9:   PHQ-9 SCORE 8/17/2018 2/1/2019 8/16/2019   PHQ-9 Total Score MyChart - - 2 (Minimal depression)   PHQ-9 Total Score 2 3 2     Previous LETI-7:   LETI-7 SCORE 8/17/2018 2/1/2019 8/16/2019   Total Score - - 3 (minimal anxiety)   Total Score 2 2 3       RAYMON LEVEL:  No flowsheet data found.    DATA  Extended Session (60+ minutes): No  Interactive Complexity: No  Crisis: No  [facility] Patient: No    Treatment Objective(s) Addressed in This Session:  Target Behavior(s): disease management/lifestyle changes      Patient  will experience a reduction in depressed mood, will develop more effective coping skills to manage depressive symptoms and will develop healthy cognitive patterns and beliefs, will experience a reduction in anxiety and will develop more effective coping skills to manage anxiety symptoms and will develop coping/problem-solving skills to facilitate more adaptive adjustment    Current Stressors / Issues:  [facility] met with Neo to provide psychotherapy support regarding depression, life adjustment, anxiety. Continued our conversation about his current stressors, hx of depression, life transitions..      Reviewed his progress again in terms of what qualities and skills he has developed. He reports being less depressed despite more stress now versus being more depressed when he had less stress a number of years ago.    Long conversation about his social life, sexuality, political  "questions and concerns.    As at previous sessions, explored his ongoing investigation into his own emotional life and patterns, his own psychology.  Discussed relationships with people at work, his own ongoing learning about his personality, his needs, his management of feelings. Continued conversation about his developing ideas about human nature, mental health, meaning, purpose, philosophy, etc. These are the roots of his own improving sense of well-being.    From previous sessions for reference:  Our initial conversation left him in a place of thinking about what he needs to deal with, he says. He has always viewed himself as \"the maladjusted one.\" He wants to work on continuing to understand his depression patterns, explore relationships and building a positive sense of himself, explore what makes a meaningful and satisfying life for himself. He says that he usually experiences more intense emotional dynamics during a transition of meds - about a month in his experience - and he is in this now.    I affirmed the steps this patient has taken to address physical and behavioral health issues, and offered continued behavioral health services or referral, now or in the future, as needed by the patient.    Progress on Treatment Objective(s) / Homework:  Satisfactory progress - ACTION (Actively working towards change); Intervened by reinforcing change plan / affirming steps taken    Psycho-education regarding mental health diagnoses and treatment options    Motivational Interviewing    Skills training    Explored specific skills useful to client in current situation    Skill areas include assertiveness, communication, conflict management, problem-solving, relaxation, etc.     Solution-Focused Therapy    Explored patterns in patient's behaviors and relationships and discussed options for new behaviors    Explored new options for problem-solving, communication, managing stress, etc.    Cognitive-behavioral " Therapy    Discussed common cognitive distortions, identified them in patient's life    Explored ways to challenge, replace, and act against these cognitions    Explore behavioral changes that might benefit patient in improving mood and engage in meaningful activity    Acceptance and Commitment Therapy    Explored and identified important values in patient's life    Discussed ways to commit to behavioral activation around these values    Psychodynamic psychotherapy    Discussed patient's emotional dynamics and issues and how they impact behaviors    Explored patient's history of relationships and how they impact present behaviors    Explored how to work with and make changes in these schemas and patterns    Narrative Therapy    Explored the patient's story of their life from their perspective,     Explored alternate ways of understanding their experience, identifying exceptions, developing new themes    Interpersonal Psychotherapy    Explored patterns in relationships that are effective or ineffective at helping patient reach their goals, find satisfying experience.    Discussed new patterns or behaviors to engage in for improved social functioning.    Behavioral Activation    Discussed steps patient can take to become more involved in meaningful activity    Identified barriers to these activities and explored possible solutions    Mindfulness-Based Strategies    Discussed skills based on development and application of mindfulness    Skills drawn from compassion-focused therapy, dialectical behavior therapy, mindfulness-based stress reduction, mindfulness-based cognitive therapy, etc.    Medication Review:  No problems reported to [facility] today.    Medication Compliance:  No problems reported to [facility] today.    Changes in Health Issues:  No problems reported to [facility] today.    Chemical Use Review:   Substance Use: Chemical use reviewed, no active concerns identified      Tobacco Use: No current tobacco use.       Assessment: Current Emotional / Mental Status (status of significant symptoms):  Risk status (Self / Other harm or suicidal ideation)  Patient denies a history of suicidal ideation, suicide attempts, self-injurious behavior, homicidal ideation, homicidal behavior and and other safety concerns  Patient denies current fears or concerns for personal safety.  Patient denies current or recent suicidal ideation or behaviors.  Patient denies current or recent homicidal ideation or behaviors.  Patient denies current or recent self injurious behavior or ideation.  Patient denies other safety concerns.  A safety and risk management plan has not been developed at this time, however patient was encouraged to call [place] [phone] should there be a change in any of these risk factors.    Appearance:   Appropriate   Eye Contact:   Good   Psychomotor Behavior: Normal   Attitude:   Cooperative   Orientation:   All  Speech   Rate / Production: Normal    Volume:  Normal   Mood:    Anxious  Depressed - fluctuates  Affect:    Appropriate   Thought Content:  Clear  Rumination   Thought Form:  Coherent  Logical   Insight:    Good     Diagnoses:  1. Recurrent major depression in partial remission (H)      Collateral Reports Completed:  Will collaborate with care team as indicated during treatment    Plan: (Homework, other):  Patient was given information about behavioral services and encouraged to schedule a follow up appointment with the clinic [facility] as needed.  He was also given information about mental health symptoms and treatment options .  CD Recommendations: No indications of CD issues.  We agree to continue psychotherapy to address his depression, anxiety, life adjustment. GAGE Sears, [facility]    ______________________________________________________________________    [facility] [place] [facility] Treatment Plan    Client's Name: Neo Saeed  YOB: 1995    Date: 9/7/2018    DSM-V Diagnoses: 296.32  (F33.1) Major Depressive Disorder, Recurrent Episode, Moderate With anxious distress;  Psychosocial / Contextual Factors: transitions in life - college to work force, friends leaving, etc    Referral / Collaboration:  Will collaborate with care team as indicated during treatment.    Anticipated number of session or this episode of care: 12+      MeasurableTreatment Goal(s) related to diagnosis / functional impairment(s)  Goal 1:  Patient will experience a reduction in depressive and anxious symptoms, along with a corresponding increase in positive emotion and life satisfaction.    Objective #A: Patient will experience a reduction in depressed mood, will develop more effective coping skills to manage depressive symptoms, will develop healthy cognitive patterns and beliefs, will increase ability to function adaptively and will continue to take medications as prescribed / participate in supportive activities and services    Status: Continued - Date(s): 9/7/2018    Objective #B: Patient will experience a reduction in anxiety, will develop more effective coping skills to manage anxiety symptoms, will develop healthy cognitive patterns and beliefs and will increase ability to function adaptively  Status: Continued - Date(s): 9/7/2018    Objective #C: Patient will develop better understanding of triggers and coping strategies to stabilize mood  Status: Continued - Date(s): 9/7/2018    Goal 2:  Patient will identify and increase engagement in valued activity, i.e. improving social connections/relationships, pursuing occupational goals or personally meaningful pursuits, exploration of meaning in life.     Objective #A: Patient will identify meaningful activity in social, occupational and  personal goals, and increase behavioral activation around these goals   Status: Continued - Date(s): 9/7/2018    Objective #B: Patient will address relationship difficulties in a more adaptive manner  Status: Continued - Date(s):  9/7/2018    Objective #C: Patient will develop coping/problem-solving skills to facilitate more adaptive adjustment and will effectively address problems that interfere with adaptive functioning  Status: Continued - Date(s): 9/7/2018    Possible Therapeutic Intervention(s)  Psycho-education regarding mental health diagnoses and treatment options    Eye-Movement Desensitization and Reprocessing Therapy    Clinical Hypnosis    Skills training    Explore skills useful to client in current situation.    Skills include assertiveness, communication, conflict management, problem-solving, relaxation, etc.    Solution-Focused Therapy    Explore patterns in patient's relationships and discuss options for new behaviors.    Explore patterns in patient's actions and choices and discuss options for new behaviors.    Cognitive-behavioral Therapy    Discuss common cognitive distortions, identify them in patient's life.    Explore ways to challenge, replace, and act against these cognitions.    Acceptance and Commitment Therapy    Explore and identify important values in patient's life.    Discuss ways to commit to behavioral activation around these values.    Psychodynamic psychotherapy    Discuss patient's emotional dynamics and issues and how they impact behaviors.    Explore patient's history of relationships and how they impact present behaviors.    Explore how to work with and make changes in these schemas and patterns.    Narrative Therapy    Explore the patient's story of his/her life from his/her perspective.    Explore alternate ways of understanding their experience, identifying exceptions, developing new themes.    Interpersonal Psychotherapy    Explore patterns in relationships that are effective or ineffective at helping patient reach their goals, find satisfying experience.    Discuss new patterns or behaviors to engage in for improved social functioning.    Behavioral Activation    Discuss steps patient can take to  become more involved in meaningful activity.    Identify barriers to these activities and explore possible solutions.    Mindfulness-Based Strategies    Discuss skills based on development and application of mindfulness.    Skills drawn from compassion-focused therapy, dialectical behavior therapy, mindfulness-based stress reduction, mindfulness-based cognitive therapy, etc.      We have developed these goals together during our work to this point. Patient has assisted in the development of these goals and has agreed to this treatment plan.       GAGE Ulloa, [facility]  September 7, 2018

## 2019-12-20 ENCOUNTER — OFFICE VISIT (OUTPATIENT)
Dept: BEHAVIORAL HEALTH | Facility: CLINIC | Age: 24
End: 2019-12-20
Payer: COMMERCIAL

## 2019-12-20 DIAGNOSIS — F33.41 RECURRENT MAJOR DEPRESSION IN PARTIAL REMISSION (H): Primary | ICD-10-CM

## 2019-12-20 NOTE — PROGRESS NOTES
[facility] ([facility] referral)  December 20, 2019      Behavioral Health Clinician Progress Note    Patient Name: Neo Saeed              Service Type:  Individual      Service Location:   Face to Face in Clinic     Session Start Time: 8am  Session End Time: 855am     Session Length: 53 - 60      Attendees: Patient    Visit Activities (Refresh list every visit): [facility] [place] Only    Diagnostic Assessment Date: none on file  Treatment Plan Review Date: 9/7/2018  See Flowsheets for today's PHQ-9 and LETI-7 results  Previous PHQ-9:   PHQ-9 SCORE 8/17/2018 2/1/2019 8/16/2019   PHQ-9 Total Score MyChart - - 2 (Minimal depression)   PHQ-9 Total Score 2 3 2     Previous LETI-7:   LETI-7 SCORE 8/17/2018 2/1/2019 8/16/2019   Total Score - - 3 (minimal anxiety)   Total Score 2 2 3       RAYMON LEVEL:  No flowsheet data found.    DATA  Extended Session (60+ minutes): No  Interactive Complexity: No  Crisis: No  [facility] Patient: No    Treatment Objective(s) Addressed in This Session:  Target Behavior(s): disease management/lifestyle changes      Patient  will experience a reduction in depressed mood, will develop more effective coping skills to manage depressive symptoms and will develop healthy cognitive patterns and beliefs, will experience a reduction in anxiety and will develop more effective coping skills to manage anxiety symptoms and will develop coping/problem-solving skills to facilitate more adaptive adjustment    Current Stressors / Issues:  [facility] [place] met with Neo to provide psychotherapy support regarding depression, life adjustment, anxiety. Continued our conversation about his current stressors, hx of depression, life transitions..      Dexter reports he has continued to do well overall. He is off his psychiatric medications and feeling quite well. He has been traveling for work and doing well with personal life.     Continued conversation about his theories of depression, mood, human flourishing.Today he wanted to  "talk about some of the challenges of being male in today's cultural world, the challenges of finding friends that are interested in engaging the topics and learning he is drawn to, etc.     As at previous sessions, explored his ongoing investigation into his own emotional life and patterns, his own psychology.  Discussed relationships with people at work, his own ongoing learning about his personality, his needs, his management of feelings. Continued conversation about his developing ideas about human nature, mental health, meaning, purpose, philosophy, etc. These are the roots of his own improving sense of well-being.    From previous sessions for reference:  Our initial conversation left him in a place of thinking about what he needs to deal with, he says. He has always viewed himself as \"the maladjusted one.\" He wants to work on continuing to understand his depression patterns, explore relationships and building a positive sense of himself, explore what makes a meaningful and satisfying life for himself. He says that he usually experiences more intense emotional dynamics during a transition of meds - about a month in his experience - and he is in this now.    I affirmed the steps this patient has taken to address physical and behavioral health issues, and offered continued behavioral health services or referral, now or in the future, as needed by the patient.    Progress on Treatment Objective(s) / Homework:  Satisfactory progress - ACTION (Actively working towards change); Intervened by reinforcing change plan / affirming steps taken    Psycho-education regarding mental health diagnoses and treatment options    Motivational Interviewing    Skills training    Explored specific skills useful to client in current situation    Skill areas include assertiveness, communication, conflict management, problem-solving, relaxation, etc.     Solution-Focused Therapy    Explored patterns in patient's behaviors and " relationships and discussed options for new behaviors    Explored new options for problem-solving, communication, managing stress, etc.    Cognitive-behavioral Therapy    Discussed common cognitive distortions, identified them in patient's life    Explored ways to challenge, replace, and act against these cognitions    Explore behavioral changes that might benefit patient in improving mood and engage in meaningful activity    Acceptance and Commitment Therapy    Explored and identified important values in patient's life    Discussed ways to commit to behavioral activation around these values    Psychodynamic psychotherapy    Discussed patient's emotional dynamics and issues and how they impact behaviors    Explored patient's history of relationships and how they impact present behaviors    Explored how to work with and make changes in these schemas and patterns    Narrative Therapy    Explored the patient's story of their life from their perspective,     Explored alternate ways of understanding their experience, identifying exceptions, developing new themes    Interpersonal Psychotherapy    Explored patterns in relationships that are effective or ineffective at helping patient reach their goals, find satisfying experience.    Discussed new patterns or behaviors to engage in for improved social functioning.    Behavioral Activation    Discussed steps patient can take to become more involved in meaningful activity    Identified barriers to these activities and explored possible solutions    Mindfulness-Based Strategies    Discussed skills based on development and application of mindfulness    Skills drawn from compassion-focused therapy, dialectical behavior therapy, mindfulness-based stress reduction, mindfulness-based cognitive therapy, etc.    Medication Review:  No problems reported to [facility] [place] today.    Medication Compliance:  No problems reported to [facility] [place] today.    Changes in Health Issues:  No problems reported to [facility] [place]  today.    Chemical Use Review:   Substance Use: Chemical use reviewed, no active concerns identified      Tobacco Use: No current tobacco use.      Assessment: Current Emotional / Mental Status (status of significant symptoms):  Risk status (Self / Other harm or suicidal ideation)  Patient denies a history of suicidal ideation, suicide attempts, self-injurious behavior, homicidal ideation, homicidal behavior and and other safety concerns  Patient denies current fears or concerns for personal safety.  Patient denies current or recent suicidal ideation or behaviors.  Patient denies current or recent homicidal ideation or behaviors.  Patient denies current or recent self injurious behavior or ideation.  Patient denies other safety concerns.  A safety and risk management plan has not been developed at this time, however patient was encouraged to call [place] [phone] should there be a change in any of these risk factors.    Appearance:   Appropriate   Eye Contact:   Good   Psychomotor Behavior: Normal   Attitude:   Cooperative   Orientation:   All  Speech   Rate / Production: Normal    Volume:  Normal   Mood:    Anxious  Depressed - fluctuates  Affect:    Appropriate   Thought Content:  Clear  Rumination   Thought Form:  Coherent  Logical   Insight:    Good     Diagnoses:  1. Recurrent major depression in partial remission (H)      Collateral Reports Completed:  Will collaborate with care team as indicated during treatment    Plan: (Homework, other):  Patient was given information about behavioral services and encouraged to schedule a follow up appointment with the clinic [facility] as needed.  He was also given information about mental health symptoms and treatment options .  CD Recommendations: No indications of CD issues.  We agree to continue psychotherapy to address his depression, anxiety, life adjustment. 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    Anticipated number of session or this episode of care: 12+      MeasurableTreatment Goal(s) related to diagnosis / functional impairment(s)  Goal 1:  Patient will experience a reduction in depressive and anxious symptoms, along with a corresponding increase in positive emotion and life satisfaction.    Objective #A: Patient will experience a reduction in depressed mood, will develop more effective coping skills to manage depressive symptoms, will develop healthy cognitive patterns and beliefs, will increase ability to function adaptively and will continue to take medications as prescribed / participate in supportive activities and services    Status: Continued - Date(s): 9/7/2018    Objective #B: Patient will experience a reduction in anxiety, will develop more effective coping skills to manage anxiety symptoms, will develop healthy cognitive patterns and beliefs and will increase ability to function adaptively  Status: Continued - Date(s): 9/7/2018    Objective #C: Patient will develop better understanding of triggers and coping strategies to stabilize mood  Status: Continued - Date(s): 9/7/2018    Goal 2:  Patient will identify and increase engagement in valued activity, i.e. improving social connections/relationships, pursuing occupational goals or personally meaningful pursuits, exploration of meaning in life.     Objective #A: Patient will identify meaningful activity in social, occupational and  personal goals, and increase behavioral activation around these goals   Status: Continued -  Date(s): 9/7/2018    Objective #B: Patient will address relationship difficulties in a more adaptive manner  Status: Continued - Date(s): 9/7/2018    Objective #C: Patient will develop coping/problem-solving skills to facilitate more adaptive adjustment and will effectively address problems that interfere with adaptive functioning  Status: Continued - Date(s): 9/7/2018    Possible Therapeutic Intervention(s)  Psycho-education regarding mental health diagnoses and treatment options    Eye-Movement Desensitization and Reprocessing Therapy    Clinical Hypnosis    Skills training    Explore skills useful to client in current situation.    Skills include assertiveness, communication, conflict management, problem-solving, relaxation, etc.    Solution-Focused Therapy    Explore patterns in patient's relationships and discuss options for new behaviors.    Explore patterns in patient's actions and choices and discuss options for new behaviors.    Cognitive-behavioral Therapy    Discuss common cognitive distortions, identify them in patient's life.    Explore ways to challenge, replace, and act against these cognitions.    Acceptance and Commitment Therapy    Explore and identify important values in patient's life.    Discuss ways to commit to behavioral activation around these values.    Psychodynamic psychotherapy    Discuss patient's emotional dynamics and issues and how they impact behaviors.    Explore patient's history of relationships and how they impact present behaviors.    Explore how to work with and make changes in these schemas and patterns.    Narrative Therapy    Explore the patient's story of his/her life from his/her perspective.    Explore alternate ways of understanding their experience, identifying exceptions, developing new themes.    Interpersonal Psychotherapy    Explore patterns in relationships that are effective or ineffective at helping patient reach their goals, find satisfying  experience.    Discuss new patterns or behaviors to engage in for improved social functioning.    Behavioral Activation    Discuss steps patient can take to become more involved in meaningful activity.    Identify barriers to these activities and explore possible solutions.    Mindfulness-Based Strategies    Discuss skills based on development and application of mindfulness.    Skills drawn from compassion-focused therapy, dialectical behavior therapy, mindfulness-based stress reduction, mindfulness-based cognitive therapy, etc.      We have developed these goals together during our work to this point. Patient has assisted in the development of these goals and has agreed to this treatment plan.       GAGE Ulloa, [facility]  September 7, 2018

## 2020-01-10 ENCOUNTER — OFFICE VISIT (OUTPATIENT)
Dept: BEHAVIORAL HEALTH | Facility: CLINIC | Age: 25
End: 2020-01-10
Payer: COMMERCIAL

## 2020-01-10 DIAGNOSIS — F33.0 MAJOR DEPRESSIVE DISORDER, RECURRENT EPISODE, MILD (H): Primary | ICD-10-CM

## 2020-01-10 NOTE — PROGRESS NOTES
[facility] (P[facility] referral)  January 10, 2020      Behavioral Health Clinician Progress Note    Patient Name: Neo Saeed              Service Type:  Individual      Service Location:   Face to Face in Clinic     Session Start Time: 320pm  Session End Time: 415pm     Session Length: 53 - 60      Attendees: Patient    Visit Activities (Refresh list every visit): [facility] [place] Only    Diagnostic Assessment Date: none on file  Treatment Plan Review Date: 9/7/2018  CGI-S: 4  CGI-I: 2    See Flowsheets for today's PHQ-9 and LETI-7 results  Previous PHQ-9:   PHQ-9 SCORE 8/17/2018 2/1/2019 8/16/2019   PHQ-9 Total Score MyChart - - 2 (Minimal depression)   PHQ-9 Total Score 2 3 2     Previous LETI-7:   LETI-7 SCORE 8/17/2018 2/1/2019 8/16/2019   Total Score - - 3 (minimal anxiety)   Total Score 2 2 3       RAYMON LEVEL:  No flowsheet data found.    DATA  Extended Session (60+ minutes): No  Interactive Complexity: No  Crisis: No  [facility] Patient: No    Treatment Objective(s) Addressed in This Session:  Target Behavior(s): disease management/lifestyle changes      Patient  will experience a reduction in depressed mood, will develop more effective coping skills to manage depressive symptoms and will develop healthy cognitive patterns and beliefs, will experience a reduction in anxiety and will develop more effective coping skills to manage anxiety symptoms and will develop coping/problem-solving skills to facilitate more adaptive adjustment    Current Stressors / Issues:  [facility] [place] met with Neo to provide psychotherapy support regarding depression, life adjustment, anxiety. Continued our conversation about his current stressors, hx of depression, life transitions..      Dexter reports that he had a difficult week over and after a trip over New Year's. He was visiting a best friend in [place] (Harish). Spent our session processing his experience at some length.  He reports the following during this conversation - things are  changing in his close relationships as they move in new directions. He has no model for their friendship, he says. His friend has a girlfriend he has dated for a year. He does not feel that his affection for this person is more than usual and/or is any source of his sadness or emotional distress, but we do explore this idea a bit, nonetheless. Talked about his experience of intimate relationships, his desires or lack of them in this regard, his sexuality and how he has experienced relationships in connection to this.    He is wondering about what bearing this recent emotionally intense experience might have on his decisions about medication. Processed and thought through this some today.    Continued conversation about his theories of depression, mood, human flourishing.Today he wanted to talk about some of the challenges of being male in today's cultural world, the challenges of finding friends that are interested in engaging the topics and learning he is drawn to, etc.     I affirmed the steps this patient has taken to address physical and behavioral health issues, and offered continued behavioral health services or referral, now or in the future, as needed by the patient.    Progress on Treatment Objective(s) / Homework:  Satisfactory progress - ACTION (Actively working towards change); Intervened by reinforcing change plan / affirming steps taken    Psycho-education regarding mental health diagnoses and treatment options    Motivational Interviewing    Skills training    Explored specific skills useful to client in current situation    Skill areas include assertiveness, communication, conflict management, problem-solving, relaxation, etc.     Solution-Focused Therapy    Explored patterns in patient's behaviors and relationships and discussed options for new behaviors    Explored new options for problem-solving, communication, managing stress, etc.    Cognitive-behavioral Therapy    Discussed common cognitive  distortions, identified them in patient's life    Explored ways to challenge, replace, and act against these cognitions    Explore behavioral changes that might benefit patient in improving mood and engage in meaningful activity    Acceptance and Commitment Therapy    Explored and identified important values in patient's life    Discussed ways to commit to behavioral activation around these values    Psychodynamic psychotherapy    Discussed patient's emotional dynamics and issues and how they impact behaviors    Explored patient's history of relationships and how they impact present behaviors    Explored how to work with and make changes in these schemas and patterns    Narrative Therapy    Explored the patient's story of their life from their perspective,     Explored alternate ways of understanding their experience, identifying exceptions, developing new themes    Interpersonal Psychotherapy    Explored patterns in relationships that are effective or ineffective at helping patient reach their goals, find satisfying experience.    Discussed new patterns or behaviors to engage in for improved social functioning.    Behavioral Activation    Discussed steps patient can take to become more involved in meaningful activity    Identified barriers to these activities and explored possible solutions    Mindfulness-Based Strategies    Discussed skills based on development and application of mindfulness    Skills drawn from compassion-focused therapy, dialectical behavior therapy, mindfulness-based stress reduction, mindfulness-based cognitive therapy, etc.    Medication Review:  No problems reported to [facility] today.    Medication Compliance:  No problems reported to [facility] today.    Changes in Health Issues:  No problems reported to [facility] today.    Chemical Use Review:   Substance Use: Chemical use reviewed, no active concerns identified      Tobacco Use: No current tobacco use.      Assessment: Current Emotional / Mental Status  (status of significant symptoms):  Risk status (Self / Other harm or suicidal ideation)  Patient denies a history of suicidal ideation, suicide attempts, self-injurious behavior, homicidal ideation, homicidal behavior and and other safety concerns  Patient denies current fears or concerns for personal safety.  Patient denies current or recent suicidal ideation or behaviors.  Patient denies current or recent homicidal ideation or behaviors.  Patient denies current or recent self injurious behavior or ideation.  Patient denies other safety concerns.  A safety and risk management plan has not been developed at this time, however patient was encouraged to call [place] [phone] should there be a change in any of these risk factors.    Appearance:   Appropriate   Eye Contact:   Good   Psychomotor Behavior: Normal   Attitude:   Cooperative   Orientation:   All  Speech   Rate / Production: Normal    Volume:  Normal   Mood:    Anxious  Depressed - fluctuates  Affect:    Appropriate   Thought Content:  Clear  Rumination   Thought Form:  Coherent  Logical   Insight:    Good     Diagnoses:  1. Major depressive disorder, recurrent episode, mild (H)      Collateral Reports Completed:  Will collaborate with care team as indicated during treatment    Plan: (Homework, other):  Patient was given information about behavioral services and encouraged to schedule a follow up appointment with the clinic [facility] [place] as needed.  He was also given information about mental health symptoms and treatment options .  CD Recommendations: No indications of CD issues.  We agree to continue psychotherapy to address his depression, anxiety, life adjustment. GAGE Sears, [facility] [place]    ______________________________________________________________________    [facility] Integrated Behavioral Health Treatment Plan    Client's Name: Neo Saeed  YOB: 1995    Date: 9/7/2018    DSM-V Diagnoses: 296.32 (F33.1) Major Depressive Disorder, Recurrent  Episode, Moderate With anxious distress;  Psychosocial / Contextual Factors: transitions in life - college to work force, friends leaving, etc    Referral / Collaboration:  Will collaborate with care team as indicated during treatment.    Anticipated number of session or this episode of care: 12+      MeasurableTreatment Goal(s) related to diagnosis / functional impairment(s)  Goal 1:  Patient will experience a reduction in depressive and anxious symptoms, along with a corresponding increase in positive emotion and life satisfaction.    Objective #A: Patient will experience a reduction in depressed mood, will develop more effective coping skills to manage depressive symptoms, will develop healthy cognitive patterns and beliefs, will increase ability to function adaptively and will continue to take medications as prescribed / participate in supportive activities and services    Status: Continued - Date(s): 9/7/2018    Objective #B: Patient will experience a reduction in anxiety, will develop more effective coping skills to manage anxiety symptoms, will develop healthy cognitive patterns and beliefs and will increase ability to function adaptively  Status: Continued - Date(s): 9/7/2018    Objective #C: Patient will develop better understanding of triggers and coping strategies to stabilize mood  Status: Continued - Date(s): 9/7/2018    Goal 2:  Patient will identify and increase engagement in valued activity, i.e. improving social connections/relationships, pursuing occupational goals or personally meaningful pursuits, exploration of meaning in life.     Objective #A: Patient will identify meaningful activity in social, occupational and  personal goals, and increase behavioral activation around these goals   Status: Continued - Date(s): 9/7/2018    Objective #B: Patient will address relationship difficulties in a more adaptive manner  Status: Continued - Date(s): 9/7/2018    Objective #C: Patient will develop  coping/problem-solving skills to facilitate more adaptive adjustment and will effectively address problems that interfere with adaptive functioning  Status: Continued - Date(s): 9/7/2018    Possible Therapeutic Intervention(s)  Psycho-education regarding mental health diagnoses and treatment options    Eye-Movement Desensitization and Reprocessing Therapy    Clinical Hypnosis    Skills training    Explore skills useful to client in current situation.    Skills include assertiveness, communication, conflict management, problem-solving, relaxation, etc.    Solution-Focused Therapy    Explore patterns in patient's relationships and discuss options for new behaviors.    Explore patterns in patient's actions and choices and discuss options for new behaviors.    Cognitive-behavioral Therapy    Discuss common cognitive distortions, identify them in patient's life.    Explore ways to challenge, replace, and act against these cognitions.    Acceptance and Commitment Therapy    Explore and identify important values in patient's life.    Discuss ways to commit to behavioral activation around these values.    Psychodynamic psychotherapy    Discuss patient's emotional dynamics and issues and how they impact behaviors.    Explore patient's history of relationships and how they impact present behaviors.    Explore how to work with and make changes in these schemas and patterns.    Narrative Therapy    Explore the patient's story of his/her life from his/her perspective.    Explore alternate ways of understanding their experience, identifying exceptions, developing new themes.    Interpersonal Psychotherapy    Explore patterns in relationships that are effective or ineffective at helping patient reach their goals, find satisfying experience.    Discuss new patterns or behaviors to engage in for improved social functioning.    Behavioral Activation    Discuss steps patient can take to become more involved in meaningful  activity.    Identify barriers to these activities and explore possible solutions.    Mindfulness-Based Strategies    Discuss skills based on development and application of mindfulness.    Skills drawn from compassion-focused therapy, dialectical behavior therapy, mindfulness-based stress reduction, mindfulness-based cognitive therapy, etc.      We have developed these goals together during our work to this point. Patient has assisted in the development of these goals and has agreed to this treatment plan.       GAGE Ulloa, [facility] [place]  September 7, 2018

## 2020-01-23 ENCOUNTER — OFFICE VISIT (OUTPATIENT)
Dept: BEHAVIORAL HEALTH | Facility: CLINIC | Age: 25
End: 2020-01-23
Payer: COMMERCIAL

## 2020-01-23 DIAGNOSIS — F33.0 MAJOR DEPRESSIVE DISORDER, RECURRENT EPISODE, MILD (H): Primary | ICD-10-CM

## 2020-01-23 NOTE — PROGRESS NOTES
[facility] (P[facility] referral)  January 23, 2020      Behavioral Health Clinician Progress Note    Patient Name: Neo Saeed              Service Type:  Individual      Service Location:   Face to Face in Clinic     Session Start Time: 815am  Session End Time: 905am     Session Length: 38 - 52      Attendees: Patient    Visit Activities (Refresh list every visit): [facility] Only    Diagnostic Assessment Date: none on file  Treatment Plan Review Date: 9/7/2018  CGI-S: 4  CGI-I: 2    See Flowsheets for today's PHQ-9 and LETI-7 results  Previous PHQ-9:   PHQ-9 SCORE 8/17/2018 2/1/2019 8/16/2019   PHQ-9 Total Score MyChart - - 2 (Minimal depression)   PHQ-9 Total Score 2 3 2     Previous LETI-7:   LETI-7 SCORE 8/17/2018 2/1/2019 8/16/2019   Total Score - - 3 (minimal anxiety)   Total Score 2 2 3       RAYMON LEVEL:  No flowsheet data found.    DATA  Extended Session (60+ minutes): No  Interactive Complexity: No  Crisis: No  [facility] Patient: No    Treatment Objective(s) Addressed in This Session:  Target Behavior(s): disease management/lifestyle changes      Patient  will experience a reduction in depressed mood, will develop more effective coping skills to manage depressive symptoms and will develop healthy cognitive patterns and beliefs, will experience a reduction in anxiety and will develop more effective coping skills to manage anxiety symptoms and will develop coping/problem-solving skills to facilitate more adaptive adjustment    Current Stressors / Issues:  [facility] met with Neo to provide psychotherapy support regarding depression, life adjustment, anxiety. Continued our conversation about his current stressors, hx of depression, life transitions..      Michael reports that he continues to have some emotional distress and upheaval, but it is better than it has been. Continued our conversation about his current thoughts, feelings, stressors. These appear to be mostly internal.     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 He has some trips planned.    Spent much of our session reviewing some relationship concerns that I wonder about in terms of their impact on Dexter - he agrees with me as we begin to explore that this is an area of life he is impacted by and has not thoroughly explored. We will continue this conversation as we move into future sessions.     I affirmed the steps this patient has taken to address physical and behavioral health issues, and offered continued behavioral health services or referral, now or in the future, as needed by the patient.    Progress on Treatment Objective(s) / Homework:  Satisfactory progress - ACTION (Actively working towards change); Intervened by reinforcing change plan / affirming steps taken    Psycho-education regarding mental health diagnoses and treatment options    Motivational Interviewing    Skills training    Explored specific skills useful to client in current situation    Skill areas include assertiveness, communication, conflict management, problem-solving, relaxation, etc.     Solution-Focused Therapy    Explored patterns in patient's behaviors and relationships and discussed options for new behaviors    Explored new options for problem-solving, communication, managing stress, etc.    Cognitive-behavioral Therapy    Discussed common cognitive distortions, identified them in patient's life    Explored ways to challenge, replace, and act against these cognitions    Explore behavioral changes that might benefit patient in improving mood and engage in meaningful activity    Acceptance and Commitment Therapy    Explored and identified important values in patient's life    Discussed ways to commit to behavioral activation around these values    Psychodynamic psychotherapy    Discussed patient's emotional dynamics and issues and how they impact behaviors    Explored patient's history of relationships and how they impact present behaviors    Explored how to  work with and make changes in these schemas and patterns    Narrative Therapy    Explored the patient's story of their life from their perspective,     Explored alternate ways of understanding their experience, identifying exceptions, developing new themes    Interpersonal Psychotherapy    Explored patterns in relationships that are effective or ineffective at helping patient reach their goals, find satisfying experience.    Discussed new patterns or behaviors to engage in for improved social functioning.    Behavioral Activation    Discussed steps patient can take to become more involved in meaningful activity    Identified barriers to these activities and explored possible solutions    Mindfulness-Based Strategies    Discussed skills based on development and application of mindfulness    Skills drawn from compassion-focused therapy, dialectical behavior therapy, mindfulness-based stress reduction, mindfulness-based cognitive therapy, etc.    Medication Review:  No problems reported to [facility] today.    Medication Compliance:  No problems reported to [facility] today.    Changes in Health Issues:  No problems reported to [facility] today.    Chemical Use Review:   Substance Use: Chemical use reviewed, no active concerns identified      Tobacco Use: No current tobacco use.      Assessment: Current Emotional / Mental Status (status of significant symptoms):  Risk status (Self / Other harm or suicidal ideation)  Patient denies a history of suicidal ideation, suicide attempts, self-injurious behavior, homicidal ideation, homicidal behavior and and other safety concerns  Patient denies current fears or concerns for personal safety.  Patient denies current or recent suicidal ideation or behaviors.  Patient denies current or recent homicidal ideation or behaviors.  Patient denies current or recent self injurious behavior or ideation.  Patient denies other safety concerns.  A safety and risk management plan has not been developed at this  time, however patient was encouraged to call [place] [phone] should there be a change in any of these risk factors.    Appearance:   Appropriate   Eye Contact:   Good   Psychomotor Behavior: Normal   Attitude:   Cooperative   Orientation:   All  Speech   Rate / Production: Normal    Volume:  Normal   Mood:    Anxious  Depressed - fluctuates  Affect:    Appropriate   Thought Content:  Clear  Rumination   Thought Form:  Coherent  Logical   Insight:    Good     Diagnoses:  1. Major depressive disorder, recurrent episode, mild (H)      Collateral Reports Completed:  Will collaborate with care team as indicated during treatment    Plan: (Homework, other):  Patient was given information about behavioral services and encouraged to schedule a follow up appointment with the clinic [facility] as needed.  He was also given information about mental health symptoms and treatment options .  CD Recommendations: No indications of CD issues.  We agree to continue psychotherapy to address his depression, anxiety, life adjustment. GAGE Sears, [facility]    ______________________________________________________________________    [facility] Integrated Behavioral Health Treatment Plan    Client's Name: Neo Saeed  YOB: 1995    Date: 9/7/2018    DSM-V Diagnoses: 296.32 (F33.1) Major Depressive Disorder, Recurrent Episode, Moderate With anxious distress;  Psychosocial / Contextual Factors: transitions in life - college to work force, friends leaving, etc    Referral / Collaboration:  Will collaborate with care team as indicated during treatment.    Anticipated number of session or this episode of care: 12+      MeasurableTreatment Goal(s) related to diagnosis / functional impairment(s)  Goal 1:  Patient will experience a reduction in depressive and anxious symptoms, along with a corresponding increase in positive emotion and life satisfaction.    Objective #A: Patient will experience a reduction in depressed mood, will develop  more effective coping skills to manage depressive symptoms, will develop healthy cognitive patterns and beliefs, will increase ability to function adaptively and will continue to take medications as prescribed / participate in supportive activities and services    Status: Continued - Date(s): 9/7/2018    Objective #B: Patient will experience a reduction in anxiety, will develop more effective coping skills to manage anxiety symptoms, will develop healthy cognitive patterns and beliefs and will increase ability to function adaptively  Status: Continued - Date(s): 9/7/2018    Objective #C: Patient will develop better understanding of triggers and coping strategies to stabilize mood  Status: Continued - Date(s): 9/7/2018    Goal 2:  Patient will identify and increase engagement in valued activity, i.e. improving social connections/relationships, pursuing occupational goals or personally meaningful pursuits, exploration of meaning in life.     Objective #A: Patient will identify meaningful activity in social, occupational and  personal goals, and increase behavioral activation around these goals   Status: Continued - Date(s): 9/7/2018    Objective #B: Patient will address relationship difficulties in a more adaptive manner  Status: Continued - Date(s): 9/7/2018    Objective #C: Patient will develop coping/problem-solving skills to facilitate more adaptive adjustment and will effectively address problems that interfere with adaptive functioning  Status: Continued - Date(s): 9/7/2018    Possible Therapeutic Intervention(s)  Psycho-education regarding mental health diagnoses and treatment options    Eye-Movement Desensitization and Reprocessing Therapy    Clinical Hypnosis    Skills training    Explore skills useful to client in current situation.    Skills include assertiveness, communication, conflict management, problem-solving, relaxation, etc.    Solution-Focused Therapy    Explore patterns in patient's relationships  and discuss options for new behaviors.    Explore patterns in patient's actions and choices and discuss options for new behaviors.    Cognitive-behavioral Therapy    Discuss common cognitive distortions, identify them in patient's life.    Explore ways to challenge, replace, and act against these cognitions.    Acceptance and Commitment Therapy    Explore and identify important values in patient's life.    Discuss ways to commit to behavioral activation around these values.    Psychodynamic psychotherapy    Discuss patient's emotional dynamics and issues and how they impact behaviors.    Explore patient's history of relationships and how they impact present behaviors.    Explore how to work with and make changes in these schemas and patterns.    Narrative Therapy    Explore the patient's story of his/her life from his/her perspective.    Explore alternate ways of understanding their experience, identifying exceptions, developing new themes.    Interpersonal Psychotherapy    Explore patterns in relationships that are effective or ineffective at helping patient reach their goals, find satisfying experience.    Discuss new patterns or behaviors to engage in for improved social functioning.    Behavioral Activation    Discuss steps patient can take to become more involved in meaningful activity.    Identify barriers to these activities and explore possible solutions.    Mindfulness-Based Strategies    Discuss skills based on development and application of mindfulness.    Skills drawn from compassion-focused therapy, dialectical behavior therapy, mindfulness-based stress reduction, mindfulness-based cognitive therapy, etc.      We have developed these goals together during our work to this point. Patient has assisted in the development of these goals and has agreed to this treatment plan.       GAGE Ulloa, [facility]  September 7, 2018

## 2020-02-06 ENCOUNTER — OFFICE VISIT (OUTPATIENT)
Dept: BEHAVIORAL HEALTH | Facility: CLINIC | Age: 25
End: 2020-02-06
Payer: COMMERCIAL

## 2020-02-06 DIAGNOSIS — F33.0 MAJOR DEPRESSIVE DISORDER, RECURRENT EPISODE, MILD (H): Primary | ICD-10-CM

## 2020-02-06 NOTE — PROGRESS NOTES
[facility] ([facility] referral)  February 6, 2020      Behavioral Health Clinician Progress Note    Patient Name: Neo Saeed              Service Type:  Individual      Service Location:   Face to Face in Clinic     Session Start Time: 810am  Session End Time: 905am     Session Length: 53 - 60      Attendees: Patient    Visit Activities (Refresh list every visit): [facility] Only    Diagnostic Assessment Date: none on file  Treatment Plan Review Date: 9/7/2018  CGI-S: 4  CGI-I: 2    See Flowsheets for today's PHQ-9 and LETI-7 results  Previous PHQ-9:   PHQ-9 SCORE 8/17/2018 2/1/2019 8/16/2019   PHQ-9 Total Score MyChart - - 2 (Minimal depression)   PHQ-9 Total Score 2 3 2     Previous LETI-7:   LETI-7 SCORE 8/17/2018 2/1/2019 8/16/2019   Total Score - - 3 (minimal anxiety)   Total Score 2 2 3       RAYMON LEVEL:  No flowsheet data found.    DATA  Extended Session (60+ minutes): No  Interactive Complexity: No  Crisis: No  [facility] Patient: No    Treatment Objective(s) Addressed in This Session:  Target Behavior(s): disease management/lifestyle changes      Patient  will experience a reduction in depressed mood, will develop more effective coping skills to manage depressive symptoms and will develop healthy cognitive patterns and beliefs, will experience a reduction in anxiety and will develop more effective coping skills to manage anxiety symptoms and will develop coping/problem-solving skills to facilitate more adaptive adjustment    Current Stressors / Issues:  [facility] met with Neo to provide psychotherapy support regarding depression, life adjustment, anxiety. Continued our conversation about his current stressors, hx of depression, life transitions..      Michael continues to explore his recent intense emotions - he reports that he is feeling more mellow but trying to make sense of what it was about, how to work with these issues. Discussed his reading recently of a book by Megan Marleen called Boys & Sex  "about development and cultural issues around masculinity. 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 \"It's easier to stay in the 'straight world' than to be in the 'dominguez world.'\" Processed and discussed this. He reports that he does not want a relationship at all right now, anyway. Explored individual and cultural factors that he feels he deals with. Framed this in existential terms as a large question to continue living with, exploring over time.    I affirmed the steps this patient has taken to address physical and behavioral health issues, and offered continued behavioral health services or referral, now or in the future, as needed by the patient.    Progress on Treatment Objective(s) / Homework:  Satisfactory progress - ACTION (Actively working towards change); Intervened by reinforcing change plan / affirming steps taken    Psycho-education regarding mental health diagnoses and treatment options    Motivational Interviewing    Skills training    Explored specific skills useful to client in current situation    Skill areas include assertiveness, communication, conflict management, problem-solving, relaxation, etc.     Solution-Focused Therapy    Explored patterns in patient's behaviors and relationships and discussed options for new behaviors    Explored new options for problem-solving, communication, managing stress, etc.    Cognitive-behavioral Therapy    Discussed common cognitive distortions, identified them in patient's life    Explored ways to challenge, replace, and act against these cognitions    Explore behavioral changes that might benefit patient in improving mood and engage in meaningful activity    Acceptance and Commitment Therapy    Explored and identified important values in patient's life    Discussed ways to commit to behavioral activation around these values    Psychodynamic psychotherapy    Discussed patient's " emotional dynamics and issues and how they impact behaviors    Explored patient's history of relationships and how they impact present behaviors    Explored how to work with and make changes in these schemas and patterns    Narrative Therapy    Explored the patient's story of their life from their perspective,     Explored alternate ways of understanding their experience, identifying exceptions, developing new themes    Interpersonal Psychotherapy    Explored patterns in relationships that are effective or ineffective at helping patient reach their goals, find satisfying experience.    Discussed new patterns or behaviors to engage in for improved social functioning.    Behavioral Activation    Discussed steps patient can take to become more involved in meaningful activity    Identified barriers to these activities and explored possible solutions    Mindfulness-Based Strategies    Discussed skills based on development and application of mindfulness    Skills drawn from compassion-focused therapy, dialectical behavior therapy, mindfulness-based stress reduction, mindfulness-based cognitive therapy, etc.    Medication Review:  No problems reported to [facility] today.    Medication Compliance:  No problems reported to [facility] today.    Changes in Health Issues:  No problems reported to [facility] today.    Chemical Use Review:   Substance Use: Chemical use reviewed, no active concerns identified      Tobacco Use: No current tobacco use.      Assessment: Current Emotional / Mental Status (status of significant symptoms):  Risk status (Self / Other harm or suicidal ideation)  Patient denies a history of suicidal ideation, suicide attempts, self-injurious behavior, homicidal ideation, homicidal behavior and and other safety concerns  Patient denies current fears or concerns for personal safety.  Patient denies current or recent suicidal ideation or behaviors.  Patient denies current or recent homicidal ideation or behaviors.  Patient  denies current or recent self injurious behavior or ideation.  Patient denies other safety concerns.  A safety and risk management plan has not been developed at this time, however patient was encouraged to call [place] [phone] should there be a change in any of these risk factors.    Appearance:   Appropriate   Eye Contact:   Good   Psychomotor Behavior: Normal   Attitude:   Cooperative   Orientation:   All  Speech   Rate / Production: Normal    Volume:  Normal   Mood:    Anxious  Depressed - fluctuates  Affect:    Appropriate   Thought Content:  Clear  Rumination   Thought Form:  Coherent  Logical   Insight:    Good     Diagnoses:  1. Major depressive disorder, recurrent episode, mild (H)      Collateral Reports Completed:  Will collaborate with care team as indicated during treatment    Plan: (Homework, other):  Patient was given information about behavioral services and encouraged to schedule a follow up appointment with the clinic [facility] as needed.  He was also given information about mental health symptoms and treatment options .  CD Recommendations: No indications of CD issues.  We agree to continue psychotherapy to address his depression, anxiety, life adjustment. GAGE Sears, [facility]    ______________________________________________________________________    [facility] Integrated [facility]al Health Treatment Plan    Client's Name: Neo Saeed  YOB: 1995    Date: 9/7/2018    DSM-V Diagnoses: 296.32 (F33.1) Major Depressive Disorder, Recurrent Episode, Moderate With anxious distress;  Psychosocial / Contextual Factors: transitions in life - college to work force, friends leaving, etc    Referral / Collaboration:  Will collaborate with care team as indicated during treatment.    Anticipated number of session or this episode of care: 12+      MeasurableTreatment Goal(s) related to diagnosis / functional impairment(s)  Goal 1:  Patient will experience a reduction in depressive and anxious  symptoms, along with a corresponding increase in positive emotion and life satisfaction.    Objective #A: Patient will experience a reduction in depressed mood, will develop more effective coping skills to manage depressive symptoms, will develop healthy cognitive patterns and beliefs, will increase ability to function adaptively and will continue to take medications as prescribed / participate in supportive activities and services    Status: Continued - Date(s): 9/7/2018    Objective #B: Patient will experience a reduction in anxiety, will develop more effective coping skills to manage anxiety symptoms, will develop healthy cognitive patterns and beliefs and will increase ability to function adaptively  Status: Continued - Date(s): 9/7/2018    Objective #C: Patient will develop better understanding of triggers and coping strategies to stabilize mood  Status: Continued - Date(s): 9/7/2018    Goal 2:  Patient will identify and increase engagement in valued activity, i.e. improving social connections/relationships, pursuing occupational goals or personally meaningful pursuits, exploration of meaning in life.     Objective #A: Patient will identify meaningful activity in social, occupational and  personal goals, and increase behavioral activation around these goals   Status: Continued - Date(s): 9/7/2018    Objective #B: Patient will address relationship difficulties in a more adaptive manner  Status: Continued - Date(s): 9/7/2018    Objective #C: Patient will develop coping/problem-solving skills to facilitate more adaptive adjustment and will effectively address problems that interfere with adaptive functioning  Status: Continued - Date(s): 9/7/2018    Possible Therapeutic Intervention(s)  Psycho-education regarding mental health diagnoses and treatment options    Eye-Movement Desensitization and Reprocessing Therapy    Clinical Hypnosis    Skills training    Explore skills useful to client in current  situation.    Skills include assertiveness, communication, conflict management, problem-solving, relaxation, etc.    Solution-Focused Therapy    Explore patterns in patient's relationships and discuss options for new behaviors.    Explore patterns in patient's actions and choices and discuss options for new behaviors.    Cognitive-behavioral Therapy    Discuss common cognitive distortions, identify them in patient's life.    Explore ways to challenge, replace, and act against these cognitions.    Acceptance and Commitment Therapy    Explore and identify important values in patient's life.    Discuss ways to commit to behavioral activation around these values.    Psychodynamic psychotherapy    Discuss patient's emotional dynamics and issues and how they impact behaviors.    Explore patient's history of relationships and how they impact present behaviors.    Explore how to work with and make changes in these schemas and patterns.    Narrative Therapy    Explore the patient's story of his/her life from his/her perspective.    Explore alternate ways of understanding their experience, identifying exceptions, developing new themes.    Interpersonal Psychotherapy    Explore patterns in relationships that are effective or ineffective at helping patient reach their goals, find satisfying experience.    Discuss new patterns or behaviors to engage in for improved social functioning.    Behavioral Activation    Discuss steps patient can take to become more involved in meaningful activity.    Identify barriers to these activities and explore possible solutions.    Mindfulness-Based Strategies    Discuss skills based on development and application of mindfulness.    Skills drawn from compassion-focused therapy, dialectical behavior therapy, mindfulness-based stress reduction, mindfulness-based cognitive therapy, etc.      We have developed these goals together during our work to this point. Patient has assisted in the development  of these goals and has agreed to this treatment plan.       GAGE Ulloa, [facility]  September 7, 2018

## 2020-02-07 ENCOUNTER — OFFICE VISIT (OUTPATIENT)
Dept: INTERNAL MEDICINE | Facility: CLINIC | Age: 25
End: 2020-02-07
Payer: COMMERCIAL

## 2020-02-07 VITALS
DIASTOLIC BLOOD PRESSURE: 71 MMHG | HEART RATE: 64 BPM | WEIGHT: 191.1 LBS | SYSTOLIC BLOOD PRESSURE: 127 MMHG | TEMPERATURE: 97.6 F | RESPIRATION RATE: 17 BRPM | OXYGEN SATURATION: 97 % | BODY MASS INDEX: 28.3 KG/M2 | HEIGHT: 69 IN

## 2020-02-07 DIAGNOSIS — Z01.818 PREOP GENERAL PHYSICAL EXAM: ICD-10-CM

## 2020-02-07 DIAGNOSIS — Z23 NEED FOR VACCINATION: Primary | ICD-10-CM

## 2020-02-07 LAB
ALBUMIN SERPL-MCNC: 4.3 G/DL (ref 3.4–5)
ALP SERPL-CCNC: 118 U/L (ref 40–150)
ALT SERPL W P-5'-P-CCNC: 46 U/L (ref 0–70)
ANION GAP SERPL CALCULATED.3IONS-SCNC: 6 MMOL/L (ref 3–14)
AST SERPL W P-5'-P-CCNC: 30 U/L (ref 0–45)
BASOPHILS # BLD AUTO: 0 10E9/L (ref 0–0.2)
BASOPHILS NFR BLD AUTO: 0.9 %
BILIRUB SERPL-MCNC: 0.4 MG/DL (ref 0.2–1.3)
BUN SERPL-MCNC: 13 MG/DL (ref 7–30)
CALCIUM SERPL-MCNC: 9.4 MG/DL (ref 8.5–10.1)
CHLORIDE SERPL-SCNC: 107 MMOL/L (ref 94–109)
CHOLEST SERPL-MCNC: 177 MG/DL
CO2 SERPL-SCNC: 26 MMOL/L (ref 20–32)
CREAT SERPL-MCNC: 1.01 MG/DL (ref 0.66–1.25)
DIFFERENTIAL METHOD BLD: NORMAL
EOSINOPHIL # BLD AUTO: 0.1 10E9/L (ref 0–0.7)
EOSINOPHIL NFR BLD AUTO: 1.4 %
ERYTHROCYTE [DISTWIDTH] IN BLOOD BY AUTOMATED COUNT: 13.2 % (ref 10–15)
GFR SERPL CREATININE-BSD FRML MDRD: >90 ML/MIN/{1.73_M2}
GLUCOSE SERPL-MCNC: 94 MG/DL (ref 70–99)
HCT VFR BLD AUTO: 48.1 % (ref 40–53)
HDLC SERPL-MCNC: 38 MG/DL
HGB BLD-MCNC: 16.2 G/DL (ref 13.3–17.7)
IMM GRANULOCYTES # BLD: 0 10E9/L (ref 0–0.4)
IMM GRANULOCYTES NFR BLD: 0.2 %
LDLC SERPL CALC-MCNC: 110 MG/DL
LYMPHOCYTES # BLD AUTO: 1.9 10E9/L (ref 0.8–5.3)
LYMPHOCYTES NFR BLD AUTO: 42.3 %
MCH RBC QN AUTO: 30.9 PG (ref 26.5–33)
MCHC RBC AUTO-ENTMCNC: 33.7 G/DL (ref 31.5–36.5)
MCV RBC AUTO: 92 FL (ref 78–100)
MONOCYTES # BLD AUTO: 0.6 10E9/L (ref 0–1.3)
MONOCYTES NFR BLD AUTO: 12.8 %
NEUTROPHILS # BLD AUTO: 1.9 10E9/L (ref 1.6–8.3)
NEUTROPHILS NFR BLD AUTO: 42.4 %
NONHDLC SERPL-MCNC: 140 MG/DL
NRBC # BLD AUTO: 0 10*3/UL
NRBC BLD AUTO-RTO: 0 /100
PLATELET # BLD AUTO: 195 10E9/L (ref 150–450)
POTASSIUM SERPL-SCNC: 4.1 MMOL/L (ref 3.4–5.3)
PROT SERPL-MCNC: 7.8 G/DL (ref 6.8–8.8)
RBC # BLD AUTO: 5.25 10E12/L (ref 4.4–5.9)
SODIUM SERPL-SCNC: 140 MMOL/L (ref 133–144)
TRIGL SERPL-MCNC: 150 MG/DL
WBC # BLD AUTO: 4.4 10E9/L (ref 4–11)

## 2020-02-07 ASSESSMENT — MIFFLIN-ST. JEOR: SCORE: 1847.2

## 2020-02-07 ASSESSMENT — ANXIETY QUESTIONNAIRES
1. FEELING NERVOUS, ANXIOUS, OR ON EDGE: SEVERAL DAYS
3. WORRYING TOO MUCH ABOUT DIFFERENT THINGS: NOT AT ALL
2. NOT BEING ABLE TO STOP OR CONTROL WORRYING: NOT AT ALL
7. FEELING AFRAID AS IF SOMETHING AWFUL MIGHT HAPPEN: NOT AT ALL
6. BECOMING EASILY ANNOYED OR IRRITABLE: NOT AT ALL
5. BEING SO RESTLESS THAT IT IS HARD TO SIT STILL: NOT AT ALL
IF YOU CHECKED OFF ANY PROBLEMS ON THIS QUESTIONNAIRE, HOW DIFFICULT HAVE THESE PROBLEMS MADE IT FOR YOU TO DO YOUR WORK, TAKE CARE OF THINGS AT HOME, OR GET ALONG WITH OTHER PEOPLE: NOT DIFFICULT AT ALL
GAD7 TOTAL SCORE: 2

## 2020-02-07 ASSESSMENT — PATIENT HEALTH QUESTIONNAIRE - PHQ9
5. POOR APPETITE OR OVEREATING: SEVERAL DAYS
SUM OF ALL RESPONSES TO PHQ QUESTIONS 1-9: 1

## 2020-02-07 ASSESSMENT — PAIN SCALES - GENERAL: PAINLEVEL: NO PAIN (0)

## 2020-02-07 NOTE — PROGRESS NOTES
PRE-OP EVALUATION:  02/07/20    Neo Saeed is 24 year old male presents for pre-operative evaluation assessment as requested by Dr. Arron Jacobo.  He requires evaluation and anesthesia risk assessment prior to undergoing surgery/procedure for treatment of a pilonidal cyst.    History of Present Illness   HPI related to upcoming procedure: Patient is a 24 year old male patient with a history of pilonidal cyst on his coccyx treated with cystectomy in 2017. PMH also includes depression. Patient follows with Dr. Huy Corley for [facility]. Denies current use of antidepressants. Patient denies a personal or family history of anesthesia reactions, perioperative or postoperative bleeding or sleep apnea. He is a nonsmoker and uses alcohol occasionally.     Surgery Information     Primary Care Provider: Patricio Ji [place] [facility]  Proposed Surgery/Procedure: Pilonidal Cystectomy  Date of Surgery/Procedure: 02/20/2020  Time of Surgery/Procedure: Unknown  Hospital/Surgical Facility: [facility]  Type of Anesthesia Anticipated: MAC Sedation    Preoperative Screening Questions       Audit C Alcohol Screening Tool  AUDIT   Questions 0 1 2 3 4 Score   1. How often do you have a drink  containing alcohol? Never Monthly or less 2 to 4  times a  month 2 to 3  times a  week 4 or more  times a  week    2. How many drinks containing alcohol  do you have on a typical day when you are drinking? 1 or 2 3 or 4 5 or 6 7 to 9 10 or more    3. How often do you have more than five  or more drinks on one occasion? Never Less  than  monthly Monthly Weekly Daily or  almost  daily    Scoring:   A score of 4 for adult men or 3 for adult women is an indication of hazardous drinking (risk for physical or physiological harm).   A score of 5 or more for adult men or 4 or more for adult women is an indication of an alcohol use disorder.      Medical History     Past Medical History:   Diagnosis Date     Allergic state      Anxiety         Past Surgical History:   Procedure Laterality Date     CYSTECTOMY PILONIDAL N/A 8/4/2017    Procedure: CYSTECTOMY PILONIDAL;  Lay Open Pilonidal Cystectomy;  Surgeon: Jenaro Cuba MD;  [facility]     DENTAL SURGERY      [place] teeth extraction     ENDOSCOPY       HERNIA REPAIR       HERNIA REPAIR          Problem (# of Occurrences) Relation (Name,Age of Onset)    Bipolar Disorder (1) Maternal Grandmother    Cancer (1) Paternal Grandfather    Coronary Artery Disease (1) Paternal Grandfather    Diabetes (1) Maternal Grandmother    Hyperlipidemia (2) Father, Paternal Grandfather    Hypothyroidism (1) Father    Parkinsonism (1) Maternal Grandfather          Social History     Tobacco Use     Smoking status: Never Smoker     Smokeless tobacco: Never Used   Substance Use Topics     Alcohol use: Yes     Frequency: 2-3 times a week     Drinks per session: 1 or 2     Binge frequency: Less than monthly     Comment: Couple of drinks once a week     History   Drug Use No       Current Outpatient Medications   Medication     buPROPion (WELLBUTRIN SR) 100 MG 12 hr tablet     cephALEXin (KEFLEX) 500 MG capsule     FEXOFENADINE HCL PO     No current facility-administered medications for this visit.        OTC products: None    No Known Allergies    Latex Allergy: NO      Review Of Systems   CONSTITUTIONAL: NEGATIVE for fever, chills, change in weight  INTEGUMENTARY/SKIN: NEGATIVE for worrisome rashes, moles or lesions  EYES: NEGATIVE for vision changes or irritation  ENT/MOUTH: NEGATIVE for ear, mouth and throat problems  RESP: NEGATIVE for significant cough or SOB  BREAST: NEGATIVE for masses, tenderness or discharge  CV: NEGATIVE for chest pain, palpitations or peripheral edema  GI: NEGATIVE for nausea, abdominal pain, heartburn, or change in bowel habits  : NEGATIVE for frequency, dysuria, or hematuria  MUSCULOSKELETAL: NEGATIVE for significant arthralgias or myalgia  NEURO: NEGATIVE for weakness,  "dizziness   or paresthesias  ENDOCRINE: NEGATIVE for temperature intolerance, skin/hair changes  HEME: NEGATIVE for bleeding problems  PSYCHIATRIC: NEGATIVE for changes in mood or affect    Exam    /71 (BP Location: Right arm, Patient Position: Sitting, Cuff Size: Adult Regular)   Pulse 64   Temp 97.6  F (36.4  C) (Oral)   Resp 17   Ht 1.753 m (5' 9\")   Wt 86.7 kg (191 lb 1.6 oz)   SpO2 97%   BMI 28.22 kg/m      GENERAL APPEARANCE: healthy, alert and no distress     EYES: EOMI,  PERRL     HENT: ear canals and TM's normal and nose and mouth without ulcers or lesions     NECK: no adenopathy, no asymmetry, masses, or scars and thyroid normal to palpation     RESP: lungs clear to auscultation - no rales, rhonchi or wheezes     CV: regular rates and rhythm, normal S1 S2, no S3 or S4 and no murmur, click or rub     ABDOMEN:  soft, nontender, no HSM or masses and bowel sounds normal     MS: extremities normal- no gross deformities noted, no evidence of inflammation in joints, FROM in all extremities.     SKIN: no suspicious lesions or rashes     NEURO: Normal strength and tone, sensory exam grossly normal, mentation intact and speech normal     PSYCH: mentation appears normal. and affect normal/bright     LYMPHATICS: No cervical adenopathy    Diagnostics     Labs Drawn and in Process:   Lipid panel, CBC with differential, CMP    Recent Labs   Lab Test 07/25/18  0745   HGB 15.7       Risk Assessment     Surgical Risk:  The proposed surgical procedure is considered LOW risk.    Revised Cardiac Risk Index  The patient has the following serious cardiovascular risks for perioperative complications such as (MI, PE, VFib and 3  AV Block):  No serious cardiac risks    INTERPRETATION: 0 risks: Class I (very low risk - 0.4% complication rate)    [facility] Activity Status Index  Total Points: 58.2  Total METs: 9.89    Functional capacity is classified as:  Excellent: >10 METs  Good: 7-10 METs   Moderate: 4 - 6 METs  Poor: <4 " METs    The patient has the following additional risks for perioperative complications:  No identified additional risks    Impression     For above listed surgery and anesthesia:   Patient is at low risk for surgery/procedure and perioperative/procedure complications.    Recommendations     Patient is on no chronic medications    Proceed with surgery as planned.  No food or liquids the morning of surgery.  Call surgeon if develops respiratory illness, fever, or other illness.    Patient is at low risk for surgery/procedure and perioperative/procedure complications.    Patient has not received an influenza vaccine this season. This will be administered today.     Signed Electronically by:   Onelia Bo RN   Nurse Practitioner student   [facility]    A copy of this evaluation report will be provided to requesting physician.   Please contact our office if there are any further questions or information required about this patient.    [place] Preop Guidelines    ACC/AHA Guidelines    Staff note; I personally reviewed his past medical history. I interviewed him and conducted a physical examination. I agree with the above assessment and plan.    ELIJAH Cartwright MD    Total time spent 25 minutes.  More than 50% of the time spent with Mr. Saeed on counseling / coordinating his care

## 2020-02-07 NOTE — PROGRESS NOTES
Surgeon (please enter first and last name):  Arron Jacobo MD   Location of Surgery: [facility]  Date of Surgery:  02/22/2020   Procedure:   pilonidal cystectomy  History of reaction to anesthesia?  No         Evangelista Jung CMA ([facility]) at 7:13 AM on 2/7/2020

## 2020-02-07 NOTE — NURSING NOTE
Chief Complaint   Patient presents with     Pre-Op Exam     DOS 02/22/2020 for pilonidal cystectomy by Arron Jacobo MD at [facility]       Evangelista Jung CMA ([facility]) at 7:12 AM on 2/7/2020

## 2020-02-07 NOTE — LETTER
2/7/2020      RE: Neo Saeed  [address]  [place] [facility] [address]         PRE-OP EVALUATION:  02/07/20    Neo Saeed is 24 year old male presents for pre-operative evaluation assessment as requested by Dr. Arron Jacobo.  He requires evaluation and anesthesia risk assessment prior to undergoing surgery/procedure for treatment of a pilonidal cyst.    History of Present Illness   HPI related to upcoming procedure: Patient is a 24 year old male patient with a history of pilonidal cyst on his coccyx treated with cystectomy in 2017. PMH also includes depression. Patient follows with Dr. Huy Corley for [facility] [place]. Denies current use of antidepressants. Patient denies a personal or family history of anesthesia reactions, perioperative or postoperative bleeding or sleep apnea. He is a nonsmoker and uses alcohol occasionally.     Surgery Information     Primary Care Provider: Patricio Ji [place] [facility]  Proposed Surgery/Procedure: Pilonidal Cystectomy  Date of Surgery/Procedure: 02/20/2020  Time of Surgery/Procedure: Unknown  Hospital/Surgical Facility: [facility]  Type of Anesthesia Anticipated: MAC Sedation    Preoperative Screening Questions       Audit C Alcohol Screening Tool  AUDIT   Questions 0 1 2 3 4 Score   1. How often do you have a drink  containing alcohol? Never Monthly or less 2 to 4  times a  month 2 to 3  times a  week 4 or more  times a  week    2. How many drinks containing alcohol  do you have on a typical day when you are drinking? 1 or 2 3 or 4 5 or 6 7 to 9 10 or more    3. How often do you have more than five  or more drinks on one occasion? Never Less  than  monthly Monthly Weekly Daily or  almost  daily    Scoring:   A score of 4 for adult men or 3 for adult women is an indication of hazardous drinking (risk for physical or physiological harm).   A score of 5 or more for adult men or 4 or more for adult women is an indication of an alcohol use disorder.      Medical History      Past Medical History:   Diagnosis Date     Allergic state      Anxiety        Past Surgical History:   Procedure Laterality Date     CYSTECTOMY PILONIDAL N/A 8/4/2017    Procedure: CYSTECTOMY PILONIDAL;  Lay Open Pilonidal Cystectomy;  Surgeon: Jenaro Cuba MD;  Location[facility]     DENTAL SURGERY      [place] teeth extraction     ENDOSCOPY       HERNIA REPAIR       HERNIA REPAIR          Problem (# of Occurrences) Relation (Name,Age of Onset)    Bipolar Disorder (1) Maternal Grandmother    Cancer (1) Paternal Grandfather    Coronary Artery Disease (1) Paternal Grandfather    Diabetes (1) Maternal Grandmother    Hyperlipidemia (2) Father, Paternal Grandfather    Hypothyroidism (1) Father    Parkinsonism (1) Maternal Grandfather          Social History     Tobacco Use     Smoking status: Never Smoker     Smokeless tobacco: Never Used   Substance Use Topics     Alcohol use: Yes     Frequency: 2-3 times a week     Drinks per session: 1 or 2     Binge frequency: Less than monthly     Comment: Couple of drinks once a week     History   Drug Use No       Current Outpatient Medications   Medication     buPROPion (WELLBUTRIN SR) 100 MG 12 hr tablet     cephALEXin (KEFLEX) 500 MG capsule     FEXOFENADINE HCL PO     No current facility-administered medications for this visit.        OTC products: None    No Known Allergies    Latex Allergy: NO      Review Of Systems   CONSTITUTIONAL: NEGATIVE for fever, chills, change in weight  INTEGUMENTARY/SKIN: NEGATIVE for worrisome rashes, moles or lesions  EYES: NEGATIVE for vision changes or irritation  ENT/MOUTH: NEGATIVE for ear, mouth and throat problems  RESP: NEGATIVE for significant cough or SOB  BREAST: NEGATIVE for masses, tenderness or discharge  CV: NEGATIVE for chest pain, palpitations or peripheral edema  GI: NEGATIVE for nausea, abdominal pain, heartburn, or change in bowel habits  : NEGATIVE for frequency, dysuria, or hematuria  MUSCULOSKELETAL: NEGATIVE  "for significant arthralgias or myalgia  NEURO: NEGATIVE for weakness, dizziness   or paresthesias  ENDOCRINE: NEGATIVE for temperature intolerance, skin/hair changes  HEME: NEGATIVE for bleeding problems  PSYCHIATRIC: NEGATIVE for changes in mood or affect    Exam    /71 (BP Location: Right arm, Patient Position: Sitting, Cuff Size: Adult Regular)   Pulse 64   Temp 97.6  F (36.4  C) (Oral)   Resp 17   Ht 1.753 m (5' 9\")   Wt 86.7 kg (191 lb 1.6 oz)   SpO2 97%   BMI 28.22 kg/m       GENERAL APPEARANCE: healthy, alert and no distress     EYES: EOMI,  PERRL     HENT: ear canals and TM's normal and nose and mouth without ulcers or lesions     NECK: no adenopathy, no asymmetry, masses, or scars and thyroid normal to palpation     RESP: lungs clear to auscultation - no rales, rhonchi or wheezes     CV: regular rates and rhythm, normal S1 S2, no S3 or S4 and no murmur, click or rub     ABDOMEN:  soft, nontender, no HSM or masses and bowel sounds normal     MS: extremities normal- no gross deformities noted, no evidence of inflammation in joints, FROM in all extremities.     SKIN: no suspicious lesions or rashes     NEURO: Normal strength and tone, sensory exam grossly normal, mentation intact and speech normal     PSYCH: mentation appears normal. and affect normal/bright     LYMPHATICS: No cervical adenopathy    Diagnostics     Labs Drawn and in Process:   Lipid panel, CBC with differential, CMP    Recent Labs   Lab Test 07/25/18  0745   HGB 15.7       Risk Assessment     Surgical Risk:  The proposed surgical procedure is considered LOW risk.    Revised Cardiac Risk Index  The patient has the following serious cardiovascular risks for perioperative complications such as (MI, PE, VFib and 3  AV Block):  No serious cardiac risks    INTERPRETATION: 0 risks: Class I (very low risk - 0.4% complication rate)    [facility] Activity Status Index  Total Points: 58.2  Total METs: 9.89    Functional capacity is classified " as:  Excellent: >10 METs  Good: 7-10 METs   Moderate: 4 - 6 METs  Poor: <4 METs    The patient has the following additional risks for perioperative complications:  No identified additional risks    Impression     For above listed surgery and anesthesia:   Patient is at low risk for surgery/procedure and perioperative/procedure complications.    Recommendations     Patient is on no chronic medications    Proceed with surgery as planned.  No food or liquids the morning of surgery.  Call surgeon if develops respiratory illness, fever, or other illness.    Patient is at low risk for surgery/procedure and perioperative/procedure complications.    Patient has not received an influenza vaccine this season. This will be administered today.     Signed Electronically by:   Onelia Bo RN   Nurse Practitioner student   [facility]    A copy of this evaluation report will be provided to requesting physician.   Please contact our office if there are any further questions or information required about this patient.    [place] Preop Guidelines    ACC/AHA Guidelines    Staff note; I personally reviewed his past medical history. I interviewed him and conducted a physical examination. I agree with the above assessment and plan.    ELIJAH Cartwright MD    Total time spent 25 minutes.  More than 50% of the time spent with Mr. Saeed on counseling / coordinating his care        Surgeon (please enter first and last name):  Arron Jacobo MD   Location of Surgery: [facility]  Date of Surgery:  02/22/2020   Procedure:   pilonidal cystectomy  History of reaction to anesthesia?  No     Evangelista Jung CMA ([facility]) at 7:13 AM on 2/7/2020   Ceferino Cartwright MD

## 2020-02-08 ASSESSMENT — ANXIETY QUESTIONNAIRES: GAD7 TOTAL SCORE: 2

## 2020-03-10 ENCOUNTER — HEALTH MAINTENANCE LETTER (OUTPATIENT)
Age: 25
End: 2020-03-10

## 2020-04-10 ENCOUNTER — VIRTUAL VISIT (OUTPATIENT)
Dept: BEHAVIORAL HEALTH | Facility: CLINIC | Age: 25
End: 2020-04-10
Payer: COMMERCIAL

## 2020-04-10 DIAGNOSIS — F41.9 ANXIETY: ICD-10-CM

## 2020-04-10 DIAGNOSIS — F33.41 RECURRENT MAJOR DEPRESSION IN PARTIAL REMISSION (H): Primary | ICD-10-CM

## 2020-04-10 NOTE — PROGRESS NOTES
[facility] (PCC referral)  April 10, 2020  Video Visit    Behavioral Health Clinician Progress Note    Patient Name: Neo Saeed              Service Type:  Individual      Service Location:   Video Visit     Session Start Time: 305pm  Session End Time: 405pm     Session Length: 53 - 60      Attendees: Patient    Visit Activities (Refresh list every visit): [facility] Only    Diagnostic Assessment Date: none on file  Treatment Plan Review Date: 9/7/2018  CGI-S: 4  CGI-I: 2    See Flowsheets for today's PHQ-9 and LETI-7 results  Previous PHQ-9:   PHQ-9 SCORE 2/1/2019 8/16/2019 2/7/2020   PHQ-9 Total Score MyChart - 2 (Minimal depression) -   PHQ-9 Total Score 3 2 1     Previous LETI-7:   LETI-7 SCORE 2/1/2019 8/16/2019 2/7/2020   Total Score - 3 (minimal anxiety) -   Total Score 2 3 2       RAYMON LEVEL:  No flowsheet data found.    DATA  Extended Session (60+ minutes): No  Interactive Complexity: No  Crisis: No  [facility] Patient: No    Treatment Objective(s) Addressed in This Session:  Target Behavior(s): disease management/lifestyle changes      Patient  will experience a reduction in depressed mood, will develop more effective coping skills to manage depressive symptoms and will develop healthy cognitive patterns and beliefs, will experience a reduction in anxiety and will develop more effective coping skills to manage anxiety symptoms and will develop coping/problem-solving skills to facilitate more adaptive adjustment    Current Stressors / Issues:  Telemedicine Visit: The patient's condition can be safely assessed and treated via synchronous audio and visual telemedicine encounter.      Reason for Telemedicine Visit: Coronavirus concerns    Originating Site (Patient Location): Patient's home    Distant Site (Provider Location[place] [facility] [facility] [place]    Consent:  The patient/guardian has verbally consented to: the potential risks and benefits of telemedicine (video visit) versus in person care;  bill my insurance or make self-payment for services provided; and responsibility for payment of non-covered services.     Mode of Communication:  Video Conference via [employer]    As the provider I attest to compliance with applicable laws and regulations related to telemedicine.    [facility] met with Neo to provide psychotherapy support regarding depression, life adjustment, anxiety. Continued our conversation about his current stressors, hx of depression, life transitions.    Discussed his adjustment to the COVID-19. His job is secure, he is doing fine with social/physical distancing, and is not dealing with excessive anxiety, so he feels okay overall. Dexter reports he may be moving to [place] for work in the next few months. Processed and explore his thoughts about this possibility.     Processed some of his recent thoughts about his relationships and his recent trip to [place] with the friend that had caused him some emotional upheaval at [facility]'s. He reports that his mood has been good - has been exercising more, drinking less.     Discussed his positive goals now that his negative mood issues have largely resolved. He remains off of previous mental thomas medications and feels good about this, he says. His mood is steadily positive and he feels he has developed resilience. He especially notes a change in his self-judgmental tendencies. He reports that he is curious instead, which has made a big change in how he feels about things, reacts to things.     This leads int a conversation about his experience of what we might call mindfulness. Explored some of these ideas, strains of research in psychology. Explored how this might be useful to explore further in his own life, and how he might explore various strains of thought, authors, ideas to learn more about this idea.    I affirmed the steps this patient has taken to address physical and behavioral health issues, and offered continued behavioral health services  or referral, now or in the future, as needed by the patient.    Progress on Treatment Objective(s) / Homework:  Satisfactory progress - ACTION (Actively working towards change); Intervened by reinforcing change plan / affirming steps taken    Psycho-education regarding mental health diagnoses and treatment options    Motivational Interviewing    Skills training    Explored specific skills useful to client in current situation    Skill areas include assertiveness, communication, conflict management, problem-solving, relaxation, etc.     Solution-Focused Therapy    Explored patterns in patient's behaviors and relationships and discussed options for new behaviors    Explored new options for problem-solving, communication, managing stress, etc.    Cognitive-behavioral Therapy    Discussed common cognitive distortions, identified them in patient's life    Explored ways to challenge, replace, and act against these cognitions    Explore behavioral changes that might benefit patient in improving mood and engage in meaningful activity    Acceptance and Commitment Therapy    Explored and identified important values in patient's life    Discussed ways to commit to behavioral activation around these values    Psychodynamic psychotherapy    Discussed patient's emotional dynamics and issues and how they impact behaviors    Explored patient's history of relationships and how they impact present behaviors    Explored how to work with and make changes in these schemas and patterns    Narrative Therapy    Explored the patient's story of their life from their perspective,     Explored alternate ways of understanding their experience, identifying exceptions, developing new themes    Interpersonal Psychotherapy    Explored patterns in relationships that are effective or ineffective at helping patient reach their goals, find satisfying experience.    Discussed new patterns or behaviors to engage in for improved social  functioning.    Behavioral Activation    Discussed steps patient can take to become more involved in meaningful activity    Identified barriers to these activities and explored possible solutions    Mindfulness-Based Strategies    Discussed skills based on development and application of mindfulness    Skills drawn from compassion-focused therapy, dialectical behavior therapy, mindfulness-based stress reduction, mindfulness-based cognitive therapy, etc.    Medication Review:  No problems reported to [facility] today.    Medication Compliance:  No problems reported to [facility] today.    Changes in Health Issues:  No problems reported to [facility] today.    Chemical Use Review:   Substance Use: Chemical use reviewed, no active concerns identified      Tobacco Use: No current tobacco use.      Assessment: Current Emotional / Mental Status (status of significant symptoms):  Risk status (Self / Other harm or suicidal ideation)  Patient denies a history of suicidal ideation, suicide attempts, self-injurious behavior, homicidal ideation, homicidal behavior and and other safety concerns  Patient denies current fears or concerns for personal safety.  Patient denies current or recent suicidal ideation or behaviors.  Patient denies current or recent homicidal ideation or behaviors.  Patient denies current or recent self injurious behavior or ideation.  Patient denies other safety concerns.  A safety and risk management plan has not been developed at this time, however patient was encouraged to call [facility] / [facility] should there be a change in any of these risk factors.    Appearance:   Appropriate   Eye Contact:   Good   Psychomotor Behavior: Normal   Attitude:   Cooperative   Orientation:   All  Speech   Rate / Production: Normal    Volume:  Normal   Mood:    Anxious  Depressed - fluctuates  Affect:    Appropriate   Thought Content:  Clear  Rumination   Thought Form:  Coherent  Logical   Insight:    Good     Diagnoses:  1. Recurrent major  depression in partial remission (H)    2. Anxiety      Collateral Reports Completed:  Will collaborate with care team as indicated during treatment    Plan: (Homework, other):  Patient was given information about behavioral services and encouraged to schedule a follow up appointment with the clinic [facility] as needed.  He was also given information about mental health symptoms and treatment options .  CD Recommendations: No indications of CD issues.  We agree to continue psychotherapy to address his depression, anxiety, life adjustment. GAGE Sears, [facility]    ______________________________________________________________________    [facility] Integrated Behavioral Health Treatment Plan    Client's Name: Neo Saeed  YOB: 1995    Date: 9/7/2018    DSM-V Diagnoses: 296.32 (F33.1) Major Depressive Disorder, Recurrent Episode, Moderate With anxious distress;  Psychosocial / Contextual Factors: transitions in life - college to work force, friends leaving, etc    Referral / Collaboration:  Will collaborate with care team as indicated during treatment.    Anticipated number of session or this episode of care: 12+      MeasurableTreatment Goal(s) related to diagnosis / functional impairment(s)  Goal 1:  Patient will experience a reduction in depressive and anxious symptoms, along with a corresponding increase in positive emotion and life satisfaction.    Objective #A: Patient will experience a reduction in depressed mood, will develop more effective coping skills to manage depressive symptoms, will develop healthy cognitive patterns and beliefs, will increase ability to function adaptively and will continue to take medications as prescribed / participate in supportive activities and services    Status: Continued - Date(s): 9/7/2018    Objective #B: Patient will experience a reduction in anxiety, will develop more effective coping skills to manage anxiety symptoms, will develop healthy cognitive patterns and  beliefs and will increase ability to function adaptively  Status: Continued - Date(s): 9/7/2018    Objective #C: Patient will develop better understanding of triggers and coping strategies to stabilize mood  Status: Continued - Date(s): 9/7/2018    Goal 2:  Patient will identify and increase engagement in valued activity, i.e. improving social connections/relationships, pursuing occupational goals or personally meaningful pursuits, exploration of meaning in life.     Objective #A: Patient will identify meaningful activity in social, occupational and  personal goals, and increase behavioral activation around these goals   Status: Continued - Date(s): 9/7/2018    Objective #B: Patient will address relationship difficulties in a more adaptive manner  Status: Continued - Date(s): 9/7/2018    Objective #C: Patient will develop coping/problem-solving skills to facilitate more adaptive adjustment and will effectively address problems that interfere with adaptive functioning  Status: Continued - Date(s): 9/7/2018    Possible Therapeutic Intervention(s)  Psycho-education regarding mental health diagnoses and treatment options    Eye-Movement Desensitization and Reprocessing Therapy    Clinical Hypnosis    Skills training    Explore skills useful to client in current situation.    Skills include assertiveness, communication, conflict management, problem-solving, relaxation, etc.    Solution-Focused Therapy    Explore patterns in patient's relationships and discuss options for new behaviors.    Explore patterns in patient's actions and choices and discuss options for new behaviors.    Cognitive-behavioral Therapy    Discuss common cognitive distortions, identify them in patient's life.    Explore ways to challenge, replace, and act against these cognitions.    Acceptance and Commitment Therapy    Explore and identify important values in patient's life.    Discuss ways to commit to behavioral activation around these  values.    Psychodynamic psychotherapy    Discuss patient's emotional dynamics and issues and how they impact behaviors.    Explore patient's history of relationships and how they impact present behaviors.    Explore how to work with and make changes in these schemas and patterns.    Narrative Therapy    Explore the patient's story of his/her life from his/her perspective.    Explore alternate ways of understanding their experience, identifying exceptions, developing new themes.    Interpersonal Psychotherapy    Explore patterns in relationships that are effective or ineffective at helping patient reach their goals, find satisfying experience.    Discuss new patterns or behaviors to engage in for improved social functioning.    Behavioral Activation    Discuss steps patient can take to become more involved in meaningful activity.    Identify barriers to these activities and explore possible solutions.    Mindfulness-Based Strategies    Discuss skills based on development and application of mindfulness.    Skills drawn from compassion-focused therapy, dialectical behavior therapy, mindfulness-based stress reduction, mindfulness-based cognitive therapy, etc.      We have developed these goals together during our work to this point. Patient has assisted in the development of these goals and has agreed to this treatment plan.       Huy Corley, GAGE, [facility]  September 7, 2018

## 2020-05-01 ENCOUNTER — VIRTUAL VISIT (OUTPATIENT)
Dept: BEHAVIORAL HEALTH | Facility: CLINIC | Age: 25
End: 2020-05-01
Payer: COMMERCIAL

## 2020-05-01 DIAGNOSIS — F33.41 RECURRENT MAJOR DEPRESSION IN PARTIAL REMISSION (H): Primary | ICD-10-CM

## 2020-05-01 NOTE — PROGRESS NOTES
[facility]: Integrated Behavioral Health  Integrated Behavioral Health Services   Brief Diagnostic Assessment    PATIENT'S NAME: Neo Saeed  MRN:   [id number]  :   1995  DATE OF SERVICE: May 1, 2020  SERVICE LOCATION: Video Visit  Visit Activities: [facility] Only    Telemedicine Visit: The patient's condition can be safely assessed and treated via synchronous audio and visual telemedicine encounter.      Reason for Telemedicine Visit: Covid-19 situation    Originating Site (Patient Location): Patient's home    Distant Site (Provider Location): Provider Remote Setting    Consent:  The patient/guardian has verbally consented to: the potential risks and benefits of telemedicine (video visit) versus in person care; bill my insurance or make self-payment for services provided; and responsibility for payment of non-covered services.     Mode of Communication:  Video Conference via [employer]    As the provider I attest to compliance with applicable laws and regulations related to telemedicine.    Start Time: 3pm  End Time: 4pm    Identifying Information:  Patient is a 24 year old year old, , single male.  Patient attended the session alone.      We have been meeting for some time, and this DA is based on our hx together, as well as today's meeting.    Referral:  Rima Sandoval NP, in the [facility] for support with depression and anxiety.     Patient's Statement of Presenting Concern & History of Presenting Concern:  Patient reports the following reason(s) for seeking an assessment at this time: ongoing support for a hx of depression, anxiety, and life adjustment/stressors.  Patient stated that his symptoms have resulted in the following functional impairments: relationship(s) and self-care    Per Rmia Sandoval's note, dated 2018:  Neo Saeed is a 22 year old male with a history of depression and anxiety who presents for evaluation of thyroid function and  "follow up on depression. His father has newly found hypothyroidism and he would like to be evaluated. Over the past two years the patient has been experiencing weight gain in spite of exercising three times a week and fatigue. He denies hair loss or skin changes. His bowel movements have never been normal, diarrhea is a long standing issue. +difficulty with temperature control. He denies feeling a rapid heart beat or palpitations.     He feels Prozac is not as effective as it has been in the past. He also tried to discontinue Prozac at one point which did not go well and he experienced withdrawal symptoms. He has difficulty staying asleep, he wakes around 3:00 and is not able to go back to sleep. His mother had a similar issue and was changed from Prozac to Wellbutrin with success. He travels often for work and hasn't seen a psychologist or therapist for a while...     He agreed to consider seeing a therapist as his schedule allows, he often travels for work. Reviewed that medication is most effective when taken in conjunction with psychotherapy. Since he feels prozac is no longer working we agreed to wean off while adding Wellbutrin. Reviewed instructions for this.      Dexter has been on medication since his sophomore year of high school. He has tried to go off them several times but he finds it \"has not worked out so far\" - symptoms return, he says. We have worked with his model of depression and he has since gone off medication with some success. His original model was a largely chemical imbalance model and he has increasingly come to see the importance of personal factors - thoughts, values, behaviors, etc.     Discussed hx of his symptoms: he has had tearfulness, sadness, sense of irritability, \"breakdown symptoms\" when he is experiencing a depressive episode. He still has ups and downs, but things have improved a lot, he says.      He is from [place], [place], and reports that his family is \"littered\" with " anxiety and depression, bipolar disorder.     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  He graduated in December 2017 and started in January 2018.       He denies any suicidal ideation, intent, plans or attempts, now or in his history.     Social History:  Current living situation: with severla roommates   Socioeconomic status and needs: enjoys job and friends.  Patient's current significant relationships include: friends, family.   Patient reported having no children.   Patient identified some stable and meaningful social connections.   Highest education level was college graduate.   Patient is currently employed full time.    Mental Health History:  Patient is not currently receiving any mental health services besides psychotherapy with me.  PHQ-9 SCORE 2/1/2019 8/16/2019 2/7/2020   PHQ-9 Total Score MyChart - 2 (Minimal depression) -   PHQ-9 Total Score 3 2 1     LETI-7 SCORE 2/1/2019 8/16/2019 2/7/2020   Total Score - 3 (minimal anxiety) -   Total Score 2 3 2     Chemical Health History:  Patient is not currently receiving any chemical dependency treatment. Patient reports no problems as a result of their drinking / drug use.    Cage-AID score is: 0.  Based on Cage-Aid score and clinical interview there  are not indications of drug or alcohol abuse.      Discussed the general effects of drugs and alcohol on health and well-being.    Significant Losses / Trauma / Abuse / Neglect Issues:  There are no indications or report of: significant losses, trauma, abuse or neglect.    Issues of possible neglect are not present.    Medical History:   Patient Active Problem List   Diagnosis     H/O pilonidal cyst     Open wnd of buttock, unspecified laterality, initial encounter     Anxiety     Major depressive disorder, recurrent episode, mild (H)     Overweight (BMI 25.0-29.9)     Mixed hyperlipidemia     Medication Review:  Current Outpatient Medications   Medication      buPROPion (WELLBUTRIN SR) 100 MG 12 hr tablet     cephALEXin (KEFLEX) 500 MG capsule     FEXOFENADINE HCL PO     No current facility-administered medications for this visit.      Patient was provided recommendation to follow-up with physician.    Mental Status Assessment:  Appearance:   Appropriate   Eye Contact:   Good   Psychomotor Behavior: Normal   Attitude:   Cooperative   Orientation:   All  Speech   Rate / Production: Normal    Volume:  Normal   Mood:    Anxious  Sad  - episodic, improved  Affect:    Appropriate   Thought Content:  Clear  Rumination   Thought Form:  Coherent  Logical   Insight:    Good       Safety Assessment:  [place] Suicide Severity Rating Scale (Short Version)  [place] Suicide Severity Rating (Short Version) 5/1/2020   Over the past 2 weeks have you felt down, depressed, or hopeless? no   Over the past 2 weeks have you had thoughts of killing yourself? no   Have you ever attempted to kill yourself? no     Patient denies a history of suicidal ideation, suicide attempts, self-injurious behavior, homicidal ideation, homicidal behavior and and other safety concerns  Patient denies current or recent suicidal ideation or behaviors.  Patient denies current or recent homicidal ideation or behaviors.  Patient denies current or recent self injurious behavior or ideation.  Patient denies other safety concerns.  Patient reports there are no firearms in the house  Protective Factors Sense of responsibility to family, Life Satisfaction, Reality testing ability, Positive coping skills, Positive problem-solving skills, Positive social support and Positive therapeutic releationships   Risk Factors None present    Plan for Safety and Risk Management:  A safety and risk management plan has not been developed at this time, however patient was encouraged to [place] [facility] should there be a change in any of these risk factors.    Patient's Strengths and Limitations:  Patient identified the  following strengths or resources that will help him succeed in counseling: commitment to health and well being, exercise routine, friends / good social support, family support, insight, intelligence, motivation, sense of humor, strong social skills and work ethic. Patient identified the following supports: family and friends. Things that may interfere with the patient's success in counseling include:hx of depression.    Diagnostic Criteria:  A) Recurrent episode(s) - symptoms have been present during the same 2-week period and represent a change from previous functioning 5 or more symptoms (required for diagnosis)   - Depressed mood. Note: In children and adolescents, can be irritable mood.     - Diminished interest or pleasure in all, or almost all, activities.    - Fatigue or loss of energy.    - Feelings of worthlessness or inappropriate and excessive guilt.    - Diminished ability to think or concentrate, or indecisiveness.   B) The symptoms cause clinically significant distress or impairment in social, occupational, or other important areas of functioning  C) The episode is not attributable to the physiological effects of a substance or to another medical condition  D) The occurence of major depressive episode is not better explained by other thought / psychotic disorders  E) There has never been a manic episode or hypomanic episode    DSM5 Diagnoses: (Sustained by DSM5 Criteria Listed Above)  Diagnoses: 296.35 (F33.41)  Major Depressive Disorder, Recurrent Episode, In partial remission With anxious distress  Psychosocial & Contextual Factors: transitions in life - college to work force, friends leaving, etc    ______________________________________________________________________     [facility]     Client's Name: Neo Saeed                    YOB: 1995     Date: 5/1/2020     DSM-V Diagnoses: 296.32 (F33.1) Major Depressive Disorder, Recurrent Episode,  Moderate With anxious distress;  Psychosocial / Contextual Factors: transitions in life - college to work force, friends leaving, etc     Referral / Collaboration:  Will collaborate with care team as indicated during treatment.     Anticipated number of session or this episode of care: 12+        MeasurableTreatment Goal(s) related to diagnosis / functional impairment(s)  Goal 1:  Patient will experience a reduction in depressive and anxious symptoms, along with a corresponding increase in positive emotion and life satisfaction.     Objective #A: Patient will experience a reduction in depressed mood, will develop more effective coping skills to manage depressive symptoms, will develop healthy cognitive patterns and beliefs, will increase ability to function adaptively and will continue to take medications as prescribed / participate in supportive activities and services               Status: Continued - Date(s): 5/1/2020     Objective #B: Patient will experience a reduction in anxiety, will develop more effective coping skills to manage anxiety symptoms, will develop healthy cognitive patterns and beliefs and will increase ability to function adaptively  Status: Continued - Date(s): 5/1/2020     Objective #C: Patient will develop better understanding of triggers and coping strategies to stabilize mood  Status: Continued - Date(s): 5/1/2020     Goal 2:  Patient will identify and increase engagement in valued activity, i.e. improving social connections/relationships, pursuing occupational goals or personally meaningful pursuits, exploration of meaning in life.     Objective #A: Patient will identify meaningful activity in social, occupational and   personal goals, and increase behavioral activation around these goals           Status: Continued - Date(s): 5/1/2020     Objective #B: Patient will address relationship difficulties in a more adaptive manner  Status: Continued - Date(s): 5/1/2020     Objective #C: Patient  will develop coping/problem-solving skills to facilitate more adaptive adjustment and will effectively address problems that interfere with adaptive functioning  Status: Continued - Date(s): 5/1/2020     Possible Therapeutic Intervention(s)  Psycho-education regarding mental health diagnoses and treatment options     Eye-Movement Desensitization and Reprocessing Therapy     Clinical Hypnosis     Skills training    Explore skills useful to client in current situation.    Skills include assertiveness, communication, conflict management, problem-solving, relaxation, etc.     Solution-Focused Therapy    Explore patterns in patient's relationships and discuss options for new behaviors.    Explore patterns in patient's actions and choices and discuss options for new behaviors.     Cognitive-behavioral Therapy    Discuss common cognitive distortions, identify them in patient's life.    Explore ways to challenge, replace, and act against these cognitions.     Acceptance and Commitment Therapy    Explore and identify important values in patient's life.    Discuss ways to commit to behavioral activation around these values.     Psychodynamic psychotherapy    Discuss patient's emotional dynamics and issues and how they impact behaviors.    Explore patient's history of relationships and how they impact present behaviors.    Explore how to work with and make changes in these schemas and patterns.     Narrative Therapy    Explore the patient's story of his/her life from his/her perspective.    Explore alternate ways of understanding their experience, identifying exceptions, developing new themes.     Interpersonal Psychotherapy    Explore patterns in relationships that are effective or ineffective at helping patient reach their goals, find satisfying experience.    Discuss new patterns or behaviors to engage in for improved social functioning.     Behavioral Activation    Discuss steps patient can take to become more involved in  meaningful activity.    Identify barriers to these activities and explore possible solutions.     Mindfulness-Based Strategies    Discuss skills based on development and application of mindfulness.    Skills drawn from compassion-focused therapy, dialectical behavior therapy, mindfulness-based stress reduction, mindfulness-based cognitive therapy, etc.        We have developed these goals together during our work to this point. Patient has assisted in the development of these goals and has agreed to this treatment plan.         GAGE Ulloa, [facility]             5/1/2020

## 2020-07-02 ENCOUNTER — VIRTUAL VISIT (OUTPATIENT)
Dept: BEHAVIORAL HEALTH | Facility: CLINIC | Age: 25
End: 2020-07-02
Payer: COMMERCIAL

## 2020-07-02 DIAGNOSIS — F33.41 RECURRENT MAJOR DEPRESSION IN PARTIAL REMISSION (H): Primary | ICD-10-CM

## 2020-07-02 DIAGNOSIS — F41.9 ANXIETY: ICD-10-CM

## 2020-07-02 NOTE — PROGRESS NOTES
[facility] (PCC referral)  July 2, 2020  Video Visit    Behavioral Health Clinician Progress Note    Patient Name: Neo Saeed              Service Type:  Individual      Service Location:   Video Visit     Session Start Time: 315pm  Session End Time: 358pm     Session Length: 38 - 52      Attendees: Patient    Visit Activities (Refresh list every visit): [facility] Only    Diagnostic Assessment Date: 5/1/2020  Treatment Plan Review Date: 5/1/2020  CGI-S: 4  CGI-I: 2    See Flowsheets for today's PHQ-9 and LETI-7 results  Previous PHQ-9:   PHQ-9 SCORE 2/1/2019 8/16/2019 2/7/2020   PHQ-9 Total Score MyChart - 2 (Minimal depression) -   PHQ-9 Total Score 3 2 1     Previous LETI-7:   LETI-7 SCORE 2/1/2019 8/16/2019 2/7/2020   Total Score - 3 (minimal anxiety) -   Total Score 2 3 2       RAYMON LEVEL:  No flowsheet data found.    DATA  Extended Session (60+ minutes): No  Interactive Complexity: No  Crisis: No  [facility] Patient: No    Treatment Objective(s) Addressed in This Session:  Target Behavior(s): disease management/lifestyle changes      Patient  will experience a reduction in depressed mood, will develop more effective coping skills to manage depressive symptoms and will develop healthy cognitive patterns and beliefs, will experience a reduction in anxiety and will develop more effective coping skills to manage anxiety symptoms and will develop coping/problem-solving skills to facilitate more adaptive adjustment    Current Stressors / Issues:  Telemedicine Visit: The patient's condition can be safely assessed and treated via synchronous audio and visual telemedicine encounter.      Reason for Telemedicine Visit: Coronavirus concerns    Originating Site (Patient Location): Patient's home    Distant [place] [facility] [facility] [place]ent:  The patient/guardian has verbally consented to: the potential risks and benefits of telemedicine (video visit) versus in person care; bill  "my insurance or make self-payment for services provided; and responsibility for payment of non-covered services.     Mode of Communication:  Video Conference via [email or web address]    As the provider I attest to compliance with applicable laws and regulations related to telemedicine.    [facility] met with Neo to provide psychotherapy support regarding depression, life adjustment, anxiety. Continued our conversation about his current stressors, hx of depression, life transitions.    Dexter reports that he is moving home to [place], [place] and will be living at his parents' house until Covid-19 passes by. He is still hoping to consider a move to [place] after all of this for work, as well, but will have to wait and see. He is wondering if he might have ADHD and will look into dx and treatment for this at some point, he says.    Dexter reports that it is hard to stay motivated at work, but it is fine overall. There has been some down-sizing at his company, but he feels that he is okay although this is kind of nerve-wracking, he says. Processed his thoughts and feelings about this and the overall stress of the Covid-19 pandemic.     We also discussed his experience of the living near [facility] during the protests after the death of Zhao Lewis. Explored his thoughts about the overall social and political situation in the country, and exploring how to live with and manage the stress. He reports he has deleted his social media to try to find more balance in his life.     The ongoing and accumulating effects of the pandemic and other stresses have taken their toll, he reports. He has more ups and downs, he says, but does find his way back to basically okay, he says. Identified and amplified his strengths, how he has learned to manage emotional regulation more effectively during the time we have worked together. Discussed the notion of a \"pandemic burnout.\" Explored resources in mindfulness and ACT to find ways to deal with the unique " situation of the world at present.    I affirmed the steps this patient has taken to address physical and behavioral health issues, and offered continued behavioral health services or referral, now or in the future, as needed by the patient.    Progress on Treatment Objective(s) / Homework:  Satisfactory progress - ACTION (Actively working towards change); Intervened by reinforcing change plan / affirming steps taken    Psycho-education regarding mental health diagnoses and treatment options    Motivational Interviewing    Skills training    Explored specific skills useful to client in current situation    Skill areas include assertiveness, communication, conflict management, problem-solving, relaxation, etc.     Solution-Focused Therapy    Explored patterns in patient's behaviors and relationships and discussed options for new behaviors    Explored new options for problem-solving, communication, managing stress, etc.    Cognitive-behavioral Therapy    Discussed common cognitive distortions, identified them in patient's life    Explored ways to challenge, replace, and act against these cognitions    Explore behavioral changes that might benefit patient in improving mood and engage in meaningful activity    Acceptance and Commitment Therapy    Explored and identified important values in patient's life    Discussed ways to commit to behavioral activation around these values    Psychodynamic psychotherapy    Discussed patient's emotional dynamics and issues and how they impact behaviors    Explored patient's history of relationships and how they impact present behaviors    Explored how to work with and make changes in these schemas and patterns    Narrative Therapy    Explored the patient's story of their life from their perspective,     Explored alternate ways of understanding their experience, identifying exceptions, developing new themes    Interpersonal Psychotherapy    Explored patterns in relationships that are  effective or ineffective at helping patient reach their goals, find satisfying experience.    Discussed new patterns or behaviors to engage in for improved social functioning.    Behavioral Activation    Discussed steps patient can take to become more involved in meaningful activity    Identified barriers to these activities and explored possible solutions    Mindfulness-Based Strategies    Discussed skills based on development and application of mindfulness    Skills drawn from compassion-focused therapy, dialectical behavior therapy, mindfulness-based stress reduction, mindfulness-based cognitive therapy, etc.    Medication Review:  No problems reported to [facility] today.    Medication Compliance:  No problems reported to [facility] today.    Changes in Health Issues:  No problems reported to [facility] today.    Chemical Use Review:   Substance Use: Chemical use reviewed, no active concerns identified      Tobacco Use: No current tobacco use.      Assessment: Current Emotional / Mental Status (status of significant symptoms):  Risk status (Self / Other harm or suicidal ideation)  Patient denies a history of suicidal ideation, suicide attempts, self-injurious behavior, homicidal ideation, homicidal behavior and and other safety concerns  Patient denies current fears or concerns for personal safety.  Patient denies current or recent suicidal ideation or behaviors.  Patient denies current or recent homicidal ideation or behaviors.  Patient denies current or recent self injurious behavior or ideation.  Patient denies other safety concerns.  A safety and risk management plan has not been developed at this time, however patient was encouraged to call [place] [phone] should there be a change in any of these risk factors.    Appearance:   Appropriate   Eye Contact:   Good   Psychomotor Behavior: Normal   Attitude:   Cooperative   Orientation:   All  Speech   Rate / Production: Normal    Volume:  Normal   Mood:    Anxious  Depressed -  fluctuates  Affect:    Appropriate   Thought Content:  Clear  Rumination   Thought Form:  Coherent  Logical   Insight:    Good     Diagnoses:  1. Recurrent major depression in partial remission (H)    2. Anxiety      Collateral Reports Completed:  Will collaborate with care team as indicated during treatment    Plan: (Homework, other):  Patient was given information about behavioral services and encouraged to schedule a follow up appointment with the clinic [facility] as needed.  He was also given information about mental health symptoms and treatment options .  CD Recommendations: No indications of CD issues.  We agree to continue psychotherapy to address his depression, anxiety, life adjustment. 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     Anticipated number of session or this episode of care: 12+        MeasurableTreatment Goal(s) related to diagnosis / functional impairment(s)  Goal 1:  Patient will experience a reduction in depressive and anxious symptoms, along with a corresponding increase in positive emotion and life satisfaction.     Objective #A: Patient will experience a reduction in depressed mood, will develop more effective coping skills to manage depressive symptoms, will develop healthy cognitive patterns and beliefs, will increase ability to function adaptively and will continue to take medications as prescribed / participate in supportive activities and services                Status: Continued - Date(s): 5/1/2020     Objective #B: Patient will experience a reduction in anxiety, will develop more effective coping skills to manage anxiety symptoms, will develop healthy cognitive patterns and beliefs and will increase ability to function adaptively  Status: Continued - Date(s): 5/1/2020     Objective #C: Patient will develop better understanding of triggers and coping strategies to stabilize mood  Status: Continued - Date(s): 5/1/2020     Goal 2:  Patient will identify and increase engagement in valued activity, i.e. improving social connections/relationships, pursuing occupational goals or personally meaningful pursuits, exploration of meaning in life.     Objective #A: Patient will identify meaningful activity in social, occupational and   personal goals, and increase behavioral activation around these goals           Status: Continued - Date(s): 5/1/2020     Objective #B: Patient will address relationship difficulties in a more adaptive manner  Status: Continued - Date(s): 5/1/2020     Objective #C: Patient will develop coping/problem-solving skills to facilitate more adaptive adjustment and will effectively address problems that interfere with adaptive functioning  Status: Continued - Date(s): 5/1/2020     Possible Therapeutic Intervention(s)  Psycho-education regarding mental health diagnoses and treatment options     Eye-Movement Desensitization and Reprocessing Therapy     Clinical Hypnosis     Skills training    Explore skills useful to client in current situation.    Skills include assertiveness, communication, conflict management, problem-solving, relaxation, etc.     Solution-Focused Therapy    Explore patterns in patient's relationships and discuss options for new behaviors.    Explore patterns in patient's actions and choices and discuss options for new behaviors.     Cognitive-behavioral Therapy    Discuss common cognitive distortions, identify them in patient's  life.    Explore ways to challenge, replace, and act against these cognitions.     Acceptance and Commitment Therapy    Explore and identify important values in patient's life.    Discuss ways to commit to behavioral activation around these values.     Psychodynamic psychotherapy    Discuss patient's emotional dynamics and issues and how they impact behaviors.    Explore patient's history of relationships and how they impact present behaviors.    Explore how to work with and make changes in these schemas and patterns.     Narrative Therapy    Explore the patient's story of his/her life from his/her perspective.    Explore alternate ways of understanding their experience, identifying exceptions, developing new themes.     Interpersonal Psychotherapy    Explore patterns in relationships that are effective or ineffective at helping patient reach their goals, find satisfying experience.    Discuss new patterns or behaviors to engage in for improved social functioning.     Behavioral Activation    Discuss steps patient can take to become more involved in meaningful activity.    Identify barriers to these activities and explore possible solutions.     Mindfulness-Based Strategies    Discuss skills based on development and application of mindfulness.    Skills drawn from compassion-focused therapy, dialectical behavior therapy, mindfulness-based stress reduction, mindfulness-based cognitive therapy, etc.        We have developed these goals together during our work to this point. Patient has assisted in the development of these goals and has agreed to this treatment plan.         GAGE Ulloa, [facility]             5/1/2020

## 2020-12-20 ENCOUNTER — HEALTH MAINTENANCE LETTER (OUTPATIENT)
Age: 25
End: 2020-12-20

## 2021-02-17 ENCOUNTER — VIRTUAL VISIT (OUTPATIENT)
Dept: BEHAVIORAL HEALTH | Facility: CLINIC | Age: 26
End: 2021-02-17
Payer: COMMERCIAL

## 2021-02-17 DIAGNOSIS — F41.9 ANXIETY: ICD-10-CM

## 2021-02-17 DIAGNOSIS — F33.41 RECURRENT MAJOR DEPRESSION IN PARTIAL REMISSION (H): Primary | ICD-10-CM

## 2021-02-17 PROCEDURE — 90837 PSYTX W PT 60 MINUTES: CPT | Mod: GT | Performed by: MARRIAGE & FAMILY THERAPIST

## 2021-02-17 NOTE — PROGRESS NOTES
[facility] (PCC referral)  February 17, 2021  Video Visit    Behavioral Health Clinician Progress Note    Patient Name: Neo Saeed              Service Type:  Individual      Service Location:   Video Visit     Session Start Time: 1005am  Session End Time: 11pm     Session Length: 38 - 52      Attendees: Patient    Visit Activities (Refresh list every visit): [facility] Only    Diagnostic Assessment Date: 5/1/2020  Treatment Plan Review Date: 5/1/2020  CGI-S: 4  CGI-I: 2    See Flowsheets for today's PHQ-9 and LETI-7 results  Previous PHQ-9:   PHQ-9 SCORE 2/1/2019 8/16/2019 2/7/2020   PHQ-9 Total Score MyChart - 2 (Minimal depression) -   PHQ-9 Total Score 3 2 1     Previous LETI-7:   LETI-7 SCORE 2/1/2019 8/16/2019 2/7/2020   Total Score - 3 (minimal anxiety) -   Total Score 2 3 2       RAYMON LEVEL:  No flowsheet data found.    DATA  Extended Session (60+ minutes): No  Interactive Complexity: No  Crisis: No  formerly [facility] Patient: No    Treatment Objective(s) Addressed in This Session:  Target Behavior(s): disease management/lifestyle changes      Patient  will experience a reduction in depressed mood, will develop more effective coping skills to manage depressive symptoms and will develop healthy cognitive patterns and beliefs, will experience a reduction in anxiety and will develop more effective coping skills to manage anxiety symptoms and will develop coping/problem-solving skills to facilitate more adaptive adjustment    Current Stressors / Issues:  Telemedicine Visit: The patient's condition can be safely assessed and treated via synchronous audio and visual telemedicine encounter.      Reason for Telemedicine Visit: Coronavirus concerns    Originating Site (Patient Location): Patient's home    Distant Site (Provider Location): [facility]: [facility]    Consent:  The patient/guardian has verbally consented to: the potential risks and benefits of telemedicine (video visit) versus in person care;  "bill my insurance or make self-payment for services provided; and responsibility for payment of non-covered services.     Mode of Communication:  Video Conference via Appointuit    As the provider I attest to compliance with applicable laws and regulations related to telemedicine.      [facility] met with Neo to provide psychotherapy support regarding some new stressors. He reports that since last October he has begun having nightmares - at first every few weeks, but with increasing frequency, to almost weekly or more often now. This disturbs his sleep quite a bit, causes concern and anxiety. He denies any notable depression or anxiety at first, but does identify the fall and winter as times that he find more difficult, emotionally. There is also an up-tick in anxiety and general insecurity.But he does say that he has had much more significant episodes of mood-related symptoms, and never had nightmares.    Discussed the content of his nightmares. He does not always remember them. He feels he wakes with a panic attack after very vivid imagery/actions of a \"psycholgical thriller nature.\" These involve being tortured, being tasked with finding his way through an unknown city, not direct violence but being in a very high stress situation. He feels they are very realistic, and wakes up not sure what has actually happened. He remembers them at first but quickly forgets the content.    He finds he is waking around 2-3 am with these nightmares, and then cannot sleep well after, so he is getting some feelings of sleep deprivation. He is finding that this is beginning to generalize to an anxiety about going to bed and trying to sleep.    Discussed referral to psychiatry (placed).     Encouraged an appointment with PCP to rule out any general health issues, as well. Encouraged a conversation about possible sleep studies.    We also agreed to begin meeting regularly again for therapy support aground these issues while we " determine the next steps.     Spent the rest of our session discussing his general experience of an uptick in stress/anxiety over the last six months. He reports he stayed with his family in [place] for awhile and paid off some debt, says this was a positive time. He is now back in the [facility] two weeks ago. He is now living alone, and will be working from home indefinitely - job is stable and good. He is generally considering a job change down the road.     He feels he has been having some part of himself show up - a more anxious, unsure part of himself. He is seeing this in a general sense of lack of confidence. It shows up in big and small things.     Encouraged journaling about the content of the dreams/nightmares he does remember - maybe keeping a notebook by the bed.  Encouraged a daily relaxation practice - he does exercise/run regularly. Sent a link for relaxation guided audio.     I affirmed the steps this patient has taken to address physical and behavioral health issues, and offered continued behavioral health services or referral, now or in the future, as needed by the patient.    Progress on Treatment Objective(s) / Homework:  Satisfactory progress - ACTION (Actively working towards change); Intervened by reinforcing change plan / affirming steps taken    Psycho-education regarding mental health diagnoses and treatment options    Motivational Interviewing    Skills training    Explored specific skills useful to client in current situation    Skill areas include assertiveness, communication, conflict management, problem-solving, relaxation, etc.     Solution-Focused Therapy    Explored patterns in patient's behaviors and relationships and discussed options for new behaviors    Explored new options for problem-solving, communication, managing stress, etc.    Cognitive-behavioral Therapy    Discussed common cognitive distortions, identified them in patient's life    Explored ways to challenge, replace,  and act against these cognitions    Explore behavioral changes that might benefit patient in improving mood and engage in meaningful activity    Acceptance and Commitment Therapy    Explored and identified important values in patient's life    Discussed ways to commit to behavioral activation around these values    Psychodynamic psychotherapy    Discussed patient's emotional dynamics and issues and how they impact behaviors    Explored patient's history of relationships and how they impact present behaviors    Explored how to work with and make changes in these schemas and patterns    Narrative Therapy    Explored the patient's story of their life from their perspective,     Explored alternate ways of understanding their experience, identifying exceptions, developing new themes    Interpersonal Psychotherapy    Explored patterns in relationships that are effective or ineffective at helping patient reach their goals, find satisfying experience.    Discussed new patterns or behaviors to engage in for improved social functioning.    Behavioral Activation    Discussed steps patient can take to become more involved in meaningful activity    Identified barriers to these activities and explored possible solutions    Mindfulness-Based Strategies    Discussed skills based on development and application of mindfulness    Skills drawn from compassion-focused therapy, dialectical behavior therapy, mindfulness-based stress reduction, mindfulness-based cognitive therapy, etc.    Medication Review:  No problems reported to [facility] today.    Medication Compliance:  No problems reported to [facility] today.    Changes in Health Issues:  No problems reported to [facility] today.    Chemical Use Review:   Substance Use: Chemical use reviewed, no active concerns identified      Tobacco Use: No current tobacco use.      Assessment: Current Emotional / Mental Status (status of significant symptoms):  Risk status (Self / Other harm or suicidal  ideation)  Patient denies a history of suicidal ideation, suicide attempts, self-injurious behavior, homicidal ideation, homicidal behavior and and other safety concerns  Patient denies current fears or concerns for personal safety.  Patient denies current or recent suicidal ideation or behaviors.  Patient denies current or recent homicidal ideation or behaviors.  Patient denies current or recent self injurious behavior or ideation.  Patient denies other safety concerns.  A safety and risk management plan has not been developed at this time, however patient was encouraged to call [place] [phone] should there be a change in any of these risk factors.    Appearance:   Appropriate   Eye Contact:   Good   Psychomotor Behavior: Normal   Attitude:   Cooperative   Orientation:   All  Speech   Rate / Production: Normal    Volume:  Normal   Mood:    Anxious  Depressed - fluctuates  Affect:    Appropriate   Thought Content:  Clear  Rumination   Thought Form:  Coherent  Logical   Insight:    Good     Diagnoses:  1. Recurrent major depression in partial remission (H)    2. Anxiety      Collateral Reports Completed:  Will collaborate with care team as indicated during treatment    Plan: (Homework, other):  Patient was given information about behavioral services and encouraged to schedule a follow up appointment with the clinic [facility] as needed.  He was also given information about mental health symptoms and treatment options .  CD Recommendations: No indications of CD issues.  We agree to continue psychotherapy to address his depression, anxiety, life adjustment. GAGE Sears, [facility]    ______________________________________________________________________     [facility] Treatment Plan     Client's Name: Neo Saeed                    YOB: 1995     Date: 5/1/2020     DSM-V Diagnoses: 296.32 (F33.1) Major Depressive Disorder, Recurrent Episode, Moderate With anxious  distress;  Psychosocial / Contextual Factors: transitions in life - college to work force, friends leaving, etc  CGI-S: 4  CGI-I: 2    Referral / Collaboration:  Will collaborate with care team as indicated during treatment.     Anticipated number of session or this episode of care: 12+        MeasurableTreatment Goal(s) related to diagnosis / functional impairment(s)  Goal 1:  Patient will experience a reduction in depressive and anxious symptoms, along with a corresponding increase in positive emotion and life satisfaction.     Objective #A: Patient will experience a reduction in depressed mood, will develop more effective coping skills to manage depressive symptoms, will develop healthy cognitive patterns and beliefs, will increase ability to function adaptively and will continue to take medications as prescribed / participate in supportive activities and services               Status: Continued - Date(s): 5/1/2020     Objective #B: Patient will experience a reduction in anxiety, will develop more effective coping skills to manage anxiety symptoms, will develop healthy cognitive patterns and beliefs and will increase ability to function adaptively  Status: Continued - Date(s): 5/1/2020     Objective #C: Patient will develop better understanding of triggers and coping strategies to stabilize mood  Status: Continued - Date(s): 5/1/2020     Goal 2:  Patient will identify and increase engagement in valued activity, i.e. improving social connections/relationships, pursuing occupational goals or personally meaningful pursuits, exploration of meaning in life.     Objective #A: Patient will identify meaningful activity in social, occupational and   personal goals, and increase behavioral activation around these goals           Status: Continued - Date(s): 5/1/2020     Objective #B: Patient will address relationship difficulties in a more adaptive manner  Status: Continued - Date(s): 5/1/2020     Objective #C: Patient  will develop coping/problem-solving skills to facilitate more adaptive adjustment and will effectively address problems that interfere with adaptive functioning  Status: Continued - Date(s): 5/1/2020     Possible Therapeutic Intervention(s)  Psycho-education regarding mental health diagnoses and treatment options     Eye-Movement Desensitization and Reprocessing Therapy     Clinical Hypnosis     Skills training    Explore skills useful to client in current situation.    Skills include assertiveness, communication, conflict management, problem-solving, relaxation, etc.     Solution-Focused Therapy    Explore patterns in patient's relationships and discuss options for new behaviors.    Explore patterns in patient's actions and choices and discuss options for new behaviors.     Cognitive-behavioral Therapy    Discuss common cognitive distortions, identify them in patient's life.    Explore ways to challenge, replace, and act against these cognitions.     Acceptance and Commitment Therapy    Explore and identify important values in patient's life.    Discuss ways to commit to behavioral activation around these values.     Psychodynamic psychotherapy    Discuss patient's emotional dynamics and issues and how they impact behaviors.    Explore patient's history of relationships and how they impact present behaviors.    Explore how to work with and make changes in these schemas and patterns.     Narrative Therapy    Explore the patient's story of his/her life from his/her perspective.    Explore alternate ways of understanding their experience, identifying exceptions, developing new themes.     Interpersonal Psychotherapy    Explore patterns in relationships that are effective or ineffective at helping patient reach their goals, find satisfying experience.    Discuss new patterns or behaviors to engage in for improved social functioning.     Behavioral Activation    Discuss steps patient can take to become more involved in  meaningful activity.    Identify barriers to these activities and explore possible solutions.     Mindfulness-Based Strategies    Discuss skills based on development and application of mindfulness.    Skills drawn from compassion-focused therapy, dialectical behavior therapy, mindfulness-based stress reduction, mindfulness-based cognitive therapy, etc.        We have developed these goals together during our work to this point. Patient has assisted in the development of these goals and has agreed to this treatment plan.         GAGE Ulloa, [facility]             5/1/2020

## 2021-02-23 ENCOUNTER — VIRTUAL VISIT (OUTPATIENT)
Dept: BEHAVIORAL HEALTH | Facility: CLINIC | Age: 26
End: 2021-02-23
Payer: COMMERCIAL

## 2021-02-23 DIAGNOSIS — F33.41 RECURRENT MAJOR DEPRESSION IN PARTIAL REMISSION (H): Primary | ICD-10-CM

## 2021-02-23 DIAGNOSIS — F41.9 ANXIETY: ICD-10-CM

## 2021-02-23 PROCEDURE — 90834 PSYTX W PT 45 MINUTES: CPT | Mod: GT | Performed by: MARRIAGE & FAMILY THERAPIST

## 2021-02-23 NOTE — PROGRESS NOTES
[facility] (PCC referral)  February 23, 2021  Behavioral Health Clinician Progress Note    Patient Name: Neo Saeed              Service Type:  Individual      Service Location:   Video Visit     Session Start Time: 1120am  Session End Time: 1210pm     Session Length: 38 - 52      Attendees: Patient    Visit Activities (Refresh list every visit): [facility] [place] Only    Diagnostic Assessment Date: 5/1/2020  Treatment Plan Review Date: 5/1/2020  CGI-S: 4  CGI-I: 2    See Flowsheets for today's PHQ-9 and LETI-7 results  Previous PHQ-9:   PHQ-9 SCORE 2/1/2019 8/16/2019 2/7/2020   PHQ-9 Total Score MyChart - 2 (Minimal depression) -   PHQ-9 Total Score 3 2 1     Previous LETI-7:   LETI-7 SCORE 2/1/2019 8/16/2019 2/7/2020   Total Score - 3 (minimal anxiety) -   Total Score 2 3 2       RAYMON LEVEL:  No flowsheet data found.    DATA  Extended Session (60+ minutes): No  Interactive Complexity: No  Crisis: No  [facility] Patient: No    Treatment Objective(s) Addressed in This Session:  Target Behavior(s): disease management/lifestyle changes      Patient  will experience a reduction in depressed mood, will develop more effective coping skills to manage depressive symptoms and will develop healthy cognitive patterns and beliefs, will experience a reduction in anxiety and will develop more effective coping skills to manage anxiety symptoms and will develop coping/problem-solving skills to facilitate more adaptive adjustment    Current Stressors / Issues:  Telemedicine Visit: The patient's condition can be safely assessed and treated via synchronous audio and visual telemedicine encounter.      Reason for Telemedicine Visit: Coronavirus concerns    Originating Site (Patient Location): Patient's home    Distant Site (Provider Location): [facility] [place]    Consent:  The patient/guardian has verbally consented to: the potential risks and benefits of telemedicine (video visit) versus in person care; bill my  insurance or make self-payment for services provided; and responsibility for payment of non-covered services.     Mode of Communication:  Video Conference via mYwindow    As the provider I attest to compliance with applicable laws and regulations related to telemedicine.      [facility] met with Neo to provide psychotherapy support regarding life stress.    Dexter and I spent this session processing the information and ideas that we have begun exploring around his recent nightmares. He reports that he has not had any nightmares since we last talked. He has noted some more anxiety in his daily life - almost triggered panic attacks, but onto due to any particular trigger that he can identify. He is interested in exploring medication to address this, and will be seeing Dr Cartwright in the [facility] this week and they will discuss it.    We did spend some time identifying some of the possible issues that might ba at work in his life presently that could be accumulating stressors - his friend in town, moving to a new house and living alone, winter-time, the pandemic, etc - and that could be causing a generalized stress, maybe resulting in the nightmares. Some friends have lately discussed with him the notion that he is perhaps kind of lonely. We explored this idea and other related ideas at some length today.     I affirmed the steps this patient has taken to address physical and behavioral health issues, and offered continued behavioral health services or referral, now or in the future, as needed by the patient.    Progress on Treatment Objective(s) / Homework:  Satisfactory progress - ACTION (Actively working towards change); Intervened by reinforcing change plan / affirming steps taken    Psycho-education regarding mental health diagnoses and treatment options    Motivational Interviewing    Skills training    Explored specific skills useful to client in current situation    Skill areas include assertiveness, communication,  conflict management, problem-solving, relaxation, etc.     Solution-Focused Therapy    Explored patterns in patient's behaviors and relationships and discussed options for new behaviors    Explored new options for problem-solving, communication, managing stress, etc.    Cognitive-behavioral Therapy    Discussed common cognitive distortions, identified them in patient's life    Explored ways to challenge, replace, and act against these cognitions    Explore behavioral changes that might benefit patient in improving mood and engage in meaningful activity    Acceptance and Commitment Therapy    Explored and identified important values in patient's life    Discussed ways to commit to behavioral activation around these values    Psychodynamic psychotherapy    Discussed patient's emotional dynamics and issues and how they impact behaviors    Explored patient's history of relationships and how they impact present behaviors    Explored how to work with and make changes in these schemas and patterns    Narrative Therapy    Explored the patient's story of their life from their perspective,     Explored alternate ways of understanding their experience, identifying exceptions, developing new themes    Interpersonal Psychotherapy    Explored patterns in relationships that are effective or ineffective at helping patient reach their goals, find satisfying experience.    Discussed new patterns or behaviors to engage in for improved social functioning.    Behavioral Activation    Discussed steps patient can take to become more involved in meaningful activity    Identified barriers to these activities and explored possible solutions    Mindfulness-Based Strategies    Discussed skills based on development and application of mindfulness    Skills drawn from compassion-focused therapy, dialectical behavior therapy, mindfulness-based stress reduction, mindfulness-based cognitive therapy, etc.    Medication Review:  No problems reported  to [facility] today.    Medication Compliance:  No problems reported to [facility] today.    Changes in Health Issues:  No problems reported to [facility] today.    Chemical Use Review:   Substance Use: Chemical use reviewed, no active concerns identified      Tobacco Use: No current tobacco use.      Assessment: Current Emotional / Mental Status (status of significant symptoms):  Risk status (Self / Other harm or suicidal ideation)  Patient denies a history of suicidal ideation, suicide attempts, self-injurious behavior, homicidal ideation, homicidal behavior and and other safety concerns  Patient denies current fears or concerns for personal safety.  Patient denies current or recent suicidal ideation or behaviors.  Patient denies current or recent homicidal ideation or behaviors.  Patient denies current or recent self injurious behavior or ideation.  Patient denies other safety concerns.  A safety and risk management plan has not been developed at this time, however patient was encouraged to call [place] [phone] should there be a change in any of these risk factors.    Appearance:   Appropriate   Eye Contact:   Good   Psychomotor Behavior: Normal   Attitude:   Cooperative   Orientation:   All  Speech   Rate / Production: Normal    Volume:  Normal   Mood:    Anxious  Depressed - fluctuates  Affect:    Appropriate   Thought Content:  Clear  Rumination   Thought Form:  Coherent  Logical   Insight:    Good     Diagnoses:  1. Recurrent major depression in partial remission (H)    2. Anxiety      Collateral Reports Completed:  Will collaborate with care team as indicated during treatment    Plan: (Homework, other):  Patient was given information about behavioral services and encouraged to schedule a follow up appointment with the clinic [facility] as needed.  He was also given information about mental health symptoms and treatment options .  CD Recommendations: No indications of CD issues.  We agree to continue psychotherapy to address his  depression, anxiety, life adjustment. GAGE Sears, [facility]    ______________________________________________________________________     [facility] Integrated Behavioral Health Treatment Plan     Client's Name: Neo Saeed                    YOB: 1995     Date: 5/1/2020     DSM-V Diagnoses: 296.32 (F33.1) Major Depressive Disorder, Recurrent Episode, Moderate With anxious distress;  Psychosocial / Contextual Factors: transitions in life - college to work force, friends leaving, etc  CGI-S: 4  CGI-I: 2    Referral / Collaboration:  Will collaborate with care team as indicated during treatment.     Anticipated number of session or this episode of care: 12+        MeasurableTreatment Goal(s) related to diagnosis / functional impairment(s)  Goal 1:  Patient will experience a reduction in depressive and anxious symptoms, along with a corresponding increase in positive emotion and life satisfaction.     Objective #A: Patient will experience a reduction in depressed mood, will develop more effective coping skills to manage depressive symptoms, will develop healthy cognitive patterns and beliefs, will increase ability to function adaptively and will continue to take medications as prescribed / participate in supportive activities and services               Status: Continued - Date(s): 5/1/2020     Objective #B: Patient will experience a reduction in anxiety, will develop more effective coping skills to manage anxiety symptoms, will develop healthy cognitive patterns and beliefs and will increase ability to function adaptively  Status: Continued - Date(s): 5/1/2020     Objective #C: Patient will develop better understanding of triggers and coping strategies to stabilize mood  Status: Continued - Date(s): 5/1/2020     Goal 2:  Patient will identify and increase engagement in valued activity, i.e. improving social connections/relationships, pursuing occupational goals or personally meaningful pursuits,  exploration of meaning in life.     Objective #A: Patient will identify meaningful activity in social, occupational and   personal goals, and increase behavioral activation around these goals           Status: Continued - Date(s): 5/1/2020     Objective #B: Patient will address relationship difficulties in a more adaptive manner  Status: Continued - Date(s): 5/1/2020     Objective #C: Patient will develop coping/problem-solving skills to facilitate more adaptive adjustment and will effectively address problems that interfere with adaptive functioning  Status: Continued - Date(s): 5/1/2020     Possible Therapeutic Intervention(s)  Psycho-education regarding mental health diagnoses and treatment options     Eye-Movement Desensitization and Reprocessing Therapy     Clinical Hypnosis     Skills training    Explore skills useful to client in current situation.    Skills include assertiveness, communication, conflict management, problem-solving, relaxation, etc.     Solution-Focused Therapy    Explore patterns in patient's relationships and discuss options for new behaviors.    Explore patterns in patient's actions and choices and discuss options for new behaviors.     Cognitive-behavioral Therapy    Discuss common cognitive distortions, identify them in patient's life.    Explore ways to challenge, replace, and act against these cognitions.     Acceptance and Commitment Therapy    Explore and identify important values in patient's life.    Discuss ways to commit to behavioral activation around these values.     Psychodynamic psychotherapy    Discuss patient's emotional dynamics and issues and how they impact behaviors.    Explore patient's history of relationships and how they impact present behaviors.    Explore how to work with and make changes in these schemas and patterns.     Narrative Therapy    Explore the patient's story of his/her life from his/her perspective.    Explore alternate ways of understanding their  experience, identifying exceptions, developing new themes.     Interpersonal Psychotherapy    Explore patterns in relationships that are effective or ineffective at helping patient reach their goals, find satisfying experience.    Discuss new patterns or behaviors to engage in for improved social functioning.     Behavioral Activation    Discuss steps patient can take to become more involved in meaningful activity.    Identify barriers to these activities and explore possible solutions.     Mindfulness-Based Strategies    Discuss skills based on development and application of mindfulness.    Skills drawn from compassion-focused therapy, dialectical behavior therapy, mindfulness-based stress reduction, mindfulness-based cognitive therapy, etc.        We have developed these goals together during our work to this point. Patient has assisted in the development of these goals and has agreed to this treatment plan.         GAGE Ulloa, [facility]             5/1/2020

## 2021-02-26 ENCOUNTER — VIRTUAL VISIT (OUTPATIENT)
Dept: INTERNAL MEDICINE | Facility: CLINIC | Age: 26
End: 2021-02-26
Payer: COMMERCIAL

## 2021-02-26 DIAGNOSIS — F41.9 ANXIETY: Primary | ICD-10-CM

## 2021-02-26 PROCEDURE — 99214 OFFICE O/P EST MOD 30 MIN: CPT | Mod: TEL | Performed by: INTERNAL MEDICINE

## 2021-02-26 ASSESSMENT — PATIENT HEALTH QUESTIONNAIRE - PHQ9: SUM OF ALL RESPONSES TO PHQ QUESTIONS 1-9: 5

## 2021-02-26 NOTE — PROGRESS NOTES
Dr. Cartwright asked me to review Dexter's chart to weigh in on medication options today:     Notes reflect that he stopped fluoxetine (Prozac) a few years ago. He had at least two periods when he went off of fluoxetine and had significant problems coming off of it. He also was unsure prior to going off of it whether it had contributed to weight gain over the course of years. I did not see any indication of whether he noted any positive change in weight after discontinuing it.     He was started on bupropion in 08/2018 and as of 02/2019 (6 months later) had reported that it was going well, had experienced improvement in mood and had not had any recent panic attacks. He was attending therapy monthly at that time and engaging in self care activities including dietary modification, running, and hot yoga.     Per 05/2020 note by his therapist, he has tried to go off of the medications several times but notes that symptoms consistently returned. Dexter noted a family history of anxiety, depression, and bipolar disorder.     I am not clear on why the bupropion was discontinued. Recommendations would be different depending on:   - Were there persistent side effects?  - Is the long term goal to be off of medications?  - Are there issues with stigma?      If there were no significant problems with bupropion, I would likely recommend restarting it, given that it seemed to be helpful for mood in the notes, at least as far as I can tell from reviewing the notes.     If there were issues with bupropion, SSRIs are considered first line evidence based treatment for panic attacks. Notably, they have to be started at half of the normal starting dose when panic symptoms are present, otherwise they can cause a transient increase in panic symptoms that might be alarming if patient does not know about it.   In this case, I would recommend starting sertraline at 25mg daily.     For nightmares, whether they are related to trauma or not, I would  "recommend prazosin 1mg, which is a relatively benign medication that can help with elevated fight-or-flight response that is often present with panic attacks, and also has special benefit for reducing nightmares and helping with sleep. The only significant potential side effect tends to be dizziness (when standing), so unless he has significant orthostasis, this medication would be a good choice.     If a \"PRN\" (as needed) medication is needed, could use gabapentin 300-600mg twice daily as needed. This can provide short acting relief to panic symptoms (higher doses may be needed) but is not an effective long term treatment.   "

## 2021-02-26 NOTE — NURSING NOTE
Chief Complaint   Patient presents with     Recheck Medication     sleep concerns     Kimberly Nissen, EMT at 9:03 AM on 2/26/2021    Video Visit Technology for this patient: No video technology available to patient, please call patient over the phone

## 2021-02-26 NOTE — PROGRESS NOTES
Telephone visit     Mr. Saeed agrees to a telephone visit    Follows with Javier Corley, Health Psychology for depression/anxiety (seen 2/23/2021) and next visit 3/25/2021. He is off Wellbutrin. He states being on Prozac in the past for depression but this was changed (about 2 years ago) to Wellbutrin.     Negative COVID testing 10/19/2020    He states more anxiety and nightmares (causing him to get less sleep and this generates more anxiety). He has not had this much (if ever) anxiety in the past. He states he goes to bed around 11 and gets up 7 AM and works from home. Anxiety can be in the form of minor panic attacks (increased HR and hyperventilation). These can last 15-20 minutes and can happen 3-4 times/week. He would prefer to meet with Dr. Maldonado Psychiatry before starting medication. No other new medications/drugs. He states he is not abusing EtOH. He does not feel he needs to be seen acutely in the ED/Psychiatry.     He has an appt. With me in-person 3/11/2021    Will communicate with Dr. Maldonado directly     ELIJAH Cartwright MD    Total time today 11 minutes and 54 seconds.

## 2021-02-28 ENCOUNTER — TELEPHONE (OUTPATIENT)
Dept: INTERNAL MEDICINE | Facility: CLINIC | Age: 26
End: 2021-02-28

## 2021-02-28 DIAGNOSIS — F41.9 ANXIETY: Primary | ICD-10-CM

## 2021-02-28 RX ORDER — SERTRALINE HYDROCHLORIDE 25 MG/1
25 TABLET, FILM COATED ORAL DAILY
Qty: 90 TABLET | Refills: 1 | COMMUNITY
Start: 2021-02-28 | End: 2021-03-05

## 2021-02-28 RX ORDER — GABAPENTIN 300 MG/1
CAPSULE ORAL
Qty: 60 CAPSULE | Refills: 1 | COMMUNITY
Start: 2021-02-28 | End: 2021-03-05

## 2021-02-28 NOTE — TELEPHONE ENCOUNTER
Dear Kristyn;    Please see Psychiatry note from Dr. Maldonado and please give Dexter a call:    (1) Placed historical Rx. For Sertraline 25 mg this encounter    (2) Placed historical order for Gabapentin PRN anxiety this encounter    (3) He should keep his 3/11/2021 follow up appointment with me    ELIJAH Montilla    Pt called and plan of care discussed.  Kristyn Khoury RN 4:18 PM on 3/5/2021.

## 2021-02-28 NOTE — TELEPHONE ENCOUNTER
----- Message from Elvis Maldonado MD sent at 2/26/2021 12:59 PM CST -----  No problem, I threw a note in on his visit with you today as well, just for reference.   ----- Message -----  From: Ceferino Cartwright MD  Sent: 2/26/2021  12:34 PM CST  To: Elvis Maldonado MD    Dear Jose;    Thanks for the follow up    William   ----- Message -----  From: Elvis Maldonado MD  Sent: 2/26/2021  12:23 PM CST  To: Ceferino Cartwright MD, #    Hey William, I looked at the chart, sounds like he thought it might have caused some weight gain, but wasn't sure, and he had a hard time coming off of it. I would recommend having him start low dose sertraline at 25mg.     Or if he is not wanting to do that right now, for the nightmares, have him start prazosin 1mg. Not sure if the nightmares are trauma related or not, but it can help either way. Unless he has any significant orthostasis issues at baseline, but other than that, he shouldn't have to worry much about side effects.     Could also consider gabapentin for acute anxiety. I'd give him 300-600mg twice daily as needed to start.   ----- Message -----  From: Ceferino Cartwright MD  Sent: 2/26/2021  12:18 PM CST  To: Elvis Maldonado MD    Dear Jose;    I had a telephone visit with Dexter today. He follows routinely with Javier Corley but unfortunately he is getting more anxiety/panic issues. Along with this worse sleep and nightmares. He was on Prozac in the past but was discontinued due to side effects. He also stopped Wellbutrin more than one year ago. I placed [facility] referral but I think he need more urgent follow up (about one week). I also see him in-person in 3 weeks.    Thanks, William CONCEPCION

## 2021-03-05 RX ORDER — GABAPENTIN 300 MG/1
CAPSULE ORAL
Qty: 60 CAPSULE | Refills: 1 | Status: SHIPPED | OUTPATIENT
Start: 2021-03-05 | End: 2021-04-09

## 2021-03-05 RX ORDER — SERTRALINE HYDROCHLORIDE 25 MG/1
25 TABLET, FILM COATED ORAL DAILY
Qty: 90 TABLET | Refills: 1 | Status: SHIPPED | OUTPATIENT
Start: 2021-03-05 | End: 2021-04-09

## 2021-03-11 ENCOUNTER — OFFICE VISIT (OUTPATIENT)
Dept: INTERNAL MEDICINE | Facility: CLINIC | Age: 26
End: 2021-03-11
Payer: COMMERCIAL

## 2021-03-11 VITALS
BODY MASS INDEX: 27.17 KG/M2 | SYSTOLIC BLOOD PRESSURE: 135 MMHG | DIASTOLIC BLOOD PRESSURE: 82 MMHG | OXYGEN SATURATION: 96 % | HEART RATE: 58 BPM | WEIGHT: 184 LBS

## 2021-03-11 DIAGNOSIS — F41.9 ANXIETY: Primary | ICD-10-CM

## 2021-03-11 DIAGNOSIS — F41.9 ANXIETY: ICD-10-CM

## 2021-03-11 LAB
ALBUMIN SERPL-MCNC: 4.1 G/DL (ref 3.4–5)
ALP SERPL-CCNC: 94 U/L (ref 40–150)
ALT SERPL W P-5'-P-CCNC: 60 U/L (ref 0–70)
ANION GAP SERPL CALCULATED.3IONS-SCNC: 5 MMOL/L (ref 3–14)
AST SERPL W P-5'-P-CCNC: 32 U/L (ref 0–45)
BASOPHILS # BLD AUTO: 0 10E9/L (ref 0–0.2)
BASOPHILS NFR BLD AUTO: 1 %
BILIRUB SERPL-MCNC: 0.4 MG/DL (ref 0.2–1.3)
BUN SERPL-MCNC: 11 MG/DL (ref 7–30)
CALCIUM SERPL-MCNC: 9.1 MG/DL (ref 8.5–10.1)
CHLORIDE SERPL-SCNC: 109 MMOL/L (ref 94–109)
CHOLEST SERPL-MCNC: 171 MG/DL
CO2 SERPL-SCNC: 26 MMOL/L (ref 20–32)
CREAT SERPL-MCNC: 0.88 MG/DL (ref 0.66–1.25)
DIFFERENTIAL METHOD BLD: NORMAL
EOSINOPHIL # BLD AUTO: 0.1 10E9/L (ref 0–0.7)
EOSINOPHIL NFR BLD AUTO: 1.2 %
ERYTHROCYTE [DISTWIDTH] IN BLOOD BY AUTOMATED COUNT: 13.3 % (ref 10–15)
GFR SERPL CREATININE-BSD FRML MDRD: >90 ML/MIN/{1.73_M2}
GLUCOSE SERPL-MCNC: 88 MG/DL (ref 70–99)
HCT VFR BLD AUTO: 46.9 % (ref 40–53)
HDLC SERPL-MCNC: 47 MG/DL
HGB BLD-MCNC: 15.7 G/DL (ref 13.3–17.7)
IMM GRANULOCYTES # BLD: 0 10E9/L (ref 0–0.4)
IMM GRANULOCYTES NFR BLD: 0.2 %
LDLC SERPL CALC-MCNC: 96 MG/DL
LYMPHOCYTES # BLD AUTO: 1.4 10E9/L (ref 0.8–5.3)
LYMPHOCYTES NFR BLD AUTO: 33.9 %
MCH RBC QN AUTO: 30.7 PG (ref 26.5–33)
MCHC RBC AUTO-ENTMCNC: 33.5 G/DL (ref 31.5–36.5)
MCV RBC AUTO: 92 FL (ref 78–100)
MONOCYTES # BLD AUTO: 0.5 10E9/L (ref 0–1.3)
MONOCYTES NFR BLD AUTO: 12.2 %
NEUTROPHILS # BLD AUTO: 2.2 10E9/L (ref 1.6–8.3)
NEUTROPHILS NFR BLD AUTO: 51.5 %
NONHDLC SERPL-MCNC: 124 MG/DL
NRBC # BLD AUTO: 0 10*3/UL
NRBC BLD AUTO-RTO: 0 /100
PLATELET # BLD AUTO: 189 10E9/L (ref 150–450)
POTASSIUM SERPL-SCNC: 4.2 MMOL/L (ref 3.4–5.3)
PROT SERPL-MCNC: 7.7 G/DL (ref 6.8–8.8)
RBC # BLD AUTO: 5.11 10E12/L (ref 4.4–5.9)
SODIUM SERPL-SCNC: 140 MMOL/L (ref 133–144)
TRIGL SERPL-MCNC: 140 MG/DL
TSH SERPL DL<=0.005 MIU/L-ACNC: 1.16 MU/L (ref 0.4–4)
WBC # BLD AUTO: 4.2 10E9/L (ref 4–11)

## 2021-03-11 PROCEDURE — 36415 COLL VENOUS BLD VENIPUNCTURE: CPT | Performed by: PATHOLOGY

## 2021-03-11 PROCEDURE — 80050 GENERAL HEALTH PANEL: CPT | Performed by: PATHOLOGY

## 2021-03-11 PROCEDURE — 99395 PREV VISIT EST AGE 18-39: CPT | Performed by: INTERNAL MEDICINE

## 2021-03-11 PROCEDURE — 80061 LIPID PANEL: CPT | Performed by: PATHOLOGY

## 2021-03-11 ASSESSMENT — PAIN SCALES - GENERAL: PAINLEVEL: NO PAIN (0)

## 2021-03-11 NOTE — PROGRESS NOTES
HPI:    Pt. Comes in for a physical today. Telephone visit with me 2/26/2021. He is exercising more at night and feels better. He would like to wait on starting anxiety/sleep medications and discuss further with Javier Corley and Dr. Maldonado. He states his father has thyroid issues and is requesting thyroid testing. He does not have an thyroid complaints. He does not feel he needs STD screening. No other HEENT, cardiopulmonary, abdominal, GY, neurological, systemic complaints.     Past Medical History:   Diagnosis Date     Allergic state      Anxiety        Past Surgical History:   Procedure Laterality Date     CYSTECTOMY PILONIDAL N/A 8/4/2017    Procedure: CYSTECTOMY PILONIDAL;  Lay Open Pilonidal Cystectomy;  Surgeon: Jenaro Cuba MD;  Location:  OR     DENTAL SURGERY      [place] teeth extraction     ENDOSCOPY       HERNIA REPAIR       HERNIA REPAIR       PE:    Vitals noted, gen, nad, cooperative, alert, neck supple nl rom, lungs with good air movement, RRR, S1, S2, no MRG, abdomen, no acute findings. Grossly normal neurological exam.     A/P:    1. He has not started Zoloft/gabapentin. He has Health Psychology follow up with Javier Corley 3/25/2021 and Psychiatry follow up with Dr. Maldonado 4/1/2021  2. Immunization up to date  3. Labs today; lipids  4. Added on thyroid lab given father's history    I will see him back in about 6 weeks.

## 2021-03-11 NOTE — NURSING NOTE
Chief Complaint   Patient presents with     Physical     Pt would like a physical exam today      ANI Richardson at 8:52 AM sign on 3/11/2021

## 2021-03-16 ENCOUNTER — MYC MEDICAL ADVICE (OUTPATIENT)
Dept: INTERNAL MEDICINE | Facility: CLINIC | Age: 26
End: 2021-03-16

## 2021-03-19 NOTE — TELEPHONE ENCOUNTER
Dexter has a 4/23/2021 follow up with me and we could discuss then. If more urgent he could see me sooner.    ELIJAH Cartwright

## 2021-03-25 ENCOUNTER — VIRTUAL VISIT (OUTPATIENT)
Dept: BEHAVIORAL HEALTH | Facility: CLINIC | Age: 26
End: 2021-03-25
Payer: COMMERCIAL

## 2021-03-25 DIAGNOSIS — F33.0 MAJOR DEPRESSIVE DISORDER, RECURRENT EPISODE, MILD (H): Primary | ICD-10-CM

## 2021-03-25 PROCEDURE — 90834 PSYTX W PT 45 MINUTES: CPT | Mod: GT | Performed by: MARRIAGE & FAMILY THERAPIST

## 2021-03-25 NOTE — PROGRESS NOTES
[facility] (PCC referral)  March 25, 2021  Behavioral Health Clinician Progress Note    Patient Name: Neo Saeed              Service Type:  Individual      Service Location:   Video Visit     Session Start Time: 310pm  Session End Time: 4pm     Session Length: 38 - 52      Attendees: Patient    Visit Activities (Refresh list every visit): [facility] [place] Only    Diagnostic Assessment Date: 5/1/2020  Treatment Plan Review Date: 5/1/2020  CGI-S: 4  CGI-I: 2    See Flowsheets for today's PHQ-9 and LETI-7 results  Previous PHQ-9:   PHQ-9 SCORE 8/16/2019 2/7/2020 2/26/2021   PHQ-9 Total Score MyChart 2 (Minimal depression) - -   PHQ-9 Total Score 2 1 5     Previous LETI-7:   LETI-7 SCORE 2/1/2019 8/16/2019 2/7/2020   Total Score - 3 (minimal anxiety) -   Total Score 2 3 2       RAYMON LEVEL:  No flowsheet data found.    DATA  Extended Session (60+ minutes): No  Interactive Complexity: No  Crisis: No  [facility] Patient: No    Treatment Objective(s) Addressed in This Session:  Target Behavior(s): disease management/lifestyle changes      Patient  will experience a reduction in depressed mood, will develop more effective coping skills to manage depressive symptoms and will develop healthy cognitive patterns and beliefs, will experience a reduction in anxiety and will develop more effective coping skills to manage anxiety symptoms and will develop coping/problem-solving skills to facilitate more adaptive adjustment    Current Stressors / Issues:  Telemedicine Visit: The patient's condition can be safely assessed and treated via synchronous audio and visual telemedicine encounter.      Reason for Telemedicine Visit: Coronavirus concerns    Originating Site (Patient Location): Patient's home    Distant Site (Provider Location): [facility]: [facility]    Consent:  The patient/guardian has verbally consented to: the potential risks and benefits of telemedicine (video visit) versus in person care; bill my insurance  or make self-payment for services provided; and responsibility for payment of non-covered services.     Mode of Communication:  Video Conference via [employer]    As the provider I attest to compliance with applicable laws and regulations related to telemedicine.      [facility] met with Neo to provide psychotherapy support regarding life stress.    Dexter reports less nightmares overall - none since last Tuesday. He is not sure what to attribute this improvement to. He has tried to keep healthy behaviors like eating well, exercising, monitoring his alcohol intake more intentional and positive. He feels as if he is re-acclimating to being at work as usual. He is aware that when he is living with family, as he was for a period of time, causes some increase in awareness of some stressors.     We processed some of these possible issues with family - notably their experience and work with his sexuality. Spent some of our session exploring these dynamics, his hx of this issue with them, etc. He says that he has not felt that in the four years since he first talked to them, they are somewhat stiff and ill-at-ease around him. He hs thoughts about trying to have some kind of conversation with them about this dynamic, but is hesitant for a variety of reasons. Discussed the challenges of having difficult conversations with family, the possible outcomes (positive and negative), ways to engage in these conversations.     Given that he lived with them for a number of months (6?) last fall and winter, it is possible that this accumulating stress led into some of the nightmare disturbances he had been having. Explored whether there are connections amongst these issues, the others we had been discussing, his several moves, his career questions, his friendships, his possible loneliness, etc, as multiple and complex developmental realities that could be creating stress. Began some conversation about assertiveness, what that looks like  in practice, the challenges emotionally of being honest and real with others.     I informed the patient of my new role at [facility], which includes reduced clinical hours. Discussed whether we would need to transition him to a new provider for ongoing therapy. Explored some options, including referral to [facility], or assistance in finding resources outside our system. He stated he would prefer to try to continue working with me, as possible, within the limitations of my new role.     I affirmed the steps this patient has taken to address physical and behavioral health issues, and offered continued behavioral health services or referral, now or in the future, as needed by the patient.    Progress on Treatment Objective(s) / Homework:  Satisfactory progress - ACTION (Actively working towards change); Intervened by reinforcing change plan / affirming steps taken    Psycho-education regarding mental health diagnoses and treatment options    Motivational Interviewing    Skills training    Explored specific skills useful to client in current situation    Skill areas include assertiveness, communication, conflict management, problem-solving, relaxation, etc.     Solution-Focused Therapy    Explored patterns in patient's behaviors and relationships and discussed options for new behaviors    Explored new options for problem-solving, communication, managing stress, etc.    Cognitive-behavioral Therapy    Discussed common cognitive distortions, identified them in patient's life    Explored ways to challenge, replace, and act against these cognitions    Explore behavioral changes that might benefit patient in improving mood and engage in meaningful activity    Acceptance and Commitment Therapy    Explored and identified important values in patient's life    Discussed ways to commit to behavioral activation around these values    Psychodynamic psychotherapy    Discussed patient's emotional  dynamics and issues and how they impact behaviors    Explored patient's history of relationships and how they impact present behaviors    Explored how to work with and make changes in these schemas and patterns    Narrative Therapy    Explored the patient's story of their life from their perspective,     Explored alternate ways of understanding their experience, identifying exceptions, developing new themes    Interpersonal Psychotherapy    Explored patterns in relationships that are effective or ineffective at helping patient reach their goals, find satisfying experience.    Discussed new patterns or behaviors to engage in for improved social functioning.    Behavioral Activation    Discussed steps patient can take to become more involved in meaningful activity    Identified barriers to these activities and explored possible solutions    Mindfulness-Based Strategies    Discussed skills based on development and application of mindfulness    Skills drawn from compassion-focused therapy, dialectical behavior therapy, mindfulness-based stress reduction, mindfulness-based cognitive therapy, etc.    Medication Review:  No problems reported to [facility] today.    Medication Compliance:  No problems reported to [facility] today.    Changes in Health Issues:  No problems reported to [facility] today.    Chemical Use Review:   Substance Use: Chemical use reviewed, no active concerns identified      Tobacco Use: No current tobacco use.      Assessment: Current Emotional / Mental Status (status of significant symptoms):  Risk status (Self / Other harm or suicidal ideation)  Patient denies a history of suicidal ideation, suicide attempts, self-injurious behavior, homicidal ideation, homicidal behavior and and other safety concerns  Patient denies current fears or concerns for personal safety.  Patient denies current or recent suicidal ideation or behaviors.  Patient denies current or recent homicidal ideation or behaviors.  Patient denies current  or recent self injurious behavior or ideation.  Patient denies other safety concerns.  A safety and risk management plan has not been developed at this time, however patient was encouraged to call [place] [phone] should there be a change in any of these risk factors.    Appearance:   Appropriate   Eye Contact:   Good   Psychomotor Behavior: Normal   Attitude:   Cooperative   Orientation:   All  Speech   Rate / Production: Normal    Volume:  Normal   Mood:    Anxious  Depressed - fluctuates  Affect:    Appropriate   Thought Content:  Clear  Rumination   Thought Form:  Coherent  Logical   Insight:    Good     Diagnoses:  1. Major depressive disorder, recurrent episode, mild (H)      Collateral Reports Completed:  Will collaborate with care team as indicated during treatment    Plan: (Homework, other):  Patient was given information about behavioral services and encouraged to schedule a follow up appointment with the clinic [facility] as needed.  He was also given information about mental health symptoms and treatment options .  CD Recommendations: No indications of CD issues.  We agree to continue psychotherapy to address his depression, anxiety, life adjustment. GAGE Sears, [facility]    ______________________________________________________________________     [facility] Integrated Behavioral Health Treatment Plan     Client's Name: Neo Saeed                    YOB: 1995     Date: 5/1/2020     DSM-V Diagnoses: 296.32 (F33.1) Major Depressive Disorder, Recurrent Episode, Moderate With anxious distress;  Psychosocial / Contextual Factors: transitions in life - college to work force, friends leaving, etc  CGI-S: 4  CGI-I: 2    Referral / Collaboration:  Will collaborate with care team as indicated during treatment.     Anticipated number of session or this episode of care: 12+        MeasurableTreatment Goal(s) related to diagnosis / functional impairment(s)  Goal 1:  Patient will experience a reduction in  depressive and anxious symptoms, along with a corresponding increase in positive emotion and life satisfaction.     Objective #A: Patient will experience a reduction in depressed mood, will develop more effective coping skills to manage depressive symptoms, will develop healthy cognitive patterns and beliefs, will increase ability to function adaptively and will continue to take medications as prescribed / participate in supportive activities and services               Status: Continued - Date(s): 5/1/2020     Objective #B: Patient will experience a reduction in anxiety, will develop more effective coping skills to manage anxiety symptoms, will develop healthy cognitive patterns and beliefs and will increase ability to function adaptively  Status: Continued - Date(s): 5/1/2020     Objective #C: Patient will develop better understanding of triggers and coping strategies to stabilize mood  Status: Continued - Date(s): 5/1/2020     Goal 2:  Patient will identify and increase engagement in valued activity, i.e. improving social connections/relationships, pursuing occupational goals or personally meaningful pursuits, exploration of meaning in life.     Objective #A: Patient will identify meaningful activity in social, occupational and   personal goals, and increase behavioral activation around these goals           Status: Continued - Date(s): 5/1/2020     Objective #B: Patient will address relationship difficulties in a more adaptive manner  Status: Continued - Date(s): 5/1/2020     Objective #C: Patient will develop coping/problem-solving skills to facilitate more adaptive adjustment and will effectively address problems that interfere with adaptive functioning  Status: Continued - Date(s): 5/1/2020     Possible Therapeutic Intervention(s)  Psycho-education regarding mental health diagnoses and treatment options     Eye-Movement Desensitization and Reprocessing Therapy     Clinical Hypnosis     Skills  training    Explore skills useful to client in current situation.    Skills include assertiveness, communication, conflict management, problem-solving, relaxation, etc.     Solution-Focused Therapy    Explore patterns in patient's relationships and discuss options for new behaviors.    Explore patterns in patient's actions and choices and discuss options for new behaviors.     Cognitive-behavioral Therapy    Discuss common cognitive distortions, identify them in patient's life.    Explore ways to challenge, replace, and act against these cognitions.     Acceptance and Commitment Therapy    Explore and identify important values in patient's life.    Discuss ways to commit to behavioral activation around these values.     Psychodynamic psychotherapy    Discuss patient's emotional dynamics and issues and how they impact behaviors.    Explore patient's history of relationships and how they impact present behaviors.    Explore how to work with and make changes in these schemas and patterns.     Narrative Therapy    Explore the patient's story of his/her life from his/her perspective.    Explore alternate ways of understanding their experience, identifying exceptions, developing new themes.     Interpersonal Psychotherapy    Explore patterns in relationships that are effective or ineffective at helping patient reach their goals, find satisfying experience.    Discuss new patterns or behaviors to engage in for improved social functioning.     Behavioral Activation    Discuss steps patient can take to become more involved in meaningful activity.    Identify barriers to these activities and explore possible solutions.     Mindfulness-Based Strategies    Discuss skills based on development and application of mindfulness.    Skills drawn from compassion-focused therapy, dialectical behavior therapy, mindfulness-based stress reduction, mindfulness-based cognitive therapy, etc.        We have developed these goals together  during our work to this point. Patient has assisted in the development of these goals and has agreed to this treatment plan.         GAGE Ulloa, [facility]             5/1/2020

## 2021-04-09 ENCOUNTER — VIRTUAL VISIT (OUTPATIENT)
Dept: BEHAVIORAL HEALTH | Facility: CLINIC | Age: 26
End: 2021-04-09
Payer: COMMERCIAL

## 2021-04-09 DIAGNOSIS — F51.5 NIGHTMARES: Primary | ICD-10-CM

## 2021-04-09 DIAGNOSIS — F41.9 ANXIETY DISORDER, UNSPECIFIED TYPE: ICD-10-CM

## 2021-04-09 DIAGNOSIS — F33.40 RECURRENT MAJOR DEPRESSIVE DISORDER, IN REMISSION (H): ICD-10-CM

## 2021-04-09 PROCEDURE — 90792 PSYCH DIAG EVAL W/MED SRVCS: CPT | Mod: 95 | Performed by: PSYCHIATRY & NEUROLOGY

## 2021-04-09 RX ORDER — PRAZOSIN HYDROCHLORIDE 1 MG/1
1 CAPSULE ORAL AT BEDTIME
Qty: 30 CAPSULE | Refills: 0 | Status: SHIPPED | OUTPATIENT
Start: 2021-04-09 | End: 2021-07-20

## 2021-04-09 RX ORDER — PRAZOSIN HYDROCHLORIDE 1 MG/1
1 CAPSULE ORAL AT BEDTIME
Qty: 30 CAPSULE | Refills: 0 | Status: SHIPPED | OUTPATIENT
Start: 2021-04-09 | End: 2021-04-09

## 2021-04-09 ASSESSMENT — PATIENT HEALTH QUESTIONNAIRE - PHQ9
SUM OF ALL RESPONSES TO PHQ QUESTIONS 1-9: 4
SUM OF ALL RESPONSES TO PHQ QUESTIONS 1-9: 4
10. IF YOU CHECKED OFF ANY PROBLEMS, HOW DIFFICULT HAVE THESE PROBLEMS MADE IT FOR YOU TO DO YOUR WORK, TAKE CARE OF THINGS AT HOME, OR GET ALONG WITH OTHER PEOPLE: NOT DIFFICULT AT ALL

## 2021-04-09 ASSESSMENT — ANXIETY QUESTIONNAIRES
4. TROUBLE RELAXING: SEVERAL DAYS
5. BEING SO RESTLESS THAT IT IS HARD TO SIT STILL: SEVERAL DAYS
GAD7 TOTAL SCORE: 4
GAD7 TOTAL SCORE: 4
6. BECOMING EASILY ANNOYED OR IRRITABLE: NOT AT ALL
3. WORRYING TOO MUCH ABOUT DIFFERENT THINGS: SEVERAL DAYS
7. FEELING AFRAID AS IF SOMETHING AWFUL MIGHT HAPPEN: NOT AT ALL
7. FEELING AFRAID AS IF SOMETHING AWFUL MIGHT HAPPEN: NOT AT ALL
1. FEELING NERVOUS, ANXIOUS, OR ON EDGE: SEVERAL DAYS
GAD7 TOTAL SCORE: 4
2. NOT BEING ABLE TO STOP OR CONTROL WORRYING: NOT AT ALL

## 2021-04-09 NOTE — PATIENT INSTRUCTIONS
- Start prazosin 1mg at bedtime    If another nightmare occurs in the next 1-2 weeks, and you are tolerating the medication well, can increase to 2mg.    - If prazosin doesn't work out, consider cyproheptadine 4mg    - If that doesn't work, may consider guanfacine (Tenex) 0.5-1mg    For scheduling or non-urgent medication questions:   Please call [phone] during business hours (8-5:00 M-F).    **For crisis resources, please see the information at the end of this document**  Patient Education    Financial Assistance [phone]  [facility] Billing [phone]  [facility] Billing [phone]  Medical Records [phone]  [facility] Patient Bill of Rights ([email or web address])      MENTAL HEALTH CRISIS NUMBERS:  [facility]:  [facility] - [phone]  [facility] - [phone]  Walk-In Counseling [facility] [phone]  [place] 24/7 [place] Mobile Team - [[phone]]    [facility]:  [facility] - [phone]  Walk-in counseling [facility] - [phone]  Walk-in counseling [facility] - [phone]  [facility] - [phone]  Urgent Care Adult Mental Health:   --Drop-in, 24/7 crisis line, and [place] Mobile Team [[phone]]    24/7 CRISIS TEXT LINE: www.crisistextline.org OR text [phone] anywhere, anytime, any crisis    Poison Control Center - [phone]  [facility] [employer] - [phone]; or [employer] - [phone]    If you have a medical emergency please call 911 or go to the nearest ER.     INSTRUCTIONS FOR USE OF PRAZOSIN      DO:    - call clinic if you, or another provider, makes any medication changes  - call clinic if you experience any symptom listed below    - CHECK BLOOD PRESSURE 1-2 times a week for 2 weeks after each dose increase    call clinic if blood pressure is low at  90/50 or less      DO NOT:  stop this med abruptly         FOOD: take with or without food       COMMON ADVERSE EFFECTS:  dizziness, palpitations, headache, sleepiness, fatigue, nausea, numbness or tingling      CALL CLINIC URGENTLY or EMERGENCY ROOM:  if you have any concerns, especially the following...  - low blood pressure of less than 90/50  - episode of loss consciousness  - falling  - chest pain  - heart pounding in chest  - dizziness or funny feeling in your head

## 2021-04-09 NOTE — PROGRESS NOTES
Telehealth Details  Type of service:  video visit for consult  Date of service: Apr 9, 2021  Time of service:    Start Time:  1:07 PM    End Time:  2:06 PM    Reason for video visit:  Services only offered telehealth  Originating Site (patient location):  Patient's home  Distant Site (provider location):  [facility]  Mode of Communication:  Video conference via Kaseya       Psychiatric Telehealth Medication Management Consultation   Neo Saeed is a 25 year old. Initial consultation on 04/09/21. Referred by Ceferino Cartwright for evaluation of anxiety.   History was provided by patient who was a good historian.       Chief Complaint     Has been having nightmares for the last few months     Assessment & Plan    Neo Saeed is a 25 year old with history supporting the following diagnoses:  Nightmares  Anxiety disorder, unspecified  Major depressive disorder, recurrent, in remission  R/o hx of hypomanic episodes    Pertinent History: Neo notes history of depression first diagnosed around age 15. Didn't have a lot of support / resources where he grew up. Was on Prozac most of the time from [age]. Tried going off of few times, but always struggled with discontinuation. Did take Wellbutrin for about a year after that and got into therapy which he felt helped with addressing underlying issues. Generally anxiety has only been minor in the past, secondary to depression.   Does note a history of week long episodes of elevated mood and decreased need for sleep, 1-2x per year. This may be consistent with hypomanic episodes, but he does not note any history of depressive cycling in relation to these episodes, nor adverse reaction to antidepressants consistent with triggered manic episodes. Would monitor this, but does not seem to be consistent with bipolar 2 disorder.   Depression has also been pretty well resolved for some time now, even without any medication interventions, noting that he benefited quite a  bit from psychotherapy.   TODAY:   Psychotherapy: Therapist could consider nightmare protocol, although I do not know specifics about this method.   Medications: Most importantly, Dexter denies significant mood symptoms at this time, other than some anxiety directly related to the nightmares. If this were to change, could consider the use of another SSRI like sertraline in the future, but would dose low and slow and use a very long taper if coming off, given his previously issues with discontinuation with fluoxetine.   For nightmares, see below. Prazosin is a good first line option. Basically, titrate to optimal effect, slowing down if orthostasis occurs. If 6mg is reached and there is still no discernable benefit, consider switching. If prazosin is unhelpful or not tolerated, may consider cyproheptadine 4-8mg at bedtime as an alternative. Alpha 2 agonists like guanfacine or clonidine may also have some potential efficacy.     [place] PRESCRIPTION MONITORING PROGRAM [] was checked today: not using controlled substances    PSYCHOTROPIC DRUG INTERACTIONS: None   MANAGEMENT:  N/A     Plan     1) PSYCHOTROPIC MEDICATION RECOMMENDATIONS:  - Start prazosin 1mg at bedtime    If another nightmare occurs in the next 1-2 weeks, and you are tolerating the medication well, can increase to 2mg.    - If prazosin doesn't work out, consider cyproheptadine 4mg    - If that doesn't work, may consider guanfacine (Tenex) 0.5-1mg    [see the following SmartPhrase(s) for more information: PSYMEDINFOPRAZOSIN]    2) THERAPY: Psychotherapy is a primary recommendation. Currently seeing Javier Corley with [facility].     3) NEXT DUE:   Labs- N/A   ECG- N/A   Rating Scales- N/A    4) REFERRALS / COORDINATION: None    5) : None    6) DISPOSITION:   - Pt is currently psychiatrically stable, but may benefit from medication adjustment. Follow up with designated prescriber.   - Prescriber should reference the  recommendations above and implement as they deem appropriate.   - If further recommendations are desired or if clarification is needed, prescriber should contact Elvis Maldonado MD via staff message for further curbside / chart review consultation    Treatment Risk Statement:  The patient understands the risks, benefits, adverse effects and alternatives. Agrees to treatment with the capacity to do so. No medical contraindications to treatment. Agrees to call clinic for any problems. The patient understands to call 911 or go to the nearest ED if life threatening or urgent symptoms occur. Crisis numbers are provided routinely in the After Visit Summary.       PROVIDER:  Elvis Maldonado MD       History of Present Illness    Notes reflect that he stopped fluoxetine (Prozac) a few years ago. He had at least two periods when he went off of fluoxetine and had significant problems coming off of it. He also was unsure prior to going off of it whether it had contributed to weight gain over the course of years. I did not see any indication of whether he noted any positive change in weight after discontinuing it.   He was started on bupropion in 08/2018 and as of 02/2019 (6 months later) had reported that it was going well, had experienced improvement in mood and had not had any recent panic attacks. He was attending therapy monthly at that time and engaging in self care activities including dietary modification, running, and hot yoga.   Per 05/2020 note by his therapist, he has tried to go off of the medications several times but notes that symptoms consistently returned. Dexter noted a family history of anxiety, depression, and bipolar disorder.     On interview: Dexter notes that after recent chart review, PCP did write scripts for sertraline and gabapentin, but he didn't start them, had some questions first.   In the last 6 months he started having nightmares every 1-2 weeks, but could get more frequent, and started to really  interfere with ability to sleep, sometimes leaving him with only 3-4 hours of sleep. These nightmares were not related to any real life experiences, just very stressful.   He does note that the nightmares are back down to maybe every other week now. There have been a few instances in the last 1-2 months when anxiety about these issues was creeping even into the daytime.   Depression has not really been an issue lately. A little low mood at times with isolation due to the pandemic.   Does feel that he has had decreased need for sleep for up to a week at a time before. This might happen 1-2x per year, and may have increased energy / mood as well. Denies any impulsive behaviors during those times. Might spend a little more than usual, but not enough to cause problems. Does maybe talk and think faster during these times. Denies any crashes after these periods, and does not relate them to taking antidepressants.   Does feel like fluoxetine may have helped 40-60% when he took it, but never really got things where he would have wanted them to go.   No OCD symptoms.     Recent Substance Use  Alcohol- 2-3x per week, 3-4 drinks in an occasion, generally socially on weekends.   Nicotine- None  Caffeine- 1/2 pot/day of coffee, may have been up to 1-2 pots per day before. Doesn't use after 2-3pm.   Opioids- None  Narcan Kit- N/A  THC- None  Other Illicit Drugs- none    Current Social Hx:  FINANCIAL SUPPORT- Works as , standard hours, office job. Mostly likes it.   LIVING SITUATION / RELATIONSHIPS- Lives in apartment by himself.    SOCIAL/ SPIRITUAL SUPPORT- Yes     Medical Review of Systems    Dizziness/orthostasis- None currently. Had vertigo a few years ago.   Headaches- None  GI- None  Sexual health concerns- None  A comprehensive review of systems was performed and is negative other than noted in the HPI.     Past Psychiatric History    Self injurious behaviors [method, most recent]- None  Suicide  attempt [#, recent, method]- None  Suicidal ideation Hx [passive, active]- None    Psychosis Hx- None  Psych hosp [ #, most recent, committed]- None    Eating disorder- None    Outpatient programs [Day treatment, DBT, eating disorder tx, etc]- None     Psychotropic Medication Trials       Drug / Start Date Max Dose (mg) Duration > 2 mo? Helpful? DC Reason / Adverse Effects?   Prozac 20 Age [age] 40-60% Difficult discontinuation   Wellbutrin 100 BID Age [age] yes Didn't need it anymore   Benadryl   yes Very sedating   melatonin    Vivid dreams   Vyvanse    Hangover      Social History                [per patient report]   Financial Support- See above  Living Situation/Family/Relationships- See above  Feels safe at home- Yes  Social/Spiritual Support- See above  Legal- None  Trauma History (self-report)- None  Early History/Education- [place], [place]. Has 2 younger sisters.     Family History - Maternal uncle and paternal uncle with schizophrenia. Maternal grandmother with bipolar disorder. Both sisters with depression and anxiety. Mother with possible bipolar disorder.      Past Medical History      CARE TEAM:   PCP- Ceferino Cartwright  Therapist- Javier Corley with [facility]    Neurologic Hx [head injury, seizures, etc.]: One concussion last year, no LOC  Patient Active Problem List   Diagnosis     H/O pilonidal cyst     Open wnd of buttock, unspecified laterality, initial encounter     Anxiety     Major depressive disorder, recurrent episode, mild (H)     Overweight (BMI 25.0-29.9)     Mixed hyperlipidemia     Past Medical History:   Diagnosis Date     Allergic state      Anxiety       Allergies    Patient has no known allergies.     Medications      Current Outpatient Medications   Medication Sig Dispense Refill     gabapentin (NEURONTIN) 300 MG capsule Take one tablet twice a day as needed for anxiety (Patient not taking: Reported on 3/11/2021) 60 capsule 1     sertraline (ZOLOFT) 25 MG tablet  Take 1 tablet (25 mg) by mouth daily (Patient not taking: Reported on 3/11/2021) 90 tablet 1      Physical Exam  (Vitals Only)   There were no vitals taken for this visit.    Pulse Readings from Last 5 Encounters:   03/11/21 58   02/07/20 64   09/05/19 68   08/22/19 75   05/16/19 82     Wt Readings from Last 5 Encounters:   03/11/21 83.5 kg (184 lb)   02/07/20 86.7 kg (191 lb 1.6 oz)   09/05/19 90.3 kg (199 lb)   08/22/19 89.4 kg (197 lb)   05/16/19 81.2 kg (179 lb)     BP Readings from Last 5 Encounters:   03/11/21 135/82   02/07/20 127/71   09/05/19 135/75   08/22/19 122/84   05/16/19 131/78      Mental Status Exam   Alertness: alert  and oriented  Appearance: adequately groomed  Behavior/Demeanor: cooperative, pleasant and calm, with good eye contact   Speech: normal and regular rate and rhythm  Language: intact and no problems  Psychomotor: normal or unremarkable  Mood: description consistent with euthymia  Affect: full range and appropriate; was congruent to mood  Thought Process/Associations: unremarkable  Thought Content:  Reports none;  Denies suicidal ideation, violent ideation and delusions  Perception:  Reports none;  Denies auditory hallucinations and visual hallucinations  Insight: intact  Judgment: intact  Cognition: does  appear grossly intact; formal cognitive testing was not done  Gait and Station: not observed     Data      CAGE-AID Total Score 4/9/2021   Total Score 1   Total Score MyChart 1 (A total score of 2 or greater is considered clinically significant)     PHQ-9 SCORE 2/7/2020 2/26/2021 4/9/2021   PHQ-9 Total Score MyChart - - 4 (Minimal depression)   PHQ-9 Total Score 1 5 4     LETI-7 SCORE 8/16/2019 2/7/2020 4/9/2021   Total Score 3 (minimal anxiety) - 4 (minimal anxiety)   Total Score 3 2 4       Recent Labs   Lab Test 03/11/21  0950 02/07/20  0749   CR 0.88 1.01   GFRESTIMATED >90 >90     Recent Labs   Lab Test 03/11/21  0950 02/07/20  0749   AST 32 30   ALT 60 46   ALKPHOS 94 118      Recent Labs   Lab Test 03/11/21  0950 07/25/18  0745   TSH 1.16 1.24

## 2021-04-09 NOTE — Clinical Note
Jose L Herrera, are you familiar with nightmare protocol? I don't know specifics about it, but I've heard some of the therapists in the department talk about it. Sounded like something that might be useful for Dexter, and I figured if you aren't familiar, maybe sleep psychology could be a useful referral to make.

## 2021-04-09 NOTE — Clinical Note
Jose L Thomas, I started Dexter on prazosin and am sending him back your way. He denies significant anxiety symptoms, noting that nightmares seem to be the biggest issue, so he would prefer to avoid anxiety meds, which I am fine with. I will write about nightmare meds in my note.

## 2021-04-09 NOTE — Clinical Note
SUSAN De León. He will be coming to see you soon. I put titration instructions in there for him, basically can just take it up incrementally until 6mg, or until not tolerated. Let me know if there are any questions!

## 2021-04-09 NOTE — NURSING NOTE
"  Chief Complaint   Patient presents with     Consult     MDD     Would like out of today's visit:  Would like to discuss treatment options for anxiety.  Dunia Lora LPN     VIDEO VISIT  Neo Saeed is a 25 year old patient who is being evaluated via a billable video visit.      The patient has been notified of following:   \"This video visit will be conducted via a call between you and your physician/provider. We have found that certain health care needs can be provided without the need for an in-person physical exam. This service lets us provide the care you need with a video conversation. If a prescription is necessary we can send it directly to your pharmacy. If lab work is needed we can place an order for that and you can then stop by our lab to have the test done at a later time. Insurers are generally covering virtual visits as they would in-office visits so billing should not be different than normal.  If for some reason you do get billed incorrectly, you should contact the billing office to correct it and that number is in the AVS .    Video Conference to be completed via:  Rajan    Patient has given verbal consent for video visit?:  Yes    Patient would prefer that any video invitations be sent by: Text to cell phone: [phone]      How would patient like to obtain AVS?:  Starbates    AVS SmartPhrase [PsychAVS] has been placed in 'Patient Instructions':  Yes      "

## 2021-04-10 ASSESSMENT — PATIENT HEALTH QUESTIONNAIRE - PHQ9: SUM OF ALL RESPONSES TO PHQ QUESTIONS 1-9: 4

## 2021-04-10 ASSESSMENT — ANXIETY QUESTIONNAIRES: GAD7 TOTAL SCORE: 4

## 2021-04-23 ENCOUNTER — OFFICE VISIT (OUTPATIENT)
Dept: INTERNAL MEDICINE | Facility: CLINIC | Age: 26
End: 2021-04-23
Payer: COMMERCIAL

## 2021-04-23 ENCOUNTER — ANCILLARY PROCEDURE (OUTPATIENT)
Dept: GENERAL RADIOLOGY | Facility: CLINIC | Age: 26
End: 2021-04-23
Attending: INTERNAL MEDICINE
Payer: COMMERCIAL

## 2021-04-23 VITALS — HEART RATE: 66 BPM | OXYGEN SATURATION: 97 % | SYSTOLIC BLOOD PRESSURE: 118 MMHG | DIASTOLIC BLOOD PRESSURE: 74 MMHG

## 2021-04-23 DIAGNOSIS — R05.9 COUGH: Primary | ICD-10-CM

## 2021-04-23 DIAGNOSIS — R05.9 COUGH: ICD-10-CM

## 2021-04-23 PROCEDURE — 71046 X-RAY EXAM CHEST 2 VIEWS: CPT | Mod: GC | Performed by: RADIOLOGY

## 2021-04-23 PROCEDURE — 99214 OFFICE O/P EST MOD 30 MIN: CPT | Performed by: INTERNAL MEDICINE

## 2021-04-23 NOTE — PROGRESS NOTES
HPI:    Last in-person visit with me 3/11/2021. Overall stable and reviewed recent Psychiatry notes from Dr. Maldonado. He has started on Prazosin (but only for about one week) and too early to  effectiveness. He describes for more than one year (and year round) a somewhat productive cough after running. The cough can last up to 24 hours afterwards. He is NOT limited with exercise. He does bring phlegm. He does not feel he has seasonal allergies. No wheezing. He does not smoke, No unusual respiratory exposures (vaping, environmental, birds, etc.). No chest pain. No other HEENT, cardiopulmonary, abdominal, , neurological, systemic, psychiatric complaints.     Past Medical History:   Diagnosis Date     Allergic state      Anxiety      Past Surgical History:   Procedure Laterality Date     CYSTECTOMY PILONIDAL N/A 8/4/2017    Procedure: CYSTECTOMY PILONIDAL;  Lay Open Pilonidal Cystectomy;  Surgeon: Jenaro Cuba MD;  Location:  OR     DENTAL SURGERY      [place] teeth extraction     ENDOSCOPY       HERNIA REPAIR       HERNIA REPAIR       PE:    Vitals noted, gen, nad, cooperative, alert, neck supple nl rom, lungs with good air movement, no wheezing, RRR, S1, S2, abdomen, no acute findings. Grossly normal neurological exam.    A/P:    1. He has 4/29/2021 Health Psychology appointment with Javier Corley. Last visit was on 3/25/2021  2. Psychiatry visit with Dr. Maldonado 4/9/2021; for anxiety, nightmares on Prazosin.  Too early to tell regarding the effectiveness.   3. Lipids 3/11/2021 with HDL 38 and   4. Thyroid testing 3/11/2021 with TSH 1.16; father h/o thyroid disorder  5. First Moderna, COVID vaccination done   6. Post exercise (running) cough (but more inflammatory?). He has not had dxs. Of RAD. He has not used MDI's in the past. No real seasonal allergies. Ordered PFT's and CXR. May consider MDI (albuterol, post exercise) and/or steroid MDI. Allergies?   7. No skin concerns and he does not  feel he needs to see Dermatology for skin screen.     30 minutes spent on the date of the encounter doing chart review, history and exam, documentation and further activities as noted above

## 2021-04-23 NOTE — NURSING NOTE
Chief Complaint   Patient presents with     Recheck Medication     6 week follow up     Kimberly Nissen, EMT at 9:03 AM on 4/23/2021

## 2021-04-29 ENCOUNTER — VIRTUAL VISIT (OUTPATIENT)
Dept: BEHAVIORAL HEALTH | Facility: CLINIC | Age: 26
End: 2021-04-29
Payer: COMMERCIAL

## 2021-04-29 DIAGNOSIS — F41.9 ANXIETY DISORDER, UNSPECIFIED TYPE: ICD-10-CM

## 2021-04-29 DIAGNOSIS — F51.5 NIGHTMARES: ICD-10-CM

## 2021-04-29 DIAGNOSIS — F33.40 RECURRENT MAJOR DEPRESSIVE DISORDER, IN REMISSION (H): Primary | ICD-10-CM

## 2021-04-29 PROCEDURE — 90837 PSYTX W PT 60 MINUTES: CPT | Mod: 95 | Performed by: MARRIAGE & FAMILY THERAPIST

## 2021-04-29 NOTE — PROGRESS NOTES
[facility] (PCC referral)  April 29, 2021  Behavioral Health Clinician Progress Note    Patient Name: Neo Saeed              Service Type:  Individual      Service Location:   Video Visit     Session Start Time: 305pm  Session End Time: 4pm     Session Length: 53 - 60      Attendees: Patient    Visit Activities (Refresh list every visit): [facility] [place] Only    Diagnostic Assessment Date: 5/1/2020  Treatment Plan Review Date: 5/1/2020  CGI-S: 4  CGI-I: 2    See Flowsheets for today's PHQ-9 and LETI-7 results  Previous PHQ-9:   PHQ-9 SCORE 2/7/2020 2/26/2021 4/9/2021   PHQ-9 Total Score MyChart - - 4 (Minimal depression)   PHQ-9 Total Score 1 5 4     Previous LETI-7:   LETI-7 SCORE 8/16/2019 2/7/2020 4/9/2021   Total Score 3 (minimal anxiety) - 4 (minimal anxiety)   Total Score 3 2 4       RAYMON LEVEL:  No flowsheet data found.    DATA  Extended Session (60+ minutes): No  Interactive Complexity: No  Crisis: No  [facility] Patient: No    Treatment Objective(s) Addressed in This Session:  Target Behavior(s): disease management/lifestyle changes      Patient  will experience a reduction in depressed mood, will develop more effective coping skills to manage depressive symptoms and will develop healthy cognitive patterns and beliefs, will experience a reduction in anxiety and will develop more effective coping skills to manage anxiety symptoms and will develop coping/problem-solving skills to facilitate more adaptive adjustment    Current Stressors / Issues:  Telemedicine Visit: The patient's condition can be safely assessed and treated via synchronous audio and visual telemedicine encounter.      Reason for Telemedicine Visit: Coronavirus concerns    Originating Site (Patient Location): Patient's home    Distant Site (Provider Location): [facility] Clinics: [facility] [place]    Consent:  The patient/guardian has verbally consented to: the potential risks and benefits of telemedicine (video visit) versus in person care;  bill my insurance or make self-payment for services provided; and responsibility for payment of non-covered services.     Mode of Communication:  Video Conference via [employer]    As the provider I attest to compliance with applicable laws and regulations related to telemedicine.      [facility] met with Neo to provide psychotherapy support regarding life stress.      Dexter reports that he had a good visit with Dr Maldonado and has switched to Prazosin and stopped Lexapro and the other meds. He has only had one or two nightmares in the last month or so, so is feeling better. He is sleeping well, trying to eat and exercise well. He is fully vaccinated for Covid-19 now, and had some side effects that seem to be wearing off now. He is definitely feeling the effects of the pandemic and the ongoing social isolation. Processed and explored these stresses.    He is feeling more bored and unhappy about his work, so he is considering and exploring job or career change. Spent some time processing and exploring his thoughts and feelings about this area of his life. Explored self-care, working to connect to meaningful activity, manage work stress/burnout issues more effectively, etc. Discussed how his conscientiousness can be both a strength and a source of stress for him. Explored ideas about what would bring him more eleni, meaning, value.     Spent a fair amount of time reviewing and discussing Acceptance and Commitment Therapy as a philosophy and approach to working with patients. Suggested a few web sites and authors (Javier Sharma and Marcel Flores).    He will look into taking a vacation as a way to work with his current malaise.    I affirmed the steps this patient has taken to address physical and behavioral health issues, and offered continued behavioral health services or referral, now or in the future, as needed by the patient.    Progress on Treatment Objective(s) / Homework:  Satisfactory progress - ACTION (Actively  working towards change); Intervened by reinforcing change plan / affirming steps taken    Psycho-education regarding mental health diagnoses and treatment options    Motivational Interviewing    Skills training    Explored specific skills useful to client in current situation    Skill areas include assertiveness, communication, conflict management, problem-solving, relaxation, etc.     Solution-Focused Therapy    Explored patterns in patient's behaviors and relationships and discussed options for new behaviors    Explored new options for problem-solving, communication, managing stress, etc.    Cognitive-behavioral Therapy    Discussed common cognitive distortions, identified them in patient's life    Explored ways to challenge, replace, and act against these cognitions    Explore behavioral changes that might benefit patient in improving mood and engage in meaningful activity    Acceptance and Commitment Therapy    Explored and identified important values in patient's life    Discussed ways to commit to behavioral activation around these values    Psychodynamic psychotherapy    Discussed patient's emotional dynamics and issues and how they impact behaviors    Explored patient's history of relationships and how they impact present behaviors    Explored how to work with and make changes in these schemas and patterns    Narrative Therapy    Explored the patient's story of their life from their perspective,     Explored alternate ways of understanding their experience, identifying exceptions, developing new themes    Interpersonal Psychotherapy    Explored patterns in relationships that are effective or ineffective at helping patient reach their goals, find satisfying experience.    Discussed new patterns or behaviors to engage in for improved social functioning.    Behavioral Activation    Discussed steps patient can take to become more involved in meaningful activity    Identified barriers to these activities and  explored possible solutions    Mindfulness-Based Strategies    Discussed skills based on development and application of mindfulness    Skills drawn from compassion-focused therapy, dialectical behavior therapy, mindfulness-based stress reduction, mindfulness-based cognitive therapy, etc.    Medication Review:  No problems reported to [facility] today.    Medication Compliance:  No problems reported to [facility] today.    Changes in Health Issues:  No problems reported to [facility] today.    Chemical Use Review:   Substance Use: Chemical use reviewed, no active concerns identified      Tobacco Use: No current tobacco use.      Assessment: Current Emotional / Mental Status (status of significant symptoms):  Risk status (Self / Other harm or suicidal ideation)  Patient denies a history of suicidal ideation, suicide attempts, self-injurious behavior, homicidal ideation, homicidal behavior and and other safety concerns  Patient denies current fears or concerns for personal safety.  Patient denies current or recent suicidal ideation or behaviors.  Patient denies current or recent homicidal ideation or behaviors.  Patient denies current or recent self injurious behavior or ideation.  Patient denies other safety concerns.  A safety and risk management plan has not been developed at this time, however patient was encouraged to call [place] [phone] should there be a change in any of these risk factors.    Appearance:   Appropriate   Eye Contact:   Good   Psychomotor Behavior: Normal   Attitude:   Cooperative   Orientation:   All  Speech   Rate / Production: Normal    Volume:  Normal   Mood:    Anxious  Depressed - fluctuates  Affect:    Appropriate   Thought Content:  Clear  Rumination   Thought Form:  Coherent  Logical   Insight:    Good     Diagnoses:  1. Recurrent major depressive disorder, in remission (H)    2. Anxiety disorder, unspecified type    3. Nightmares      Collateral Reports Completed:  Will collaborate with care team as  indicated during treatment    Plan: (Homework, other):  Patient was given information about behavioral services and encouraged to schedule a follow up appointment with the clinic [facility] as needed.  He was also given information about mental health symptoms and treatment options .  CD Recommendations: No indications of CD issues.  We agree to continue psychotherapy to address his depression, anxiety, life adjustment. GAGE Sears, [facility]    ______________________________________________________________________     [facility] Integrated Behavioral Health Treatment Plan     Client's Name: Neo Saeed                    YOB: 1995     Date: 5/1/2020     DSM-V Diagnoses: 296.32 (F33.1) Major Depressive Disorder, Recurrent Episode, Moderate With anxious distress;  Psychosocial / Contextual Factors: transitions in life - college to work force, friends leaving, etc  CGI-S: 4  CGI-I: 2    Referral / Collaboration:  Will collaborate with care team as indicated during treatment.     Anticipated number of session or this episode of care: 12+        MeasurableTreatment Goal(s) related to diagnosis / functional impairment(s)  Goal 1:  Patient will experience a reduction in depressive and anxious symptoms, along with a corresponding increase in positive emotion and life satisfaction.     Objective #A: Patient will experience a reduction in depressed mood, will develop more effective coping skills to manage depressive symptoms, will develop healthy cognitive patterns and beliefs, will increase ability to function adaptively and will continue to take medications as prescribed / participate in supportive activities and services               Status: Continued - Date(s): 5/1/2020     Objective #B: Patient will experience a reduction in anxiety, will develop more effective coping skills to manage anxiety symptoms, will develop healthy cognitive patterns and beliefs and will increase ability to function adaptively  Status:  Continued - Date(s): 5/1/2020     Objective #C: Patient will develop better understanding of triggers and coping strategies to stabilize mood  Status: Continued - Date(s): 5/1/2020     Goal 2:  Patient will identify and increase engagement in valued activity, i.e. improving social connections/relationships, pursuing occupational goals or personally meaningful pursuits, exploration of meaning in life.     Objective #A: Patient will identify meaningful activity in social, occupational and   personal goals, and increase behavioral activation around these goals           Status: Continued - Date(s): 5/1/2020     Objective #B: Patient will address relationship difficulties in a more adaptive manner  Status: Continued - Date(s): 5/1/2020     Objective #C: Patient will develop coping/problem-solving skills to facilitate more adaptive adjustment and will effectively address problems that interfere with adaptive functioning  Status: Continued - Date(s): 5/1/2020     Possible Therapeutic Intervention(s)  Psycho-education regarding mental health diagnoses and treatment options     Eye-Movement Desensitization and Reprocessing Therapy     Clinical Hypnosis     Skills training    Explore skills useful to client in current situation.    Skills include assertiveness, communication, conflict management, problem-solving, relaxation, etc.     Solution-Focused Therapy    Explore patterns in patient's relationships and discuss options for new behaviors.    Explore patterns in patient's actions and choices and discuss options for new behaviors.     Cognitive-behavioral Therapy    Discuss common cognitive distortions, identify them in patient's life.    Explore ways to challenge, replace, and act against these cognitions.     Acceptance and Commitment Therapy    Explore and identify important values in patient's life.    Discuss ways to commit to behavioral activation around these values.     Psychodynamic psychotherapy    Discuss  patient's emotional dynamics and issues and how they impact behaviors.    Explore patient's history of relationships and how they impact present behaviors.    Explore how to work with and make changes in these schemas and patterns.     Narrative Therapy    Explore the patient's story of his/her life from his/her perspective.    Explore alternate ways of understanding their experience, identifying exceptions, developing new themes.     Interpersonal Psychotherapy    Explore patterns in relationships that are effective or ineffective at helping patient reach their goals, find satisfying experience.    Discuss new patterns or behaviors to engage in for improved social functioning.     Behavioral Activation    Discuss steps patient can take to become more involved in meaningful activity.    Identify barriers to these activities and explore possible solutions.     Mindfulness-Based Strategies    Discuss skills based on development and application of mindfulness.    Skills drawn from compassion-focused therapy, dialectical behavior therapy, mindfulness-based stress reduction, mindfulness-based cognitive therapy, etc.        We have developed these goals together during our work to this point. Patient has assisted in the development of these goals and has agreed to this treatment plan.         Huy Corley, GAGE, [facility]             5/1/2020

## 2021-05-03 ENCOUNTER — MYC MEDICAL ADVICE (OUTPATIENT)
Dept: INTERNAL MEDICINE | Facility: CLINIC | Age: 26
End: 2021-05-03

## 2021-05-03 NOTE — TELEPHONE ENCOUNTER
Call patient and warm transfer to Pulmonary Clinic to schedule PFT.      Melida Burnett, Clinic Coordinator, May 3, 2021 at 11:27 AM

## 2021-05-04 DIAGNOSIS — R05.9 COUGH: ICD-10-CM

## 2021-05-04 PROCEDURE — 94726 PLETHYSMOGRAPHY LUNG VOLUMES: CPT | Performed by: INTERNAL MEDICINE

## 2021-05-04 PROCEDURE — 94729 DIFFUSING CAPACITY: CPT | Performed by: INTERNAL MEDICINE

## 2021-05-04 PROCEDURE — 94375 RESPIRATORY FLOW VOLUME LOOP: CPT | Performed by: INTERNAL MEDICINE

## 2021-05-05 LAB
DLCOUNC-%PRED-PRE: 122 %
DLCOUNC-PRE: 39.21 ML/MIN/MMHG
DLCOUNC-PRED: 32.04 ML/MIN/MMHG
ERV-%PRED-PRE: 46 %
ERV-PRE: 0.74 L
ERV-PRED: 1.59 L
EXPTIME-PRE: 7.53 SEC
FEF2575-%PRED-PRE: 80 %
FEF2575-PRE: 3.79 L/SEC
FEF2575-PRED: 4.74 L/SEC
FEFMAX-%PRED-PRE: 99 %
FEFMAX-PRE: 9.94 L/SEC
FEFMAX-PRED: 9.97 L/SEC
FEV1-%PRED-PRE: 102 %
FEV1-PRE: 4.57 L
FEV1FEV6-PRE: 77 %
FEV1FEV6-PRED: 84 %
FEV1FVC-PRE: 77 %
FEV1FVC-PRED: 84 %
FEV1SVC-PRE: 83 %
FEV1SVC-PRED: 80 %
FIFMAX-PRE: 7.61 L/SEC
FRCPLETH-%PRED-PRE: 72 %
FRCPLETH-PRE: 2.34 L
FRCPLETH-PRED: 3.24 L
FVC-%PRED-PRE: 111 %
FVC-PRE: 5.98 L
FVC-PRED: 5.35 L
IC-%PRED-PRE: 118 %
IC-PRE: 4.79 L
IC-PRED: 4.03 L
RVPLETH-%PRED-PRE: 98 %
RVPLETH-PRE: 1.6 L
RVPLETH-PRED: 1.62 L
TLCPLETH-%PRED-PRE: 103 %
TLCPLETH-PRE: 7.13 L
TLCPLETH-PRED: 6.92 L
VA-%PRED-PRE: 108 %
VA-PRE: 6.86 L
VC-%PRED-PRE: 98 %
VC-PRE: 5.54 L
VC-PRED: 5.62 L

## 2021-05-12 ENCOUNTER — OFFICE VISIT (OUTPATIENT)
Dept: INTERNAL MEDICINE | Facility: CLINIC | Age: 26
End: 2021-05-12
Payer: COMMERCIAL

## 2021-05-12 VITALS
DIASTOLIC BLOOD PRESSURE: 75 MMHG | SYSTOLIC BLOOD PRESSURE: 132 MMHG | WEIGHT: 194 LBS | BODY MASS INDEX: 28.65 KG/M2 | OXYGEN SATURATION: 97 % | HEART RATE: 74 BPM

## 2021-05-12 DIAGNOSIS — R06.02 SOB (SHORTNESS OF BREATH): Primary | ICD-10-CM

## 2021-05-12 DIAGNOSIS — R07.89 ATYPICAL CHEST PAIN: ICD-10-CM

## 2021-05-12 PROCEDURE — 99214 OFFICE O/P EST MOD 30 MIN: CPT | Performed by: INTERNAL MEDICINE

## 2021-05-12 RX ORDER — FEXOFENADINE HCL 60 MG/1
60 TABLET, FILM COATED ORAL 2 TIMES DAILY
COMMUNITY
End: 2022-10-26

## 2021-05-12 RX ORDER — FLUTICASONE PROPIONATE 50 MCG
1 SPRAY, SUSPENSION (ML) NASAL DAILY
COMMUNITY
End: 2021-07-20

## 2021-05-12 NOTE — PROGRESS NOTES
HPI:    Last visit with me 4/23/2021. He states he still has nightmares and the Prazosin is not as effective as in the past. He has seen Dr. Maldonado in the past. He also states that he still has cough, sob, and chest burning only after completing exercise. He states the duration and intensity of the exercise with make the sxs. Worse. As mentioned before, he has not tried MDI's and does not carry a dx. Of Asthma. No other HEENT, cardiopulmonary, abdominal, , neurological, systemic, psychiatric, lymphatic, endocrine complaints.     Past Medical History:   Diagnosis Date     Allergic state      Anxiety      PE:    Vitals noted, gen, nad, cooperative, alert, neck supple nl rom, lungs with good air movement, RRR, S1, S2, no MRG, abdomen, no acute findings.     PFT's 5/4/2021:  The FVC and FEV1 are within normal limits.  The inspiratory flow rates are within normal limits.  Lung volumes are within normal limits.  The diffusing capacity is normal.  However, the diffusing capacity was not corrected for the patient's hemoglobin.   IMPRESSION:   Normal Pulmonary Function.       A/P:    1. Stress/nightmares; He is on Prazosin and has Health Psychology follow up with Javier Corley 6/18/2021; last visit 4/29/2021. See Dr. Maldonado's Psychiatry note from 4/9/2021 for additional information. He states Prazosin not that effective anymore. He will contact Dr. Maldonado for discussion and possible trail of alternative medication(s).   2. Post exercise cough; CXR done 4/23/2021 no acute findings. PFT's done 5/4/2021  3. Immunizations; he has completed the Moderna COVID vaccination series   4. Still post exercise, cough, phlegm, and atypical Chest pain. Ordered stress echo testing and pulmonary referral.     30 minutes spent on the date of the encounter doing chart review, history and exam, documentation and further activities as noted above

## 2021-05-12 NOTE — NURSING NOTE
Chief Complaint   Patient presents with     RECHECK     3 week follow up       Minerva Maguire MA, at 2:56 PM on 5/12/2021.

## 2021-05-12 NOTE — PATIENT INSTRUCTIONS
ECHO [phone] ([facility] [place] [facility])     Pulmonology [phone] ([facility]rd [facility] [place] [facility])

## 2021-05-13 ENCOUNTER — TELEPHONE (OUTPATIENT)
Dept: PULMONOLOGY | Facility: CLINIC | Age: 26
End: 2021-05-13

## 2021-05-13 NOTE — TELEPHONE ENCOUNTER
As pt completed FPFT a few days ago and it would still be within the 3 month time frame so FPFT schedule prior to Dr. Molina's appt in July was cancelled. Discussed this with pt who voiced understanding.

## 2021-05-18 ENCOUNTER — HOSPITAL ENCOUNTER (OUTPATIENT)
Dept: CARDIOLOGY | Facility: CLINIC | Age: 26
Discharge: HOME | End: 2021-05-18
Attending: INTERNAL MEDICINE | Admitting: INTERNAL MEDICINE
Payer: COMMERCIAL

## 2021-05-18 DIAGNOSIS — R06.02 SOB (SHORTNESS OF BREATH): ICD-10-CM

## 2021-05-18 DIAGNOSIS — R07.89 ATYPICAL CHEST PAIN: ICD-10-CM

## 2021-05-18 PROCEDURE — 93350 STRESS TTE ONLY: CPT | Mod: 26 | Performed by: STUDENT IN AN ORGANIZED HEALTH CARE EDUCATION/TRAINING PROGRAM

## 2021-05-18 PROCEDURE — 93321 DOPPLER ECHO F-UP/LMTD STD: CPT | Mod: 26 | Performed by: STUDENT IN AN ORGANIZED HEALTH CARE EDUCATION/TRAINING PROGRAM

## 2021-05-18 PROCEDURE — 93016 CV STRESS TEST SUPVJ ONLY: CPT | Performed by: STUDENT IN AN ORGANIZED HEALTH CARE EDUCATION/TRAINING PROGRAM

## 2021-05-18 PROCEDURE — 93325 DOPPLER ECHO COLOR FLOW MAPG: CPT | Mod: 26 | Performed by: STUDENT IN AN ORGANIZED HEALTH CARE EDUCATION/TRAINING PROGRAM

## 2021-05-18 PROCEDURE — 93325 DOPPLER ECHO COLOR FLOW MAPG: CPT | Mod: TC

## 2021-05-18 PROCEDURE — 93018 CV STRESS TEST I&R ONLY: CPT | Performed by: STUDENT IN AN ORGANIZED HEALTH CARE EDUCATION/TRAINING PROGRAM

## 2021-05-18 PROCEDURE — 255N000002 HC RX 255 OP 636: Performed by: STUDENT IN AN ORGANIZED HEALTH CARE EDUCATION/TRAINING PROGRAM

## 2021-05-18 RX ADMIN — PERFLUTREN 5 ML: 6.52 INJECTION, SUSPENSION INTRAVENOUS at 15:14

## 2021-05-18 NOTE — TELEPHONE ENCOUNTER
RECORDS RECEIVED FROM: internal    DATE RECEIVED: 7.13.21    NOTES STATUS DETAILS   OFFICE NOTE from referring provider internal  Ceferino Cartwright   OFFICE NOTE from other specialist na    DISCHARGE SUMMARY from hospital na    DISCHARGE REPORT from the ER na    MEDICATION LIST internal     IMAGING  (NEED IMAGES AND REPORTS)     CT SCAN na    CHEST XRAY (CXR) internal  4.23.21    TESTS     PULMONARY FUNCTION TESTING (PFT) internal  5.4.21

## 2021-05-19 ENCOUNTER — TELEPHONE (OUTPATIENT)
Dept: INTERNAL MEDICINE | Facility: CLINIC | Age: 26
End: 2021-05-19

## 2021-05-19 DIAGNOSIS — R07.89 ATYPICAL CHEST PAIN: Primary | ICD-10-CM

## 2021-05-19 NOTE — TELEPHONE ENCOUNTER
Placed cardiology referral this encounter    Dear Dexter;    No acute findings on your recent stress test. However, your blood pressure was quite high at the end of the test. I suggest you meet with one of our cardiology providers to discuss this and your exercise symptoms further. I placed a referral today and you can call [phone] to schedule this appointment.    ELIJAH Cartwright MD

## 2021-05-26 ENCOUNTER — OFFICE VISIT (OUTPATIENT)
Dept: CARDIOLOGY | Facility: CLINIC | Age: 26
End: 2021-05-26
Attending: INTERNAL MEDICINE
Payer: COMMERCIAL

## 2021-05-26 VITALS
DIASTOLIC BLOOD PRESSURE: 70 MMHG | SYSTOLIC BLOOD PRESSURE: 122 MMHG | BODY MASS INDEX: 26.77 KG/M2 | OXYGEN SATURATION: 96 % | HEART RATE: 71 BPM | WEIGHT: 187 LBS | HEIGHT: 70 IN

## 2021-05-26 DIAGNOSIS — R07.89 ATYPICAL CHEST PAIN: ICD-10-CM

## 2021-05-26 PROCEDURE — G0463 HOSPITAL OUTPT CLINIC VISIT: HCPCS | Mod: 25

## 2021-05-26 PROCEDURE — 93005 ELECTROCARDIOGRAM TRACING: CPT

## 2021-05-26 PROCEDURE — 99203 OFFICE O/P NEW LOW 30 MIN: CPT | Performed by: INTERNAL MEDICINE

## 2021-05-26 RX ORDER — IPRATROPIUM BROMIDE 21 UG/1
SPRAY, METERED NASAL
COMMUNITY
Start: 2021-05-15 | End: 2022-08-26

## 2021-05-26 RX ORDER — MONTELUKAST SODIUM 10 MG/1
TABLET ORAL
COMMUNITY
Start: 2021-05-15 | End: 2021-11-09

## 2021-05-26 ASSESSMENT — PAIN SCALES - GENERAL: PAINLEVEL: NO PAIN (0)

## 2021-05-26 ASSESSMENT — MIFFLIN-ST. JEOR: SCORE: 1831.54

## 2021-05-26 NOTE — PROGRESS NOTES
CARDIOLOGY NEW OFFICE VISIT    HPI: Neo Saeed is a 25 year old male being seen today for evaluation of exercise induced hypertension.   The patient's risk factor profile is: (-) HTN, (-) DM, (-) hypercholesterolemia, (-) tobacco use, (-) fam Hx premature CAD.  The patient has no history of cardiovascular disease (CAD, CHF, arrhythmia, valvular heart disease).  The patient has no Hx of PAD or cerebrovascular disease.  The patient has not undergone prior cardiovascular evaluation and has never had an ECHO, cardiac catheterization, or EP study.   Last summer he reports noticing that while exercising, in particular endurance exercises such as running for prolonged periods of time (> 3 miles, 5-9 miles), he develops a cough that persists for a day and some shortness of breath that improves rapidly after stopping the exercise. He finally presented to his PCP this year for evaluation of this who recommended an exercise stress test which he performed (exercise echocardiogram) which was normal but was stopped due to a hypertensive response to exercise with his BP rising to 220 mmHg systolic.     He has no family history of heart disease, no sudden death in the family at an early age, no unexplained death in the family at an early age. Both parents are alive and well along with both of his siblings.    The patient denies a history of chest discomfort, dyspnea, PND, orthopnea, pedal edema, palpitations, lightheadedness, and syncope.      PAST MEDICAL HISTORY:  Past Medical History:   Diagnosis Date     Allergic state      Anxiety        CURRENT MEDICATIONS:  Current Outpatient Medications   Medication Sig Dispense Refill     fexofenadine (ALLEGRA) 60 MG tablet Take 60 mg by mouth 2 times daily       fluticasone (FLONASE) 50 MCG/ACT nasal spray Spray 1 spray into both nostrils daily       ipratropium (ATROVENT) 0.03 % nasal spray USE 2 SPRAYS INTRANASALLY THREE TIMES DAILY AS DIRECTED       montelukast (SINGULAIR) 10 MG  tablet TAKE 1 TABLET BY MOUTH EVERY DAY IN THE EVENING       prazosin (MINIPRESS) 1 MG capsule Take 1 capsule (1 mg) by mouth At Bedtime (Patient not taking: Reported on 5/26/2021) 30 capsule 0       PAST SURGICAL HISTORY:  Past Surgical History:   Procedure Laterality Date     CYSTECTOMY PILONIDAL N/A 8/4/2017    Procedure: CYSTECTOMY PILONIDAL;  Lay Open Pilonidal Cystectomy;  Surgeon: Jenaro Cuba MD;  Location: [facility]     DENTAL SURGERY      [place] teeth extraction     ENDOSCOPY       HERNIA REPAIR       HERNIA REPAIR         ALLERGIES  Patient has no known allergies.    FAMILY HX:  Family History   Problem Relation Age of Onset     Hyperlipidemia Father      Hypothyroidism Father      Diabetes Maternal Grandmother      Bipolar Disorder Maternal Grandmother      Parkinsonism Maternal Grandfather      Hyperlipidemia Paternal Grandfather      Coronary Artery Disease Paternal Grandfather      Cancer Paternal Grandfather        SOCIAL HX:  Social History     Socioeconomic History     Marital status: Single     Spouse name: None     Number of children: None     Years of education: None     Highest education level: Bachelor's degree (e.g., BA, AB, BS)   Occupational History     None   Social Needs     Financial resource strain: Not hard at all     Food insecurity     Worry: Never true     Inability: Never true     Transportation needs     Medical: No     Non-medical: No   Tobacco Use     Smoking status: Never Smoker     Smokeless tobacco: Never Used   Substance and Sexual Activity     Alcohol use: Yes     Frequency: 2-3 times a week     Drinks per session: 1 or 2     Binge frequency: Less than monthly     Comment: Couple of drinks once a week     Drug use: No     Sexual activity: Yes     Partners: Male     Birth control/protection: Condom   Lifestyle     Physical activity     Days per week: 4 days     Minutes per session: 40 min     Stress: Only a little   Relationships     Social connections     Talks on  "phone: More than three times a week     Gets together: Twice a week     Attends Buddhist service: Never     Active member of club or organization: No     Attends meetings of clubs or organizations: Never     Relationship status: Never      Intimate partner violence     Fear of current or ex partner: None     Emotionally abused: None     Physically abused: None     Forced sexual activity: None   Other Topics Concern     None   Social History Narrative    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        ROS:  Constitutional: No fever, chills, or sweats. No weight gain/loss.   ENT: No visual disturbance, ear ache, epistaxis, sore throat.   Allergies/Immunologic: Negative.   Respiratory: No cough, hemoptysis.   Cardiovascular: As per HPI.   GI: No nausea, vomiting, hematemesis, melena, or hematochezia.   : No urinary frequency, dysuria, or hematuria.   Integument: Negative.   Psychiatric: Negative.   Neuro: Negative.   Endocrinology: Negative.   Musculoskeletal: No myalgia.    VITAL SIGNS:  /70 (BP Location: Right arm, Patient Position: Chair, Cuff Size: Adult Regular)   Pulse 71   Ht 1.765 m (5' 9.5\")   Wt 84.8 kg (187 lb)   SpO2 96%   BMI 27.22 kg/m    Body mass index is 27.22 kg/m .  Wt Readings from Last 2 Encounters:   05/26/21 84.8 kg (187 lb)   05/12/21 88 kg (194 lb)       PHYSICAL EXAM  Neo Saeed is a 25 year old male in no acute distress.  HEENT: Unremarkable.  Neck: JVP normal.  Carotids +4/4 bilaterally without bruits.  Lungs: CTA.  Cor: RRR. Normal S1 and S2.  No murmur, rub, or gallop.  PMI in Lf 5th ICS.  Abd: Soft, nontender, nondistended.  NABS.  No pulsatile mass.  Extremities: No C/C/E.  Pulses +4/4 symmetric in upper and lower extremities.  Neuro: Grossly intact.    LABS    Lab Results   Component Value Date    WBC 4.2 03/11/2021     Lab Results   Component Value Date    RBC 5.11 03/11/2021     Lab Results   Component Value Date    HGB 15.7 03/11/2021 "     Lab Results   Component Value Date    HCT 46.9 2021     No components found for: MCT  Lab Results   Component Value Date    MCV 92 2021     Lab Results   Component Value Date    MCH 30.7 2021     Lab Results   Component Value Date    MCHC 33.5 2021     Lab Results   Component Value Date    RDW 13.3 2021     Lab Results   Component Value Date     2021      Recent Labs   Lab Test 21  0950 20  0749    140   POTASSIUM 4.2 4.1   CHLORIDE 109 107   CO2 26 26   ANIONGAP 5 6   GLC 88 94   BUN 11 13   CR 0.88 1.01   GLORY 9.1 9.4     Recent Labs   Lab Test 21  0950 20  0749   CHOL 171 177   HDL 47 38*   LDL 96 110*   TRIG 140 150*   NHDL 124 140*        EK21  Sinus, early repolarization    ECHO: 21    STRESS TEST:  21  Interpretation Summary   Near-maximal exercise echocardiogram without evidence of inducible ischemia.   Sensitivity of the test is reduced since target heart rate was not achieved.   However, patient had a high rate-pressure product (>25,000) that correlates   with adequate myocardial workload.   The target heart rate was not achieved. Exercise was terminated at maximal   heart rate of 161 bpm (83% age-predicted max) due to hypertension.   Blunted heart rate and hypertensive response to exercise.   Normal biventricular size, thickness, and global systolic function at   baseline, LVEF=60-65%.   With exercise at a below-diagnostic workload, LVEF increased to >70% and LV   cavity size decreased appropriately.   No regional wall motion abnormalities are present at rest or with exercise at   a below-diagnostic workload.   No angina was elicited at a below-diagnostic workload.   No ECG evidence of ischemia.   Functional capacity is normal for age.   No significant valvular abnormalities are noted on screening Doppler exam.   The aortic root and visualized ascending aorta are normal.     CARDIAC CATH:  --    ASSESSMENT AND  PLAN:    1. Exercise Induced Hypertension; we discussed the pathogenesis of this and why it occurs, we discussed that there are no clear societal guidelines on management and since he is not hypertensive at baseline, it is no necessarily recommended that he start a blood pressure medication, though if medical therapy is pursued the recommendation is to start either an ACEI or BB as the sympathetic system is at the root of this response. He is not excited to start any medical therapy just yet but will consider it. The other option is to scale back the exercise duration. We discussed that he may also have exercise induced asthma that could be driving his symptoms as I am not entirely convinced that his BP response is responsible for his cough and shortness of breath that happens during exercise, though it is not entirely impossible. I asked him to follow up with his PCP unless new concerns arise.    Tarun Mathews MD

## 2021-05-26 NOTE — LETTER
5/26/2021      RE: Neo Saeed  [address]  [facility] [address]       Dear Colleague,    Thank you for the opportunity to participate in the care of your patient, Neo Saeed, at the [facility] [place] [facility]. Please see a copy of my visit note below.    CARDIOLOGY NEW OFFICE VISIT    HPI: Neo Saeed is a 25 year old male being seen today for evaluation of exercise induced hypertension.   The patient's risk factor profile is: (-) HTN, (-) DM, (-) hypercholesterolemia, (-) tobacco use, (-) fam Hx premature CAD.  The patient has no history of cardiovascular disease (CAD, CHF, arrhythmia, valvular heart disease).  The patient has no Hx of PAD or cerebrovascular disease.  The patient has not undergone prior cardiovascular evaluation and has never had an ECHO, cardiac catheterization, or EP study.   Last summer he reports noticing that while exercising, in particular endurance exercises such as running for prolonged periods of time (> 3 miles, 5-9 miles), he develops a cough that persists for a day and some shortness of breath that improves rapidly after stopping the exercise. He finally presented to his PCP this year for evaluation of this who recommended an exercise stress test which he performed (exercise echocardiogram) which was normal but was stopped due to a hypertensive response to exercise with his BP rising to 220 mmHg systolic.     He has no family history of heart disease, no sudden death in the family at an early age, no unexplained death in the family at an early age. Both parents are alive and well along with both of his siblings.    The patient denies a history of chest discomfort, dyspnea, PND, orthopnea, pedal edema, palpitations, lightheadedness, and syncope.      PAST MEDICAL HISTORY:  Past Medical History:   Diagnosis Date     Allergic state      Anxiety        CURRENT MEDICATIONS:  Current Outpatient  Medications   Medication Sig Dispense Refill     fexofenadine (ALLEGRA) 60 MG tablet Take 60 mg by mouth 2 times daily       fluticasone (FLONASE) 50 MCG/ACT nasal spray Spray 1 spray into both nostrils daily       ipratropium (ATROVENT) 0.03 % nasal spray USE 2 SPRAYS INTRANASALLY THREE TIMES DAILY AS DIRECTED       montelukast (SINGULAIR) 10 MG tablet TAKE 1 TABLET BY MOUTH EVERY DAY IN THE EVENING       prazosin (MINIPRESS) 1 MG capsule Take 1 capsule (1 mg) by mouth At Bedtime (Patient not taking: Reported on 5/26/2021) 30 capsule 0       PAST SURGICAL HISTORY:  Past Surgical History:   Procedure Laterality Date     CYSTECTOMY PILONIDAL N/A 8/4/2017    Procedure: CYSTECTOMY PILONIDAL;  Lay Open Pilonidal Cystectomy;  Surgeon: Jenaro Cuba MD;  Location: [facility]     DENTAL SURGERY      [place] teeth extraction     ENDOSCOPY       HERNIA REPAIR       HERNIA REPAIR         ALLERGIES  Patient has no known allergies.    FAMILY HX:  Family History   Problem Relation Age of Onset     Hyperlipidemia Father      Hypothyroidism Father      Diabetes Maternal Grandmother      Bipolar Disorder Maternal Grandmother      Parkinsonism Maternal Grandfather      Hyperlipidemia Paternal Grandfather      Coronary Artery Disease Paternal Grandfather      Cancer Paternal Grandfather        SOCIAL HX:  Social History     Socioeconomic History     Marital status: Single     Spouse name: None     Number of children: None     Years of education: None     Highest education level: Bachelor's degree (e.g., BA, AB, BS)   Occupational History     None   Social Needs     Financial resource strain: Not hard at all     Food insecurity     Worry: Never true     Inability: Never true     Transportation needs     Medical: No     Non-medical: No   Tobacco Use     Smoking status: Never Smoker     Smokeless tobacco: Never Used   Substance and Sexual Activity     Alcohol use: Yes     Frequency: 2-3 times a week     Drinks per session: 1 or 2  "    Binge frequency: Less than monthly     Comment: Couple of drinks once a week     Drug use: No     Sexual activity: Yes     Partners: Male     Birth control/protection: Condom   Lifestyle     Physical activity     Days per week: 4 days     Minutes per session: 40 min     Stress: Only a little   Relationships     Social connections     Talks on phone: More than three times a week     Gets together: Twice a week     Attends Hinduism service: Never     Active member of club or organization: No     Attends meetings of clubs or organizations: Never     Relationship status: Never      Intimate partner violence     Fear of current or ex partner: None     Emotionally abused: None     Physically abused: None     Forced sexual activity: None   Other Topics Concern     None   Social History Narrative    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        ROS:  Constitutional: No fever, chills, or sweats. No weight gain/loss.   ENT: No visual disturbance, ear ache, epistaxis, sore throat.   Allergies/Immunologic: Negative.   Respiratory: No cough, hemoptysis.   Cardiovascular: As per HPI.   GI: No nausea, vomiting, hematemesis, melena, or hematochezia.   : No urinary frequency, dysuria, or hematuria.   Integument: Negative.   Psychiatric: Negative.   Neuro: Negative.   Endocrinology: Negative.   Musculoskeletal: No myalgia.    VITAL SIGNS:  /70 (BP Location: Right arm, Patient Position: Chair, Cuff Size: Adult Regular)   Pulse 71   Ht 1.765 m (5' 9.5\")   Wt 84.8 kg (187 lb)   SpO2 96%   BMI 27.22 kg/m    Body mass index is 27.22 kg/m .  Wt Readings from Last 2 Encounters:   05/26/21 84.8 kg (187 lb)   05/12/21 88 kg (194 lb)       PHYSICAL EXAM  Neo Saeed is a 25 year old male in no acute distress.  HEENT: Unremarkable.  Neck: JVP normal.  Carotids +4/4 bilaterally without bruits.  Lungs: CTA.  Cor: RRR. Normal S1 and S2.  No murmur, rub, or gallop.  PMI in Lf 5th ICS.  Abd: Soft, " nontender, nondistended.  NABS.  No pulsatile mass.  Extremities: No C/C/E.  Pulses +4/4 symmetric in upper and lower extremities.  Neuro: Grossly intact.    LABS    Lab Results   Component Value Date    WBC 4.2 2021     Lab Results   Component Value Date    RBC 5.11 2021     Lab Results   Component Value Date    HGB 15.7 2021     Lab Results   Component Value Date    HCT 46.9 2021     No components found for: MCT  Lab Results   Component Value Date    MCV 92 2021     Lab Results   Component Value Date    MCH 30.7 2021     Lab Results   Component Value Date    MCHC 33.5 2021     Lab Results   Component Value Date    RDW 13.3 2021     Lab Results   Component Value Date     2021      Recent Labs   Lab Test 21  0950 20  0749    140   POTASSIUM 4.2 4.1   CHLORIDE 109 107   CO2 26 26   ANIONGAP 5 6   GLC 88 94   BUN 11 13   CR 0.88 1.01   GLORY 9.1 9.4     Recent Labs   Lab Test 21  0950 20  0749   CHOL 171 177   HDL 47 38*   LDL 96 110*   TRIG 140 150*   NHDL 124 140*        EK21  Sinus, early repolarization    ECHO: 21    STRESS TEST:  21  Interpretation Summary   Near-maximal exercise echocardiogram without evidence of inducible ischemia.   Sensitivity of the test is reduced since target heart rate was not achieved.   However, patient had a high rate-pressure product (>25,000) that correlates   with adequate myocardial workload.   The target heart rate was not achieved. Exercise was terminated at maximal   heart rate of 161 bpm (83% age-predicted max) due to hypertension.   Blunted heart rate and hypertensive response to exercise.   Normal biventricular size, thickness, and global systolic function at   baseline, LVEF=60-65%.   With exercise at a below-diagnostic workload, LVEF increased to >70% and LV   cavity size decreased appropriately.   No regional wall motion abnormalities are present at rest or with  exercise at   a below-diagnostic workload.   No angina was elicited at a below-diagnostic workload.   No ECG evidence of ischemia.   Functional capacity is normal for age.   No significant valvular abnormalities are noted on screening Doppler exam.   The aortic root and visualized ascending aorta are normal.     CARDIAC CATH:  --    ASSESSMENT AND PLAN:    1. Exercise Induced Hypertension; we discussed the pathogenesis of this and why it occurs, we discussed that there are no clear societal guidelines on management and since he is not hypertensive at baseline, it is no necessarily recommended that he start a blood pressure medication, though if medical therapy is pursued the recommendation is to start either an ACEI or BB as the sympathetic system is at the root of this response. He is not excited to start any medical therapy just yet but will consider it. The other option is to scale back the exercise duration. We discussed that he may also have exercise induced asthma that could be driving his symptoms as I am not entirely convinced that his BP response is responsible for his cough and shortness of breath that happens during exercise, though it is not entirely impossible. I asked him to follow up with his PCP unless new concerns arise.    Tarun Mathews MD

## 2021-05-26 NOTE — PATIENT INSTRUCTIONS
Patient Instructions:  It was a pleasure to see you in the cardiology clinic today.      If you have any questions, call  Christy Adame RN, at [phone].  Press Option #1 for the [facility], and then press Option #4  We are encouraging the use of [employer] to communicate with your HealthCare Provider    Note the new medications: none  Stop the following medications: none    The results from today include: none  Please follow up with primary care      If you have an urgent need after hours (8:00 am to 4:30 pm) please call [phone] and ask for the cardiology fellow on call.

## 2021-05-26 NOTE — NURSING NOTE
Chief Complaint   Patient presents with     Follow Up      referral from Dr. Cartwright for atypical chest pain      Vitals were taken, medications reconciled and EKG performed.     ILSA Berry  7:04 AM

## 2021-06-11 ENCOUNTER — MYC MEDICAL ADVICE (OUTPATIENT)
Dept: BEHAVIORAL HEALTH | Facility: CLINIC | Age: 26
End: 2021-06-11

## 2021-06-15 ENCOUNTER — TELEPHONE (OUTPATIENT)
Dept: PULMONOLOGY | Facility: CLINIC | Age: 26
End: 2021-06-15

## 2021-06-15 NOTE — TELEPHONE ENCOUNTER
Spoke with pt regarding rescheduling NEW appt with Dr. Molina on 7/13 as he is no longer available that day. Offered the following week as there was an opening and pt is agreeable. Testing has been completed within 3 months of seeing provider.

## 2021-06-16 NOTE — TELEPHONE ENCOUNTER
RECORDS RECEIVED FROM: internal     DATE RECEIVED: 7.20.21     NOTES STATUS DETAILS   OFFICE NOTE from referring provider internal  Ceferino Cartwright   OFFICE NOTE from other specialist na     DISCHARGE SUMMARY from hospital na     DISCHARGE REPORT from the ER na     MEDICATION LIST internal      IMAGING  (NEED IMAGES AND REPORTS)       CT SCAN na     CHEST XRAY (CXR) internal  4.23.21    TESTS       PULMONARY FUNCTION TESTING (PFT) internal  5.4.21

## 2021-07-13 ENCOUNTER — PRE VISIT (OUTPATIENT)
Dept: PULMONOLOGY | Facility: CLINIC | Age: 26
End: 2021-07-13

## 2021-07-20 ENCOUNTER — OFFICE VISIT (OUTPATIENT)
Dept: PULMONOLOGY | Facility: CLINIC | Age: 26
End: 2021-07-20
Attending: INTERNAL MEDICINE
Payer: COMMERCIAL

## 2021-07-20 ENCOUNTER — PRE VISIT (OUTPATIENT)
Dept: PULMONOLOGY | Facility: CLINIC | Age: 26
End: 2021-07-20

## 2021-07-20 VITALS
OXYGEN SATURATION: 96 % | DIASTOLIC BLOOD PRESSURE: 84 MMHG | HEART RATE: 54 BPM | SYSTOLIC BLOOD PRESSURE: 125 MMHG | WEIGHT: 187 LBS | BODY MASS INDEX: 26.77 KG/M2 | RESPIRATION RATE: 17 BRPM | HEIGHT: 70 IN

## 2021-07-20 DIAGNOSIS — R06.02 SOB (SHORTNESS OF BREATH): ICD-10-CM

## 2021-07-20 DIAGNOSIS — R07.89 ATYPICAL CHEST PAIN: ICD-10-CM

## 2021-07-20 PROCEDURE — G0463 HOSPITAL OUTPT CLINIC VISIT: HCPCS

## 2021-07-20 PROCEDURE — 99204 OFFICE O/P NEW MOD 45 MIN: CPT | Mod: 25 | Performed by: INTERNAL MEDICINE

## 2021-07-20 RX ORDER — ALBUTEROL SULFATE 90 UG/1
1-2 AEROSOL, METERED RESPIRATORY (INHALATION) EVERY 4 HOURS PRN
Qty: 1 G | Refills: 3 | Status: SHIPPED | OUTPATIENT
Start: 2021-07-20

## 2021-07-20 ASSESSMENT — MIFFLIN-ST. JEOR: SCORE: 1831.54

## 2021-07-20 ASSESSMENT — PAIN SCALES - GENERAL: PAINLEVEL: NO PAIN (0)

## 2021-07-20 NOTE — NURSING NOTE
Chief Complaint   Patient presents with     Consult     Shortness of breath and atypical chest pain     Medications reviewed and vital signs taken.   Radha Beck, CMA

## 2021-07-20 NOTE — PROGRESS NOTES
Reason for Visit  Neo Saeed is a 25 year old year old male who is being seen for Consult (Shortness of breath and atypical chest pain )    Pulmonary HPI  24 YO referred to the pulmonary clinic for evaluation of cough.  Symptoms have been present for almost one year.  Occur after running long distances of 3-4 miles, symptoms persist for half a day afterwards.  Has mild chest tightness with onset of cough.  Also does rowing and weight lifting but does not have symptoms.  Symptoms occur year round, no seasonal variation.  History of allergic rhinitis, mostly Spring; increased symptoms this Spring but responded to ipatropium bromide nasal spray and starting Singulair.  No history of sinus infections.  No history of asthma.  No history of exercise induced cough or SOB as child.  No history of GERD.    No tobacco use.  No occupational exposures.  No family history of lung disease.  CXR was personally reviewed in clinic- no acute findings.    The patient was seen and examined by Italo Molina MD           Current Outpatient Medications   Medication     fexofenadine (ALLEGRA) 60 MG tablet     ipratropium (ATROVENT) 0.03 % nasal spray     montelukast (SINGULAIR) 10 MG tablet     No current facility-administered medications for this visit.     No Known Allergies  Past Medical History:   Diagnosis Date     Allergic state      Anxiety        Past Surgical History:   Procedure Laterality Date     CYSTECTOMY PILONIDAL N/A 8/4/2017    Procedure: CYSTECTOMY PILONIDAL;  Lay Open Pilonidal Cystectomy;  Surgeon: Jenaro Cuba MD;  Location:  OR     DENTAL SURGERY      [place] teeth extraction     ENDOSCOPY       HERNIA REPAIR       HERNIA REPAIR         Social History     Socioeconomic History     Marital status: Single     Spouse name: Not on file     Number of children: Not on file     Years of education: Not on file     Highest education level: Bachelor's degree (e.g., BA, AB, BS)   Occupational History      "Not on file   Tobacco Use     Smoking status: Never Smoker     Smokeless tobacco: Never Used   Substance and Sexual Activity     Alcohol use: Yes     Comment: Couple of drinks once a week     Drug use: No     Sexual activity: Yes     Partners: Male     Birth control/protection: Condom   Other Topics Concern     Not on file   Social History Narrative    Graduated 12/2017 in [employer] science. Works for [employer].      Social Determinants of Health     Financial Resource Strain: Low Risk      Difficulty of Paying Living Expenses: Not hard at all   Food Insecurity: No Food Insecurity     Worried About Running Out of Food in the Last Year: Never true     Ran Out of Food in the Last Year: Never true   Transportation Needs: No Transportation Needs     Lack of Transportation (Medical): No     Lack of Transportation (Non-Medical): No   Physical Activity:      Days of Exercise per Week:      Minutes of Exercise per Session:    Stress:      Feeling of Stress :    Social Connections:      Frequency of Communication with Friends and Family:      Frequency of Social Gatherings with Friends and Family:      Attends [employer] Services:      Active Member of Clubs or Organizations:      Attends Club or Organization Meetings:      Marital Status:    Intimate Partner Violence:      Fear of Current or Ex-Partner:      Emotionally Abused:      Physically Abused:      Sexually Abused:        ROS Pulmonary  A complete ROS was otherwise negative except as noted in the HPI.  BP (!) 147/78   Pulse 54   Resp 17   Ht 1.765 m (5' 9.5\")   Wt 84.8 kg (187 lb)   SpO2 96%   BMI 27.22 kg/m    Exam:   GENERAL APPEARANCE: Well developed, well nourished, alert, and in no apparent distress.  EYES: PERRL, EOMI  HENT: Nasal mucosa with no edema and no hyperemia. No nasal polyps.  EARS: Canals clear, TMs normal  MOUTH: Oral mucosa is moist, without any lesions, no tonsillar enlargement, no oropharyngeal exudate.  NECK: supple, no masses, no " thyromegaly.  LYMPHATICS: No significant axillary, cervical, or supraclavicular nodes.  RESP:  Good air flow throughout.  No crackles. No rhonchi. No wheezes.  CV: Normal S1, S2, regular rhythm, normal rate. No murmur.  No rub. No gallop. No LE edema.   ABDOMEN:  Bowel sounds normal, soft, nontender, no HSM or masses.   MS: extremities normal. No clubbing. No cyanosis.  SKIN: no rash on limited exam  NEURO: Mentation intact, speech normal, normal strength and tone, normal gait and stance  PSYCH: mentation appears normal. and affect normal/bright  Results:  Pulmonary function tests from 5-4-21 were personally reviewed in clinic  FEV-1 4.57 (102%), FVC 5.98 (111%), FEV-1/FVC 77%  FRC 72%, %, RV 98%  DLCO 122%  No airflow obstruction.  Isolated decrease in FRC likely secondary to body habitus.  Normal diffusion capacity.  No results found for this or any previous visit (from the past 168 hour(s)).    Assessment and plan:   26 YO with exercise induced cough likely due to exercise induced bronchospasm.  Spirometry is normal.  History of allergic rhinitis but is well controlled.    1. Start albuterol 20 minutes prior to exercise and prn with symptoms.  If symptoms are not resolved will start low dose Advair or equivalent  2. Considering monitoring peak flows if symptoms persist  3. Continue ipatropium and Singulair    RTC in 2 months.  Advised to contact the clinic with any questions or concerns.

## 2021-07-20 NOTE — LETTER
7/20/2021         RE: Neo Saeed  [address]  [facility] [address]        Dear Colleague,    Thank you for referring your patient, Neo Saeed, to the [facility] [place]. Please see a copy of my visit note below.    Reason for Visit  Neo Saeed is a 25 year old year old male who is being seen for Consult (Shortness of breath and atypical chest pain )    Pulmonary HPI  24 YO referred to the pulmonary clinic for evaluation of cough.  Symptoms have been present for almost one year.  Occur after running long distances of 3-4 miles, symptoms persist for half a day afterwards.  Has mild chest tightness with onset of cough.  Also does rowing and weight lifting but does not have symptoms.  Symptoms occur year round, no seasonal variation.  History of allergic rhinitis, mostly Spring; increased symptoms this Spring but responded to ipatropium bromide nasal spray and starting Singulair.  No history of sinus infections.  No history of asthma.  No history of exercise induced cough or SOB as child.  No history of GERD.    No tobacco use.  No occupational exposures.  No family history of lung disease.  CXR was personally reviewed in clinic- no acute findings.    The patient was seen and examined by Italo Molina MD           Current Outpatient Medications   Medication     fexofenadine (ALLEGRA) 60 MG tablet     ipratropium (ATROVENT) 0.03 % nasal spray     montelukast (SINGULAIR) 10 MG tablet     No current facility-administered medications for this visit.     No Known Allergies  Past Medical History:   Diagnosis Date     Allergic state      Anxiety        Past Surgical History:   Procedure Laterality Date     CYSTECTOMY PILONIDAL N/A 8/4/2017    Procedure: CYSTECTOMY PILONIDAL;  Lay Open Pilonidal Cystectomy;  Surgeon: Jenaro Cuba MD;  Location:  OR     DENTAL SURGERY      [place] teeth extraction     ENDOSCOPY       HERNIA  "REPAIR       HERNIA REPAIR         Social History     Socioeconomic History     Marital status: Single     Spouse name: Not on file     Number of children: Not on file     Years of education: Not on file     Highest education level: Bachelor's degree (e.g., BA, AB, BS)   Occupational History     Not on file   Tobacco Use     Smoking status: Never Smoker     Smokeless tobacco: Never Used   Substance and Sexual Activity     Alcohol use: Yes     Comment: Couple of drinks once a week     Drug use: No     Sexual activity: Yes     Partners: Male     Birth control/protection: Condom   Other Topics Concern     Not on file   Social History Narrative    Graduated 12/2017 in [email or web address] science. Works for [employer].      Social Determinants of Health     Financial Resource Strain: Low Risk      Difficulty of Paying Living Expenses: Not hard at all   Food Insecurity: No Food Insecurity     Worried About Running Out of Food in the Last Year: Never true     Ran Out of Food in the Last Year: Never true   Transportation Needs: No Transportation Needs     Lack of Transportation (Medical): No     Lack of Transportation (Non-Medical): No   Physical Activity:      Days of Exercise per Week:      Minutes of Exercise per Session:    Stress:      Feeling of Stress :    Social Connections:      Frequency of Communication with Friends and Family:      Frequency of Social Gatherings with Friends and Family:      Attends Rastafari Services:      Active Member of Clubs or Organizations:      Attends Club or Organization Meetings:      Marital Status:    Intimate Partner Violence:      Fear of Current or Ex-Partner:      Emotionally Abused:      Physically Abused:      Sexually Abused:        ROS Pulmonary  A complete ROS was otherwise negative except as noted in the HPI.  BP (!) 147/78   Pulse 54   Resp 17   Ht 1.765 m (5' 9.5\")   Wt 84.8 kg (187 lb)   SpO2 96%   BMI 27.22 kg/m    Exam:   GENERAL APPEARANCE: Well developed, well nourished, " alert, and in no apparent distress.  EYES: PERRL, EOMI  HENT: Nasal mucosa with no edema and no hyperemia. No nasal polyps.  EARS: Canals clear, TMs normal  MOUTH: Oral mucosa is moist, without any lesions, no tonsillar enlargement, no oropharyngeal exudate.  NECK: supple, no masses, no thyromegaly.  LYMPHATICS: No significant axillary, cervical, or supraclavicular nodes.  RESP:  Good air flow throughout.  No crackles. No rhonchi. No wheezes.  CV: Normal S1, S2, regular rhythm, normal rate. No murmur.  No rub. No gallop. No LE edema.   ABDOMEN:  Bowel sounds normal, soft, nontender, no HSM or masses.   MS: extremities normal. No clubbing. No cyanosis.  SKIN: no rash on limited exam  NEURO: Mentation intact, speech normal, normal strength and tone, normal gait and stance  PSYCH: mentation appears normal. and affect normal/bright  Results:  Pulmonary function tests from 5-4-21 were personally reviewed in clinic  FEV-1 4.57 (102%), FVC 5.98 (111%), FEV-1/FVC 77%  FRC 72%, %, RV 98%  DLCO 122%  No airflow obstruction.  Isolated decrease in FRC likely secondary to body habitus.  Normal diffusion capacity.  No results found for this or any previous visit (from the past 168 hour(s)).    Assessment and plan:   26 YO with exercise induced cough likely due to exercise induced bronchospasm.  Spirometry is normal.  History of allergic rhinitis but is well controlled.    1. Start albuterol 20 minutes prior to exercise and prn with symptoms.  If symptoms are not resolved will start low dose Advair or equivalent  2. Considering monitoring peak flows if symptoms persist  3. Continue ipatropium and Singulair    RTC in 2 months.  Advised to contact the clinic with any questions or concerns.            Again, thank you for allowing me to participate in the care of your patient.        Sincerely,        Italo Molina MD

## 2021-08-19 ENCOUNTER — VIRTUAL VISIT (OUTPATIENT)
Dept: BEHAVIORAL HEALTH | Facility: CLINIC | Age: 26
End: 2021-08-19
Payer: COMMERCIAL

## 2021-08-19 DIAGNOSIS — F41.9 ANXIETY DISORDER, UNSPECIFIED TYPE: ICD-10-CM

## 2021-08-19 DIAGNOSIS — F33.40 RECURRENT MAJOR DEPRESSIVE DISORDER, IN REMISSION (H): Primary | ICD-10-CM

## 2021-08-19 PROCEDURE — 90834 PSYTX W PT 45 MINUTES: CPT | Mod: 95 | Performed by: MARRIAGE & FAMILY THERAPIST

## 2021-08-19 NOTE — PROGRESS NOTES
[facility] (PCC referral)  August 19, 2021  Behavioral Health Clinician Progress Note    Patient Name: Neo Saeed              Service Type:  Individual      Service Location:   Video Visit     Session Start Time: 305pm  Session End Time: 355pm     Session Length: 38 - 52      Attendees: Patient    Visit Activities (Refresh list every visit): [facility] Only    Diagnostic Assessment Date: 5/1/2020  Treatment Plan Review Date: 5/1/2020  CGI-S: 4  CGI-I: 2    See Flowsheets for today's PHQ-9 and LETI-7 results  Previous PHQ-9:   PHQ-9 SCORE 2/7/2020 2/26/2021 4/9/2021   PHQ-9 Total Score MyChart - - 4 (Minimal depression)   PHQ-9 Total Score 1 5 4     Previous LETI-7:   LETI-7 SCORE 2/7/2020 4/9/2021 7/26/2021   Total Score - 4 (minimal anxiety) 4 (minimal anxiety)   Total Score 2 4 4       RAYMON LEVEL:  No flowsheet data found.    DATA  Extended Session (60+ minutes): No  Interactive Complexity: No  Crisis: No  [facility] Patient: No    Treatment Objective(s) Addressed in This Session:  Target Behavior(s): disease management/lifestyle changes      Patient  will experience a reduction in depressed mood, will develop more effective coping skills to manage depressive symptoms and will develop healthy cognitive patterns and beliefs, will experience a reduction in anxiety and will develop more effective coping skills to manage anxiety symptoms and will develop coping/problem-solving skills to facilitate more adaptive adjustment    Current Stressors / Issues:  Telemedicine Visit: The patient's condition can be safely assessed and treated via synchronous audio and visual telemedicine encounter.      Reason for Telemedicine Visit: Coronavirus concerns    Originating Site (Patient Location): Patient's home    Distant Site (Provider Location): [facility]: [facility] [place]    Consent:  The patient/guardian has verbally consented to: the potential risks and benefits of telemedicine (video visit) versus in person care;  "bill my insurance or make self-payment for services provided; and responsibility for payment of non-covered services.     Mode of Communication:  Video Conference via Zipnosis    As the provider I attest to compliance with applicable laws and regulations related to telemedicine.      [facility] met with Neo to provide psychotherapy support regarding life stress.      Dexter reports that his nightmares are continuing, and he is waking more during the night now, as well. He stopped taking the Prazosin and says he found little difference from it. He is still not sure what to make of this issue. Nightmares are nightly again, and he cannot identify any external cause for this.    He is wondering if he has ADHD, and will see someone next week on this. Explored some of his thoughts and experiences that led to these questions for him.     He has been socializing more and this has gone well, but he is still working from home. He has been dating some, and we discussed this. Overall his life is going pretty well, he reports. Spent a good part of our session discussing his general life goals - existential questions about what will bring him satisfaction. He admits to feeling somewhat restless and dissatisfied, and we began an exploration of his values and goals in life, how to explore these. He reports he feels he has done all of the things he \"should\" in life - school, job, get established - but he feels still rather unhappy.    I affirmed the steps this patient has taken to address physical and behavioral health issues, and offered continued behavioral health services or referral, now or in the future, as needed by the patient.    Progress on Treatment Objective(s) / Homework:  Satisfactory progress - ACTION (Actively working towards change); Intervened by reinforcing change plan / affirming steps taken    Psycho-education regarding mental health diagnoses and treatment options    Motivational Interviewing    Skills " training    Explored specific skills useful to client in current situation    Skill areas include assertiveness, communication, conflict management, problem-solving, relaxation, etc.     Solution-Focused Therapy    Explored patterns in patient's behaviors and relationships and discussed options for new behaviors    Explored new options for problem-solving, communication, managing stress, etc.    Cognitive-behavioral Therapy    Discussed common cognitive distortions, identified them in patient's life    Explored ways to challenge, replace, and act against these cognitions    Explore behavioral changes that might benefit patient in improving mood and engage in meaningful activity    Acceptance and Commitment Therapy    Explored and identified important values in patient's life    Discussed ways to commit to behavioral activation around these values    Psychodynamic psychotherapy    Discussed patient's emotional dynamics and issues and how they impact behaviors    Explored patient's history of relationships and how they impact present behaviors    Explored how to work with and make changes in these schemas and patterns    Narrative Therapy    Explored the patient's story of their life from their perspective,     Explored alternate ways of understanding their experience, identifying exceptions, developing new themes    Interpersonal Psychotherapy    Explored patterns in relationships that are effective or ineffective at helping patient reach their goals, find satisfying experience.    Discussed new patterns or behaviors to engage in for improved social functioning.    Behavioral Activation    Discussed steps patient can take to become more involved in meaningful activity    Identified barriers to these activities and explored possible solutions    Mindfulness-Based Strategies    Discussed skills based on development and application of mindfulness    Skills drawn from compassion-focused therapy, dialectical behavior  therapy, mindfulness-based stress reduction, mindfulness-based cognitive therapy, etc.    Medication Review:  No problems reported to [facility] today.    Medication Compliance:  No problems reported to [facility] today.    Changes in Health Issues:  No problems reported to [facility] today.    Chemical Use Review:   Substance Use: Chemical use reviewed, no active concerns identified      Tobacco Use: No current tobacco use.      Assessment: Current Emotional / Mental Status (status of significant symptoms):  Risk status (Self / Other harm or suicidal ideation)  Patient denies a history of suicidal ideation, suicide attempts, self-injurious behavior, homicidal ideation, homicidal behavior and and other safety concerns  Patient denies current fears or concerns for personal safety.  Patient denies current or recent suicidal ideation or behaviors.  Patient denies current or recent homicidal ideation or behaviors.  Patient denies current or recent self injurious behavior or ideation.  Patient denies other safety concerns.  A safety and risk management plan has not been developed at this time, however patient was encouraged to call [place] [phone] should there be a change in any of these risk factors.    Appearance:   Appropriate   Eye Contact:   Good   Psychomotor Behavior: Normal   Attitude:   Cooperative   Orientation:   All  Speech   Rate / Production: Normal    Volume:  Normal   Mood:    Anxious  Depressed - fluctuates  Affect:    Appropriate   Thought Content:  Clear  Rumination   Thought Form:  Coherent  Logical   Insight:    Good     Diagnoses:  1. Recurrent major depressive disorder, in remission (H)    2. Anxiety disorder, unspecified type      Collateral Reports Completed:  Will collaborate with care team as indicated during treatment    Plan: (Homework, other):  Patient was given information about behavioral services and encouraged to schedule a follow up appointment with the clinic [facility] as needed.  He was also given  information about mental health symptoms and treatment options .  CD Recommendations: No indications of CD issues.  We agree to continue psychotherapy to address his depression, anxiety, life adjustment. GAGE Sears, [facility]    ______________________________________________________________________     [facility] Treatment Plan     Client's Name: Neo Saeed                    YOB: 1995     Date: 5/1/2020     DSM-V Diagnoses: 296.32 (F33.1) Major Depressive Disorder, Recurrent Episode, Moderate With anxious distress;  Psychosocial / Contextual Factors: transitions in life - college to work force, friends leaving, etc  CGI-S: 4  CGI-I: 2    Referral / Collaboration:  Will collaborate with care team as indicated during treatment.     Anticipated number of session or this episode of care: 12+        MeasurableTreatment Goal(s) related to diagnosis / functional impairment(s)  Goal 1:  Patient will experience a reduction in depressive and anxious symptoms, along with a corresponding increase in positive emotion and life satisfaction.     Objective #A: Patient will experience a reduction in depressed mood, will develop more effective coping skills to manage depressive symptoms, will develop healthy cognitive patterns and beliefs, will increase ability to function adaptively and will continue to take medications as prescribed / participate in supportive activities and services               Status: Continued - Date(s): 5/1/2020     Objective #B: Patient will experience a reduction in anxiety, will develop more effective coping skills to manage anxiety symptoms, will develop healthy cognitive patterns and beliefs and will increase ability to function adaptively  Status: Continued - Date(s): 5/1/2020     Objective #C: Patient will develop better understanding of triggers and coping strategies to stabilize mood  Status: Continued - Date(s): 5/1/2020     Goal 2:  Patient will identify  and increase engagement in valued activity, i.e. improving social connections/relationships, pursuing occupational goals or personally meaningful pursuits, exploration of meaning in life.     Objective #A: Patient will identify meaningful activity in social, occupational and   personal goals, and increase behavioral activation around these goals           Status: Continued - Date(s): 5/1/2020     Objective #B: Patient will address relationship difficulties in a more adaptive manner  Status: Continued - Date(s): 5/1/2020     Objective #C: Patient will develop coping/problem-solving skills to facilitate more adaptive adjustment and will effectively address problems that interfere with adaptive functioning  Status: Continued - Date(s): 5/1/2020     Possible Therapeutic Intervention(s)  Psycho-education regarding mental health diagnoses and treatment options     Eye-Movement Desensitization and Reprocessing Therapy     Clinical Hypnosis     Skills training    Explore skills useful to client in current situation.    Skills include assertiveness, communication, conflict management, problem-solving, relaxation, etc.     Solution-Focused Therapy    Explore patterns in patient's relationships and discuss options for new behaviors.    Explore patterns in patient's actions and choices and discuss options for new behaviors.     Cognitive-behavioral Therapy    Discuss common cognitive distortions, identify them in patient's life.    Explore ways to challenge, replace, and act against these cognitions.     Acceptance and Commitment Therapy    Explore and identify important values in patient's life.    Discuss ways to commit to behavioral activation around these values.     Psychodynamic psychotherapy    Discuss patient's emotional dynamics and issues and how they impact behaviors.    Explore patient's history of relationships and how they impact present behaviors.    Explore how to work with and make changes in these schemas and  patterns.     Narrative Therapy    Explore the patient's story of his/her life from his/her perspective.    Explore alternate ways of understanding their experience, identifying exceptions, developing new themes.     Interpersonal Psychotherapy    Explore patterns in relationships that are effective or ineffective at helping patient reach their goals, find satisfying experience.    Discuss new patterns or behaviors to engage in for improved social functioning.     Behavioral Activation    Discuss steps patient can take to become more involved in meaningful activity.    Identify barriers to these activities and explore possible solutions.     Mindfulness-Based Strategies    Discuss skills based on development and application of mindfulness.    Skills drawn from compassion-focused therapy, dialectical behavior therapy, mindfulness-based stress reduction, mindfulness-based cognitive therapy, etc.        We have developed these goals together during our work to this point. Patient has assisted in the development of these goals and has agreed to this treatment plan.         GAGE Ulloa, [facility]             5/1/2020

## 2021-08-31 ENCOUNTER — MYC MEDICAL ADVICE (OUTPATIENT)
Dept: PULMONOLOGY | Facility: CLINIC | Age: 26
End: 2021-08-31

## 2021-09-15 ENCOUNTER — VIRTUAL VISIT (OUTPATIENT)
Dept: INTERNAL MEDICINE | Facility: CLINIC | Age: 26
End: 2021-09-15
Payer: COMMERCIAL

## 2021-09-15 DIAGNOSIS — F41.9 ANXIETY: Primary | ICD-10-CM

## 2021-09-15 PROCEDURE — 99213 OFFICE O/P EST LOW 20 MIN: CPT | Mod: TEL | Performed by: INTERNAL MEDICINE

## 2021-09-15 NOTE — PROGRESS NOTES
Telephone visit     Mr. Saeed agrees to a telephone visit    Last in-person visit with me 5/12/2021     He follows with [facility] (last visit 8/19/2021) for anxiety/depression and next visit 9/16/2021. No current medications. See Dr. Maldonado's psychiatry note from 4/29/2021. He states he has found a new Psychiatrist and has been started on 15 mg XR Adderall for ADHD and he has found this beneficial. He would like to go back on Wellbutrin (last dose 100 mg BID). He discontinued this around 2019. He states that nightmares persist (about once/week) and he also wakes up at night with anxiety/panic issues. He states Prazosin was not useful for nightmares. He has discussed restarting Wellbutrin and being on Adderall with his new psychiatry provider. He feels he will have another appt. With psychiatry in a few weeks to month. Will discuss with Dr. Maldonado about this and have another telephone visit with Dexter in about one month.     He follows with Dr. Molina, Pulmonry (last visit 7/20/2021 and follow up 11/9/2021) for cough/atypical CP/SOB. He is on Albuterol, Atrovent nasal spray, and Singulair     Seen in Cardiology Dr. Mathews 5/26/2021 for exercise induced HTN. He had normal stress exercise testing 5/18/2021.     He has completed the Moderna COVID vaccination series.     Lipids checked 3/11/2021 with HDL 47 and LDL 96.     Total time 9 minutes and 31 seconds    ELIJAH Cartwright MD

## 2021-09-16 ENCOUNTER — VIRTUAL VISIT (OUTPATIENT)
Dept: BEHAVIORAL HEALTH | Facility: CLINIC | Age: 26
End: 2021-09-16
Payer: COMMERCIAL

## 2021-09-16 DIAGNOSIS — F33.40 RECURRENT MAJOR DEPRESSIVE DISORDER, IN REMISSION (H): Primary | ICD-10-CM

## 2021-09-16 DIAGNOSIS — F41.9 ANXIETY DISORDER, UNSPECIFIED TYPE: ICD-10-CM

## 2021-09-16 PROCEDURE — 90837 PSYTX W PT 60 MINUTES: CPT | Mod: 95 | Performed by: MARRIAGE & FAMILY THERAPIST

## 2021-09-16 NOTE — PROGRESS NOTES
[facility] ([facility] referral)  September 16, 2021  Behavioral Health Clinician Progress Note    Patient Name: Neo Saeed              Service Type:  Individual      Service Location:   Video Visit     Session Start Time: 2pm  Session End Time: 255pm     Session Length: 53 - 60      Attendees: Patient    Visit Activities (Refresh list every visit): [facility] Only    Diagnostic Assessment Date: 5/1/2020  Treatment Plan Review Date: 5/1/2020  CGI-S: 4  CGI-I: 2    See Flowsheets for today's PHQ-9 and LETI-7 results  Previous PHQ-9:   PHQ-9 SCORE 2/7/2020 2/26/2021 4/9/2021   PHQ-9 Total Score MyChart - - 4 (Minimal depression)   PHQ-9 Total Score 1 5 4     Previous LETI-7:   LETI-7 SCORE 2/7/2020 4/9/2021 7/26/2021   Total Score - 4 (minimal anxiety) 4 (minimal anxiety)   Total Score 2 4 4       RAYMON LEVEL:  No flowsheet data found.    DATA  Extended Session (60+ minutes): No  Interactive Complexity: No  Crisis: No  [facility] Patient: No    Treatment Objective(s) Addressed in This Session:  Target Behavior(s): disease management/lifestyle changes      Patient  will experience a reduction in depressed mood, will develop more effective coping skills to manage depressive symptoms and will develop healthy cognitive patterns and beliefs, will experience a reduction in anxiety and will develop more effective coping skills to manage anxiety symptoms and will develop coping/problem-solving skills to facilitate more adaptive adjustment    Current Stressors / Issues:  Telemedicine Visit: The patient's condition can be safely assessed and treated via synchronous audio and visual telemedicine encounter.      Reason for Telemedicine Visit: Coronavirus concerns    Originating Site (Patient Location): Patient's home    Distant Site (Provider Location[place] [facility] [facility] [place]    Consent:  The patient/guardian has verbally consented to: the potential risks and benefits of telemedicine (video visit) versus in person  "care; bill my insurance or make self-payment for services provided; and responsibility for payment of non-covered services.     Mode of Communication:  Video Conference via [employer]    As the provider I attest to compliance with applicable laws and regulations related to telemedicine.      [facility] met with Neo to provide psychotherapy support regarding life stress.    Dxeter reports that he was diagnosed with ADHD and started on Adderall. He has been thinking about medications to address depression again, as well. A conversation with a pharmacist friend, who said that Adderall can increase the chance of depression in those who have a hx of this. Discussed his more recent feelings of depression, and some panic that is new for him, he said. He says he wakes in the middle of the night many nights. He felt that the Adderall made him feel more focused and sharper. He has decided to stop taking that medication for now.     Discussed depression and anxiety, and how his current symptoms appear to be more in line with anxiety rather than depression. Explored the things in his life that might be contributing to anxiety. Disconnection socially, friends moving away, etc contributes to stress. He is wondering if he might move, himself. The pandemic certainly contributed to this isolation and loneliness, as well. He feels as if he has become more extraverted over time and is not sure how to be this way. Explored the over-arching idea of the \"quarter life crisis,\" which proved useful today.     Discussed working to be aware of the \"primal\" emotional level of his experience before he analyzes, \"intellectualizes.\" He notes that relationships and connections with others are one of the main sources of his anxiety and stress, but he does not talk about this easily. Maintaining old relationships and starting new ones is a big concern for him, he says. Harish is a delgado friend, but lives in [place]. Explored the need for intimacy of any " sort to have a reciprocity, what can get in the way of this.    I affirmed the steps this patient has taken to address physical and behavioral health issues, and offered continued behavioral health services or referral, now or in the future, as needed by the patient.    Progress on Treatment Objective(s) / Homework:  Satisfactory progress - ACTION (Actively working towards change); Intervened by reinforcing change plan / affirming steps taken    Psycho-education regarding mental health diagnoses and treatment options    Motivational Interviewing    Skills training    Explored specific skills useful to client in current situation    Skill areas include assertiveness, communication, conflict management, problem-solving, relaxation, etc.     Solution-Focused Therapy    Explored patterns in patient's behaviors and relationships and discussed options for new behaviors    Explored new options for problem-solving, communication, managing stress, etc.    Cognitive-behavioral Therapy    Discussed common cognitive distortions, identified them in patient's life    Explored ways to challenge, replace, and act against these cognitions    Explore behavioral changes that might benefit patient in improving mood and engage in meaningful activity    Acceptance and Commitment Therapy    Explored and identified important values in patient's life    Discussed ways to commit to behavioral activation around these values    Psychodynamic psychotherapy    Discussed patient's emotional dynamics and issues and how they impact behaviors    Explored patient's history of relationships and how they impact present behaviors    Explored how to work with and make changes in these schemas and patterns    Narrative Therapy    Explored the patient's story of their life from their perspective,     Explored alternate ways of understanding their experience, identifying exceptions, developing new themes    Interpersonal Psychotherapy    Explored patterns  in relationships that are effective or ineffective at helping patient reach their goals, find satisfying experience.    Discussed new patterns or behaviors to engage in for improved social functioning.    Behavioral Activation    Discussed steps patient can take to become more involved in meaningful activity    Identified barriers to these activities and explored possible solutions    Mindfulness-Based Strategies    Discussed skills based on development and application of mindfulness    Skills drawn from compassion-focused therapy, dialectical behavior therapy, mindfulness-based stress reduction, mindfulness-based cognitive therapy, etc.    Medication Review:  No problems reported to [facility] today.    Medication Compliance:  No problems reported to [facility] today.    Changes in Health Issues:  No problems reported to [facility] today.    Chemical Use Review:   Substance Use: Chemical use reviewed, no active concerns identified      Tobacco Use: No current tobacco use.      Assessment: Current Emotional / Mental Status (status of significant symptoms):  Risk status (Self / Other harm or suicidal ideation)  Patient denies a history of suicidal ideation, suicide attempts, self-injurious behavior, homicidal ideation, homicidal behavior and and other safety concerns  Patient denies current fears or concerns for personal safety.  Patient denies current or recent suicidal ideation or behaviors.  Patient denies current or recent homicidal ideation or behaviors.  Patient denies current or recent self injurious behavior or ideation.  Patient denies other safety concerns.  A safety and risk management plan has not been developed at this time, however patient was encouraged to call [place] [phone] should there be a change in any of these risk factors.    Appearance:   Appropriate   Eye Contact:   Good   Psychomotor Behavior: Normal   Attitude:   Cooperative   Orientation:   All  Speech   Rate / Production: Normal    Volume:  Normal    Mood:    Anxious  Depressed - fluctuates  Affect:    Appropriate   Thought Content:  Clear  Rumination   Thought Form:  Coherent  Logical   Insight:    Good     Diagnoses:  1. Recurrent major depressive disorder, in remission (H)    2. Anxiety disorder, unspecified type      Collateral Reports Completed:  Will collaborate with care team as indicated during treatment    Plan: (Homework, other):  Patient was given information about behavioral services and encouraged to schedule a follow up appointment with the clinic [facility] as needed.  He was also given information about mental health symptoms and treatment options .  CD Recommendations: No indications of CD issues.  We agree to continue psychotherapy to address his depression, anxiety, life adjustment. GAGE Sears, [facility]    ______________________________________________________________________     [facility] Integrated Behavioral Health Treatment Plan     Client's Name: Neo Saeed                    YOB: 1995     Date: 5/1/2020     DSM-V Diagnoses: 296.32 (F33.1) Major Depressive Disorder, Recurrent Episode, Moderate With anxious distress;  Psychosocial / Contextual Factors: transitions in life - college to work force, friends leaving, etc  CGI-S: 4  CGI-I: 2    Referral / Collaboration:  Will collaborate with care team as indicated during treatment.     Anticipated number of session or this episode of care: 12+        MeasurableTreatment Goal(s) related to diagnosis / functional impairment(s)  Goal 1:  Patient will experience a reduction in depressive and anxious symptoms, along with a corresponding increase in positive emotion and life satisfaction.     Objective #A: Patient will experience a reduction in depressed mood, will develop more effective coping skills to manage depressive symptoms, will develop healthy cognitive patterns and beliefs, will increase ability to function adaptively and will continue to take medications as prescribed /  participate in supportive activities and services               Status: Continued - Date(s): 5/1/2020     Objective #B: Patient will experience a reduction in anxiety, will develop more effective coping skills to manage anxiety symptoms, will develop healthy cognitive patterns and beliefs and will increase ability to function adaptively  Status: Continued - Date(s): 5/1/2020     Objective #C: Patient will develop better understanding of triggers and coping strategies to stabilize mood  Status: Continued - Date(s): 5/1/2020     Goal 2:  Patient will identify and increase engagement in valued activity, i.e. improving social connections/relationships, pursuing occupational goals or personally meaningful pursuits, exploration of meaning in life.     Objective #A: Patient will identify meaningful activity in social, occupational and   personal goals, and increase behavioral activation around these goals           Status: Continued - Date(s): 5/1/2020     Objective #B: Patient will address relationship difficulties in a more adaptive manner  Status: Continued - Date(s): 5/1/2020     Objective #C: Patient will develop coping/problem-solving skills to facilitate more adaptive adjustment and will effectively address problems that interfere with adaptive functioning  Status: Continued - Date(s): 5/1/2020     Possible Therapeutic Intervention(s)  Psycho-education regarding mental health diagnoses and treatment options     Eye-Movement Desensitization and Reprocessing Therapy     Clinical Hypnosis     Skills training    Explore skills useful to client in current situation.    Skills include assertiveness, communication, conflict management, problem-solving, relaxation, etc.     Solution-Focused Therapy    Explore patterns in patient's relationships and discuss options for new behaviors.    Explore patterns in patient's actions and choices and discuss options for new behaviors.     Cognitive-behavioral Therapy    Discuss common  cognitive distortions, identify them in patient's life.    Explore ways to challenge, replace, and act against these cognitions.     Acceptance and Commitment Therapy    Explore and identify important values in patient's life.    Discuss ways to commit to behavioral activation around these values.     Psychodynamic psychotherapy    Discuss patient's emotional dynamics and issues and how they impact behaviors.    Explore patient's history of relationships and how they impact present behaviors.    Explore how to work with and make changes in these schemas and patterns.     Narrative Therapy    Explore the patient's story of his/her life from his/her perspective.    Explore alternate ways of understanding their experience, identifying exceptions, developing new themes.     Interpersonal Psychotherapy    Explore patterns in relationships that are effective or ineffective at helping patient reach their goals, find satisfying experience.    Discuss new patterns or behaviors to engage in for improved social functioning.     Behavioral Activation    Discuss steps patient can take to become more involved in meaningful activity.    Identify barriers to these activities and explore possible solutions.     Mindfulness-Based Strategies    Discuss skills based on development and application of mindfulness.    Skills drawn from compassion-focused therapy, dialectical behavior therapy, mindfulness-based stress reduction, mindfulness-based cognitive therapy, etc.        We have developed these goals together during our work to this point. Patient has assisted in the development of these goals and has agreed to this treatment plan.         GAGE Ulloa, [facility]             5/1/2020

## 2021-10-08 ENCOUNTER — VIRTUAL VISIT (OUTPATIENT)
Dept: BEHAVIORAL HEALTH | Facility: CLINIC | Age: 26
End: 2021-10-08
Payer: COMMERCIAL

## 2021-10-08 DIAGNOSIS — F41.9 ANXIETY DISORDER, UNSPECIFIED TYPE: ICD-10-CM

## 2021-10-08 DIAGNOSIS — F90.8 ATTENTION DEFICIT HYPERACTIVITY DISORDER (ADHD), OTHER TYPE: ICD-10-CM

## 2021-10-08 DIAGNOSIS — F33.40 RECURRENT MAJOR DEPRESSIVE DISORDER, IN REMISSION (H): Primary | ICD-10-CM

## 2021-10-08 PROCEDURE — 90791 PSYCH DIAGNOSTIC EVALUATION: CPT | Mod: 95 | Performed by: MARRIAGE & FAMILY THERAPIST

## 2021-10-08 NOTE — PROGRESS NOTES
[facility]: Integrated Behavioral Health  Integrated Behavioral Health Services   Diagnostic Assessment Update      PATIENT'S NAME: Neo Saeed  MRN:   [id number]  :   1995  DATE OF SERVICE: 2021  SERVICE LOCATION: Video Visit  Visit Activities: [facility] Only    Telemedicine Visit: The patient's condition can be safely assessed and treated via synchronous audio and visual telemedicine encounter.      Reason for Telemedicine Visit: Services only offered telehealth    Originating Site (Patient Location): Patient's home    Distant Site (Provider Location): Provider Remote Setting- Home Office    Consent:  The patient/guardian has verbally consented to: the potential risks and benefits of telemedicine (video visit) versus in person care; bill my insurance or make self-payment for services provided; and responsibility for payment of non-covered services.     Mode of Communication:  Video Conference via [employer]    As the provider I attest to compliance with applicable laws and regulations related to telemedicine.    Identifying Information:  Patient is a 25 year old year old, , single male.  Patient attended the session alone.      We have worked together for some time, and still meet occasionally to address his depression/mood concerns and life adjustments.     Updates on Presenting Concern(s):  Patient reports the following reason(s) for continuing to receive behavioral services: ongoing need to get support for depression, anxiety, life adjustment.  Patient reported that his symptoms and concerns are improving.  Patient attributed the status of his current symptoms to changes in their life stressors, improvements in their connectedness to resources and support services, changes in their significant relationships  and skills learned in therapy..      Patient stated that his symptoms have resulted in the following functional impairments:  organization, relationship(s), social interactions and work / vocational responsibilities    Patient reports that other professional(s) are involved in providing support / services.  He sees his PCP for psychiatric medication management.    Standard Screening tools completed, including PHQ9 and GAD7.  See Epic for today's results.  Historical PHQ9:  PHQ-9 SCORE 2/7/2020 2/26/2021 4/9/2021   PHQ-9 Total Score MyChart - - 4 (Minimal depression)   PHQ-9 Total Score 1 5 4     Historical GAD7:  LETI-7 SCORE 2/7/2020 4/9/2021 7/26/2021   Total Score - 4 (minimal anxiety) 4 (minimal anxiety)   Total Score 2 4 4     Review of Updates to Patient's Life Situation:  Patient reported no significant changes to their relationships and identified support(s).  Patient identified no changes in the stability of their social connections and identified supports. Patient noted that their living situation did not change within the last year.     Patient's employment status did not change within the past year and remains employed full time and reports @HIS@ is able to function appropriately at work..     Patient reported no changes or new involvment with the legal system.  There are no ethnic, cultural or Methodist factors that may be relevant for therapy.     Mental Health History:  Patient is not currently receiving any mental health services besides seeing me and medication management with PCP.    Chemical Health History:  Patient is not currently receiving any chemical dependency treatment. Patient reports no problems as a result of their drinking / drug use.    Cage-AID score is: 0.   Based on Cage-Aid score and clinical interview there  are not indications of drug or alcohol abuse.      Discussed the general effects of drugs and alcohol on health and well-being.    Significant Losses / Trauma / Abuse / Neglect Issues:  There are no indications or report of: significant losses, trauma, abuse or neglect.    Issues of possible neglect  are not present.    Medical History:   Patient Active Problem List   Diagnosis     H/O pilonidal cyst     Open wnd of buttock, unspecified laterality, initial encounter     Anxiety     Major depressive disorder, recurrent episode, mild (H)     Overweight (BMI 25.0-29.9)     Mixed hyperlipidemia     Medication Review:  Current Outpatient Medications   Medication     albuterol (PROAIR HFA/PROVENTIL HFA/VENTOLIN HFA) 108 (90 Base) MCG/ACT inhaler     fexofenadine (ALLEGRA) 60 MG tablet     ipratropium (ATROVENT) 0.03 % nasal spray     montelukast (SINGULAIR) 10 MG tablet     No current facility-administered medications for this visit.     Medication Compliance:  Yes    Patient was provided recommendation to follow-up with physician.    Mental Status Assessment:  Appearance:   Appropriate   Eye Contact:   Good   Psychomotor Behavior: Normal   Attitude:   Cooperative   Orientation:   All  Speech   Rate / Production: Normal/ Responsive Normal    Volume:  Normal   Mood:    Anxious  Depressed - fluctuates  Affect:    Appropriate   Thought Content:  Clear   Thought Form:  Coherent  Logical   Insight:    Good     Safety Assessment:  Patient denies a history of suicidal ideation, suicide attempts, self-injurious behavior, homicidal ideation, homicidal behavior and and other safety concerns  Patient denies current or recent suicidal ideation or behaviors.  Patient denies current or recent homicidal ideation or behaviors.  Patient denies current or recent self injurious behavior or ideation.  Patient denies other safety concerns.  Patient reports there are no firearms in the house  Protective Factors Sense of responsibility to family, Life Satisfaction, Reality testing ability, Positive coping skills, Positive problem-solving skills, Positive social support and Positive therapeutic releationships   Risk Factors None reported    Plan for Safety and Risk Management:  A safety and risk management plan has not been developed at this  time, however patient was encouraged to call [place] [phone] should there be a change in any of these risk factors.    Patient's Strengths and Limitations:  Patient identified the following strengths or resources that will help him succeed in counseling: commitment to health and well being, community involvement, exercise routine, friends / good social support, family support, insight, intelligence, positive work environment, motivation, sense of humor, strong social skills and work ethic. Patient identified the following supports: community involvement, family and friends. Things that may interfere with the patient's success in counseling include:none identified.    Diagnostic Criteria:  A) Recurrent episode(s) - symptoms have been present during the same 2-week period and represent a change from previous functioning 5 or more symptoms (required for diagnosis)   - Depressed mood. Note: In children and adolescents, can be irritable mood.     - Diminished interest or pleasure in all, or almost all, activities.    - Decreased sleep.    - Fatigue or loss of energy.    - Feelings of worthlessness or inappropriate and excessive guilt.    - Diminished ability to think or concentrate, or indecisiveness.   B) The symptoms cause clinically significant distress or impairment in social, occupational, or other important areas of functioning  C) The episode is not attributable to the physiological effects of a substance or to another medical condition  D) The occurence of major depressive episode is not better explained by other thought / psychotic disorders  E) There has never been a manic episode or hypomanic episode   MDD currently in parital remission    Also recently dx ADHD  Anxiety NOS by     DSM5 Diagnoses: (Sustained by DSM5 Criteria Listed Above)  Diagnoses: Attention-Deficit/Hyperactivity Disorder  314.00 (F90.0) Predominantly inattentive presentation  296.35 (F33.41)  Major Depressive Disorder, Recurrent  Episode, In partial remission With anxious distress  300.00 (F41.9) Unspecified Anxiety Disorder  Psychosocial & Contextual Factors: work stress, relationship changes/life adjustment    I have reviewed the treatment plan today. Neo has made good progress towards these goals. They remain appropriate to direct our work together.      Huy Corley, [facility], Behavioral Health Clinician      ______________________________________________________________________     [facility] Integrated Behavioral Health Treatment Plan     Client's Name: Neo Saeed                    YOB: 1995     Date: 10/8/2021     DSM-V Diagnoses: 296.32 (F33.1) Major Depressive Disorder, Recurrent Episode, Moderate With anxious distress;  Psychosocial / Contextual Factors: transitions in life - college to work force, friends leaving, etc  CGI-S: 4  CGI-I: 2    Referral / Collaboration:  Will collaborate with care team as indicated during treatment.     Anticipated number of session or this episode of care: 12+        MeasurableTreatment Goal(s) related to diagnosis / functional impairment(s)  Goal 1:  Patient will experience a reduction in depressive and anxious symptoms, along with a corresponding increase in positive emotion and life satisfaction.     Objective #A: Patient will experience a reduction in depressed mood, will develop more effective coping skills to manage depressive symptoms, will develop healthy cognitive patterns and beliefs, will increase ability to function adaptively and will continue to take medications as prescribed / participate in supportive activities and services               Status: Continued - Date(s): 10/8/2021     Objective #B: Patient will experience a reduction in anxiety, will develop more effective coping skills to manage anxiety symptoms, will develop healthy cognitive patterns and beliefs and will increase ability to function adaptively  Status: Continued - Date(s): 10/8/2021     Objective  #C: Patient will develop better understanding of triggers and coping strategies to stabilize mood  Status: Continued - Date(s): 10/8/2021     Goal 2:  Patient will identify and increase engagement in valued activity, i.e. improving social connections/relationships, pursuing occupational goals or personally meaningful pursuits, exploration of meaning in life.     Objective #A: Patient will identify meaningful activity in social, occupational and   personal goals, and increase behavioral activation around these goals           Status: Continued - Date(s): 10/8/2021     Objective #B: Patient will address relationship difficulties in a more adaptive manner  Status: Continued - Date(s): 10/8/2021     Objective #C: Patient will develop coping/problem-solving skills to facilitate more adaptive adjustment and will effectively address problems that interfere with adaptive functioning  Status: Continued - Date(s): 10/8/2021     Possible Therapeutic Intervention(s)  Psycho-education regarding mental health diagnoses and treatment options     Eye-Movement Desensitization and Reprocessing Therapy     Clinical Hypnosis     Skills training    Explore skills useful to client in current situation.    Skills include assertiveness, communication, conflict management, problem-solving, relaxation, etc.     Solution-Focused Therapy    Explore patterns in patient's relationships and discuss options for new behaviors.    Explore patterns in patient's actions and choices and discuss options for new behaviors.     Cognitive-behavioral Therapy    Discuss common cognitive distortions, identify them in patient's life.    Explore ways to challenge, replace, and act against these cognitions.     Acceptance and Commitment Therapy    Explore and identify important values in patient's life.    Discuss ways to commit to behavioral activation around these values.     Psychodynamic psychotherapy    Discuss patient's emotional dynamics and issues and  how they impact behaviors.    Explore patient's history of relationships and how they impact present behaviors.    Explore how to work with and make changes in these schemas and patterns.     Narrative Therapy    Explore the patient's story of his/her life from his/her perspective.    Explore alternate ways of understanding their experience, identifying exceptions, developing new themes.     Interpersonal Psychotherapy    Explore patterns in relationships that are effective or ineffective at helping patient reach their goals, find satisfying experience.    Discuss new patterns or behaviors to engage in for improved social functioning.     Behavioral Activation    Discuss steps patient can take to become more involved in meaningful activity.    Identify barriers to these activities and explore possible solutions.     Mindfulness-Based Strategies    Discuss skills based on development and application of mindfulness.    Skills drawn from compassion-focused therapy, dialectical behavior therapy, mindfulness-based stress reduction, mindfulness-based cognitive therapy, etc.        We have developed these goals together during our work to this point. Patient has assisted in the development of these goals and has agreed to this treatment plan.         Huy Corley, G[facility], [facility]             10/8/2021

## 2021-11-01 LAB
ATRIAL RATE - MUSE: 65 BPM
DIASTOLIC BLOOD PRESSURE - MUSE: NORMAL MMHG
INTERPRETATION ECG - MUSE: NORMAL
P AXIS - MUSE: 45 DEGREES
PR INTERVAL - MUSE: 194 MS
QRS DURATION - MUSE: 92 MS
QT - MUSE: 406 MS
QTC - MUSE: 422 MS
R AXIS - MUSE: 68 DEGREES
SYSTOLIC BLOOD PRESSURE - MUSE: NORMAL MMHG
T AXIS - MUSE: 33 DEGREES
VENTRICULAR RATE- MUSE: 65 BPM

## 2021-11-09 ENCOUNTER — OFFICE VISIT (OUTPATIENT)
Dept: PULMONOLOGY | Facility: CLINIC | Age: 26
End: 2021-11-09
Attending: INTERNAL MEDICINE
Payer: COMMERCIAL

## 2021-11-09 VITALS
BODY MASS INDEX: 26.48 KG/M2 | RESPIRATION RATE: 17 BRPM | WEIGHT: 185 LBS | HEART RATE: 60 BPM | DIASTOLIC BLOOD PRESSURE: 85 MMHG | SYSTOLIC BLOOD PRESSURE: 129 MMHG | HEIGHT: 70 IN | OXYGEN SATURATION: 98 %

## 2021-11-09 DIAGNOSIS — J45.20 MILD INTERMITTENT ASTHMA WITHOUT COMPLICATION: Primary | ICD-10-CM

## 2021-11-09 PROCEDURE — G0463 HOSPITAL OUTPT CLINIC VISIT: HCPCS

## 2021-11-09 PROCEDURE — 99213 OFFICE O/P EST LOW 20 MIN: CPT | Performed by: INTERNAL MEDICINE

## 2021-11-09 RX ORDER — FLUTICASONE PROPIONATE AND SALMETEROL XINAFOATE 115; 21 UG/1; UG/1
2 AEROSOL, METERED RESPIRATORY (INHALATION) 2 TIMES DAILY
Qty: 1 G | Refills: 4 | Status: SHIPPED | OUTPATIENT
Start: 2021-11-09 | End: 2022-08-26

## 2021-11-09 ASSESSMENT — MIFFLIN-ST. JEOR: SCORE: 1822.46

## 2021-11-09 ASSESSMENT — PAIN SCALES - GENERAL: PAINLEVEL: NO PAIN (0)

## 2021-11-09 NOTE — LETTER
11/9/2021     RE: Neo Saeed  [address]  [place] [address]    Dear Colleague,    Thank you for referring your patient, Neo Saeed, to the [facility] [place]. Please see a copy of my visit note below.    Reason for Visit  Neo Saeed is a 25 year old year old male who is being seen for RECHECK (Retrun 2 month follow up )    Pulmonary HPI  24 YO referred to the pulmonary clinic for evaluation of cough.  Symptoms have been present for almost one year.  Occur after running long distances of 3-4 miles, symptoms persist for half a day afterwards.  Has mild chest tightness with onset of cough.  Also does rowing and weight lifting but does not have symptoms.  Symptoms occur year round, no seasonal variation.  History of allergic rhinitis, mostly Spring; increased symptoms this Spring but responded to ipatropium bromide nasal spray and starting Singulair.  No history of sinus infections.  No history of asthma.  No history of exercise induced cough or SOB as child.  No history of GERD.    No tobacco use.  No occupational exposures.  No family history of lung disease.  CXR was personally reviewed in clinic- no acute findings.    Last seen 4 months ago. Since his last visit he has used the albuterol inhaler prior to exercise- this helps 80% during non-allergic season and 50% during allergy season (early Spring to late Fall), has not used the inhaler during exercise.  No sinus symptoms at present.  Overall has been doing well.  Sinus symptoms have been ok.    The patient was seen and examined by Italo Molina MD           Current Outpatient Medications   Medication     albuterol (PROAIR HFA/PROVENTIL HFA/VENTOLIN HFA) 108 (90 Base) MCG/ACT inhaler     fexofenadine (ALLEGRA) 60 MG tablet     ipratropium (ATROVENT) 0.03 % nasal spray     No current facility-administered medications for this visit.     No Known Allergies  Past Medical History:  "  Diagnosis Date     Allergic state      Anxiety        Past Surgical History:   Procedure Laterality Date     CYSTECTOMY PILONIDAL N/A 8/4/2017    Procedure: CYSTECTOMY PILONIDAL;  Lay Open Pilonidal Cystectomy;  Surgeon: Jenaro Cuba MD;  Location: [facility] OR     DENTAL SURGERY      [place] teeth extraction     ENDOSCOPY       HERNIA REPAIR       HERNIA REPAIR         Social History     Socioeconomic History     Marital status: Single     Spouse name: Not on file     Number of children: Not on file     Years of education: Not on file     Highest education level: Bachelor's degree (e.g., BA, AB, BS)   Occupational History     Not on file   Tobacco Use     Smoking status: Never Smoker     Smokeless tobacco: Never Used   Substance and Sexual Activity     Alcohol use: Yes     Comment: Couple of drinks once a week     Drug use: No     Sexual activity: Yes     Partners: Male     Birth control/protection: Condom   Other Topics Concern     Not on file   Social History Narrative    Graduated 12/2017 in [employer] science. Works for [employer].      Social Determinants of Health     Financial Resource Strain: Low Risk      Difficulty of Paying Living Expenses: Not hard at all   Food Insecurity: No Food Insecurity     Worried About Running Out of Food in the Last Year: Never true     Ran Out of Food in the Last Year: Never true   Transportation Needs: No Transportation Needs     Lack of Transportation (Medical): No     Lack of Transportation (Non-Medical): No   Physical Activity: Not on file   Stress: Not on file   Social Connections: Not on file   Intimate Partner Violence: Not on file   Housing Stability: Not on file       ROS Pulmonary  A complete ROS was otherwise negative except as noted in the HPI.  Pulse 60   Resp 17   Ht 1.765 m (5' 9.5\")   Wt 83.9 kg (185 lb)   SpO2 98%   BMI 26.93 kg/m    Exam:   GENERAL APPEARANCE: Well developed, well nourished, alert, and in no apparent distress.  EYES: PERRL, " EOMI  HENT: Nasal mucosa with no edema and no hyperemia. No nasal polyps.  EARS: Canals clear, TMs normal  MOUTH: Oral mucosa is moist, without any lesions, no tonsillar enlargement, no oropharyngeal exudate.  NECK: supple, no masses, no thyromegaly.  LYMPHATICS: No significant axillary, cervical, or supraclavicular nodes.  RESP:  Good air flow throughout.  No crackles. No rhonchi. No wheezes.  CV: Normal S1, S2, regular rhythm, normal rate. No murmur.  No rub. No gallop. No LE edema.   ABDOMEN:  Bowel sounds normal, soft, nontender, no HSM or masses.   MS: extremities normal. No clubbing. No cyanosis.  SKIN: no rash on limited exam  NEURO: Mentation intact, speech normal, normal strength and tone, normal gait and stance  PSYCH: mentation appears normal. and affect normal/bright  Results:  No results found for this or any previous visit (from the past 168 hour(s)).    Assessment and plan:   24 YO with exercise induced cough likely due to exercise induced bronchospasm.  Spirometry is normal.  History of allergic rhinitis but is well controlled.  Did better with use of albuterol prior to activity; will start inhaled ICS/LABA.     1. Continue albuterol 20 minutes prior to exercise and prn with symptoms. Advised could use albuterol as needed.  2. Start low dose Advair  3. Continue ipatropium nasal spray and Singulair for sinus symptoms     RTC in 3 months.  Advised to contact the clinic with any questions or concerns.    Again, thank you for allowing me to participate in the care of your patient.      Sincerely,      Italo Molina MD

## 2021-11-09 NOTE — PROGRESS NOTES
Reason for Visit  Neo Saeed is a 25 year old year old male who is being seen for RECHECK (Retrun 2 month follow up )    Pulmonary HPI  24 YO referred to the pulmonary clinic for evaluation of cough.  Symptoms have been present for almost one year.  Occur after running long distances of 3-4 miles, symptoms persist for half a day afterwards.  Has mild chest tightness with onset of cough.  Also does rowing and weight lifting but does not have symptoms.  Symptoms occur year round, no seasonal variation.  History of allergic rhinitis, mostly Spring; increased symptoms this Spring but responded to ipatropium bromide nasal spray and starting Singulair.  No history of sinus infections.  No history of asthma.  No history of exercise induced cough or SOB as child.  No history of GERD.    No tobacco use.  No occupational exposures.  No family history of lung disease.  CXR was personally reviewed in clinic- no acute findings.    Last seen 4 months ago. Since his last visit he has used the albuterol inhaler prior to exercise- this helps 80% during non-allergic season and 50% during allergy season (early Spring to late Fall), has not used the inhaler during exercise.  No sinus symptoms at present.  Overall has been doing well.  Sinus symptoms have been ok.    The patient was seen and examined by Italo Molina MD           Current Outpatient Medications   Medication     albuterol (PROAIR HFA/PROVENTIL HFA/VENTOLIN HFA) 108 (90 Base) MCG/ACT inhaler     fexofenadine (ALLEGRA) 60 MG tablet     ipratropium (ATROVENT) 0.03 % nasal spray     No current facility-administered medications for this visit.     No Known Allergies  Past Medical History:   Diagnosis Date     Allergic state      Anxiety        Past Surgical History:   Procedure Laterality Date     CYSTECTOMY PILONIDAL N/A 8/4/2017    Procedure: CYSTECTOMY PILONIDAL;  Lay Open Pilonidal Cystectomy;  Surgeon: Jenaro Cuba MD;  Location:  OR     [facility]  "SURGERY      [place] teeth extraction     ENDOSCOPY       HERNIA REPAIR       HERNIA REPAIR         Social History     Socioeconomic History     Marital status: Single     Spouse name: Not on file     Number of children: Not on file     Years of education: Not on file     Highest education level: Bachelor's degree (e.g., BA, AB, BS)   Occupational History     Not on file   Tobacco Use     Smoking status: Never Smoker     Smokeless tobacco: Never Used   Substance and Sexual Activity     Alcohol use: Yes     Comment: Couple of drinks once a week     Drug use: No     Sexual activity: Yes     Partners: Male     Birth control/protection: Condom   Other Topics Concern     Not on file   Social History Narrative    Graduated 12/2017 in [email or web address] science. Works for [employer].      Social Determinants of Health     Financial Resource Strain: Low Risk      Difficulty of Paying Living Expenses: Not hard at all   Food Insecurity: No Food Insecurity     Worried About Running Out of Food in the Last Year: Never true     Ran Out of Food in the Last Year: Never true   Transportation Needs: No Transportation Needs     Lack of Transportation (Medical): No     Lack of Transportation (Non-Medical): No   Physical Activity: Not on file   Stress: Not on file   Social Connections: Not on file   Intimate Partner Violence: Not on file   Housing Stability: Not on file       ROS Pulmonary  A complete ROS was otherwise negative except as noted in the HPI.  Pulse 60   Resp 17   Ht 1.765 m (5' 9.5\")   Wt 83.9 kg (185 lb)   SpO2 98%   BMI 26.93 kg/m    Exam:   GENERAL APPEARANCE: Well developed, well nourished, alert, and in no apparent distress.  EYES: PERRL, EOMI  HENT: Nasal mucosa with no edema and no hyperemia. No nasal polyps.  EARS: Canals clear, TMs normal  MOUTH: Oral mucosa is moist, without any lesions, no tonsillar enlargement, no oropharyngeal exudate.  NECK: supple, no masses, no thyromegaly.  LYMPHATICS: No significant axillary, " cervical, or supraclavicular nodes.  RESP:  Good air flow throughout.  No crackles. No rhonchi. No wheezes.  CV: Normal S1, S2, regular rhythm, normal rate. No murmur.  No rub. No gallop. No LE edema.   ABDOMEN:  Bowel sounds normal, soft, nontender, no HSM or masses.   MS: extremities normal. No clubbing. No cyanosis.  SKIN: no rash on limited exam  NEURO: Mentation intact, speech normal, normal strength and tone, normal gait and stance  PSYCH: mentation appears normal. and affect normal/bright  Results:  No results found for this or any previous visit (from the past 168 hour(s)).    Assessment and plan:   26 YO with exercise induced cough likely due to exercise induced bronchospasm.  Spirometry is normal.  History of allergic rhinitis but is well controlled.  Did better with use of albuterol prior to activity; will start inhaled ICS/LABA.     1. Continue albuterol 20 minutes prior to exercise and prn with symptoms. Advised could use albuterol as needed.  2. Start low dose Advair  3. Continue ipatropium nasal spray and Singulair for sinus symptoms     RTC in 3 months.  Advised to contact the clinic with any questions or concerns.

## 2021-11-09 NOTE — NURSING NOTE
Chief Complaint   Patient presents with     RECHECK     Retrun 2 month follow up     Medications reviewed and vital signs taken.   Radha Beck CMA

## 2021-12-01 ENCOUNTER — LAB (OUTPATIENT)
Dept: LAB | Facility: CLINIC | Age: 26
End: 2021-12-01
Payer: COMMERCIAL

## 2021-12-01 ENCOUNTER — OFFICE VISIT (OUTPATIENT)
Dept: INTERNAL MEDICINE | Facility: CLINIC | Age: 26
End: 2021-12-01
Payer: COMMERCIAL

## 2021-12-01 ENCOUNTER — ANCILLARY PROCEDURE (OUTPATIENT)
Dept: CT IMAGING | Facility: CLINIC | Age: 26
End: 2021-12-01
Attending: INTERNAL MEDICINE
Payer: COMMERCIAL

## 2021-12-01 VITALS
HEIGHT: 70 IN | DIASTOLIC BLOOD PRESSURE: 92 MMHG | SYSTOLIC BLOOD PRESSURE: 132 MMHG | WEIGHT: 185 LBS | HEART RATE: 73 BPM | OXYGEN SATURATION: 98 % | BODY MASS INDEX: 26.48 KG/M2 | RESPIRATION RATE: 16 BRPM

## 2021-12-01 DIAGNOSIS — J32.9 SINUSITIS, UNSPECIFIED CHRONICITY, UNSPECIFIED LOCATION: ICD-10-CM

## 2021-12-01 DIAGNOSIS — J32.9 SINUSITIS, UNSPECIFIED CHRONICITY, UNSPECIFIED LOCATION: Primary | ICD-10-CM

## 2021-12-01 LAB
BASOPHILS # BLD AUTO: 0.1 10E3/UL (ref 0–0.2)
BASOPHILS NFR BLD AUTO: 1 %
EOSINOPHIL # BLD AUTO: 0.1 10E3/UL (ref 0–0.7)
EOSINOPHIL NFR BLD AUTO: 1 %
ERYTHROCYTE [DISTWIDTH] IN BLOOD BY AUTOMATED COUNT: 12.1 % (ref 10–15)
HCT VFR BLD AUTO: 49.2 % (ref 40–53)
HGB BLD-MCNC: 16.6 G/DL (ref 13.3–17.7)
IMM GRANULOCYTES # BLD: 0 10E3/UL
IMM GRANULOCYTES NFR BLD: 0 %
LYMPHOCYTES # BLD AUTO: 1.3 10E3/UL (ref 0.8–5.3)
LYMPHOCYTES NFR BLD AUTO: 33 %
MCH RBC QN AUTO: 30.8 PG (ref 26.5–33)
MCHC RBC AUTO-ENTMCNC: 33.7 G/DL (ref 31.5–36.5)
MCV RBC AUTO: 91 FL (ref 78–100)
MONOCYTES # BLD AUTO: 0.4 10E3/UL (ref 0–1.3)
MONOCYTES NFR BLD AUTO: 11 %
NEUTROPHILS # BLD AUTO: 2.2 10E3/UL (ref 1.6–8.3)
NEUTROPHILS NFR BLD AUTO: 54 %
NRBC # BLD AUTO: 0 10E3/UL
NRBC BLD AUTO-RTO: 0 /100
PLATELET # BLD AUTO: 184 10E3/UL (ref 150–450)
PROCALCITONIN SERPL-MCNC: <0.05 NG/ML
RBC # BLD AUTO: 5.39 10E6/UL (ref 4.4–5.9)
WBC # BLD AUTO: 4.1 10E3/UL (ref 4–11)

## 2021-12-01 PROCEDURE — 36415 COLL VENOUS BLD VENIPUNCTURE: CPT | Performed by: PATHOLOGY

## 2021-12-01 PROCEDURE — 85025 COMPLETE CBC W/AUTO DIFF WBC: CPT | Performed by: PATHOLOGY

## 2021-12-01 PROCEDURE — 70486 CT MAXILLOFACIAL W/O DYE: CPT | Performed by: RADIOLOGY

## 2021-12-01 PROCEDURE — 99214 OFFICE O/P EST MOD 30 MIN: CPT | Performed by: INTERNAL MEDICINE

## 2021-12-01 PROCEDURE — 84145 PROCALCITONIN (PCT): CPT | Performed by: INTERNAL MEDICINE

## 2021-12-01 RX ORDER — MONTELUKAST SODIUM 10 MG/1
10 TABLET ORAL AT BEDTIME
Qty: 30 TABLET | Refills: 3 | Status: SHIPPED | OUTPATIENT
Start: 2021-12-01 | End: 2022-08-26

## 2021-12-01 RX ORDER — METHYLPHENIDATE HYDROCHLORIDE 5 MG/1
5 TABLET ORAL 2 TIMES DAILY
COMMUNITY
Start: 2021-11-17 | End: 2022-08-26

## 2021-12-01 ASSESSMENT — ANXIETY QUESTIONNAIRES
7. FEELING AFRAID AS IF SOMETHING AWFUL MIGHT HAPPEN: NOT AT ALL
5. BEING SO RESTLESS THAT IT IS HARD TO SIT STILL: NOT AT ALL
IF YOU CHECKED OFF ANY PROBLEMS ON THIS QUESTIONNAIRE, HOW DIFFICULT HAVE THESE PROBLEMS MADE IT FOR YOU TO DO YOUR WORK, TAKE CARE OF THINGS AT HOME, OR GET ALONG WITH OTHER PEOPLE: NOT DIFFICULT AT ALL
GAD7 TOTAL SCORE: 3
6. BECOMING EASILY ANNOYED OR IRRITABLE: NOT AT ALL
1. FEELING NERVOUS, ANXIOUS, OR ON EDGE: SEVERAL DAYS
2. NOT BEING ABLE TO STOP OR CONTROL WORRYING: SEVERAL DAYS
3. WORRYING TOO MUCH ABOUT DIFFERENT THINGS: SEVERAL DAYS

## 2021-12-01 ASSESSMENT — PATIENT HEALTH QUESTIONNAIRE - PHQ9: 5. POOR APPETITE OR OVEREATING: NOT AT ALL

## 2021-12-01 ASSESSMENT — PAIN SCALES - GENERAL: PAINLEVEL: NO PAIN (0)

## 2021-12-01 ASSESSMENT — MIFFLIN-ST. JEOR: SCORE: 1830.4

## 2021-12-01 NOTE — NURSING NOTE
Neo Saeed is a 25 year old male patient that presents today in clinic for the following:    Chief Complaint   Patient presents with     RECHECK     Discuss sinus infection that has been going on for 3-4 weeks     The patient's allergies and medications were reviewed as noted. A set of vitals were recorded as noted without incident. The patient does not have any other questions for the provider.    Tala Andres, EMT at 9:22 AM on 12/1/2021

## 2021-12-01 NOTE — PROGRESS NOTES
"HPI  25-year-old presents today with a 2 to 3-week history of \"sinus infection\".  Started initially as a sore throat.  Progressed some maxillary sinus tenderness and upper respiratory congestion.  [facility] treated with antibiotics.  This did not help his symptoms and he continues to remain congested with postnasal drip.  No chest pain or chest congestion.  Does have a history of exercise-induced asthma has not been using his inhalers for this.  Recently switched to Advair on a as needed basis.  Said no fever chills or sweats no difficulty swallowing at all.  Past Medical History:   Diagnosis Date     Allergic state      Anxiety      Past Surgical History:   Procedure Laterality Date     CYSTECTOMY PILONIDAL N/A 8/4/2017    Procedure: CYSTECTOMY PILONIDAL;  Lay Open Pilonidal Cystectomy;  Surgeon: Jenaro Cuba MD;  Location:  OR     DENTAL SURGERY      [place] teeth extraction     ENDOSCOPY       HERNIA REPAIR       HERNIA REPAIR       Family History   Problem Relation Age of Onset     Hyperlipidemia Father      Hypothyroidism Father      Diabetes Maternal Grandmother      Bipolar Disorder Maternal Grandmother      Parkinsonism Maternal Grandfather      Hyperlipidemia Paternal Grandfather      Coronary Artery Disease Paternal Grandfather      Cancer Paternal Grandfather          Exam:  BP (!) 132/92 (BP Location: Right arm, Patient Position: Sitting, Cuff Size: Adult Regular)   Pulse 73   Resp 16   Ht 1.778 m (5' 10\")   Wt 83.9 kg (185 lb)   SpO2 98%   BMI 26.54 kg/m    185 lbs 0 oz  Physical Exam   The patient is alert, oriented with a clear sensorium. Skin shows no lesions or rashes and good turgor. Head is normocephalic and atraumatic.  Ears show normal TMs bilaterally with clear ear canals. Nose shows clear nasal mucosa. Mouth shows clear oral mucosa. Neck shows no nodes, thyromegaly. Back is non tender. Lungs are clear to percussion and auscultation. Heart shows " normal S1 and S2 without murmur or gallop.      ASSESSMENT  1 upper respiratory congestion suspect viral or allergic  2 Exercise induced bronchospasm  3 history anxiety    Plan  We will treat him with a combination of Flonase montelukast and have him use his Advair inhaler twice a day regularly for the next week to 10 days.  We will check his sinus CT scan CBC and procalcitonin.  This note was completed using Dragon voice recognition software.      Sam Deshpande MD  General Internal Medicine  [facility]  [phone]

## 2021-12-02 ASSESSMENT — ASTHMA QUESTIONNAIRES: ACT_TOTALSCORE: 21

## 2021-12-02 ASSESSMENT — ANXIETY QUESTIONNAIRES: GAD7 TOTAL SCORE: 3

## 2021-12-02 ASSESSMENT — PATIENT HEALTH QUESTIONNAIRE - PHQ9: SUM OF ALL RESPONSES TO PHQ QUESTIONS 1-9: 3

## 2021-12-03 ENCOUNTER — VIRTUAL VISIT (OUTPATIENT)
Dept: BEHAVIORAL HEALTH | Facility: CLINIC | Age: 26
End: 2021-12-03
Payer: COMMERCIAL

## 2021-12-03 DIAGNOSIS — F41.9 ANXIETY DISORDER, UNSPECIFIED TYPE: ICD-10-CM

## 2021-12-03 DIAGNOSIS — F90.8 ATTENTION DEFICIT HYPERACTIVITY DISORDER (ADHD), OTHER TYPE: ICD-10-CM

## 2021-12-03 DIAGNOSIS — F33.40 RECURRENT MAJOR DEPRESSIVE DISORDER, IN REMISSION (H): Primary | ICD-10-CM

## 2021-12-03 PROCEDURE — 90837 PSYTX W PT 60 MINUTES: CPT | Mod: 95 | Performed by: MARRIAGE & FAMILY THERAPIST

## 2021-12-03 NOTE — PROGRESS NOTES
[facility] ([facility] referral)  December 3, 2021  Behavioral Health Clinician Progress Note    Patient Name: Neo Saeed              Service Type:  Individual      Service Location:   Video Visit     Session Start Time: 303pm  Session End Time: 4pm     Session Length: 53 - 60      Attendees: Patient    Visit Activities (Refresh list every visit): [facility] Only    Diagnostic Assessment Date: 10/8/2021  Treatment Plan Review Date: 10/8/2021  CGI-S: 4  CGI-I: 2    See Flowsheets for today's PHQ-9 and LETI-7 results  Previous PHQ-9:   PHQ-9 SCORE 2/26/2021 4/9/2021 12/1/2021   PHQ-9 Total Score MyChart - 4 (Minimal depression) -   PHQ-9 Total Score 5 4 3     Previous LETI-7:   LETI-7 SCORE 4/9/2021 7/26/2021 12/1/2021   Total Score 4 (minimal anxiety) 4 (minimal anxiety) -   Total Score 4 4 3       RAYMON LEVEL:  No flowsheet data found.    DATA  Extended Session (60+ minutes): No  Interactive Complexity: No  Crisis: No  formerly [facility] Patient: No    Treatment Objective(s) Addressed in This Session:  Target Behavior(s): disease management/lifestyle changes      Patient  will experience a reduction in depressed mood, will develop more effective coping skills to manage depressive symptoms and will develop healthy cognitive patterns and beliefs, will experience a reduction in anxiety and will develop more effective coping skills to manage anxiety symptoms and will develop coping/problem-solving skills to facilitate more adaptive adjustment    Current Stressors / Issues:  Telemedicine Visit: The patient's condition can be safely assessed and treated via synchronous audio and visual telemedicine encounter.      Reason for Telemedicine Visit: Coronavirus concerns    Originating Site (Patient Location): Patient's home    Distant Site (Provider Location[place] [facility] [place] [facility] [place]    Consent:  The patient/guardian has verbally consented to: the potential risks and benefits of telemedicine (video visit) versus in person  care; bill my insurance or make self-payment for services provided; and responsibility for payment of non-covered services.     Mode of Communication:  Video Conference via [employer]    As the provider I attest to compliance with applicable laws and regulations related to telemedicine.      [facility] [place] met with Neo to provide psychotherapy support regarding life stress.    Dexter shared some family stress from Thanksgiving where his family was being challenging to him. Processed these together today, and explored how these connect into a sense of feeling disconnected, misunderstood. Made connections into adult dynamics per vulnerability, openness, feeling seen, valued, etc.     He is still considering a move and a new job. He will be taking a vacation with a friend at the end of the year. He is establishing more patterns and habits that promote health. He is trialing a low-dose Ritalin to see if it helps with his ADHD symptoms. He can use this in a targeted way, and it is helpful to him.    Checked in on sleep and nightmares. He reports these have reduced in frequency and feel manageable. He is exploring the Calm ursula for sleep support.     Discussed importance of registering feelings, checking into what is going on in his physical self, the value and benefit of mindfulness practice, Discussed his ongoing work of tuning into emotion, learning to let go a bit in situations, etc. Touched on self-compassion as a necessary element of mindfulness. Touched on Shayy Ogden's understanding of mindfulness - as including compassion, non-judgmentalism, open acceptance, curiosity.     I affirmed the steps this patient has taken to address physical and behavioral health issues, and offered continued behavioral health services or referral, now or in the future, as needed by the patient.    Progress on Treatment Objective(s) / Homework:  Satisfactory progress - ACTION (Actively working towards change); Intervened by reinforcing change  plan / affirming steps taken    Psycho-education regarding mental health diagnoses and treatment options    Motivational Interviewing    Skills training    Explored specific skills useful to client in current situation    Skill areas include assertiveness, communication, conflict management, problem-solving, relaxation, etc.     Solution-Focused Therapy    Explored patterns in patient's behaviors and relationships and discussed options for new behaviors    Explored new options for problem-solving, communication, managing stress, etc.    Cognitive-behavioral Therapy    Discussed common cognitive distortions, identified them in patient's life    Explored ways to challenge, replace, and act against these cognitions    Explore behavioral changes that might benefit patient in improving mood and engage in meaningful activity    Acceptance and Commitment Therapy    Explored and identified important values in patient's life    Discussed ways to commit to behavioral activation around these values    Psychodynamic psychotherapy    Discussed patient's emotional dynamics and issues and how they impact behaviors    Explored patient's history of relationships and how they impact present behaviors    Explored how to work with and make changes in these schemas and patterns    Narrative Therapy    Explored the patient's story of their life from their perspective,     Explored alternate ways of understanding their experience, identifying exceptions, developing new themes    Interpersonal Psychotherapy    Explored patterns in relationships that are effective or ineffective at helping patient reach their goals, find satisfying experience.    Discussed new patterns or behaviors to engage in for improved social functioning.    Behavioral Activation    Discussed steps patient can take to become more involved in meaningful activity    Identified barriers to these activities and explored possible solutions    Mindfulness-Based  Strategies    Discussed skills based on development and application of mindfulness    Skills drawn from compassion-focused therapy, dialectical behavior therapy, mindfulness-based stress reduction, mindfulness-based cognitive therapy, etc.    Medication Review:  No problems reported to [facility] today.    Medication Compliance:  No problems reported to [facility] today.    Changes in Health Issues:  No problems reported to [facility] today.    Chemical Use Review:   Substance Use: Chemical use reviewed, no active concerns identified      Tobacco Use: No current tobacco use.      Assessment: Current Emotional / Mental Status (status of significant symptoms):  Risk status (Self / Other harm or suicidal ideation)  Patient denies a history of suicidal ideation, suicide attempts, self-injurious behavior, homicidal ideation, homicidal behavior and and other safety concerns  Patient denies current fears or concerns for personal safety.  Patient denies current or recent suicidal ideation or behaviors.  Patient denies current or recent homicidal ideation or behaviors.  Patient denies current or recent self injurious behavior or ideation.  Patient denies other safety concerns.  A safety and risk management plan has not been developed at this time, however patient was encouraged to call [place] [phone] should there be a change in any of these risk factors.    Appearance:   Appropriate   Eye Contact:   Good   Psychomotor Behavior: Normal   Attitude:   Cooperative   Orientation:   All  Speech   Rate / Production: Normal    Volume:  Normal   Mood:    Anxious  Depressed - fluctuates  Affect:    Appropriate   Thought Content:  Clear  Rumination   Thought Form:  Coherent  Logical   Insight:    Good     Diagnoses:  1. Recurrent major depressive disorder, in remission (H)    2. Anxiety disorder, unspecified type    3. Attention deficit hyperactivity disorder (ADHD), other type      Collateral Reports Completed:  Will collaborate with care team as  indicated during treatment    Plan: (Homework, other):  Patient was given information about behavioral services and encouraged to schedule a follow up appointment with the clinic [facility] as needed.  He was also given information about mental health symptoms and treatment options .  CD Recommendations: No indications of CD issues.  We agree to continue psychotherapy to address his depression, anxiety, life adjustment. GAGE Sears, [facility]    ______________________________________________________________________     [facility] Integrated Behavioral Health Treatment Plan     Client's Name: Neo Saeed                    YOB: 1995     Date: 10/8/2021     DSM-V Diagnoses: 296.32 (F33.1) Major Depressive Disorder, Recurrent Episode, Moderate With anxious distress;  Psychosocial / Contextual Factors: transitions in life - college to work force, friends leaving, etc  CGI-S: 4  CGI-I: 2    Referral / Collaboration:  Will collaborate with care team as indicated during treatment.     Anticipated number of session or this episode of care: 12+        MeasurableTreatment Goal(s) related to diagnosis / functional impairment(s)  Goal 1:  Patient will experience a reduction in depressive and anxious symptoms, along with a corresponding increase in positive emotion and life satisfaction.     Objective #A: Patient will experience a reduction in depressed mood, will develop more effective coping skills to manage depressive symptoms, will develop healthy cognitive patterns and beliefs, will increase ability to function adaptively and will continue to take medications as prescribed / participate in supportive activities and services               Status: Continued - Date(s): 10/8/2021     Objective #B: Patient will experience a reduction in anxiety, will develop more effective coping skills to manage anxiety symptoms, will develop healthy cognitive patterns and beliefs and will increase ability to function adaptively  Status:  Continued - Date(s): 10/8/2021     Objective #C: Patient will develop better understanding of triggers and coping strategies to stabilize mood  Status: Continued - Date(s): 10/8/2021     Goal 2:  Patient will identify and increase engagement in valued activity, i.e. improving social connections/relationships, pursuing occupational goals or personally meaningful pursuits, exploration of meaning in life.     Objective #A: Patient will identify meaningful activity in social, occupational and   personal goals, and increase behavioral activation around these goals           Status: Continued - Date(s): 10/8/2021     Objective #B: Patient will address relationship difficulties in a more adaptive manner  Status: Continued - Date(s): 10/8/2021     Objective #C: Patient will develop coping/problem-solving skills to facilitate more adaptive adjustment and will effectively address problems that interfere with adaptive functioning  Status: Continued - Date(s): 10/8/2021     Possible Therapeutic Intervention(s)  Psycho-education regarding mental health diagnoses and treatment options     Eye-Movement Desensitization and Reprocessing Therapy     Clinical Hypnosis     Skills training    Explore skills useful to client in current situation.    Skills include assertiveness, communication, conflict management, problem-solving, relaxation, etc.     Solution-Focused Therapy    Explore patterns in patient's relationships and discuss options for new behaviors.    Explore patterns in patient's actions and choices and discuss options for new behaviors.     Cognitive-behavioral Therapy    Discuss common cognitive distortions, identify them in patient's life.    Explore ways to challenge, replace, and act against these cognitions.     Acceptance and Commitment Therapy    Explore and identify important values in patient's life.    Discuss ways to commit to behavioral activation around these values.     Psychodynamic psychotherapy    Discuss  patient's emotional dynamics and issues and how they impact behaviors.    Explore patient's history of relationships and how they impact present behaviors.    Explore how to work with and make changes in these schemas and patterns.     Narrative Therapy    Explore the patient's story of his/her life from his/her perspective.    Explore alternate ways of understanding their experience, identifying exceptions, developing new themes.     Interpersonal Psychotherapy    Explore patterns in relationships that are effective or ineffective at helping patient reach their goals, find satisfying experience.    Discuss new patterns or behaviors to engage in for improved social functioning.     Behavioral Activation    Discuss steps patient can take to become more involved in meaningful activity.    Identify barriers to these activities and explore possible solutions.     Mindfulness-Based Strategies    Discuss skills based on development and application of mindfulness.    Skills drawn from compassion-focused therapy, dialectical behavior therapy, mindfulness-based stress reduction, mindfulness-based cognitive therapy, etc.        We have developed these goals together during our work to this point. Patient has assisted in the development of these goals and has agreed to this treatment plan.         Huy Corley, GAGE, [facility]             10/8/2021

## 2022-02-03 ENCOUNTER — VIRTUAL VISIT (OUTPATIENT)
Dept: BEHAVIORAL HEALTH | Facility: CLINIC | Age: 27
End: 2022-02-03
Payer: COMMERCIAL

## 2022-02-03 DIAGNOSIS — F33.1 MAJOR DEPRESSIVE DISORDER, RECURRENT EPISODE, MODERATE (H): Primary | ICD-10-CM

## 2022-02-03 PROCEDURE — 90834 PSYTX W PT 45 MINUTES: CPT | Mod: 95 | Performed by: MARRIAGE & FAMILY THERAPIST

## 2022-02-03 NOTE — PROGRESS NOTES
[facility] (PCC referral)  February 3, 2021  Behavioral Health Clinician Progress Note    Patient Name: Neo Saeed              Service Type:  Individual      Service Location:   Video Visit     Session Start Time: 308pm  Session End Time: 4pm     Session Length: 53 - 60       Attendees: Patient    Visit Activities (Refresh list every visit): [facility] Only    Diagnostic Assessment Date: 10/8/2021  Treatment Plan Review Date: 10/8/2021  CGI-S: 4  CGI-I: 2    See Flowsheets for today's PHQ-9 and LETI-7 results  Previous PHQ-9:   PHQ-9 SCORE 2/26/2021 4/9/2021 12/1/2021   PHQ-9 Total Score MyChart - 4 (Minimal depression) -   PHQ-9 Total Score 5 4 3     Previous LETI-7:   LETI-7 SCORE 4/9/2021 7/26/2021 12/1/2021   Total Score 4 (minimal anxiety) 4 (minimal anxiety) -   Total Score 4 4 3       RAYMON LEVEL:  No flowsheet data found.    DATA  Extended Session (60+ minutes): No  Interactive Complexity: No  Crisis: No  [facility] Patient: No    Treatment Objective(s) Addressed in This Session:  Target Behavior(s): disease management/lifestyle changes      Patient  will experience a reduction in depressed mood, will develop more effective coping skills to manage depressive symptoms and will develop healthy cognitive patterns and beliefs, will experience a reduction in anxiety and will develop more effective coping skills to manage anxiety symptoms and will develop coping/problem-solving skills to facilitate more adaptive adjustment    Current Stressors / Issues:  Telemedicine Visit: The patient's condition can be safely assessed and treated via synchronous audio and visual telemedicine encounter.      Reason for Telemedicine Visit: Coronavirus concerns    Originating Site (Patient Location): Patient's home    Distant Site (Provider Location): [facility]: [facility] – [place], [place]    Consent:  The patient/guardian has verbally consented to: the potential risks and benefits of telemedicine (video visit) versus in person  "care; bill my insurance or make self-payment for services provided; and responsibility for payment of non-covered services.     Mode of Communication:  Video Conference via [employer]    As the provider I attest to compliance with applicable laws and regulations related to telemedicine.      [facility] met with Neo to provide psychotherapy support regarding life stress.    Discussed how January and February really are hard months for him, due to weather, lack of sun, etc. HE has been talking with his psychiatrist about trying a medication for help. We reviewed self-care.    His trip was cancelled for the end of the year, but they are hoping to do something this summer. He was also sick over New Year's.     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    Focused today on his sense of stuckness with his mood - a sense of hopelessness and helplessness about trying to find a way to feel better. Explored the experience of the difficulty in being vulnerable, real, detailed about his real experience day-to-day. Discussed the \"rules\" about sharing and being open and honest about negative feelings that he lives by - unconscious. Explored how this might contribute to his un-ease, depression, sense of life dissatisfaction. Agreed to continue exploring these issues moving forward.      Progress on Treatment Objective(s) / Homework:  Satisfactory progress - ACTION (Actively working towards change); Intervened by reinforcing change plan / affirming steps taken    Psycho-education regarding mental health diagnoses and treatment options    Motivational Interviewing    Skills training    Explored specific skills useful to client in current situation    Skill areas include assertiveness, communication, conflict management, problem-solving, relaxation, etc.     Solution-Focused Therapy    Explored patterns in patient's behaviors and relationships and discussed options for new behaviors    Explored new options " for problem-solving, communication, managing stress, etc.    Cognitive-behavioral Therapy    Discussed common cognitive distortions, identified them in patient's life    Explored ways to challenge, replace, and act against these cognitions    Explore behavioral changes that might benefit patient in improving mood and engage in meaningful activity    Acceptance and Commitment Therapy    Explored and identified important values in patient's life    Discussed ways to commit to behavioral activation around these values    Psychodynamic psychotherapy    Discussed patient's emotional dynamics and issues and how they impact behaviors    Explored patient's history of relationships and how they impact present behaviors    Explored how to work with and make changes in these schemas and patterns    Narrative Therapy    Explored the patient's story of their life from their perspective,     Explored alternate ways of understanding their experience, identifying exceptions, developing new themes    Interpersonal Psychotherapy    Explored patterns in relationships that are effective or ineffective at helping patient reach their goals, find satisfying experience.    Discussed new patterns or behaviors to engage in for improved social functioning.    Behavioral Activation    Discussed steps patient can take to become more involved in meaningful activity    Identified barriers to these activities and explored possible solutions    Mindfulness-Based Strategies    Discussed skills based on development and application of mindfulness    Skills drawn from compassion-focused therapy, dialectical behavior therapy, mindfulness-based stress reduction, mindfulness-based cognitive therapy, etc.    Medication Review:  No problems reported to [facility] today.    Medication Compliance:  No problems reported to [facility] today.    Changes in Health Issues:  No problems reported to [facility] today.    Chemical Use Review:   Substance Use: Chemical use reviewed, no  active concerns identified      Tobacco Use: No current tobacco use.      Assessment: Current Emotional / Mental Status (status of significant symptoms):  Risk status (Self / Other harm or suicidal ideation)  Patient denies a history of suicidal ideation, suicide attempts, self-injurious behavior, homicidal ideation, homicidal behavior and and other safety concerns  Patient denies current fears or concerns for personal safety.  Patient denies current or recent suicidal ideation or behaviors.  Patient denies current or recent homicidal ideation or behaviors.  Patient denies current or recent self injurious behavior or ideation.  Patient denies other safety concerns.  A safety and risk management plan has not been developed at this time, however patient was encouraged to call [place] [phone] should there be a change in any of these risk factors.    Appearance:   Appropriate   Eye Contact:   Good   Psychomotor Behavior: Normal   Attitude:   Cooperative   Orientation:   All  Speech   Rate / Production: Normal    Volume:  Normal   Mood:    Anxious  Depressed - fluctuates  Affect:    Appropriate   Thought Content:  Clear  Rumination   Thought Form:  Coherent  Logical   Insight:    Good     Diagnoses:  1. Major depressive disorder, recurrent episode, moderate (H)      Collateral Reports Completed:  Will collaborate with care team as indicated during treatment    Plan: (Homework, other):  Patient was given information about behavioral services and encouraged to schedule a follow up appointment with the clinic [facility] as needed.  He was also given information about mental health symptoms and treatment options .  CD Recommendations: No indications of CD issues.  We agree to continue psychotherapy to address his depression, anxiety, life adjustment. GAGE Sears, [facility]    ______________________________________________________________________     [facility] Integrated Behavioral Health Treatment Plan     Client's Name: Neo  Darlin                    YOB: 1995     Date: 10/8/2021     DSM-V Diagnoses: 296.32 (F33.1) Major Depressive Disorder, Recurrent Episode, Moderate With anxious distress;  Psychosocial / Contextual Factors: transitions in life - college to work force, friends leaving, etc  CGI-S: 4  CGI-I: 2    Referral / Collaboration:  Will collaborate with care team as indicated during treatment.     Anticipated number of session or this episode of care: 12+        MeasurableTreatment Goal(s) related to diagnosis / functional impairment(s)  Goal 1:  Patient will experience a reduction in depressive and anxious symptoms, along with a corresponding increase in positive emotion and life satisfaction.     Objective #A: Patient will experience a reduction in depressed mood, will develop more effective coping skills to manage depressive symptoms, will develop healthy cognitive patterns and beliefs, will increase ability to function adaptively and will continue to take medications as prescribed / participate in supportive activities and services               Status: Continued - Date(s): 10/8/2021     Objective #B: Patient will experience a reduction in anxiety, will develop more effective coping skills to manage anxiety symptoms, will develop healthy cognitive patterns and beliefs and will increase ability to function adaptively  Status: Continued - Date(s): 10/8/2021     Objective #C: Patient will develop better understanding of triggers and coping strategies to stabilize mood  Status: Continued - Date(s): 10/8/2021     Goal 2:  Patient will identify and increase engagement in valued activity, i.e. improving social connections/relationships, pursuing occupational goals or personally meaningful pursuits, exploration of meaning in life.     Objective #A: Patient will identify meaningful activity in social, occupational and   personal goals, and increase behavioral activation around these goals           Status: Continued -  Date(s): 10/8/2021     Objective #B: Patient will address relationship difficulties in a more adaptive manner  Status: Continued - Date(s): 10/8/2021     Objective #C: Patient will develop coping/problem-solving skills to facilitate more adaptive adjustment and will effectively address problems that interfere with adaptive functioning  Status: Continued - Date(s): 10/8/2021     Possible Therapeutic Intervention(s)  Psycho-education regarding mental health diagnoses and treatment options     Eye-Movement Desensitization and Reprocessing Therapy     Clinical Hypnosis     Skills training    Explore skills useful to client in current situation.    Skills include assertiveness, communication, conflict management, problem-solving, relaxation, etc.     Solution-Focused Therapy    Explore patterns in patient's relationships and discuss options for new behaviors.    Explore patterns in patient's actions and choices and discuss options for new behaviors.     Cognitive-behavioral Therapy    Discuss common cognitive distortions, identify them in patient's life.    Explore ways to challenge, replace, and act against these cognitions.     Acceptance and Commitment Therapy    Explore and identify important values in patient's life.    Discuss ways to commit to behavioral activation around these values.     Psychodynamic psychotherapy    Discuss patient's emotional dynamics and issues and how they impact behaviors.    Explore patient's history of relationships and how they impact present behaviors.    Explore how to work with and make changes in these schemas and patterns.     Narrative Therapy    Explore the patient's story of his/her life from his/her perspective.    Explore alternate ways of understanding their experience, identifying exceptions, developing new themes.     Interpersonal Psychotherapy    Explore patterns in relationships that are effective or ineffective at helping patient reach their goals, find satisfying  experience.    Discuss new patterns or behaviors to engage in for improved social functioning.     Behavioral Activation    Discuss steps patient can take to become more involved in meaningful activity.    Identify barriers to these activities and explore possible solutions.     Mindfulness-Based Strategies    Discuss skills based on development and application of mindfulness.    Skills drawn from compassion-focused therapy, dialectical behavior therapy, mindfulness-based stress reduction, mindfulness-based cognitive therapy, etc.        We have developed these goals together during our work to this point. Patient has assisted in the development of these goals and has agreed to this treatment plan.         GAGE Ulloa, [facility]             10/8/2021

## 2022-02-15 ENCOUNTER — OFFICE VISIT (OUTPATIENT)
Dept: PULMONOLOGY | Facility: CLINIC | Age: 27
End: 2022-02-15
Attending: INTERNAL MEDICINE
Payer: COMMERCIAL

## 2022-02-15 VITALS — HEART RATE: 62 BPM | SYSTOLIC BLOOD PRESSURE: 127 MMHG | OXYGEN SATURATION: 98 % | DIASTOLIC BLOOD PRESSURE: 77 MMHG

## 2022-02-15 DIAGNOSIS — J32.0 CHRONIC MAXILLARY SINUSITIS: Primary | ICD-10-CM

## 2022-02-15 PROCEDURE — G0463 HOSPITAL OUTPT CLINIC VISIT: HCPCS

## 2022-02-15 PROCEDURE — 99213 OFFICE O/P EST LOW 20 MIN: CPT | Mod: GC | Performed by: INTERNAL MEDICINE

## 2022-02-15 NOTE — PROGRESS NOTES
"Reason for Visit  Neo Saeed is a 25 year old year old male who is being seen for General Visit (3mo f/u)    Pulmonary HPI  24 YO presents for follow up of exercise-induced bronchospasm. Mr. Saeed states the advair has helped him significantly with dyspnea during exercise. \"My VO2 max has increased, my watch monitors it for me.\" He is happy with the new medication. He had symptoms of sinus infection in November 2021, complaining of thick green nasal discharge. He was prescribed 2-week course of augmentin which partially improved symptoms, however he continues to complain of a few teaspoons worth of sputum production during exercise which persists for a few hours after cessation of exercise. He also notes chest congestion in the morning and states \"I feel I have not returned back to what I was before my sinus infection, in terms of exercise tolerance.\" He describes feeling post-nasal drip and sinus discharge that is exacerbated during his runs which occurs 4-5x per week. He denies cough, fatigue, fever, chills, or weight loss.          Current Outpatient Medications   Medication     albuterol (PROAIR HFA/PROVENTIL HFA/VENTOLIN HFA) 108 (90 Base) MCG/ACT inhaler     amoxicillin-clavulanate (AUGMENTIN) 875-125 MG tablet     fexofenadine (ALLEGRA) 60 MG tablet     fluticasone-salmeterol (ADVAIR-HFA) 115-21 MCG/ACT inhaler     ipratropium (ATROVENT) 0.03 % nasal spray     methylphenidate (RITALIN) 5 MG tablet     montelukast (SINGULAIR) 10 MG tablet     No current facility-administered medications for this visit.     No Known Allergies  Past Medical History:   Diagnosis Date     Allergic state      Anxiety        Past Surgical History:   Procedure Laterality Date     CYSTECTOMY PILONIDAL N/A 8/4/2017    Procedure: CYSTECTOMY PILONIDAL;  Lay Open Pilonidal Cystectomy;  Surgeon: Jenaro Cuba MD;  Location:  OR     DENTAL SURGERY      [place] teeth extraction     ENDOSCOPY       HERNIA REPAIR       HERNIA " REPAIR         Social History     Socioeconomic History     Marital status: Single     Spouse name: Not on file     Number of children: Not on file     Years of education: Not on file     Highest education level: Bachelor's degree (e.g., BA, AB, BS)   Occupational History     Not on file   Tobacco Use     Smoking status: Never Smoker     Smokeless tobacco: Never Used   Substance and Sexual Activity     Alcohol use: Yes     Comment: Couple of drinks once a week     Drug use: No     Sexual activity: Yes     Partners: Male     Birth control/protection: Condom   Other Topics Concern     Not on file   Social History Narrative    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      Social Determinants of Health     Financial Resource Strain: Not on file   Food Insecurity: Not on file   Transportation Needs: Not on file   Physical Activity: Not on file   Stress: Not on file   Social Connections: Not on file   Intimate Partner Violence: Not on file   Housing Stability: Not on file       ROS Pulmonary  A complete ROS was otherwise negative except as noted in the HPI.  /77   Pulse 62   SpO2 98%   Exam:   GENERAL APPEARANCE: Well developed, well nourished, alert, and in no apparent distress.  EYES: PERRL, EOMI  HENT: Nasal mucosa with no edema and no hyperemia. No nasal polyps.  EARS: Canals clear, TMs normal  MOUTH: Oral mucosa is moist, without any lesions, no tonsillar enlargement, no oropharyngeal exudate.  NECK: supple, no masses, no thyromegaly.  LYMPHATICS: No significant axillary, cervical, or supraclavicular nodes.  RESP:  Good air flow throughout.  No crackles. No rhonchi. No wheezes.  CV: Normal S1, S2, regular rhythm, normal rate. No murmur.  No rub. No gallop. No LE edema.   ABDOMEN:  Bowel sounds normal, soft, nontender, no HSM or masses.   MS: extremities normal. No clubbing. No cyanosis.  SKIN: no rash on limited exam  NEURO: Mentation intact, speech normal, normal strength and tone, normal  gait and stance  PSYCH: mentation appears normal. and affect normal/bright  Results:  No results found for this or any previous visit (from the past 168 hour(s)).    Assessment and plan:   24 YO with exercise induced cough likely due to exercise induced bronchospasm.  Spirometry is normal.  History of allergic rhinitis but is well controlled.  Improved dyspnea during exercise with initiation of advair. Continues to have sputum production precipitated by exercise which likely appears to be persistent sinus infection from 11/2021.       1. Begin augmentin x2 weeks, with refill if patient continues to have symptoms.  2. Continue albuterol 20 minutes prior to exercise and in morning for chest congestion.  3. Continue low dose Advair  4. Continue ipatropium nasal spray and Singulair for sinus symptoms     RTC in 6 months.  Advised to contact the clinic with any questions or concerns.    Lois Bradley MD  Resident, PGY3  Pulmonology    Patient seen and discussed with attending, Dr. Molina.      Attending note:  Patient seen, examined and discussed with Dr. Abreu. All data reviewed. Agree with the assessment and plan as outlined in the above note.    Marla Molina MD  [phone]

## 2022-02-15 NOTE — LETTER
"  2/15/2022     RE: Neo Saeed  [address]  [place] [place] [address]    Dear Colleague,    Thank you for referring your patient, Neo Saeed, to the [facility]. Please see a copy of my visit note below.    Reason for Visit  Neo Saeed is a 25 year old year old male who is being seen for General Visit (3mo f/u)    Pulmonary HPI  26 YO presents for follow up of exercise-induced bronchospasm. Mr. Saeed states the advair has helped him significantly with dyspnea during exercise. \"My VO2 max has increased, my watch monitors it for me.\" He is happy with the new medication. He had symptoms of sinus infection in November 2021, complaining of thick green nasal discharge. He was prescribed 2-week course of augmentin which partially improved symptoms, however he continues to complain of a few teaspoons worth of sputum production during exercise which persists for a few hours after cessation of exercise. He also notes chest congestion in the morning and states \"I feel I have not returned back to what I was before my sinus infection, in terms of exercise tolerance.\" He describes feeling post-nasal drip and sinus discharge that is exacerbated during his runs which occurs 4-5x per week. He denies cough, fatigue, fever, chills, or weight loss.          Current Outpatient Medications   Medication     albuterol (PROAIR HFA/PROVENTIL HFA/VENTOLIN HFA) 108 (90 Base) MCG/ACT inhaler     amoxicillin-clavulanate (AUGMENTIN) 875-125 MG tablet     fexofenadine (ALLEGRA) 60 MG tablet     fluticasone-salmeterol (ADVAIR-HFA) 115-21 MCG/ACT inhaler     ipratropium (ATROVENT) 0.03 % nasal spray     methylphenidate (RITALIN) 5 MG tablet     montelukast (SINGULAIR) 10 MG tablet     No current facility-administered medications for this visit.     No Known Allergies  Past Medical History:   Diagnosis Date     Allergic state      Anxiety        Past Surgical History: "   Procedure Laterality Date     CYSTECTOMY PILONIDAL N/A 8/4/2017    Procedure: CYSTECTOMY PILONIDAL;  Lay Open Pilonidal Cystectomy;  Surgeon: Jenaro Cuba MD;  Location: [facility] OR     DENTAL SURGERY      [place] teeth extraction     ENDOSCOPY       HERNIA REPAIR       HERNIA REPAIR         Social History     Socioeconomic History     Marital status: Single     Spouse name: Not on file     Number of children: Not on file     Years of education: Not on file     Highest education level: Bachelor's degree (e.g., BA, AB, BS)   Occupational History     Not on file   Tobacco Use     Smoking status: Never Smoker     Smokeless tobacco: Never Used   Substance and Sexual Activity     Alcohol use: Yes     Comment: Couple of drinks once a week     Drug use: No     Sexual activity: Yes     Partners: Male     Birth control/protection: Condom   Other Topics Concern     Not on file   Social History Narrative    Graduated 12/2017 in [employer] science. Works for [employer].      Social Determinants of Health     Financial Resource Strain: Not on file   Food Insecurity: Not on file   Transportation Needs: Not on file   Physical Activity: Not on file   Stress: Not on file   Social Connections: Not on file   Intimate Partner Violence: Not on file   Housing Stability: Not on file       ROS Pulmonary  A complete ROS was otherwise negative except as noted in the HPI.  /77   Pulse 62   SpO2 98%   Exam:   GENERAL APPEARANCE: Well developed, well nourished, alert, and in no apparent distress.  EYES: PERRL, EOMI  HENT: Nasal mucosa with no edema and no hyperemia. No nasal polyps.  EARS: Canals clear, TMs normal  MOUTH: Oral mucosa is moist, without any lesions, no tonsillar enlargement, no oropharyngeal exudate.  NECK: supple, no masses, no thyromegaly.  LYMPHATICS: No significant axillary, cervical, or supraclavicular nodes.  RESP:  Good air flow throughout.  No crackles. No rhonchi. No wheezes.  CV: Normal S1, S2, regular  rhythm, normal rate. No murmur.  No rub. No gallop. No LE edema.   ABDOMEN:  Bowel sounds normal, soft, nontender, no HSM or masses.   MS: extremities normal. No clubbing. No cyanosis.  SKIN: no rash on limited exam  NEURO: Mentation intact, speech normal, normal strength and tone, normal gait and stance  PSYCH: mentation appears normal. and affect normal/bright  Results:  No results found for this or any previous visit (from the past 168 hour(s)).    Assessment and plan:   26 YO with exercise induced cough likely due to exercise induced bronchospasm.  Spirometry is normal.  History of allergic rhinitis but is well controlled.  Improved dyspnea during exercise with initiation of advair. Continues to have sputum production precipitated by exercise which likely appears to be persistent sinus infection from 11/2021.       1. Begin augmentin x2 weeks, with refill if patient continues to have symptoms.  2. Continue albuterol 20 minutes prior to exercise and in morning for chest congestion.  3. Continue low dose Advair  4. Continue ipatropium nasal spray and Singulair for sinus symptoms     RTC in 6 months.  Advised to contact the clinic with any questions or concerns.    Lois Bradley MD  Resident, PGY3  Pulmonology    Patient seen and discussed with attending, Dr. Molina.    Attending note:  Patient seen, examined and discussed with Dr. Abreu. All data reviewed. Agree with the assessment and plan as outlined in the above note.    Marla Molina MD  [phone]

## 2022-02-15 NOTE — NURSING NOTE
Chief Complaint   Patient presents with     General Visit     3mo f/u     Vitals were taken and medications were reconciled.     JULISSA Santos

## 2022-02-16 ENCOUNTER — TRANSFERRED RECORDS (OUTPATIENT)
Dept: HEALTH INFORMATION MANAGEMENT | Facility: CLINIC | Age: 27
End: 2022-02-16
Payer: COMMERCIAL

## 2022-02-21 ENCOUNTER — TELEPHONE (OUTPATIENT)
Dept: PULMONOLOGY | Facility: CLINIC | Age: 27
End: 2022-02-21
Payer: COMMERCIAL

## 2022-02-22 ENCOUNTER — VIRTUAL VISIT (OUTPATIENT)
Dept: BEHAVIORAL HEALTH | Facility: CLINIC | Age: 27
End: 2022-02-22
Payer: COMMERCIAL

## 2022-02-22 DIAGNOSIS — F33.1 MAJOR DEPRESSIVE DISORDER, RECURRENT EPISODE, MODERATE (H): Primary | ICD-10-CM

## 2022-02-22 PROCEDURE — 90837 PSYTX W PT 60 MINUTES: CPT | Mod: 95 | Performed by: MARRIAGE & FAMILY THERAPIST

## 2022-02-22 ASSESSMENT — PATIENT HEALTH QUESTIONNAIRE - PHQ9
10. IF YOU CHECKED OFF ANY PROBLEMS, HOW DIFFICULT HAVE THESE PROBLEMS MADE IT FOR YOU TO DO YOUR WORK, TAKE CARE OF THINGS AT HOME, OR GET ALONG WITH OTHER PEOPLE: SOMEWHAT DIFFICULT
SUM OF ALL RESPONSES TO PHQ QUESTIONS 1-9: 4
SUM OF ALL RESPONSES TO PHQ QUESTIONS 1-9: 4

## 2022-02-22 NOTE — PROGRESS NOTES
[facility] (P[facility] referral)  February 22, 2021  Behavioral Health Clinician Progress Note    Patient Name: Neo Saeed              Service Type:  Individual      Service Location:   Video Visit     Session Start Time: 308pm  Session End Time: 405pm     Session Length: 53 - 60       Attendees: Patient    Visit Activities (Refresh list every visit): [facility] Only    Diagnostic Assessment Date: 10/8/2021  Treatment Plan Review Date: 2/22/2022      See Flowsheets for today's PHQ-9 and LETI-7 results  Previous PHQ-9:   PHQ-9 SCORE 4/9/2021 12/1/2021 2/22/2022   PHQ-9 Total Score MyChart 4 (Minimal depression) - 4 (Minimal depression)   PHQ-9 Total Score 4 3 4     Previous LETI-7:   LETI-7 SCORE 4/9/2021 7/26/2021 12/1/2021   Total Score 4 (minimal anxiety) 4 (minimal anxiety) -   Total Score 4 4 3       RAYMON LEVEL:  No flowsheet data found.    DATA  Extended Session (60+ minutes): No  Interactive Complexity: No  Crisis: No  [facility] Patient: No    Treatment Objective(s) Addressed in This Session:  Target Behavior(s): disease management/lifestyle changes      Patient  will experience a reduction in depressed mood, will develop more effective coping skills to manage depressive symptoms and will develop healthy cognitive patterns and beliefs, will experience a reduction in anxiety and will develop more effective coping skills to manage anxiety symptoms and will develop coping/problem-solving skills to facilitate more adaptive adjustment    Current Stressors / Issues:  Telemedicine Visit: The patient's condition can be safely assessed and treated via synchronous audio and visual telemedicine encounter.      Reason for Telemedicine Visit: Coronavirus concerns    Originating Site (Patient Location): Patient's home    Distant Site (Provider Location): [facility]: [facility] [place]    Consent:  The patient/guardian has verbally consented to: the potential risks and benefits of telemedicine (video visit) versus in  "person care; bill my insurance or make self-payment for services provided; and responsibility for payment of non-covered services.     Mode of Communication:  Video Conference via "[email or web address]    As the provider I attest to compliance with applicable laws and regulations related to telemedicine.      [facility] met with Neo to provide psychotherapy support regarding life stress.    Dexter reports that he has felt pretty well since we last met - he has been trialing some CBD and finds his sleep is much better. He is not having any more of the dreams. This appears to have cascaded into some of the more positive moods he has been feeling, He has also been exercising more and enjoys the longer daylight. He is feeling as if he has six weeks every year where he does have reduced mood - discussed SAD realities and how to keep identifying and working with the variables that bring improvement.     We spent this session in a continuation of our last meeting, discussing the questions of Dexter's comfort, ability, willingness, experience of being \"vulnerable\" or \"open\" in relationships. We explored some of the emotional and relational patterns he may have learned in his family and early years. Also discussed how these may have played out in his life over time - especially into young adulthood. Began some discussion of how one might go about identifying healthier patterns or ideas, and/or go about making these patterns part of his life. Discussed the work of [employer] as possible resources for him. Discussed Michael Jackson's research on loneliness and relationships. Discussed a few of the models he has in his own life: a long-term female friend, and a golf angelita.     Discussed the general theory of mental health and wellbeing in life - evolution, genetics, cognitive-behavioral variables, relationships, meaning, etc. We will discuss CBT, ACT, etc next time - basics of therapeutic change. Book recommendations, as well.    Progress " on Treatment Objective(s) / Homework:  Satisfactory progress - ACTION (Actively working towards change); Intervened by reinforcing change plan / affirming steps taken    Psycho-education regarding mental health diagnoses and treatment options    Motivational Interviewing    Skills training    Explored specific skills useful to client in current situation    Skill areas include assertiveness, communication, conflict management, problem-solving, relaxation, etc.     Solution-Focused Therapy    Explored patterns in patient's behaviors and relationships and discussed options for new behaviors    Explored new options for problem-solving, communication, managing stress, etc.    Cognitive-behavioral Therapy    Discussed common cognitive distortions, identified them in patient's life    Explored ways to challenge, replace, and act against these cognitions    Explore behavioral changes that might benefit patient in improving mood and engage in meaningful activity    Acceptance and Commitment Therapy    Explored and identified important values in patient's life    Discussed ways to commit to behavioral activation around these values    Psychodynamic psychotherapy    Discussed patient's emotional dynamics and issues and how they impact behaviors    Explored patient's history of relationships and how they impact present behaviors    Explored how to work with and make changes in these schemas and patterns    Narrative Therapy    Explored the patient's story of their life from their perspective,     Explored alternate ways of understanding their experience, identifying exceptions, developing new themes    Interpersonal Psychotherapy    Explored patterns in relationships that are effective or ineffective at helping patient reach their goals, find satisfying experience.    Discussed new patterns or behaviors to engage in for improved social functioning.    Behavioral Activation    Discussed steps patient can take to become more  involved in meaningful activity    Identified barriers to these activities and explored possible solutions    Mindfulness-Based Strategies    Discussed skills based on development and application of mindfulness    Skills drawn from compassion-focused therapy, dialectical behavior therapy, mindfulness-based stress reduction, mindfulness-based cognitive therapy, etc.    Medication Review:  No problems reported to [facility] today.    Medication Compliance:  No problems reported to [facility] today.    Changes in Health Issues:  No problems reported to [facility] today.    Chemical Use Review:   Substance Use: Chemical use reviewed, no active concerns identified      Tobacco Use: No current tobacco use.      Assessment: Current Emotional / Mental Status (status of significant symptoms):  Risk status (Self / Other harm or suicidal ideation)  Patient denies a history of suicidal ideation, suicide attempts, self-injurious behavior, homicidal ideation, homicidal behavior and and other safety concerns  Patient denies current fears or concerns for personal safety.  Patient denies current or recent suicidal ideation or behaviors.  Patient denies current or recent homicidal ideation or behaviors.  Patient denies current or recent self injurious behavior or ideation.  Patient denies other safety concerns.  A safety and risk management plan has not been developed at this time, however patient was encouraged to call [place] [phone] should there be a change in any of these risk factors.    Appearance:   Appropriate   Eye Contact:   Good   Psychomotor Behavior: Normal   Attitude:   Cooperative   Orientation:   All  Speech   Rate / Production: Normal    Volume:  Normal   Mood:    Anxious  Depressed - fluctuates  Affect:    Appropriate   Thought Content:  Clear  Rumination   Thought Form:  Coherent  Logical   Insight:    Good     Diagnoses:  1. Major depressive disorder, recurrent episode, moderate (H)      Collateral Reports Completed:  Will  collaborate with care team as indicated during treatment    Plan: (Homework, other):  Patient was given information about behavioral services and encouraged to schedule a follow up appointment with the clinic [facility] as needed.  He was also given information about mental health symptoms and treatment options .  CD Recommendations: No indications of CD issues.  We agree to continue psychotherapy to address his depression, anxiety, life adjustment. GAGE Sears, [facility]    ______________________________________________________________________     [facility] Integrated Behavioral Health Treatment Plan     Client's Name: Neo Saeed                    YOB: 1995     Date: 2/22/2022     DSM-V Diagnoses: 296.32 (F33.1) Major Depressive Disorder, Recurrent Episode, Moderate With anxious distress;  Psychosocial / Contextual Factors: transitions in life - college to work force, friends leaving, etc    Referral / Collaboration:  Will collaborate with care team as indicated during treatment.     Anticipated number of session or this episode of care: 12+        MeasurableTreatment Goal(s) related to diagnosis / functional impairment(s)  Goal 1:  Patient will experience a reduction in depressive and anxious symptoms, along with a corresponding increase in positive emotion and life satisfaction.     Objective #A: Patient will experience a reduction in depressed mood, will develop more effective coping skills to manage depressive symptoms, will develop healthy cognitive patterns and beliefs, will increase ability to function adaptively and will continue to take medications as prescribed / participate in supportive activities and services               Status: Continued - Date(s): 2/22/2022     Objective #B: Patient will experience a reduction in anxiety, will develop more effective coping skills to manage anxiety symptoms, will develop healthy cognitive patterns and beliefs and will increase ability to function  adaptively  Status: Continued - Date(s): 2/22/2022     Objective #C: Patient will develop better understanding of triggers and coping strategies to stabilize mood  Status: Continued - Date(s): 2/22/2022     Goal 2:  Patient will identify and increase engagement in valued activity, i.e. improving social connections/relationships, pursuing occupational goals or personally meaningful pursuits, exploration of meaning in life.     Objective #A: Patient will identify meaningful activity in social, occupational and   personal goals, and increase behavioral activation around these goals           Status: Continued - Date(s): 2/22/2022     Objective #B: Patient will address relationship difficulties in a more adaptive manner  Status: Continued - Date(s): 2/22/2022     Objective #C: Patient will develop coping/problem-solving skills to facilitate more adaptive adjustment and will effectively address problems that interfere with adaptive functioning  Status: Continued - Date(s): 2/22/2022     Possible Therapeutic Intervention(s)  Psycho-education regarding mental health diagnoses and treatment options     Eye-Movement Desensitization and Reprocessing Therapy     Clinical Hypnosis     Skills training    Explore skills useful to client in current situation.    Skills include assertiveness, communication, conflict management, problem-solving, relaxation, etc.     Solution-Focused Therapy    Explore patterns in patient's relationships and discuss options for new behaviors.    Explore patterns in patient's actions and choices and discuss options for new behaviors.     Cognitive-behavioral Therapy    Discuss common cognitive distortions, identify them in patient's life.    Explore ways to challenge, replace, and act against these cognitions.     Acceptance and Commitment Therapy    Explore and identify important values in patient's life.    Discuss ways to commit to behavioral activation around these values.     Psychodynamic  psychotherapy    Discuss patient's emotional dynamics and issues and how they impact behaviors.    Explore patient's history of relationships and how they impact present behaviors.    Explore how to work with and make changes in these schemas and patterns.     Narrative Therapy    Explore the patient's story of his/her life from his/her perspective.    Explore alternate ways of understanding their experience, identifying exceptions, developing new themes.     Interpersonal Psychotherapy    Explore patterns in relationships that are effective or ineffective at helping patient reach their goals, find satisfying experience.    Discuss new patterns or behaviors to engage in for improved social functioning.     Behavioral Activation    Discuss steps patient can take to become more involved in meaningful activity.    Identify barriers to these activities and explore possible solutions.     Mindfulness-Based Strategies    Discuss skills based on development and application of mindfulness.    Skills drawn from compassion-focused therapy, dialectical behavior therapy, mindfulness-based stress reduction, mindfulness-based cognitive therapy, etc.        We have developed these goals together during our work to this point. Patient has assisted in the development of these goals and has agreed to this treatment plan.         GAGE Ulloa, [facility]             2/22/2022

## 2022-02-23 ASSESSMENT — PATIENT HEALTH QUESTIONNAIRE - PHQ9: SUM OF ALL RESPONSES TO PHQ QUESTIONS 1-9: 4

## 2022-03-22 ENCOUNTER — VIRTUAL VISIT (OUTPATIENT)
Dept: BEHAVIORAL HEALTH | Facility: CLINIC | Age: 27
End: 2022-03-22
Payer: COMMERCIAL

## 2022-03-22 DIAGNOSIS — F33.41 RECURRENT MAJOR DEPRESSION IN PARTIAL REMISSION (H): Primary | ICD-10-CM

## 2022-03-22 DIAGNOSIS — F41.9 ANXIETY DISORDER, UNSPECIFIED TYPE: ICD-10-CM

## 2022-03-22 DIAGNOSIS — F90.8 ATTENTION DEFICIT HYPERACTIVITY DISORDER (ADHD), OTHER TYPE: ICD-10-CM

## 2022-03-22 PROCEDURE — 90834 PSYTX W PT 45 MINUTES: CPT | Mod: 95 | Performed by: MARRIAGE & FAMILY THERAPIST

## 2022-03-22 NOTE — PROGRESS NOTES
[facility] (P[facility] referral)  March 22, 2021  Behavioral Health Clinician Progress Note    Patient Name: Neo Saeed              Service Type:  Individual      Service Location:   Video Visit     Session Start Time: 308pm  Session End Time: 4pm     Session Length: 38 - 52       Attendees: Patient    Visit Activities (Refresh list every visit): [facility] Only    Diagnostic Assessment Date: 10/8/2021  Treatment Plan Review Date: 2/22/2022      See Flowsheets for today's PHQ-9 and LETI-7 results  Previous PHQ-9:   PHQ-9 SCORE 4/9/2021 12/1/2021 2/22/2022   PHQ-9 Total Score MyChart 4 (Minimal depression) - 4 (Minimal depression)   PHQ-9 Total Score 4 3 4     Previous LETI-7:   LETI-7 SCORE 4/9/2021 7/26/2021 12/1/2021   Total Score 4 (minimal anxiety) 4 (minimal anxiety) -   Total Score 4 4 3       RAYMON LEVEL:  No flowsheet data found.    DATA  Extended Session (60+ minutes): No  Interactive Complexity: No  Crisis: No  [facility] Patient: No    Treatment Objective(s) Addressed in This Session:  Target Behavior(s): disease management/lifestyle changes      Patient  will experience a reduction in depressed mood, will develop more effective coping skills to manage depressive symptoms and will develop healthy cognitive patterns and beliefs, will experience a reduction in anxiety and will develop more effective coping skills to manage anxiety symptoms and will develop coping/problem-solving skills to facilitate more adaptive adjustment    Current Stressors / Issues:  Telemedicine Visit: The patient's condition can be safely assessed and treated via synchronous audio and visual telemedicine encounter.      Reason for Telemedicine Visit: Coronavirus concerns    Originating Site (Patient Location): Patient's home    Distant Site (Provider Location): [facility]: [facility] [place]    Consent:  The patient/guardian has verbally consented to: the potential risks and benefits of telemedicine (video visit) versus in person  care; bill my insurance or make self-payment for services provided; and responsibility for payment of non-covered services.     Mode of Communication:  Video Conference via [employer]    As the provider I attest to compliance with applicable laws and regulations related to telemedicine.      [facility] met with Neo to provide psychotherapy support regarding life stress. We continued our conversation at his request about the basic approaches to therapy extant today. Discussed CBT, ACT, mindfulness and other basic ideas in psychotherapy, also touching on the history and development of these schools of thought, as well as common-factors and integrative approaches. He reports he is feeling much better than he had at our last meeting, but he is very motivated to build resilience against future episodes of negative moods, and wants to design an approach to work on building knowledge and skills for the future.    We used a tool bench and exercise metaphors today. Assigned him some reading and a combined CBT/ACT exploration. Large discussion around what mindfulness is and how it can be useful. Jose A on multi-variable model of CBT (Bud and Carrie) for a way to weave some of these ideas together. Encouraged reading: Georgie Wyman - Uncovering Happiness and Marcel Flores - Embracing your Demons. Also sent a copy of the first chapter of The Mindfulness Solution by Josep Watson for an evolutionary psychology perspective.     Progress on Treatment Objective(s) / Homework:  Satisfactory progress - ACTION (Actively working towards change); Intervened by reinforcing change plan / affirming steps taken    Psycho-education regarding mental health diagnoses and treatment options    Motivational Interviewing    Skills training    Explored specific skills useful to client in current situation    Skill areas include assertiveness, communication, conflict management, problem-solving, relaxation, etc.     Solution-Focused  Therapy    Explored patterns in patient's behaviors and relationships and discussed options for new behaviors    Explored new options for problem-solving, communication, managing stress, etc.    Cognitive-behavioral Therapy    Discussed common cognitive distortions, identified them in patient's life    Explored ways to challenge, replace, and act against these cognitions    Explore behavioral changes that might benefit patient in improving mood and engage in meaningful activity    Acceptance and Commitment Therapy    Explored and identified important values in patient's life    Discussed ways to commit to behavioral activation around these values    Psychodynamic psychotherapy    Discussed patient's emotional dynamics and issues and how they impact behaviors    Explored patient's history of relationships and how they impact present behaviors    Explored how to work with and make changes in these schemas and patterns    Narrative Therapy    Explored the patient's story of their life from their perspective,     Explored alternate ways of understanding their experience, identifying exceptions, developing new themes    Interpersonal Psychotherapy    Explored patterns in relationships that are effective or ineffective at helping patient reach their goals, find satisfying experience.    Discussed new patterns or behaviors to engage in for improved social functioning.    Behavioral Activation    Discussed steps patient can take to become more involved in meaningful activity    Identified barriers to these activities and explored possible solutions    Mindfulness-Based Strategies    Discussed skills based on development and application of mindfulness    Skills drawn from compassion-focused therapy, dialectical behavior therapy, mindfulness-based stress reduction, mindfulness-based cognitive therapy, etc.    Medication Review:  No problems reported to [facility] today.    Medication Compliance:  No problems reported to [facility]  today.    Changes in Health Issues:  No problems reported to [facility] today.    Chemical Use Review:   Substance Use: Chemical use reviewed, no active concerns identified      Tobacco Use: No current tobacco use.      Assessment: Current Emotional / Mental Status (status of significant symptoms):  Risk status (Self / Other harm or suicidal ideation)  Patient denies a history of suicidal ideation, suicide attempts, self-injurious behavior, homicidal ideation, homicidal behavior and and other safety concerns  Patient denies current fears or concerns for personal safety.  Patient denies current or recent suicidal ideation or behaviors.  Patient denies current or recent homicidal ideation or behaviors.  Patient denies current or recent self injurious behavior or ideation.  Patient denies other safety concerns.  A safety and risk management plan has not been developed at this time, however patient was encouraged to call [place] [phone] should there be a change in any of these risk factors.    Appearance:   Appropriate   Eye Contact:   Good   Psychomotor Behavior: Normal   Attitude:   Cooperative   Orientation:   All  Speech   Rate / Production: Normal    Volume:  Normal   Mood:    Anxious  Depressed - fluctuates  Affect:    Appropriate   Thought Content:  Clear  Rumination   Thought Form:  Coherent  Logical   Insight:    Good     Diagnoses:  1. Recurrent major depression in partial remission (H)    2. Attention deficit hyperactivity disorder (ADHD), other type    3. Anxiety disorder, unspecified type      Collateral Reports Completed:  Will collaborate with care team as indicated during treatment    Plan: (Homework, other):  Patient was given information about behavioral services and encouraged to schedule a follow up appointment with the clinic [facility] as needed.  He was also given information about mental health symptoms and treatment options .  CD Recommendations: No indications of CD issues.  We agree to continue  psychotherapy to address his depression, anxiety, life adjustment. Javier Corley LMFT, [facility]    ______________________________________________________________________                                                 Individual Treatment Plan    Patient's Name: Neo Saeed YOB: 1995    Date of Creation: 2/22/2022  Date Treatment Plan Last Reviewed/Revised: 2/22/2022    DSM-V Diagnoses: 296.32 (F33.1) Major Depressive Disorder, Recurrent Episode, Moderate With anxious distress; ADHD; Anxiety unspecified  Psychosocial / Contextual Factors: transitions in life - college to work force, friends leaving, etc  PROMIS (reviewed every 90 days): needs to be completed    Referral / Collaboration:  Referral to another professional/service is not indicated at this time..    Anticipated number of session for this episode of care: 10+  Anticipation frequency of session: Monthly  Anticipated Duration of each session: 38-52 minutes  Treatment plan will be reviewed in 90 days or when goals have been changed.       MeasurableTreatment Goal(s) related to diagnosis / functional impairment(s)  Goal 1:  Patient will experience a reduction in depressive and anxious symptoms, along with a corresponding increase in positive emotion and life satisfaction.    Objective #A: Patient will experience a reduction in depressed mood, will develop more effective coping skills to manage depressive symptoms, will develop healthy cognitive patterns and beliefs, will increase ability to function adaptively and will continue to take medications as prescribed / participate in supportive activities and services               Status: Continued - Date(s): 2/22/2022     Objective #B: Patient will experience a reduction in anxiety, will develop more effective coping skills to manage anxiety symptoms, will develop healthy cognitive patterns and beliefs and will increase ability to function adaptively  Status: Continued - Date(s):  2/22/2022     Objective #C: Patient will develop better understanding of triggers and coping strategies to stabilize mood  Status: Continued - Date(s): 2/22/2022    Intervention(s):    Therapist will provide psycho-education regarding mental health diagnoses and treatment options.    Therapist will support development and implementation of Cognitive-Behavioral Therapy skills:    Discuss common cognitive distortions, identify them in patient's life    Explore ways to challenge, replace, and act against these cognitions    Explore behavioral changes that might benefit patient in improving mood and engage in meaningful activity    Therapist will support development and implementation of Acceptance and Commitment Therapy skills:    Explore aspects of psychological flexibility: de-fusion, self-as-context/observing self, acceptance, present moment awareness, values, and committed action.    Develop mindfulness and acceptance skills.    Identify important values in patient's life, and discuss ways to commit to behavioral activation around these values    Therapist will support development and implementation of emotional awareness and emotional self-regulation skills.       Goal 2:  Patient will identify and increase engagement in valued activity, i.e. improving social connections/relationships, pursuing occupational goals or personally meaningful pursuits, exploration of meaning in life.    Objective #A: Patient will identify meaningful activity in social, occupational and   personal goals, and increase behavioral activation around these goals           Status: Continued - Date(s): 2/22/2022     Objective #B: Patient will address relationship difficulties in a more adaptive manner  Status: Continued - Date(s): 2/22/2022     Objective #C: Patient will develop coping/problem-solving skills to facilitate more adaptive adjustment and will effectively address problems that interfere with adaptive functioning  Status: Continued  - Date(s): 2/22/2022    Intervention(s):    Therapist will provide psycho-education regarding mental health diagnoses and treatment options.    Therapist will support development and implementation of Cognitive-Behavioral Therapy skills:    Discuss common cognitive distortions, identify them in patient's life    Explore ways to challenge, replace, and act against these cognitions    Explore behavioral changes that might benefit patient in improving mood and engage in meaningful activity    Therapist will support development and implementation of Acceptance and Commitment Therapy skills:    Explore aspects of psychological flexibility: de-fusion, self-as-context/observing self, acceptance, present moment awareness, values, and committed action.    Develop mindfulness and acceptance skills.    Identify important values in patient's life, and discuss ways to commit to behavioral activation around these values    Therapist will support development and implementation of emotional awareness and emotional self-regulation skills.     Therapist will explore patient's history of relationships and how they impact present behaviors, discuss emotional dynamics and how they impact behaviors, and explore how to make desired changes in these schemas and patterns.     Therapist will support development of mindfulness-based skills drawn from compassion-focused therapy, dialectical behavior therapy, mindfulness-based stress reduction, mindfulness-based cognitive therapy, etc.       Patient has reviewed and agreed to the above plan.      Huy Corley MA, LM, Behavioral Health Clinician  March 22, 2022

## 2022-05-15 ENCOUNTER — HEALTH MAINTENANCE LETTER (OUTPATIENT)
Age: 27
End: 2022-05-15

## 2022-05-19 ENCOUNTER — VIRTUAL VISIT (OUTPATIENT)
Dept: BEHAVIORAL HEALTH | Facility: CLINIC | Age: 27
End: 2022-05-19
Payer: COMMERCIAL

## 2022-05-19 DIAGNOSIS — F33.41 RECURRENT MAJOR DEPRESSION IN PARTIAL REMISSION (H): Primary | ICD-10-CM

## 2022-05-19 DIAGNOSIS — F90.8 ATTENTION DEFICIT HYPERACTIVITY DISORDER (ADHD), OTHER TYPE: ICD-10-CM

## 2022-05-19 PROCEDURE — 90837 PSYTX W PT 60 MINUTES: CPT | Mod: 95 | Performed by: MARRIAGE & FAMILY THERAPIST

## 2022-05-19 ASSESSMENT — PATIENT HEALTH QUESTIONNAIRE - PHQ9
SUM OF ALL RESPONSES TO PHQ QUESTIONS 1-9: 3
SUM OF ALL RESPONSES TO PHQ QUESTIONS 1-9: 3
10. IF YOU CHECKED OFF ANY PROBLEMS, HOW DIFFICULT HAVE THESE PROBLEMS MADE IT FOR YOU TO DO YOUR WORK, TAKE CARE OF THINGS AT HOME, OR GET ALONG WITH OTHER PEOPLE: NOT DIFFICULT AT ALL

## 2022-05-19 NOTE — PROGRESS NOTES
[facility] (PCC referral)  May 19, 2022  Behavioral Health Clinician Progress Note    Patient Name: Neo Saeed              Service Type:  Individual      Service Location:   Video Visit     Session Start Time: 9am  Session End Time: 955am     Session Length: 53 - 60       Attendees: Patient    Visit Activities (Refresh list every visit): [facility] Only    Diagnostic Assessment Date: 10/8/2021  Treatment Plan Review Date: 2/22/2022      See Flowsheets for today's PHQ-9 and LETI-7 results  Previous PHQ-9:   PHQ-9 SCORE 12/1/2021 2/22/2022 5/19/2022   PHQ-9 Total Score MyChart - 4 (Minimal depression) 3 (Minimal depression)   PHQ-9 Total Score 3 4 3     Previous LETI-7:   LETI-7 SCORE 4/9/2021 7/26/2021 12/1/2021   Total Score 4 (minimal anxiety) 4 (minimal anxiety) -   Total Score 4 4 3       RAYMON LEVEL:  No flowsheet data found.    DATA  Extended Session (60+ minutes): No  Interactive Complexity: No  Crisis: No  [facility] Patient: No    Treatment Objective(s) Addressed in This Session:  Target Behavior(s): disease management/lifestyle changes      Patient  will experience a reduction in depressed mood, will develop more effective coping skills to manage depressive symptoms and will develop healthy cognitive patterns and beliefs, will experience a reduction in anxiety and will develop more effective coping skills to manage anxiety symptoms and will develop coping/problem-solving skills to facilitate more adaptive adjustment    Current Stressors / Issues:  Telemedicine Visit: The patient's condition can be safely assessed and treated via synchronous audio and visual telemedicine encounter.      Reason for Telemedicine Visit: Coronavirus concerns    Originating Site (Patient Location): Patient's home    Distant Site (Provider Location): [facility]: [facility]    Consent:  The patient/guardian has verbally consented to: the potential risks and benefits of telemedicine (video visit) versus in person  care; bill my insurance or make self-payment for services provided; and responsibility for payment of non-covered services.     Mode of Communication:  Video Conference via MideoMe    As the provider I attest to compliance with applicable laws and regulations related to telemedicine.      [facility] met with Neo to provide psychotherapy support regarding life stress.     Dexter had Covid a little bit ago, and we discussed this. He is up for a promotion but it may not happen because of company reasons, not due to any failing on his part - so he is disappointed. We discussed the book Uncovering Happiness that he has been reading. He has finished his first graduate school class and he is feeling good about this, and continuing to take one class at a time.     Approaches to treating ADHD was something he wanted to explore today. We discussed his experience with this condition, his hx of trying medications that don't work very well for him. Discussed therapy approaches to ADHD support - behavioral strategies about using external aides and practices. He is convinced that there is an emotional component to his experience of ADHD. He has a sense of sadness or negative self-evaluation around this issue, and we explored how he feels about himself since having this diagnosis as an adult. Explored his ideas about diagnosis and how he feels about himself in this time. Discussed and suggested the book Saving Normal by Job Sherwood as a way to consider some of these questions.     Explored how his beliefs and ideas about these things influence his feelings and behavior (CBT), normalized many of his experiences, normalized and support efforts to reduce suffering and offered and explored broader ways of understanding mental health and ways to work with symptoms and goals.     From recent sessions, for reference:  We continued our conversation at his request about the basic approaches to therapy extant today. Discussed CBT, ACT,  mindfulness and other basic ideas in psychotherapy, also touching on the history and development of these schools of thought, as well as common-factors and integrative approaches. He reports he is feeling much better than he had at our last meeting, but he is very motivated to build resilience against future episodes of negative moods, and wants to design an approach to work on building knowledge and skills for the future.    We used a tool bench and exercise metaphors today. Assigned him some reading and a combined CBT/ACT exploration. Large discussion around what mindfulness is and how it can be useful. Jose A on multi-variable model of CBT (Bud and Carrie) for a way to weave some of these ideas together. Encouraged reading: Georgie Wyman - Uncovering Happiness and Marcel Flores - Embracing your Demons. Also sent a copy of the first chapter of The Mindfulness Solution by Josep Watson for an evolutionary psychology perspective.     Progress on Treatment Objective(s) / Homework:  Satisfactory progress - ACTION (Actively working towards change); Intervened by reinforcing change plan / affirming steps taken    Medication Review:  No problems reported to [facility] today.    Medication Compliance:  No problems reported to [facility] today.    Changes in Health Issues:  No problems reported to [facility] today.    Chemical Use Review:   Substance Use: Chemical use reviewed, no active concerns identified      Tobacco Use: No current tobacco use.      Assessment: Current Emotional / Mental Status (status of significant symptoms):  Risk status (Self / Other harm or suicidal ideation)  Patient denies a history of suicidal ideation, suicide attempts, self-injurious behavior, homicidal ideation, homicidal behavior and and other safety concerns  Patient denies current fears or concerns for personal safety.  Patient denies current or recent suicidal ideation or behaviors.  Patient denies current or recent homicidal ideation or  behaviors.  Patient denies current or recent self injurious behavior or ideation.  Patient denies other safety concerns.  A safety and risk management plan has not been developed at this time, however patient was encouraged to call [place] [phone] should there be a change in any of these risk factors.    Appearance:   Appropriate   Eye Contact:   Good   Psychomotor Behavior: Normal   Attitude:   Cooperative   Orientation:   All  Speech   Rate / Production: Normal    Volume:  Normal   Mood:    Anxious  Depressed - fluctuates  Affect:    Appropriate   Thought Content:  Clear  Rumination   Thought Form:  Coherent  Logical   Insight:    Good     Diagnoses:  1. Recurrent major depression in partial remission (H)    2. Attention deficit hyperactivity disorder (ADHD), other type      Collateral Reports Completed:  Will collaborate with care team as indicated during treatment    Plan: (Homework, other):  Patient was given information about behavioral services and encouraged to schedule a follow up appointment with the clinic [facility] as needed.  He was also given information about mental health symptoms and treatment options .  CD Recommendations: No indications of CD issues.  We agree to continue psychotherapy to address his depression, anxiety, life adjustment. GAGE Sears, [facility]    ______________________________________________________________________                                                 Individual Treatment Plan    Patient's Name: Neo Saeed YOB: 1995    Date of Creation: 2/22/2022  Date Treatment Plan Last Reviewed/Revised: 2/22/2022    DSM-V Diagnoses: 296.32 (F33.1) Major Depressive Disorder, Recurrent Episode, Moderate With anxious distress; ADHD; Anxiety unspecified  Psychosocial / Contextual Factors: transitions in life - college to work force, friends leaving, etc  PROMIS (reviewed every 90 days): needs to be completed    Referral / Collaboration:  Referral to another  professional/service is not indicated at this time..    Anticipated number of session for this episode of care: 10+  Anticipation frequency of session: Monthly  Anticipated Duration of each session: 38-52 minutes  Treatment plan will be reviewed in 90 days or when goals have been changed.       MeasurableTreatment Goal(s) related to diagnosis / functional impairment(s)  Goal 1:  Patient will experience a reduction in depressive and anxious symptoms, along with a corresponding increase in positive emotion and life satisfaction.    Objective #A: Patient will experience a reduction in depressed mood, will develop more effective coping skills to manage depressive symptoms, will develop healthy cognitive patterns and beliefs, will increase ability to function adaptively and will continue to take medications as prescribed / participate in supportive activities and services               Status: Continued - Date(s): 2/22/2022     Objective #B: Patient will experience a reduction in anxiety, will develop more effective coping skills to manage anxiety symptoms, will develop healthy cognitive patterns and beliefs and will increase ability to function adaptively  Status: Continued - Date(s): 2/22/2022     Objective #C: Patient will develop better understanding of triggers and coping strategies to stabilize mood  Status: Continued - Date(s): 2/22/2022    Intervention(s):    Therapist will provide psycho-education regarding mental health diagnoses and treatment options.    Therapist will support development and implementation of Cognitive-Behavioral Therapy skills:    Discuss common cognitive distortions, identify them in patient's life    Explore ways to challenge, replace, and act against these cognitions    Explore behavioral changes that might benefit patient in improving mood and engage in meaningful activity    Therapist will support development and implementation of Acceptance and Commitment Therapy skills:    Explore  aspects of psychological flexibility: de-fusion, self-as-context/observing self, acceptance, present moment awareness, values, and committed action.    Develop mindfulness and acceptance skills.    Identify important values in patient's life, and discuss ways to commit to behavioral activation around these values    Therapist will support development and implementation of emotional awareness and emotional self-regulation skills.       Goal 2:  Patient will identify and increase engagement in valued activity, i.e. improving social connections/relationships, pursuing occupational goals or personally meaningful pursuits, exploration of meaning in life.    Objective #A: Patient will identify meaningful activity in social, occupational and   personal goals, and increase behavioral activation around these goals           Status: Continued - Date(s): 2/22/2022     Objective #B: Patient will address relationship difficulties in a more adaptive manner  Status: Continued - Date(s): 2/22/2022     Objective #C: Patient will develop coping/problem-solving skills to facilitate more adaptive adjustment and will effectively address problems that interfere with adaptive functioning  Status: Continued - Date(s): 2/22/2022    Intervention(s):    Therapist will provide psycho-education regarding mental health diagnoses and treatment options.    Therapist will support development and implementation of Cognitive-Behavioral Therapy skills:    Discuss common cognitive distortions, identify them in patient's life    Explore ways to challenge, replace, and act against these cognitions    Explore behavioral changes that might benefit patient in improving mood and engage in meaningful activity    Therapist will support development and implementation of Acceptance and Commitment Therapy skills:    Explore aspects of psychological flexibility: de-fusion, self-as-context/observing self, acceptance, present moment awareness, values, and committed  action.    Develop mindfulness and acceptance skills.    Identify important values in patient's life, and discuss ways to commit to behavioral activation around these values    Therapist will support development and implementation of emotional awareness and emotional self-regulation skills.     Therapist will explore patient's history of relationships and how they impact present behaviors, discuss emotional dynamics and how they impact behaviors, and explore how to make desired changes in these schemas and patterns.     Therapist will support development of mindfulness-based skills drawn from compassion-focused therapy, dialectical behavior therapy, mindfulness-based stress reduction, mindfulness-based cognitive therapy, etc.       Patient has reviewed and agreed to the above plan.      Huy Corley MA, LMFT, Behavioral Health Clinician  March 22, 2022

## 2022-06-13 ENCOUNTER — MYC MEDICAL ADVICE (OUTPATIENT)
Dept: INTERNAL MEDICINE | Facility: CLINIC | Age: 27
End: 2022-06-13
Payer: COMMERCIAL

## 2022-06-13 DIAGNOSIS — R68.84 JAW PAIN: Primary | ICD-10-CM

## 2022-06-30 ENCOUNTER — VIRTUAL VISIT (OUTPATIENT)
Dept: BEHAVIORAL HEALTH | Facility: CLINIC | Age: 27
End: 2022-06-30
Payer: COMMERCIAL

## 2022-06-30 DIAGNOSIS — F33.0 MAJOR DEPRESSIVE DISORDER, RECURRENT EPISODE, MILD (H): Primary | ICD-10-CM

## 2022-06-30 DIAGNOSIS — F90.8 ATTENTION DEFICIT HYPERACTIVITY DISORDER (ADHD), OTHER TYPE: ICD-10-CM

## 2022-06-30 DIAGNOSIS — F41.9 ANXIETY: ICD-10-CM

## 2022-06-30 PROCEDURE — 90837 PSYTX W PT 60 MINUTES: CPT | Mod: 95 | Performed by: MARRIAGE & FAMILY THERAPIST

## 2022-06-30 ASSESSMENT — PATIENT HEALTH QUESTIONNAIRE - PHQ9
10. IF YOU CHECKED OFF ANY PROBLEMS, HOW DIFFICULT HAVE THESE PROBLEMS MADE IT FOR YOU TO DO YOUR WORK, TAKE CARE OF THINGS AT HOME, OR GET ALONG WITH OTHER PEOPLE: SOMEWHAT DIFFICULT
SUM OF ALL RESPONSES TO PHQ QUESTIONS 1-9: 2
SUM OF ALL RESPONSES TO PHQ QUESTIONS 1-9: 2

## 2022-06-30 NOTE — PROGRESS NOTES
[facility] (PCC referral)  June 30, 2022  Behavioral Health Clinician Progress Note    Patient Name: Neo Saeed              Service Type:  Individual      Service Location:   Video Visit     Session Start Time: 305pm  Session End Time: 410pm     Session Length: 53 - 60       Attendees: Patient    Visit Activities (Refresh list every visit): [facility] [place] Only    Diagnostic Assessment Date: 10/8/2021  Treatment Plan Review Date: 6/30/2022      See Flowsheets for today's PHQ-9 and LETI-7 results  Previous PHQ-9:   PHQ-9 SCORE 2/22/2022 5/19/2022 6/30/2022   PHQ-9 Total Score MyChart 4 (Minimal depression) 3 (Minimal depression) 2 (Minimal depression)   PHQ-9 Total Score 4 3 2     Previous LETI-7:   LETI-7 SCORE 4/9/2021 7/26/2021 12/1/2021   Total Score 4 (minimal anxiety) 4 (minimal anxiety) -   Total Score 4 4 3       RAYMON LEVEL:  No flowsheet data found.    DATA  Extended Session (60+ minutes): No  Interactive Complexity: No  Crisis: No  [facility] Patient: No    Treatment Objective(s) Addressed in This Session:  Target Behavior(s): disease management/lifestyle changes      Patient  will experience a reduction in depressed mood, will develop more effective coping skills to manage depressive symptoms and will develop healthy cognitive patterns and beliefs, will experience a reduction in anxiety and will develop more effective coping skills to manage anxiety symptoms and will develop coping/problem-solving skills to facilitate more adaptive adjustment    Current Stressors / Issues:  Telemedicine Visit: The patient's condition can be safely assessed and treated via synchronous audio and visual telemedicine encounter.      Reason for Telemedicine Visit: Coronavirus concerns    Originating Site (Patient Location): Patient's home    Distant Site (Provider Location): [facility]    Consent:  The patient/guardian has verbally consented to: the potential risks and benefits of telemedicine (video  "visit) versus in person care; bill my insurance or make self-payment for services provided; and responsibility for payment of non-covered services.     Mode of Communication:  Video Conference via [employer]    As the provider I attest to compliance with applicable laws and regulations related to telemedicine.      [facility] met with Neo to provide psychotherapy support regarding life stress.     Dexter reports he has been doing pretty well these days.     Burnout at work or at least a feeling of less enthusiastic about work has been an issue for him. We spent time processing this and exploring cause and effect in his current barrios=ituation, and explored possible pathways out of this. Discussed the notion of the \"quarter-life crisis\" that might be part of his life stage. Also discussed how he could make some changes about his work or social life that might bring more energy and satisfaction into his life. Began an exploration of values (ACT) to help him identify what he might want to move towards.     Talked again about relationships and what he might be missing from them. Discussed vulnerability, honesty, openness, the need for being \"seen.\"     Discussed dating and his sense of himself - he feels out of place in the [place] community.     Current 0-10 on general life satisfaction = 6 or 6 1/2, per his report. He identifies an existential malaise about politics, pandemic, waiting on next job moves/grad school, etc., as sources of this - primarily background issues that impact him in  Way he can not impact very much. Eduardo. Discussed self-care, focusing on what he values, enjoying and living in the moment as much as possible, mindfulness practice.    I have reviewed the treatment plan today. Neo has made steady progress towards these goals, and they remain appropriate to direct our work together.      Progress on Treatment Objective(s) / Homework:  Satisfactory progress - ACTION (Actively working towards change); " Intervened by reinforcing change plan / affirming steps taken    Medication Review:  No problems reported to [facility] [place] today.    Medication Compliance:  No problems reported to [facility] today.    Changes in Health Issues:  No problems reported to [facility] today.    Chemical Use Review:   Substance Use: Chemical use reviewed, no active concerns identified      Tobacco Use: No current tobacco use.      Assessment: Current Emotional / Mental Status (status of significant symptoms):  Risk status (Self / Other harm or suicidal ideation)  Patient denies a history of suicidal ideation, suicide attempts, self-injurious behavior, homicidal ideation, homicidal behavior and and other safety concerns  Patient denies current fears or concerns for personal safety.  Patient denies current or recent suicidal ideation or behaviors.  Patient denies current or recent homicidal ideation or behaviors.  Patient denies current or recent self injurious behavior or ideation.  Patient denies other safety concerns.  A safety and risk management plan has not been developed at this time, however patient was encouraged to call [place] [phone] should there be a change in any of these risk factors.    Appearance:   Appropriate   Eye Contact:   Good   Psychomotor Behavior: Normal   Attitude:   Cooperative   Orientation:   All  Speech   Rate / Production: Normal    Volume:  Normal   Mood:    Anxious  Depressed - fluctuates  Affect:    Appropriate   Thought Content:  Clear  Rumination   Thought Form:  Coherent  Logical   Insight:    Good     Diagnoses:  1. Major depressive disorder, recurrent episode, mild (H)    2. Attention deficit hyperactivity disorder (ADHD), other type    3. Anxiety      Collateral Reports Completed:  Will collaborate with care team as indicated during treatment    Plan: (Homework, other):  Patient was given information about behavioral services and encouraged to schedule a follow up appointment with the clinic [facility] [place] as needed.  He was also  given information about mental health symptoms and treatment options .  CD Recommendations: No indications of CD issues.  We agree to continue psychotherapy to address his depression, anxiety, life adjustment. Javier Corley LMFT, [facility] [place]    ______________________________________________________________________                                                 Individual Treatment Plan    Patient's Name: Neo Saeed YOB: 1995    Date of Creation: 2/22/2022  Date Treatment Plan Last Reviewed/Revised: 6/30/2022    DSM-V Diagnoses: 296.32 (F33.1) Major Depressive Disorder, Recurrent Episode, Moderate With anxious distress; ADHD; Anxiety unspecified  Psychosocial / Contextual Factors: transitions in life - college to work force, friends leaving, etc  PROMIS (reviewed every 90 days): needs to be completed    Referral / Collaboration:  Referral to another professional/service is not indicated at this time..    Anticipated number of session for this episode of care: 10+  Anticipation frequency of session: Monthly  Anticipated Duration of each session: 38-52 minutes  Treatment plan will be reviewed in 90 days or when goals have been changed.       MeasurableTreatment Goal(s) related to diagnosis / functional impairment(s)  Goal 1:  Patient will experience a reduction in depressive and anxious symptoms, along with a corresponding increase in positive emotion and life satisfaction.    Objective #A: Patient will experience a reduction in depressed mood, will develop more effective coping skills to manage depressive symptoms, will develop healthy cognitive patterns and beliefs, will increase ability to function adaptively and will continue to take medications as prescribed / participate in supportive activities and services               Status: Continued - Date(s): 6/30/2022     Objective #B: Patient will experience a reduction in anxiety, will develop more effective coping skills to manage anxiety symptoms, will  develop healthy cognitive patterns and beliefs and will increase ability to function adaptively  Status: Continued - Date(s): 6/30/2022     Objective #C: Patient will develop better understanding of triggers and coping strategies to stabilize mood  Status: Continued - Date(s): 6/30/2022    Intervention(s):    Therapist will provide psycho-education regarding mental health diagnoses and treatment options.    Therapist will support development and implementation of Cognitive-Behavioral Therapy skills:    Discuss common cognitive distortions, identify them in patient's life    Explore ways to challenge, replace, and act against these cognitions    Explore behavioral changes that might benefit patient in improving mood and engage in meaningful activity    Therapist will support development and implementation of Acceptance and Commitment Therapy skills:    Explore aspects of psychological flexibility: de-fusion, self-as-context/observing self, acceptance, present moment awareness, values, and committed action.    Develop mindfulness and acceptance skills.    Identify important values in patient's life, and discuss ways to commit to behavioral activation around these values    Therapist will support development and implementation of emotional awareness and emotional self-regulation skills.       Goal 2:  Patient will identify and increase engagement in valued activity, i.e. improving social connections/relationships, pursuing occupational goals or personally meaningful pursuits, exploration of meaning in life.    Objective #A: Patient will identify meaningful activity in social, occupational and   personal goals, and increase behavioral activation around these goals           Status: Continued - Date(s): 6/30/2022     Objective #B: Patient will address relationship difficulties in a more adaptive manner  Status: Continued - Date(s): 6/30/2022     Objective #C: Patient will develop coping/problem-solving skills to  facilitate more adaptive adjustment and will effectively address problems that interfere with adaptive functioning  Status: Continued - Date(s): 6/30/2022    Intervention(s):    Therapist will provide psycho-education regarding mental health diagnoses and treatment options.    Therapist will support development and implementation of Cognitive-Behavioral Therapy skills:    Discuss common cognitive distortions, identify them in patient's life    Explore ways to challenge, replace, and act against these cognitions    Explore behavioral changes that might benefit patient in improving mood and engage in meaningful activity    Therapist will support development and implementation of Acceptance and Commitment Therapy skills:    Explore aspects of psychological flexibility: de-fusion, self-as-context/observing self, acceptance, present moment awareness, values, and committed action.    Develop mindfulness and acceptance skills.    Identify important values in patient's life, and discuss ways to commit to behavioral activation around these values    Therapist will support development and implementation of emotional awareness and emotional self-regulation skills.     Therapist will explore patient's history of relationships and how they impact present behaviors, discuss emotional dynamics and how they impact behaviors, and explore how to make desired changes in these schemas and patterns.     Therapist will support development of mindfulness-based skills drawn from compassion-focused therapy, dialectical behavior therapy, mindfulness-based stress reduction, mindfulness-based cognitive therapy, etc.       Patient has reviewed and agreed to the above plan.      Huy Corley MA, [facility], Behavioral Health Clinician  6/30/2022

## 2022-07-12 ENCOUNTER — OFFICE VISIT (OUTPATIENT)
Dept: PHYSICAL MEDICINE AND REHAB | Facility: CLINIC | Age: 27
End: 2022-07-12
Payer: COMMERCIAL

## 2022-07-12 VITALS — SYSTOLIC BLOOD PRESSURE: 142 MMHG | DIASTOLIC BLOOD PRESSURE: 88 MMHG | HEART RATE: 62 BPM

## 2022-07-12 DIAGNOSIS — G24.4 IDIOPATHIC OROFACIAL DYSTONIA: Primary | ICD-10-CM

## 2022-07-12 PROCEDURE — 99202 OFFICE O/P NEW SF 15 MIN: CPT | Performed by: PHYSICAL MEDICINE & REHABILITATION

## 2022-07-12 NOTE — LETTER
2022         RE: Neo Saeed  [address]  [place] [address]        Dear Colleague,    Thank you for referring your patient, Neo Saeed, to the [facility]. Please see a copy of my visit note below.        [facility]     PHYSICAL MEDICINE & REHABILITATION EVALUATION        PATIENT NAME Neo Saeed   1995  MRN [id number]  REFERRING PROVIDER Ceferino Cartwright MD     CHIEF COMPLAINT   Bruxism    HISTORY OF PRESENT ILLNESS  Neo Saeed is a 26 year old male who is referred to clinic for consideration of botulinum toxin injections and other potential modalities for management of excessive jaw clenching.     The patient states that he has been living with excessive jaw clenching since high school, and has subsequently developed excessive wear and tear on his teeth through the years.  The patient has stress lines in his teeth as well as chips on his front incisors as result of involuntary jaw clenching and grinding, which occurs primarily at night.  Approximately 2-3 years ago, the patient was fitted with a mouthguard to be worn at night.  The patient also endorses pain in his temporomandibular joint as well as clicking/popping of the jaw when opening his mouth wide.    The patient has not tried physical therapy or muscle relaxers for his symptoms.  He denies any associated headaches.  His dentist is recommending a trial of botulinum toxin injections into the muscles of mastication to prevent further damage of his teeth, and excessive wear and tear on his TMJ.      PAST MEDICAL HISTORY  Past Medical History:   Diagnosis Date     Allergic state      Anxiety        PAST SURGICAL HISTORY  Past Surgical History:   Procedure Laterality Date     CYSTECTOMY PILONIDAL N/A 2017    Procedure: CYSTECTOMY PILONIDAL;  Lay Open Pilonidal Cystectomy;  Surgeon: Jenaro Cuba MD;  Location:  OR     DENTAL  SURGERY      [place] teeth extraction     ENDOSCOPY       HERNIA REPAIR       HERNIA REPAIR         PAST SOCIAL HISTORY  Social History     Socioeconomic History     Marital status: Single     Spouse name: Not on file     Number of children: Not on file     Years of education: Not on file     Highest education level: Bachelor's degree (e.g., BA, AB, BS)   Occupational History     Not on file   Tobacco Use     Smoking status: Never Smoker     Smokeless tobacco: Never Used   Substance and Sexual Activity     Alcohol use: Yes     Comment: Couple of drinks once a week     Drug use: No     Sexual activity: Yes     Partners: Male     Birth control/protection: Condom   Other Topics Concern     Not on file   Social History Narrative    Graduated 12/2017 in [employer] science. Works for [employer].      Social Determinants of Health     Financial Resource Strain: Not on file   Food Insecurity: Not on file   Transportation Needs: Not on file   Physical Activity: Not on file   Stress: Not on file   Social Connections: Not on file   Intimate Partner Violence: Not on file   Housing Stability: Not on file       FAMILY HISTORY  Family History   Problem Relation Age of Onset     Hyperlipidemia Father      Hypothyroidism Father      Diabetes Maternal Grandmother      Bipolar Disorder Maternal Grandmother      Parkinsonism Maternal Grandfather      Hyperlipidemia Paternal Grandfather      Coronary Artery Disease Paternal Grandfather      Cancer Paternal Grandfather        IMMUNIZATIONS  Immunization History   Administered Date(s) Administered     COVID-19,PF,Moderna 03/28/2021, 04/25/2021, 11/01/2021     DTAP (<7y) 08/09/2001     DTP-Hib 03/05/1996, 04/10/1996, 06/25/1996, 06/23/1997     FLU 6-35 months 11/26/2012, 09/18/2013     Flu, Unspecified 11/11/2003, 12/12/2003, 12/01/2006, 10/15/2014, 10/15/2015     [id number] Novel Flu P-free 12/16/2009     HEPA 05/04/2010     HPV Quadrivalent 11/05/2013, 04/16/2014, 12/30/2014     Hep B, Peds or  "Adolescent 1995, 03/05/1996, 09/26/1996     HepA-ped 2 Dose 05/04/2010, 01/10/2013     HepB, Unspecified 03/05/1996, 09/26/1996     Hib, Unspecified 03/20/1997     Influenza (IIV3) PF 11/28/2008, 10/17/2009, 10/09/2010, 10/16/2011     Influenza Vaccine IM > 6 months Valent IIV4 (Alfuria,Fluzone) 02/07/2020, 09/21/2020, 08/25/2021     Influenza,INJ,MDCK,PF, IIV3 18+yrs 10/28/2015     MMR 03/20/1997, 08/09/2001     Meningococcal (Menactra ) 11/01/2007, 01/10/2013     Pedvax-hib 03/20/1997     Poliovirus, inactivated (IPV) 03/05/1996, 04/10/1996, 06/25/1996, 08/09/2001     TDAP Vaccine (Adacel) 11/01/2007     TDAP Vaccine (Boostrix) 03/27/2018     Varicella 12/23/1996, 12/13/2007       ALLERGIES  No Known Allergies    MEDICATIONS  Current Outpatient Medications   Medication     albuterol (PROAIR HFA/PROVENTIL HFA/VENTOLIN HFA) 108 (90 Base) MCG/ACT inhaler     fexofenadine (ALLEGRA) 60 MG tablet     fluticasone-salmeterol (ADVAIR-HFA) 115-21 MCG/ACT inhaler     methylphenidate (RITALIN) 5 MG tablet     amoxicillin-clavulanate (AUGMENTIN) 875-125 MG tablet     amoxicillin-clavulanate (AUGMENTIN) 875-125 MG tablet     ipratropium (ATROVENT) 0.03 % nasal spray     montelukast (SINGULAIR) 10 MG tablet     No current facility-administered medications for this visit.       PHYSICAL EXAM      BP: (!) 142/88 Pulse: 62                Estimated body mass index is 26.54 kg/m  as calculated from the following:    Height as of 12/1/21: 1.778 m (5' 10\").    Weight as of 12/1/21: 83.9 kg (185 lb).  GEN: Pleasant and cooperative, in no acute distress  HEENT: Significant masseter and temporalis hypertrophy bilaterally.  There is significant wear and tear noted in frontal incisors as well as molars bilaterally.  He has lost the normal curvature of his front teeth, and he has chips in the distal aspect of bilateral front teeth.      ASSESSMENT AND PLAN  Neo Saeed is a 26 year old male with a history of excessive jaw clenching " and grinding which has resulted in significant wear and tear on his teeth.  His dentist is recommending a trial of botulinum toxin injections to prevent any further damage to his teeth or temporomandibular joints.    The patient is an excellent candidate for a trial of botulinum toxin injections for management of excessive jaw clenching and TMJ dysfunction with oromandibular components.     Plan will be to request prior authorization from insurer for a maximum dose of 100 units of Botox every 12 weeks for the management of idiopathic oromandibular dystonia. He has been tentatively scheduled for first series of injections pending insurance approval.       Thank you for this consultation. Please do not hesitate to contact me with further questions.   Lea Rossi MD  Physical Medicine and Rehabilitation  [phone]      I spent a total of 27 minutes face-to-face and managing the care of Neo Saeed. Over 50% of my time was spent counseling the patient and coordinating care. Please see note for details.                   Again, thank you for allowing me to participate in the care of your patient.        Sincerely,        Lea Rossi MD

## 2022-07-12 NOTE — PROGRESS NOTES
[facility]     PHYSICAL MEDICINE & REHABILITATION EVALUATION        PATIENT NAME Neo Saeed   1995  MRN [id number]  REFERRING PROVIDER Ceferino Cartwright MD     CHIEF COMPLAINT   Bruxism    HISTORY OF PRESENT ILLNESS  Neo Saeed is a 26 year old male who is referred to clinic for consideration of botulinum toxin injections and other potential modalities for management of excessive jaw clenching.     The patient states that he has been living with excessive jaw clenching since high school, and has subsequently developed excessive wear and tear on his teeth through the years.  The patient has stress lines in his teeth as well as chips on his front incisors as result of involuntary jaw clenching and grinding, which occurs primarily at night.  Approximately 2-3 years ago, the patient was fitted with a mouthguard to be worn at night.  The patient also endorses pain in his temporomandibular joint as well as clicking/popping of the jaw when opening his mouth wide.    The patient has not tried physical therapy or muscle relaxers for his symptoms.  He denies any associated headaches.  His dentist is recommending a trial of botulinum toxin injections into the muscles of mastication to prevent further damage of his teeth, and excessive wear and tear on his TMJ.      PAST MEDICAL HISTORY  Past Medical History:   Diagnosis Date     Allergic state      Anxiety        PAST SURGICAL HISTORY  Past Surgical History:   Procedure Laterality Date     CYSTECTOMY PILONIDAL N/A 2017    Procedure: CYSTECTOMY PILONIDAL;  Lay Open Pilonidal Cystectomy;  Surgeon: Jenaro Cuba MD;  Location: [facility]     DENTAL SURGERY      [place] teeth extraction     ENDOSCOPY       HERNIA REPAIR       HERNIA REPAIR         PAST SOCIAL HISTORY  Social History     Socioeconomic History     Marital status: Single     Spouse name: Not on file     Number of children: Not on file     Years of  education: Not on file     Highest education level: Bachelor's degree (e.g., BA, AB, BS)   Occupational History     Not on file   Tobacco Use     Smoking status: Never Smoker     Smokeless tobacco: Never Used   Substance and Sexual Activity     Alcohol use: Yes     Comment: Couple of drinks once a week     Drug use: No     Sexual activity: Yes     Partners: Male     Birth control/protection: Condom   Other Topics Concern     Not on file   Social History Narrative    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      Social Determinants of Health     Financial Resource Strain: Not on file   Food Insecurity: Not on file   Transportation Needs: Not on file   Physical Activity: Not on file   Stress: Not on file   Social Connections: Not on file   Intimate Partner Violence: Not on file   Housing Stability: Not on file       FAMILY HISTORY  Family History   Problem Relation Age of Onset     Hyperlipidemia Father      Hypothyroidism Father      Diabetes Maternal Grandmother      Bipolar Disorder Maternal Grandmother      Parkinsonism Maternal Grandfather      Hyperlipidemia Paternal Grandfather      Coronary Artery Disease Paternal Grandfather      Cancer Paternal Grandfather        IMMUNIZATIONS  Immunization History   Administered Date(s) Administered     COVID-19,PF,Moderna 03/28/2021, 04/25/2021, 11/01/2021     DTAP (<7y) 08/09/2001     DTP-Hib 03/05/1996, 04/10/1996, 06/25/1996, 06/23/1997     FLU 6-35 months 11/26/2012, 09/18/2013     Flu, Unspecified 11/11/2003, 12/12/2003, 12/01/2006, 10/15/2014, 10/15/2015     Z5n1-39 Novel Flu P-free 12/16/2009     HEPA 05/04/2010     HPV Quadrivalent 11/05/2013, 04/16/2014, 12/30/2014     Hep B, Peds or Adolescent 1995, 03/05/1996, 09/26/1996     HepA-ped 2 Dose 05/04/2010, 01/10/2013     HepB, Unspecified 03/05/1996, 09/26/1996     Hib, Unspecified 03/20/1997     Influenza (IIV3) PF 11/28/2008, 10/17/2009, 10/09/2010, 10/16/2011     Influenza Vaccine IM > 6  "months Valent IIV4 (Alfuria,Fluzone) 02/07/2020, 09/21/2020, 08/25/2021     Influenza,INJ,MDCK,PF, IIV3 18+yrs 10/28/2015     MMR 03/20/1997, 08/09/2001     Meningococcal (Menactra ) 11/01/2007, 01/10/2013     Pedvax-hib 03/20/1997     Poliovirus, inactivated (IPV) 03/05/1996, 04/10/1996, 06/25/1996, 08/09/2001     TDAP Vaccine (Adacel) 11/01/2007     TDAP Vaccine (Boostrix) 03/27/2018     Varicella 12/23/1996, 12/13/2007       ALLERGIES  No Known Allergies    MEDICATIONS  Current Outpatient Medications   Medication     albuterol (PROAIR HFA/PROVENTIL HFA/VENTOLIN HFA) 108 (90 Base) MCG/ACT inhaler     fexofenadine (ALLEGRA) 60 MG tablet     fluticasone-salmeterol (ADVAIR-HFA) 115-21 MCG/ACT inhaler     methylphenidate (RITALIN) 5 MG tablet     amoxicillin-clavulanate (AUGMENTIN) 875-125 MG tablet     amoxicillin-clavulanate (AUGMENTIN) 875-125 MG tablet     ipratropium (ATROVENT) 0.03 % nasal spray     montelukast (SINGULAIR) 10 MG tablet     No current facility-administered medications for this visit.       PHYSICAL EXAM      BP: (!) 142/88 Pulse: 62                Estimated body mass index is 26.54 kg/m  as calculated from the following:    Height as of 12/1/21: 1.778 m (5' 10\").    Weight as of 12/1/21: 83.9 kg (185 lb).  GEN: Pleasant and cooperative, in no acute distress  HEENT: Significant masseter and temporalis hypertrophy bilaterally.  There is significant wear and tear noted in frontal incisors as well as molars bilaterally.  He has lost the normal curvature of his front teeth, and he has chips in the distal aspect of bilateral front teeth.      ASSESSMENT AND PLAN  Neo Saeed is a 26 year old male with a history of excessive jaw clenching and grinding which has resulted in significant wear and tear on his teeth.  His dentist is recommending a trial of botulinum toxin injections to prevent any further damage to his teeth or temporomandibular joints.    The patient is an excellent candidate for a trial " of botulinum toxin injections for management of excessive jaw clenching and TMJ dysfunction with oromandibular components.     Plan will be to request prior authorization from insurer for a maximum dose of 100 units of Botox every 12 weeks for the management of idiopathic oromandibular dystonia. He has been tentatively scheduled for first series of injections pending insurance approval.       Thank you for this consultation. Please do not hesitate to contact me with further questions.   Lea Rossi MD  Physical Medicine and Rehabilitation  [phone]      I spent a total of 27 minutes face-to-face and managing the care of Neo Saeed. Over 50% of my time was spent counseling the patient and coordinating care. Please see note for details.

## 2022-08-09 ENCOUNTER — TELEPHONE (OUTPATIENT)
Dept: PULMONOLOGY | Facility: CLINIC | Age: 27
End: 2022-08-09

## 2022-08-09 DIAGNOSIS — G24.4 IDIOPATHIC OROFACIAL DYSTONIA: Primary | ICD-10-CM

## 2022-08-09 NOTE — TELEPHONE ENCOUNTER
Called pt lvm informing per Dr. Molina the appt for 8/16 can be pushed out by 6 mth. I informed to contact call center [phone]

## 2022-08-15 ENCOUNTER — TELEPHONE (OUTPATIENT)
Dept: PULMONOLOGY | Facility: CLINIC | Age: 27
End: 2022-08-15

## 2022-08-16 ENCOUNTER — TELEPHONE (OUTPATIENT)
Dept: PHYSICAL MEDICINE AND REHAB | Facility: CLINIC | Age: 27
End: 2022-08-16

## 2022-08-16 NOTE — TELEPHONE ENCOUNTER
"Spoke to Dexter, he is not currently taking this med, the nights he tried it in the past week he was definitely groggy in the morning after and did not notice any improvement in muscle tightness \"It still felt the same, as if I had been grinding my teeth during sleep.\"    Also has nauseous / slight heartburn upon wakening on the morning after he took tizanidine 4 mg.    Has not tried other muscle relaxants in the past.    Please advise.    Lea Lunsford RN on 8/16/2022 at 2:50 PM    "

## 2022-08-16 NOTE — TELEPHONE ENCOUNTER
Health Call Center    Phone Message    May a detailed message be left on voicemail: yes     Reason for Call: Medication Question or concern regarding medication   Prescription Clarification  Name of Medication: tiZANidine (ZANAFLEX) 4 MG tablet  Prescribing Provider: JARROD AGUILAR   Pharmacy: [id number] [place], [place] - [address] AT [facility] [address]   What on the order needs clarification? Pt is calling because Dr. Aguilar prescribed him this medication and he has noticed some side effects. Pt says he's been taking the medication for about a week and he's had some nausea and heartburn. He also has trouble waking up in the morning- he feels pretty groggy. Pt wondering if there is another medication he can take.    Action Taken: Message routed to:  [facility] PM&R    Travel Screening: Not Applicable

## 2022-08-25 ENCOUNTER — TELEPHONE (OUTPATIENT)
Dept: PULMONOLOGY | Facility: CLINIC | Age: 27
End: 2022-08-25

## 2022-08-25 DIAGNOSIS — J45.20 MILD INTERMITTENT ASTHMA WITHOUT COMPLICATION: ICD-10-CM

## 2022-08-25 NOTE — TELEPHONE ENCOUNTER
[facility] Call [facility]    Phone Message    May a detailed message be left on voicemail: yes     Reason for Call: Medication Refill Request    Has the patient contacted the pharmacy for the refill? Yes   Name of medication being requested: fluticasone-salmeterol (ADVAIR-HFA) 115-21 MCG/ACT inhaler  Provider who prescribed the medication: Italo Molina MD  Pharmacy: [facility]  [address]  Date medication is needed: ASAP      Action Taken: Message routed to:  [facility] Pulm    Travel Screening: Not Applicable

## 2022-08-26 DIAGNOSIS — G24.4 IDIOPATHIC OROFACIAL DYSTONIA: Primary | ICD-10-CM

## 2022-08-26 RX ORDER — DIAZEPAM 5 MG
5 TABLET ORAL
Qty: 30 TABLET | Refills: 0 | Status: SHIPPED | OUTPATIENT
Start: 2022-08-26 | End: 2022-10-26

## 2022-08-26 RX ORDER — FLUTICASONE PROPIONATE AND SALMETEROL XINAFOATE 115; 21 UG/1; UG/1
2 AEROSOL, METERED RESPIRATORY (INHALATION) 2 TIMES DAILY
Qty: 12 G | Refills: 6 | Status: SHIPPED | OUTPATIENT
Start: 2022-08-26 | End: 2022-08-26

## 2022-08-26 RX ORDER — FLUTICASONE PROPIONATE AND SALMETEROL XINAFOATE 115; 21 UG/1; UG/1
2 AEROSOL, METERED RESPIRATORY (INHALATION) 2 TIMES DAILY
Qty: 12 G | Refills: 6 | Status: SHIPPED | OUTPATIENT
Start: 2022-08-26

## 2022-08-26 NOTE — TELEPHONE ENCOUNTER
This was addressed by Dr. Rossi within [facility] message from 8/25/22. Closing encounter.  Aminah Hodgson RN on 8/26/2022 at 2:22 PM

## 2022-09-09 ENCOUNTER — VIRTUAL VISIT (OUTPATIENT)
Dept: PHYSICAL MEDICINE AND REHAB | Facility: CLINIC | Age: 27
End: 2022-09-09
Payer: COMMERCIAL

## 2022-09-09 DIAGNOSIS — G24.4 IDIOPATHIC OROFACIAL DYSTONIA: Primary | ICD-10-CM

## 2022-09-09 PROCEDURE — 99207 PR SATISFY VISIT NUMBER: CPT | Performed by: PHYSICAL MEDICINE & REHABILITATION

## 2022-09-09 NOTE — PROGRESS NOTES
"Length of phone visit 3 minutes.     [facility]     PHYSICAL MEDICINE & REHABILITATION FOLLOW UP        PATIENT NAME Neo Saeed   1995  MRN [id number]    HISTORY OF PRESENT ILLNESS  Neo Saeed is a 26 year old male who presents today for follow up regarding his excessive jaw clenching. The patient has been living with excessive jaw clenching since high school, and has subsequently developed excessive wear and tear on his teeth.  The patient has stress lines in his teeth as well as chips on his front incisors as result of involuntary jaw clenching and grinding, which occurs primarily at night. He has tried a mouthguard, but he continues to have excessive jaw clenching and excessive wear and tear on his teeth. His dentist is recommending a trial of botulinum toxin injections into the muscles of mastication to prevent further damage of his teeth, and excessive wear and tear on his TMJ.    He was initially seen in consultation on 2022, at which time he was evaluated and felt to be a good candidate for a trial of botulinum toxin injections for management of his excessive clenching. His insurance would not cover the botulinum toxin injections until a muscle relaxant was tried, and therefore he was started on tizanidine 4 mg at bedtime. The patient called in on 2022 and stated that \"the nights he tried it in the past week he was definitely groggy in the morning after and did not notice any improvement in muscle tightness \"It still felt the same, as if I had been grinding my teeth during sleep.\" Also has nauseous / slight heartburn upon wakening on the morning after he took tizanidine 4 mg.\"     The patient was then started on diazepam 5 mg at bedtime prn on 2022. He was advised to use sparingly. He notes that he took it a few times, and while it did seem to help with the jaw clenching, he felt lethargic and groggy in the morning after taking. Overall, the " "effects of medication felt quite \"heavy\". He is not interested in continuing this medication.       PAST MEDICAL HISTORY  Past Medical History:   Diagnosis Date     Allergic state      Anxiety        PAST SURGICAL HISTORY  Past Surgical History:   Procedure Laterality Date     CYSTECTOMY PILONIDAL N/A 8/4/2017    Procedure: CYSTECTOMY PILONIDAL;  Lay Open Pilonidal Cystectomy;  Surgeon: Jenaro Cuba MD;  [facility]     DENTAL SURGERY      [place] teeth extraction     ENDOSCOPY       HERNIA REPAIR       HERNIA REPAIR         PAST SOCIAL HISTORY  Social History     Socioeconomic History     Marital status: Single     Spouse name: Not on file     Number of children: Not on file     Years of education: Not on file     Highest education level: Bachelor's degree (e.g., BA, AB, BS)   Occupational History     Not on file   Tobacco Use     Smoking status: Never Smoker     Smokeless tobacco: Never Used   Substance and Sexual Activity     Alcohol use: Yes     Comment: Couple of drinks once a week     Drug use: No     Sexual activity: Yes     Partners: Male     Birth control/protection: Condom   Other Topics Concern     Not on file   Social History Narrative    Graduated 12/2017 in [employer] science. Works for [employer].      Social Determinants of Health     Financial Resource Strain: Not on file   Food Insecurity: Not on file   Transportation Needs: Not on file   Physical Activity: Not on file   Stress: Not on file   Social Connections: Not on file   Intimate Partner Violence: Not on file   Housing Stability: Not on file       FAMILY HISTORY  Family History   Problem Relation Age of Onset     Hyperlipidemia Father      Hypothyroidism Father      Diabetes Maternal Grandmother      Bipolar Disorder Maternal Grandmother      Parkinsonism Maternal Grandfather      Hyperlipidemia Paternal Grandfather      Coronary Artery Disease Paternal Grandfather      Cancer Paternal Grandfather  " "      IMMUNIZATIONS  Immunization History   Administered Date(s) Administered     COVID-19,PF,Moderna 03/28/2021, 04/25/2021, 11/01/2021     DTAP (<7y) 08/09/2001     DTP-Hib 03/05/1996, 04/10/1996, 06/25/1996, 06/23/1997     FLU 6-35 months 11/26/2012, 09/18/2013     Flu, Unspecified 11/11/2003, 12/12/2003, 12/01/2006, 10/15/2014, 10/15/2015     W8x2-97 Novel Flu P-free 12/16/2009     HEPA 05/04/2010     HPV Quadrivalent 11/05/2013, 04/16/2014, 12/30/2014     Hep B, Peds or Adolescent 1995, 03/05/1996, 09/26/1996     HepA-ped 2 Dose 05/04/2010, 01/10/2013     HepB, Unspecified 03/05/1996, 09/26/1996     Hib, Unspecified 03/20/1997     Influenza (IIV3) PF 11/28/2008, 10/17/2009, 10/09/2010, 10/16/2011     Influenza Vaccine IM > 6 months Valent IIV4 (Alfuria,Fluzone) 02/07/2020, 09/21/2020, 08/25/2021     Influenza,INJ,MDCK,PF, IIV3 18+yrs 10/28/2015     MMR 03/20/1997, 08/09/2001     Meningococcal (Menactra ) 11/01/2007, 01/10/2013     Pedvax-hib 03/20/1997     Poliovirus, inactivated (IPV) 03/05/1996, 04/10/1996, 06/25/1996, 08/09/2001     TDAP Vaccine (Adacel) 11/01/2007     TDAP Vaccine (Boostrix) 03/27/2018     Varicella 12/23/1996, 12/13/2007       ALLERGIES  No Known Allergies    MEDICATIONS  Current Outpatient Medications   Medication     albuterol (PROAIR HFA/PROVENTIL HFA/VENTOLIN HFA) 108 (90 Base) MCG/ACT inhaler     diazepam (VALIUM) 5 MG tablet     fexofenadine (ALLEGRA) 60 MG tablet     fluticasone-salmeterol (ADVAIR HFA) 115-21 MCG/ACT inhaler     tiZANidine (ZANAFLEX) 4 MG tablet     Current Facility-Administered Medications   Medication     botulinum toxin type A (BOTOX) 100 units injection 100 Units       PHYSICAL EXAM                         Estimated body mass index is 26.54 kg/m  as calculated from the following:    Height as of 12/1/21: 1.778 m (5' 10\").    Weight as of 12/1/21: 83.9 kg (185 lb).  No physical exam due to phone visit      ASSESSMENT AND PLAN  Neo Saeed is a 26 year " old male with a history of excessive jaw clenching and grinding which has resulted in significant wear and tear on his teeth.  His dentist is recommending a trial of botulinum toxin injections to prevent any further damage to his teeth or temporomandibular joints. He has recently tried and failed tizanidine and diazepam, both of which had to be discontinued due to either not working or intolerable side effects.     The patient is an excellent candidate for a trial of botulinum toxin injections for management of excessive jaw clenching and TMJ dysfunction with oromandibular components.     Plan will be to request prior authorization from insurer for a maximum dose of 100 units of Botox every 12 weeks for the management of idiopathic oromandibular dystonia. He has been tentatively scheduled for first series of injections pending insurance approval.       Thank you for this consultation. Please do not hesitate to contact me with further questions.   Lea Rossi MD  Physical Medicine and Rehabilitation  [phone]      I spent a total of 8 minutes face-to-face and managing the care of Neo Saeed. Over 50% of my time was spent counseling the patient and coordinating care. Please see note for details.

## 2022-09-09 NOTE — NURSING NOTE
Chief Complaint   Patient presents with     RECHECK     Medication follow up     Eliza Swanson CMA at 12:08 PM on 9/9/2022.

## 2022-09-09 NOTE — LETTER
"2022       RE: Neo Saeed  [address]  [facility] [address]     Dear Colleague,    Thank you for referring your patient, Neo Saeed, to the [facility] PHYSICAL MEDICINE AND REHABILITATION CLINIC [place] [facility]. Please see a copy of my visit note below.    Length of phone visit 3 minutes.     [facility]     PHYSICAL MEDICINE & REHABILITATION FOLLOW UP        PATIENT NAME Neo Saeed   1995  MRN [id number]    HISTORY OF PRESENT ILLNESS  Neo Saeed is a 26 year old male who presents today for follow up regarding his excessive jaw clenching. The patient has been living with excessive jaw clenching since high school, and has subsequently developed excessive wear and tear on his teeth.  The patient has stress lines in his teeth as well as chips on his front incisors as result of involuntary jaw clenching and grinding, which occurs primarily at night. He has tried a mouthguard, but he continues to have excessive jaw clenching and excessive wear and tear on his teeth. His dentist is recommending a trial of botulinum toxin injections into the muscles of mastication to prevent further damage of his teeth, and excessive wear and tear on his TMJ.    He was initially seen in consultation on 2022, at which time he was evaluated and felt to be a good candidate for a trial of botulinum toxin injections for management of his excessive clenching. His insurance would not cover the botulinum toxin injections until a muscle relaxant was tried, and therefore he was started on tizanidine 4 mg at bedtime. The patient called in on 2022 and stated that \"the nights he tried it in the past week he was definitely groggy in the morning after and did not notice any improvement in muscle tightness \"It still felt the same, as if I had been grinding my teeth during sleep.\" Also has nauseous / " "slight heartburn upon wakening on the morning after he took tizanidine 4 mg.\"     The patient was then started on diazepam 5 mg at bedtime prn on 8/26/2022. He was advised to use sparingly. He notes that he took it a few times, and while it did seem to help with the jaw clenching, he felt lethargic and groggy in the morning after taking. Overall, the effects of medication felt quite \"heavy\". He is not interested in continuing this medication.       PAST MEDICAL HISTORY  Past Medical History:   Diagnosis Date     Allergic state      Anxiety        PAST SURGICAL HISTORY  Past Surgical History:   Procedure Laterality Date     CYSTECTOMY PILONIDAL N/A 8/4/2017    Procedure: CYSTECTOMY PILONIDAL;  Lay Open Pilonidal Cystectomy;  Surgeon: Jenaro Cuba MD;  [facility]     DENTAL SURGERY      [place] teeth extraction     ENDOSCOPY       HERNIA REPAIR       HERNIA REPAIR         PAST SOCIAL HISTORY  Social History     Socioeconomic History     Marital status: Single     Spouse name: Not on file     Number of children: Not on file     Years of education: Not on file     Highest education level: Bachelor's degree (e.g., BA, AB, BS)   Occupational History     Not on file   Tobacco Use     Smoking status: Never Smoker     Smokeless tobacco: Never Used   Substance and Sexual Activity     Alcohol use: Yes     Comment: Couple of drinks once a week     Drug use: No     Sexual activity: Yes     Partners: Male     Birth control/protection: Condom   Other Topics Concern     Not on file   Social History Narrative    Graduated 12/2017 in [employer] science. Works for [employer].      Social Determinants of Health     Financial Resource Strain: Not on file   Food Insecurity: Not on file   Transportation Needs: Not on file   Physical Activity: Not on file   Stress: Not on file   Social Connections: Not on file   Intimate Partner Violence: Not on file   Housing Stability: Not on file       FAMILY HISTORY  Family History "   Problem Relation Age of Onset     Hyperlipidemia Father      Hypothyroidism Father      Diabetes Maternal Grandmother      Bipolar Disorder Maternal Grandmother      Parkinsonism Maternal Grandfather      Hyperlipidemia Paternal Grandfather      Coronary Artery Disease Paternal Grandfather      Cancer Paternal Grandfather        IMMUNIZATIONS  Immunization History   Administered Date(s) Administered     COVID-19,PF,Moderna 03/28/2021, 04/25/2021, 11/01/2021     DTAP (<7y) 08/09/2001     DTP-Hib 03/05/1996, 04/10/1996, 06/25/1996, 06/23/1997     FLU 6-35 months 11/26/2012, 09/18/2013     Flu, Unspecified 11/11/2003, 12/12/2003, 12/01/2006, 10/15/2014, 10/15/2015     [id number] Novel Flu P-free 12/16/2009     HEPA 05/04/2010     HPV Quadrivalent 11/05/2013, 04/16/2014, 12/30/2014     Hep B, Peds or Adolescent 1995, 03/05/1996, 09/26/1996     HepA-ped 2 Dose 05/04/2010, 01/10/2013     HepB, Unspecified 03/05/1996, 09/26/1996     Hib, Unspecified 03/20/1997     Influenza (IIV3) PF 11/28/2008, 10/17/2009, 10/09/2010, 10/16/2011     Influenza Vaccine IM > 6 months Valent IIV4 (Alfuria,Fluzone) 02/07/2020, 09/21/2020, 08/25/2021     Influenza,INJ,MDCK,PF, IIV3 18+yrs 10/28/2015     MMR 03/20/1997, 08/09/2001     Meningococcal (Menactra ) 11/01/2007, 01/10/2013     Pedvax-hib 03/20/1997     Poliovirus, inactivated (IPV) 03/05/1996, 04/10/1996, 06/25/1996, 08/09/2001     TDAP Vaccine (Adacel) 11/01/2007     TDAP Vaccine (Boostrix) 03/27/2018     Varicella 12/23/1996, 12/13/2007       ALLERGIES  No Known Allergies    MEDICATIONS  Current Outpatient Medications   Medication     albuterol (PROAIR HFA/PROVENTIL HFA/VENTOLIN HFA) 108 (90 Base) MCG/ACT inhaler     diazepam (VALIUM) 5 MG tablet     fexofenadine (ALLEGRA) 60 MG tablet     fluticasone-salmeterol (ADVAIR HFA) 115-21 MCG/ACT inhaler     tiZANidine (ZANAFLEX) 4 MG tablet     Current Facility-Administered Medications   Medication     botulinum toxin type A (BOTOX)  "100 units injection 100 Units       PHYSICAL EXAM  Estimated body mass index is 26.54 kg/m  as calculated from the following:    Height as of 12/1/21: 1.778 m (5' 10\").    Weight as of 12/1/21: 83.9 kg (185 lb).  No physical exam due to phone visit      ASSESSMENT AND PLAN  Neo Saeed is a 26 year old male with a history of excessive jaw clenching and grinding which has resulted in significant wear and tear on his teeth.  His dentist is recommending a trial of botulinum toxin injections to prevent any further damage to his teeth or temporomandibular joints. He has recently tried and failed tizanidine and diazepam, both of which had to be discontinued due to either not working or intolerable side effects.     The patient is an excellent candidate for a trial of botulinum toxin injections for management of excessive jaw clenching and TMJ dysfunction with oromandibular components.     Plan will be to request prior authorization from insurer for a maximum dose of 100 units of Botox every 12 weeks for the management of idiopathic oromandibular dystonia. He has been tentatively scheduled for first series of injections pending insurance approval.       Thank you for this consultation. Please do not hesitate to contact me with further questions.       Lea Rossi MD  Physical Medicine and Rehabilitation  [phone]    I spent a total of 8 minutes face-to-face and managing the care of Neo Saeed. Over 50% of my time was spent counseling the patient and coordinating care. Please see note for details.   "

## 2022-09-11 ENCOUNTER — HEALTH MAINTENANCE LETTER (OUTPATIENT)
Age: 27
End: 2022-09-11

## 2022-09-12 DIAGNOSIS — G24.4 IDIOPATHIC OROFACIAL DYSTONIA: Primary | ICD-10-CM

## 2022-09-28 ENCOUNTER — ALLIED HEALTH/NURSE VISIT (OUTPATIENT)
Dept: INTERNAL MEDICINE | Facility: CLINIC | Age: 27
End: 2022-09-28
Payer: COMMERCIAL

## 2022-09-28 DIAGNOSIS — Z23 NEED FOR VACCINATION: ICD-10-CM

## 2022-09-28 DIAGNOSIS — Z23 ENCOUNTER FOR IMMUNIZATION: Primary | ICD-10-CM

## 2022-09-28 PROCEDURE — 90472 IMMUNIZATION ADMIN EACH ADD: CPT

## 2022-09-28 PROCEDURE — 90686 IIV4 VACC NO PRSV 0.5 ML IM: CPT

## 2022-09-28 PROCEDURE — 90471 IMMUNIZATION ADMIN: CPT

## 2022-09-28 PROCEDURE — 90611 SMALLPOX&MONKEYPOX VAC 0.5ML: CPT

## 2022-09-28 NOTE — PROGRESS NOTES
At the request of Dr. Ceferino Cartwright, Neo Saeed received the Influenza Vaccine Fluzone Quadrivalent [id number] Formula No Preservative vaccine today in clinic. The flu shot was given under the supervision of Dr. Ceferino Cartwright if assistance was needed. The immunization site was cleaned with an alcohol prep wipe. The flu shot was given without incident--see immunization list for administration details. No swelling or redness was observed at the site of injection after the immunization was given. The patient was advised to remain in exam room until the on-site RN presented with the monkeypox vaccine.     ANI Foster at 3:02 PM on 9/28/2022

## 2022-10-04 ENCOUNTER — OFFICE VISIT (OUTPATIENT)
Dept: PHYSICAL MEDICINE AND REHAB | Facility: CLINIC | Age: 27
End: 2022-10-04
Payer: COMMERCIAL

## 2022-10-04 VITALS — DIASTOLIC BLOOD PRESSURE: 83 MMHG | HEART RATE: 66 BPM | SYSTOLIC BLOOD PRESSURE: 128 MMHG

## 2022-10-04 DIAGNOSIS — G24.4 IDIOPATHIC OROFACIAL DYSTONIA: Primary | ICD-10-CM

## 2022-10-04 PROCEDURE — 64612 DESTROY NERVE FACE MUSCLE: CPT | Mod: 59 | Performed by: PHYSICAL MEDICINE & REHABILITATION

## 2022-10-04 PROCEDURE — 96372 THER/PROPH/DIAG INJ SC/IM: CPT | Performed by: PHYSICAL MEDICINE & REHABILITATION

## 2022-10-04 PROCEDURE — 95874 GUIDE NERV DESTR NEEDLE EMG: CPT | Performed by: PHYSICAL MEDICINE & REHABILITATION

## 2022-10-04 RX ORDER — METHYLPHENIDATE HYDROCHLORIDE 5 MG/1
TABLET ORAL
COMMUNITY
Start: 2022-03-01

## 2022-10-04 NOTE — PROGRESS NOTES
[facility]    PM&R CLINIC NOTE  BOTULINUM TOXIN PROCEDURE      HPI  No chief complaint on file.    Neo Saeed is a 26 year old male with a history of involuntary jaw clenching resulting in excessive wear and tear on his teeth who presents to clinic for botulinum toxin injections for management of oromandibular dystonia.     SINCE LAST VISIT  Neo Saeed was last seen in follow up on 9/9/2022, at which time he noted that he had a sub-optimal response to the tizanidine and diazepam. The tizanidine gave him stomach upset and the diazepam made him groggy.       RESPONSE TO PREVIOUS TREATMENT    N/A - Initial treatment.       PHYSICAL EXAM  VS: There were no vitals taken for this visit.   GEN: Pleasant and cooperative, in no acute distress  HEENT: No facial asymmetry, significant masseter and temporalis hypertrophy.     ALLERGIES  No Known Allergies    CURRENT MEDICATIONS    Current Outpatient Medications:      albuterol (PROAIR HFA/PROVENTIL HFA/VENTOLIN HFA) 108 (90 Base) MCG/ACT inhaler, Inhale 1-2 puffs into the lungs every 4 hours as needed for shortness of breath / dyspnea or wheezing Take 1-2 puffs 20 minutes prior to exercise and every four hours as needed, Disp: 1 g, Rfl: 3     diazepam (VALIUM) 5 MG tablet, Take 1 tablet (5 mg) by mouth nightly as needed for anxiety, Disp: 30 tablet, Rfl: 0     fexofenadine (ALLEGRA) 60 MG tablet, Take 60 mg by mouth 2 times daily, Disp: , Rfl:      fluticasone-salmeterol (ADVAIR HFA) 115-21 MCG/ACT inhaler, Inhale 2 puffs into the lungs 2 times daily, Disp: 12 g, Rfl: 6     tiZANidine (ZANAFLEX) 4 MG tablet, Take 1 tablet (4 mg) by mouth At Bedtime, Disp: 30 tablet, Rfl: 1    Current Facility-Administered Medications:      botulinum toxin type A (BOTOX) 100 units injection 100 Units, 100 Units, Intramuscular, Q90 Days, Standal, Lea Richter MD     botulinum toxin type A (BOTOX) 100 units injection 100 Units, 100 Units, Intramuscular, Q90  Enid Jackson Stephanie Erin, MD       BOTULINUM NEUROTOXIN INJECTION PROCEDURES    VERIFICATION OF PATIENT IDENTIFICATION AND PROCEDURE     Initials   Patient Name SES   Patient  SES   Procedure Verified by: SES     Prior to the start of the procedure and with procedural staff participation, I verbally confirmed the patient s identity using two indicators, relevant allergies, that the procedure was appropriate and matched the consent or emergent situation, and that the correct equipment/implants were available. Immediately prior to starting the procedure I conducted the Time Out with the procedural staff and re-confirmed the patient s name, procedure, and site/side. 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 His baseline symptoms have been recalcitrant to oral medications and conservative therapy.  He is here today for reinjection with Botox.    GOAL OF PROCEDURE  The goal of this procedure is to increase active range of motion and improve volitional motor control.      TOTAL DOSE ADMINISTERED  Dose Administered:  100 units  Botox (Botulinum Toxin Type A)       1:1 Dilution   Unavoidable Drug Waste: No  Diluent Used:  Preservative Free Normal Saline  Total Volume of Diluent Used:  1 ml  Lot # [id number] with Expiration Date:  2024  NDC #: Botox 100u ([address]-46)      CONSENT  The risks, benefits, and treatment options were discussed with Neo Saeed and he agreed to proceed.    Written consent was obtained by [facility].     EQUIPMENT USED  Needle-25mm stimulating/recording  EMG/NCS Machine    SKIN PREPARATION  Skin preparation was performed using an alcohol wipe.    GUIDANCE DESCRIPTION  Electro-myographic guidance was necessary throughout the  procedure to accurately identify all areas of dystonic muscles while avoiding injection of non-dystonic muscles, neighboring nerves and nearby vascular structures.     AREA/MUSCLE INJECTED: 100 UNITS BOTOX = TOTAL DOSE, 1:1 DILUTION  1. JAW MUSCLES: 90 UNITS BOTOX = TOTAL DOSE, 1:1 DILUTION    Right Masseter - 10 units of Botox at 1 site/s.   Left Masseter - 10 units of Botox at 1 site/s.     Right Anterior Temporalis - 35 units of Botox at 2 site/s.   Left Anterior Temporalis - 35 units of Botox at 2 site/s.    2. NECK & SHOULDER GIRDLE MUSCLES: 10 UNITS Botox = TOTAL DOSE, 1:1 DILUTION   Right Levator Scapulae - 5 units of Botox at 1 site/s.   Left Levator Scapulae - 5 units of Botox at 1 site/s.      RESPONSE TO PROCEDURE  Neo Saeed tolerated the procedure well and there were no immediate complications. He was allowed to recover for an appropriate period of time and was discharged home in stable condition.    ASSESSMENT AND PLAN   1. 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 The patient does have significant temporalis and masseter hypertrophy, and therefore I suspect he may require a higher dose than 100 units of Botox for his oromandibular dystonia. Patient will continue to monitor response and report at next appointment at which time we may wish to ask insurer for a higher dose.   2. Referrals: None.   3. Follow up: Neo Saeed was rescheduled for the next series of injections in 12 weeks, at which time we will evaluate response to today's injections. he may call the clinic prior with any questions or concerns prior to the next appointment.

## 2022-10-04 NOTE — PATIENT INSTRUCTIONS
You had Botox today for the first time. It should take between 3-7 days to start seeing any of the effects. Botox will be at it's peak after 1 month, and then will gradually wear off by the time you have your next appointment in 3 months.     Most common side effects include pain at the injection sites, mild jaw weakness, difficulty chewing, or flu-like symptoms. Any and all side effects will wear off. If you have any questions before your next appointment, please call clinic or Marie me.     Lea Rossi MD   Clinic Phone: [phone]

## 2022-10-04 NOTE — LETTER
10/4/2022         RE: Neo Saeed  [address]  [place] [address]        Dear Colleague,    Thank you for referring your patient, Neo Saeed, to the [facility]. Please see a copy of my visit note below.      [facility]    PM&R CLINIC NOTE  BOTULINUM TOXIN PROCEDURE      HPI  No chief complaint on file.    Neo Saeed is a 26 year old male with a history of involuntary jaw clenching resulting in excessive wear and tear on his teeth who presents to clinic for botulinum toxin injections for management of oromandibular dystonia.     SINCE LAST VISIT  Neo Saeed was last seen in follow up on 9/9/2022, at which time he noted that he had a sub-optimal response to the tizanidine and diazepam. The tizanidine gave him stomach upset and the diazepam made him groggy.       RESPONSE TO PREVIOUS TREATMENT    N/A - Initial treatment.       PHYSICAL EXAM  VS: There were no vitals taken for this visit.   GEN: Pleasant and cooperative, in no acute distress  HEENT: No facial asymmetry, significant masseter and temporalis hypertrophy.     ALLERGIES  No Known Allergies    CURRENT MEDICATIONS    Current Outpatient Medications:      albuterol (PROAIR HFA/PROVENTIL HFA/VENTOLIN HFA) 108 (90 Base) MCG/ACT inhaler, Inhale 1-2 puffs into the lungs every 4 hours as needed for shortness of breath / dyspnea or wheezing Take 1-2 puffs 20 minutes prior to exercise and every four hours as needed, Disp: 1 g, Rfl: 3     diazepam (VALIUM) 5 MG tablet, Take 1 tablet (5 mg) by mouth nightly as needed for anxiety, Disp: 30 tablet, Rfl: 0     fexofenadine (ALLEGRA) 60 MG tablet, Take 60 mg by mouth 2 times daily, Disp: , Rfl:      fluticasone-salmeterol (ADVAIR HFA) 115-21 MCG/ACT inhaler, Inhale 2 puffs into the lungs 2 times daily, Disp: 12 g, Rfl: 6     tiZANidine (ZANAFLEX) 4 MG tablet, Take 1 tablet (4 mg) by mouth At Bedtime, Disp: 30 tablet, Rfl:  1    Current Facility-Administered Medications:      botulinum toxin type A (BOTOX) 100 units injection 100 Units, 100 Units, Intramuscular, Q90 Days, Lea Rossi MD     botulinum toxin type A (BOTOX) 100 units injection 100 Units, 100 Units, Intramuscular, Q90 Days, Lea Rossi MD       BOTULINUM NEUROTOXIN INJECTION PROCEDURES    VERIFICATION OF PATIENT IDENTIFICATION AND PROCEDURE     Initials   Patient Name SES   Patient  SES   Procedure Verified by: SES     Prior to the start of the procedure and with procedural staff participation, I verbally confirmed the patient s identity using two indicators, relevant allergies, that the procedure was appropriate and matched the consent or emergent situation, and that the correct equipment/implants were available. Immediately prior to starting the procedure I conducted the Time Out with the procedural staff and re-confirmed the patient s name, procedure, and site/side. 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 His baseline symptoms have been recalcitrant to oral medications and conservative therapy.  He is here today for reinjection with Botox.    GOAL OF PROCEDURE  The goal of this procedure is to increase active range of motion and improve volitional motor control.      TOTAL DOSE ADMINISTERED  Dose Administered:  100 units  Botox (Botulinum Toxin Type A)       1:1 Dilution   Unavoidable Drug Waste: No  Diluent Used:  Preservative Free Normal Saline  Total Volume of Diluent Used:  1 ml  Lot # [id number] with Expiration Date:  2024  NDC #: Botox 100u ([address]-48)      CONSENT  The risks, benefits, and treatment options were discussed with Neo Saeed and he agreed to  proceed.    Written consent was obtained by [facility].     EQUIPMENT USED  Needle-25mm stimulating/recording  EMG/NCS Machine    SKIN PREPARATION  Skin preparation was performed using an alcohol wipe.    GUIDANCE DESCRIPTION  Electro-myographic guidance was necessary throughout the procedure to accurately identify all areas of dystonic muscles while avoiding injection of non-dystonic muscles, neighboring nerves and nearby vascular structures.     AREA/MUSCLE INJECTED: 100 UNITS BOTOX = TOTAL DOSE, 1:1 DILUTION  1. JAW MUSCLES: 90 UNITS BOTOX = TOTAL DOSE, 1:1 DILUTION    Right Masseter - 10 units of Botox at 1 site/s.   Left Masseter - 10 units of Botox at 1 site/s.     Right Anterior Temporalis - 35 units of Botox at 2 site/s.   Left Anterior Temporalis - 35 units of Botox at 2 site/s.    2. NECK & SHOULDER GIRDLE MUSCLES: 10 UNITS Botox = TOTAL DOSE, 1:1 DILUTION   Right Levator Scapulae - 5 units of Botox at 1 site/s.   Left Levator Scapulae - 5 units of Botox at 1 site/s.      RESPONSE TO PROCEDURE  Neo Saeed tolerated the procedure well and there were no immediate complications. He was allowed to recover for an appropriate period of time and was discharged home in stable condition.    ASSESSMENT AND PLAN   1. 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 The patient does have significant temporalis and masseter hypertrophy, and therefore I suspect he may require a higher dose than 100 units of Botox for his oromandibular dystonia. Patient will continue to monitor response and report at next appointment at which time we may wish to ask insurer for a higher dose.   2. Referrals: None.   3. Follow up: Neo Saeed was rescheduled for the next series of injections in 12 weeks, at which time we will evaluate response to today's injections. he may call the clinic prior with any questions or concerns prior to the next appointment.             Again, thank you  for allowing me to participate in the care of your patient.        Sincerely,        Lea Rossi MD

## 2022-10-26 ENCOUNTER — OFFICE VISIT (OUTPATIENT)
Dept: FAMILY MEDICINE | Facility: CLINIC | Age: 27
End: 2022-10-26
Payer: COMMERCIAL

## 2022-10-26 ENCOUNTER — LAB (OUTPATIENT)
Dept: LAB | Facility: CLINIC | Age: 27
End: 2022-10-26
Payer: COMMERCIAL

## 2022-10-26 VITALS
SYSTOLIC BLOOD PRESSURE: 129 MMHG | TEMPERATURE: 97.7 F | DIASTOLIC BLOOD PRESSURE: 84 MMHG | HEART RATE: 65 BPM | OXYGEN SATURATION: 95 % | WEIGHT: 194.9 LBS | RESPIRATION RATE: 16 BRPM | BODY MASS INDEX: 27.97 KG/M2

## 2022-10-26 DIAGNOSIS — R10.84 ABDOMINAL PAIN, GENERALIZED: ICD-10-CM

## 2022-10-26 DIAGNOSIS — Z83.79 FAMILY HISTORY OF CELIAC DISEASE: ICD-10-CM

## 2022-10-26 DIAGNOSIS — Z91.89 HAS MULTIPLE SEXUAL PARTNERS: ICD-10-CM

## 2022-10-26 DIAGNOSIS — Z11.59 NEED FOR HEPATITIS C SCREENING TEST: ICD-10-CM

## 2022-10-26 DIAGNOSIS — R10.84 ABDOMINAL PAIN, GENERALIZED: Primary | ICD-10-CM

## 2022-10-26 DIAGNOSIS — Z23 NEED FOR VACCINATION: ICD-10-CM

## 2022-10-26 DIAGNOSIS — R11.0 NAUSEA: ICD-10-CM

## 2022-10-26 LAB
ALBUMIN SERPL BCG-MCNC: 4.5 G/DL (ref 3.5–5.2)
ALP SERPL-CCNC: 75 U/L (ref 40–129)
ALT SERPL W P-5'-P-CCNC: 31 U/L (ref 10–50)
ANION GAP SERPL CALCULATED.3IONS-SCNC: 11 MMOL/L (ref 7–15)
AST SERPL W P-5'-P-CCNC: 29 U/L (ref 10–50)
BASOPHILS # BLD AUTO: 0.1 10E3/UL (ref 0–0.2)
BASOPHILS NFR BLD AUTO: 1 %
BILIRUB SERPL-MCNC: 0.4 MG/DL
BUN SERPL-MCNC: 13.1 MG/DL (ref 6–20)
CALCIUM SERPL-MCNC: 9.6 MG/DL (ref 8.6–10)
CHLORIDE SERPL-SCNC: 105 MMOL/L (ref 98–107)
CREAT SERPL-MCNC: 0.94 MG/DL (ref 0.67–1.17)
CRP SERPL-MCNC: <3 MG/L
DEPRECATED HCO3 PLAS-SCNC: 25 MMOL/L (ref 22–29)
EOSINOPHIL # BLD AUTO: 0.1 10E3/UL (ref 0–0.7)
EOSINOPHIL NFR BLD AUTO: 2 %
ERYTHROCYTE [DISTWIDTH] IN BLOOD BY AUTOMATED COUNT: 12.3 % (ref 10–15)
GFR SERPL CREATININE-BSD FRML MDRD: >90 ML/MIN/1.73M2
GLUCOSE SERPL-MCNC: 99 MG/DL (ref 70–99)
HCT VFR BLD AUTO: 45.4 % (ref 40–53)
HCV AB SERPL QL IA: NONREACTIVE
HGB BLD-MCNC: 15.5 G/DL (ref 13.3–17.7)
IMM GRANULOCYTES # BLD: 0 10E3/UL
IMM GRANULOCYTES NFR BLD: 0 %
LIPASE SERPL-CCNC: 28 U/L (ref 13–60)
LYMPHOCYTES # BLD AUTO: 1.4 10E3/UL (ref 0.8–5.3)
LYMPHOCYTES NFR BLD AUTO: 35 %
MCH RBC QN AUTO: 31.1 PG (ref 26.5–33)
MCHC RBC AUTO-ENTMCNC: 34.1 G/DL (ref 31.5–36.5)
MCV RBC AUTO: 91 FL (ref 78–100)
MONOCYTES # BLD AUTO: 0.5 10E3/UL (ref 0–1.3)
MONOCYTES NFR BLD AUTO: 11 %
NEUTROPHILS # BLD AUTO: 2 10E3/UL (ref 1.6–8.3)
NEUTROPHILS NFR BLD AUTO: 51 %
NRBC # BLD AUTO: 0 10E3/UL
NRBC BLD AUTO-RTO: 0 /100
PLATELET # BLD AUTO: 158 10E3/UL (ref 150–450)
POTASSIUM SERPL-SCNC: 4.3 MMOL/L (ref 3.4–5.3)
PROT SERPL-MCNC: 7.2 G/DL (ref 6.4–8.3)
RBC # BLD AUTO: 4.99 10E6/UL (ref 4.4–5.9)
SODIUM SERPL-SCNC: 141 MMOL/L (ref 136–145)
TSH SERPL DL<=0.005 MIU/L-ACNC: 1.04 UIU/ML (ref 0.3–4.2)
WBC # BLD AUTO: 4 10E3/UL (ref 4–11)

## 2022-10-26 PROCEDURE — 86364 TISS TRNSGLTMNASE EA IG CLAS: CPT | Performed by: FAMILY MEDICINE

## 2022-10-26 PROCEDURE — 36415 COLL VENOUS BLD VENIPUNCTURE: CPT | Performed by: PATHOLOGY

## 2022-10-26 PROCEDURE — 99214 OFFICE O/P EST MOD 30 MIN: CPT | Mod: 25 | Performed by: FAMILY MEDICINE

## 2022-10-26 PROCEDURE — 86803 HEPATITIS C AB TEST: CPT | Performed by: FAMILY MEDICINE

## 2022-10-26 PROCEDURE — 2894A VOIDCORRECT: CPT | Performed by: FAMILY MEDICINE

## 2022-10-26 PROCEDURE — 90471 IMMUNIZATION ADMIN: CPT | Performed by: FAMILY MEDICINE

## 2022-10-26 PROCEDURE — 86140 C-REACTIVE PROTEIN: CPT | Performed by: PATHOLOGY

## 2022-10-26 PROCEDURE — 80050 GENERAL HEALTH PANEL: CPT | Performed by: PATHOLOGY

## 2022-10-26 PROCEDURE — 90611 SMALLPOX&MONKEYPOX VAC 0.5ML: CPT | Performed by: FAMILY MEDICINE

## 2022-10-26 PROCEDURE — 83690 ASSAY OF LIPASE: CPT | Performed by: PATHOLOGY

## 2022-10-26 PROCEDURE — 84443 ASSAY THYROID STIM HORMONE: CPT | Performed by: FAMILY MEDICINE

## 2022-10-26 RX ORDER — PANTOPRAZOLE SODIUM 40 MG/1
1 FOR SUSPENSION ORAL DAILY
COMMUNITY
End: 2022-10-26

## 2022-10-26 ASSESSMENT — ASTHMA QUESTIONNAIRES: ACT_TOTALSCORE: 21

## 2022-10-26 NOTE — NURSING NOTE
Neo Saeed is a 26 year old male that presents in clinic today for the following:     Chief Complaint   Patient presents with     Abdominal Pain     Pt has been experiencing nausea for the past couple months; infrequent sharp pains       The patient's allergies and medications were reviewed. The patient's vitals were obtained without incident. The patient does not have any other questions for the provider.     Sandy Tony, EMT at 7:05 AM on 10/26/2022.  Primary Care Clinic: [phone]

## 2022-10-26 NOTE — PROGRESS NOTES
"  Assessment & Plan     Abdominal pain, generalized  Nausea  Family history of celiac disease  He presents today for discussion and evaluation of nausea.  Today he remembered his mother was recently diagnosed with celiac and has been following gluten-free diet.  He has never tried to follow a gluten-free diet and provided information on ways to lower gluten in his diet, recommended eating a whole food diet with fresh fruits and vegetables avoiding preservatives and additives sticking to a Mediterranean diet.  Today will obtain labs and also reviewed ultrasound as there was a family history of renal cancer to ensure no abnormalities related to his kidneys.  If lab and ultrasound are unrevealing may consider upper GI and colonoscopy for further clarification.  At this time he did not feel he needed to have antinausea medication and will complete at least 2 months of proton pump therapy with pantoprazole 40 mg daily.  I encouraged him to lower his Tums use to no more than 2 twice daily to avoid excessive calcium  - Comprehensive metabolic panel  - CBC with platelets differential  - TSH with free T4 reflex  - Tissue transglutaminase brandon IgA and IgG  - CRP inflammation  - US Abdomen complete  - Lipase    Has multiple sexual partners  Need for vaccination  He was to receive his monkey pox vaccine today      Need for hepatitis C screening test  As he is having labs we will check hep C screening  - HCV Screen with Reflex    39 minutes spent on the date of the encounter doing chart review, history, exam, diagnostics review, documentation, counseling and coordination of cares as noted.             BMI:   Estimated body mass index is 27.97 kg/m  as calculated from the following:    Height as of 12/1/21: 1.778 m (5' 10\").    Weight as of this encounter: 88.4 kg (194 lb 14.4 oz).             Sai Hathaway MD  [facility]   Dexter is a 26 year old, presenting for the following " "health issues:  Abdominal Pain (Pt has been experiencing nausea for the past couple months; infrequent sharp pains)      HPI   Neo Saeed 26 presents for evaluation of abdominal pain with associated nausea for the last 2 to 3 months.  He experiences nausea and sore throat when waking symptoms trouble some 6-7/10 no associated vomiting. Fried food worsens SX. Had tried Nexium over-the-counter which was not effective and panzoprasole 40 mg one month symptoms lowered to 4/10 associated nausea with gas but normal BM daily. Has had issues with \"Ulcers \" in [facility] requiring endoscopy with [facility] 2016 was very sick with nausea and vomiting was hospitalized had zofran and was treated for several months with PPI with improvement of symptoms.  After further questioning he notes his mother was recently diagnosed with celiac.  He has not noted any significant association with certain foods other than stopped lactose-containing foods without much change.  He drinks Coffee 2 cups daily, Etoh 3-5 drinks weekly 2-3 at the most of his alcohol consumption is beer.  He also cut out spicy food.  Tries to eat fish and eggs. Has been taking 6-8 tums daily 2-3 at a time to assist with the nausea.  Occasional sharp pain in the left upper abdomen.  Has developed anxiety about whether he will be sick and require hospitalization therefore thought he should seek care.   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  Denies current significant other, will have monkey pox vaccine due to risk status has nurse visit today wondering if he can have this completed while he is here.          Labs reviewed in EPIC  BP Readings from Last 3 Encounters:   10/26/22 129/84   10/04/22 128/83   07/12/22 (!) 142/88    Wt Readings from Last 3 Encounters:   10/26/22 88.4 kg (194 lb 14.4 oz)   12/01/21 83.9 kg (185 lb)   11/09/21 83.9 kg (185 lb)               Immunization History   Administered Date(s) Administered     COVID-19,PF,Moderna 03/28/2021, 04/25/2021, " 11/01/2021     DTAP (<7y) 08/09/2001     DTP-Hib 03/05/1996, 04/10/1996, 06/25/1996, 06/23/1997     FLU 6-35 months 11/26/2012, 09/18/2013     Flu, Unspecified 11/11/2003, 12/12/2003, 12/01/2006, 10/15/2014, 10/15/2015     U6w4-20 Novel Flu P-free 12/16/2009     HEPA 05/04/2010     HPV Quadrivalent 11/05/2013, 04/16/2014, 12/30/2014     Hep B, Peds or Adolescent 1995, 03/05/1996, 09/26/1996     HepA-ped 2 Dose 05/04/2010, 01/10/2013     HepB, Unspecified 03/05/1996, 09/26/1996     Hib, Unspecified 03/20/1997     Influenza (IIV3) PF 11/28/2008, 10/17/2009, 10/09/2010, 10/16/2011     Influenza Vaccine IM > 6 months Valent IIV4 (Alfuria,Fluzone) 02/07/2020, 09/21/2020, 08/25/2021, 09/28/2022     Influenza Vaccine, 6+MO IM (QUADRIVALENT W/PRESERVATIVES) 10/15/2014     Influenza,INJ,MDCK,PF, IIV3 18+yrs 10/28/2015     MMR 03/20/1997, 08/09/2001     Meningococcal (Menactra ) 11/01/2007, 01/10/2013     Pedvax-hib 03/20/1997     Poliovirus, inactivated (IPV) 03/05/1996, 04/10/1996, 06/25/1996, 08/09/2001     Smallpox/Monkeypox Vaccine Subcutaneous (JYNNEOS) 09/28/2022, 10/26/2022     TDAP Vaccine (Adacel) 11/01/2007     TDAP Vaccine (Boostrix) 03/27/2018     Varicella 12/23/1996, 12/13/2007        Patient Active Problem List   Diagnosis     H/O pilonidal cyst     Open wnd of buttock, unspecified laterality, initial encounter     Anxiety     Major depressive disorder, recurrent episode, mild (H)     Overweight (BMI 25.0-29.9)     Mixed hyperlipidemia     Family history of celiac disease     Past Surgical History:   Procedure Laterality Date     CYSTECTOMY PILONIDAL N/A 8/4/2017    Procedure: CYSTECTOMY PILONIDAL;  Lay Open Pilonidal Cystectomy;  Surgeon: Jenaro Cuba MD;  Location: [facility] OR     DENTAL SURGERY      Pioneer teeth extraction     ENDOSCOPY       HERNIA REPAIR       HERNIA REPAIR         Social History     Tobacco Use     Smoking status: Never     Smokeless tobacco: Never   Substance Use Topics      Alcohol use: Yes     Comment: Couple of drinks once a week     Family History   Problem Relation Age of Onset     Celiac Disease Mother 52     Hyperlipidemia Father      Hypothyroidism Father      Diabetes Maternal Grandmother      Bipolar Disorder Maternal Grandmother      Parkinsonism Maternal Grandfather      Hyperlipidemia Paternal Grandfather      Coronary Artery Disease Paternal Grandfather 60     Cancer Paternal Grandfather         renal         Current Outpatient Medications   Medication Sig Dispense Refill     albuterol (PROAIR HFA/PROVENTIL HFA/VENTOLIN HFA) 108 (90 Base) MCG/ACT inhaler Inhale 1-2 puffs into the lungs every 4 hours as needed for shortness of breath / dyspnea or wheezing Take 1-2 puffs 20 minutes prior to exercise and every four hours as needed 1 g 3     fluticasone-salmeterol (ADVAIR HFA) 115-21 MCG/ACT inhaler Inhale 2 puffs into the lungs 2 times daily 12 g 6     methylphenidate (RITALIN) 5 MG tablet TAKE 1 1/2 TABLETS TWICE A DAY       PANTOPRAZOLE SODIUM PO Take 40 mg by mouth every morning (before breakfast)       No Known Allergies  Recent Labs   Lab Test 10/26/22  0808 03/11/21  0950 02/07/20  0749 02/07/20  0749 02/01/19  0821 07/25/18  0745   LDL  --  96  --  110* 116*  --    HDL  --  47  --  38* 35*  --    TRIG  --  140  --  150* 167*  --    ALT 31 60  --  46  --   --    CR 0.94 0.88   < > 1.01  --   --    GFRESTIMATED >90 >90   < > >90  --   --    GFRESTBLACK  --  >90  --  >90  --   --    POTASSIUM 4.3 4.2   < > 4.1  --   --    TSH  --  1.16  --   --   --  1.24    < > = values in this interval not displayed.      Review of Systems   Problem list, PMH, Surgical HX, FH, SH, allergies, medications,immunizations reviewed and updated in Epic. ROS negative other than noted in HPI and ROS.          Objective    /84 (BP Location: Right arm, Patient Position: Sitting, Cuff Size: Adult Large)   Pulse 65   Temp 97.7  F (36.5  C)   Resp 16   Wt 88.4 kg (194 lb 14.4 oz)   SpO2  95%   BMI 27.97 kg/m    Body mass index is 27.97 kg/m .  Physical Exam   GENERAL: healthy, alert and no distress appears nutritionally well  EYES: Eyes grossly normal to inspection, PERRL and conjunctivae and sclerae normal  HENT: Mask removed briefly mouth without ulcers or lesions, minimal posterior pharynx mild erythema no exudate  NECK: no adenopathy, no asymmetry, masses, or scars and thyroid normal to palpation  RESP: lungs clear to auscultation - no rales, rhonchi or wheezes  CV: regular rate and rhythm, normal S1 S2, no S3 or S4, no murmur, click or rub  ABDOMEN: soft, nontender, no hepatosplenomegaly, no masses and bowel sounds normal  MS: no gross musculoskeletal defects noted, no edema  SKIN: no suspicious lesions or rashes  NEURO: Normal strength and tone, mentation intact and speech normal  PSYCH: mentation appears normal, affect normal/bright  Results for orders placed or performed in visit on 10/26/22   Comprehensive metabolic panel     Status: Normal   Result Value Ref Range    Sodium 141 136 - 145 mmol/L    Potassium 4.3 3.4 - 5.3 mmol/L    Chloride 105 98 - 107 mmol/L    Carbon Dioxide (CO2) 25 22 - 29 mmol/L    Anion Gap 11 7 - 15 mmol/L    Urea Nitrogen 13.1 6.0 - 20.0 mg/dL    Creatinine 0.94 0.67 - 1.17 mg/dL    Calcium 9.6 8.6 - 10.0 mg/dL    Glucose 99 70 - 99 mg/dL    Alkaline Phosphatase 75 40 - 129 U/L    AST 29 10 - 50 U/L    ALT 31 10 - 50 U/L    Protein Total 7.2 6.4 - 8.3 g/dL    Albumin 4.5 3.5 - 5.2 g/dL    Bilirubin Total 0.4 <=1.2 mg/dL    GFR Estimate >90 >60 mL/min/1.73m2   CRP inflammation     Status: Normal   Result Value Ref Range    CRP Inflammation <3.00 <5.00 mg/L   Lipase     Status: Normal   Result Value Ref Range    Lipase 28 13 - 60 U/L   CBC with platelets and differential     Status: None   Result Value Ref Range    WBC Count 4.0 4.0 - 11.0 10e3/uL    RBC Count 4.99 4.40 - 5.90 10e6/uL    Hemoglobin 15.5 13.3 - 17.7 g/dL    Hematocrit 45.4 40.0 - 53.0 %    MCV 91 78  - 100 fL    MCH 31.1 26.5 - 33.0 pg    MCHC 34.1 31.5 - 36.5 g/dL    RDW 12.3 10.0 - 15.0 %    Platelet Count 158 150 - 450 10e3/uL    % Neutrophils 51 %    % Lymphocytes 35 %    % Monocytes 11 %    % Eosinophils 2 %    % Basophils 1 %    % Immature Granulocytes 0 %    NRBCs per 100 WBC 0 <1 /100    Absolute Neutrophils 2.0 1.6 - 8.3 10e3/uL    Absolute Lymphocytes 1.4 0.8 - 5.3 10e3/uL    Absolute Monocytes 0.5 0.0 - 1.3 10e3/uL    Absolute Eosinophils 0.1 0.0 - 0.7 10e3/uL    Absolute Basophils 0.1 0.0 - 0.2 10e3/uL    Absolute Immature Granulocytes 0.0 <=0.4 10e3/uL    Absolute NRBCs 0.0 10e3/uL   CBC with platelets differential     Status: None    Narrative    The following orders were created for panel order CBC with platelets differential.  Procedure                               Abnormality         Status                     ---------                               -----------         ------                     CBC with platelets and d...[[id number]]                      Final result                 Please view results for these tests on the individual orders.   Above results came back after visit I will notify by [employer] when all results are available  Sai Hathaway MD

## 2022-10-27 ENCOUNTER — MYC REFILL (OUTPATIENT)
Dept: INTERNAL MEDICINE | Facility: CLINIC | Age: 27
End: 2022-10-27

## 2022-10-27 DIAGNOSIS — K21.9 GASTROESOPHAGEAL REFLUX DISEASE WITHOUT ESOPHAGITIS: Primary | ICD-10-CM

## 2022-10-27 LAB
TTG IGA SER-ACNC: 0.7 U/ML
TTG IGG SER-ACNC: <0.6 U/ML

## 2022-10-27 RX ORDER — PANTOPRAZOLE SODIUM 20 MG/1
40 TABLET, DELAYED RELEASE ORAL
Qty: 90 TABLET | Refills: 3 | Status: SHIPPED | OUTPATIENT
Start: 2022-10-27 | End: 2022-10-28

## 2022-10-27 NOTE — TELEPHONE ENCOUNTER
PANTOPRAZOLE SODIUM PO  Last Written Prescription Date:  unknown  Last Fill Quantity: unknown,   # refills:unknown  Last Office Visit :10/26/22  Future Office visit:  none    Routing refill request to provider for review/approval because: historical. My chart.

## 2022-10-27 NOTE — RESULT ENCOUNTER NOTE
Dear Neo Saeed   Your thyroid, crp for inflammation, complete blood count including hemoglobin,white cell count for infection, kidney, liver, blood sugar,calcium, sodium and potassium were normal or within acceptable range.   Your Hep C screen was negative good result.  Test for celiac negative at this time. Continue with plans were reviewed at your visit.  Best wishes,  Sai Hathaway MD

## 2022-10-28 PROBLEM — K21.9 GASTROESOPHAGEAL REFLUX DISEASE WITHOUT ESOPHAGITIS: Status: ACTIVE | Noted: 2022-10-28

## 2022-10-28 PROBLEM — K21.00 GASTROESOPHAGEAL REFLUX DISEASE WITH ESOPHAGITIS WITHOUT HEMORRHAGE: Status: ACTIVE | Noted: 2022-10-28

## 2022-10-28 RX ORDER — PANTOPRAZOLE SODIUM 20 MG/1
40 TABLET, DELAYED RELEASE ORAL
Qty: 60 TABLET | Refills: 1 | Status: SHIPPED | OUTPATIENT
Start: 2022-10-28 | End: 2023-03-25

## 2022-10-28 NOTE — TELEPHONE ENCOUNTER
Neo was seen today for refill request.    Diagnoses and all orders for this visit:    Gastroesophageal reflux disease without esophagitis without hemorrhage  -     Discontinue: pantoprazole (PROTONIX) 20 MG EC tablet; Take 2 tablets (40 mg) by mouth every morning (before breakfast)  -     pantoprazole (PROTONIX) 20 MG EC tablet; Take 2 tablets (40 mg) by mouth every morning (before breakfast) For an additional month then reassess need  RX changed and diagnosis changed  #60 1 Refill  Needs to take for anadditional month then reassess, not long term medication.  Sai Hathaway MD

## 2022-11-28 ENCOUNTER — MYC MEDICAL ADVICE (OUTPATIENT)
Dept: FAMILY MEDICINE | Facility: CLINIC | Age: 27
End: 2022-11-28

## 2022-11-29 ENCOUNTER — ANCILLARY PROCEDURE (OUTPATIENT)
Dept: ULTRASOUND IMAGING | Facility: CLINIC | Age: 27
End: 2022-11-29
Attending: FAMILY MEDICINE
Payer: COMMERCIAL

## 2022-11-29 DIAGNOSIS — R10.84 ABDOMINAL PAIN, GENERALIZED: ICD-10-CM

## 2022-11-29 DIAGNOSIS — R11.0 NAUSEA: ICD-10-CM

## 2022-11-29 PROCEDURE — 76700 US EXAM ABDOM COMPLETE: CPT | Performed by: RADIOLOGY

## 2022-11-29 NOTE — RESULT ENCOUNTER NOTE
Dear Neo Saeed    Your Abdominal US is normal.  Please follow-up with Dr Cartwright if further questions or concerns.  Best wishes,  Sai Hathaway MD

## 2022-12-21 ENCOUNTER — MYC MEDICAL ADVICE (OUTPATIENT)
Dept: PHYSICAL MEDICINE AND REHAB | Facility: CLINIC | Age: 27
End: 2022-12-21

## 2023-01-10 ENCOUNTER — OFFICE VISIT (OUTPATIENT)
Dept: PHYSICAL MEDICINE AND REHAB | Facility: CLINIC | Age: 28
End: 2023-01-10
Payer: COMMERCIAL

## 2023-01-10 VITALS — DIASTOLIC BLOOD PRESSURE: 87 MMHG | HEART RATE: 77 BPM | SYSTOLIC BLOOD PRESSURE: 137 MMHG

## 2023-01-10 DIAGNOSIS — R42 VERTIGO: Primary | ICD-10-CM

## 2023-01-10 DIAGNOSIS — G24.4 IDIOPATHIC OROFACIAL DYSTONIA: ICD-10-CM

## 2023-01-10 PROCEDURE — 99212 OFFICE O/P EST SF 10 MIN: CPT | Mod: 25 | Performed by: PHYSICAL MEDICINE & REHABILITATION

## 2023-01-10 PROCEDURE — 64612 DESTROY NERVE FACE MUSCLE: CPT | Mod: 50 | Performed by: PHYSICAL MEDICINE & REHABILITATION

## 2023-01-10 PROCEDURE — 95874 GUIDE NERV DESTR NEEDLE EMG: CPT | Performed by: PHYSICAL MEDICINE & REHABILITATION

## 2023-01-10 NOTE — LETTER
"    1/10/2023         RE: Neo Saeed  [address]  [place] [address]        Dear Colleague,    Thank you for referring your patient, Neo Saeed, to the [facility]. Please see a copy of my visit note below.      [facility]    PM&R CLINIC NOTE  BOTULINUM TOXIN PROCEDURE      HPI  Chief Complaint   Patient presents with     Procedure     Botox     Neo Saeed is a 27 year old male with a history of involuntary jaw clenching resulting in excessive wear and tear on his teeth who presents to clinic for botulinum toxin injections for management of oromandibular dystonia.     SINCE LAST VISIT  Neo Saeed was last seen in follow up on 10/4/2022, at which time he received 100 units of Botox, which was his first round of Botox injections.     Medically, he has been dealing with vertigo, nausea and dizziness since 11/2022. He notes that he has a history of similar issues in the past very intermittently, but it became more bothersome in recent months. He was initially diagnosed with a \"stomach issue\" but this did not resolve, so he then he went to see an ENT who did an audiogram which was completely normal. It was felt his symptoms were consistent with vestibular migraine but was told to see Neurology for further workup. He has been taking dramamine and meclizine (causes side effects).       RESPONSE TO PREVIOUS TREATMENT:    Neo Saeed received 100 units of Botox on 10/4/2022. This was his first series of injections. Overall, he found Botox to be extremely helpful. He noticed less tension and pain about a week after injections, with a wearing-off effect 4-6 weeks with a full return to baseline two weeks ago.     Problems following the previous series of neurotoxin injections included:  No problems reported other than soreness for 1-2 days and then full resolution.     BENEFITS BY PATIENT REPORT:    Pain Improvement: Yes. Percent " Improvement: 85-90 %    Duration of Benefit:  5-6 weeks and followed by a gradual reduction in benefit.    Dystonia Improvement: Yes.  Percent Improvement: 80-85 %    Duration of Benefit:  5-6 weeks and followed by a gradual reduction in benefit.        PHYSICAL EXAM  VS: /87 (BP Location: Right arm, Patient Position: Sitting, Cuff Size: Adult Regular)   Pulse 77    GEN: Pleasant and cooperative, in no acute distress  HEENT: No facial asymmetry, significant masseter and temporalis hypertrophy bilaterally.     ALLERGIES  No Known Allergies    CURRENT MEDICATIONS    Current Outpatient Medications:      albuterol (PROAIR HFA/PROVENTIL HFA/VENTOLIN HFA) 108 (90 Base) MCG/ACT inhaler, Inhale 1-2 puffs into the lungs every 4 hours as needed for shortness of breath / dyspnea or wheezing Take 1-2 puffs 20 minutes prior to exercise and every four hours as needed, Disp: 1 g, Rfl: 3     fluticasone-salmeterol (ADVAIR HFA) 115-21 MCG/ACT inhaler, Inhale 2 puffs into the lungs 2 times daily, Disp: 12 g, Rfl: 6     methylphenidate (RITALIN) 5 MG tablet, TAKE 1 1/2 TABLETS TWICE A DAY, Disp: , Rfl:      pantoprazole (PROTONIX) 20 MG EC tablet, Take 2 tablets (40 mg) by mouth every morning (before breakfast) For an additional month then reassess need, Disp: 60 tablet, Rfl: 1    Current Facility-Administered Medications:      botulinum toxin type A (BOTOX) 100 units injection 100 Units, 100 Units, Intramuscular, Q90 Days, Lea Rossi MD     botulinum toxin type A (BOTOX) 100 units injection 100 Units, 100 Units, Intramuscular, Q90 Days, Lea Rossi MD, 100 Units at 10/17/22 1203       BOTULINUM NEUROTOXIN INJECTION PROCEDURES    VERIFICATION OF PATIENT IDENTIFICATION AND PROCEDURE     Initials   Patient Name SES   Patient  SES   Procedure Verified by: BLANCO     Prior to the start of the procedure and with procedural staff participation, I verbally confirmed the patient s identity using two  indicators, relevant allergies, that the procedure was appropriate and matched the consent or emergent situation, and that the correct equipment/implants were available. Immediately prior to starting the procedure I conducted the Time Out with the procedural staff and re-confirmed the patient s name, procedure, and site/side. 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 His baseline symptoms have been recalcitrant to oral medications and conservative therapy.  He is here today for reinjection with Botox.    GOAL OF PROCEDURE  The goal of this procedure is to increase active range of motion and improve volitional motor control.      TOTAL DOSE ADMINISTERED  Dose Administered:  100 units  Botox (Botulinum Toxin Type A)       1:1 Dilution   Unavoidable Drug Waste: No  Diluent Used:  Preservative Free Normal Saline  Total Volume of Diluent Used:  1 ml  NDC #: Botox 100u ([address]-44)      CONSENT  The risks, benefits, and treatment options were discussed with Neo Saeed and he agreed to proceed.    Written consent was obtained by [facility].     EQUIPMENT USED  Needle-25mm stimulating/recording  EMG/NCS Machine    SKIN PREPARATION  Skin preparation was performed using an alcohol wipe.    GUIDANCE DESCRIPTION  Electro-myographic guidance was necessary throughout the procedure to accurately identify all areas of dystonic muscles while avoiding injection of non-dystonic muscles, neighboring nerves and nearby vascular structures.       AREA/MUSCLE INJECTED: 100 UNITS BOTOX = TOTAL DOSE, 1:1 DILUTION  1. JAW MUSCLES: 90 UNITS BOTOX = TOTAL DOSE, 1:1 DILUTION    Right Masseter - 10 units of Botox at 1 site/s.   Left Masseter - 10 units of Botox at 1 site/s.      Right Anterior Temporalis - 35 units of Botox at 2 site/s.   Left Anterior Temporalis - 35 units of Botox at 2 site/s.    2. NECK & SHOULDER GIRDLE MUSCLES: 10 UNITS Botox = TOTAL DOSE, 1:1 DILUTION   Right Levator Scapulae - 5 units of Botox at 1 site/s.   Left Levator Scapulae - 5 units of Botox at 1 site/s.      RESPONSE TO PROCEDURE  Neo Saeed tolerated the procedure well and there were no immediate complications. He was allowed to recover for an appropriate period of time and was discharged home in stable condition.    ASSESSMENT AND PLAN   1. Botulinum toxin injections: No changes made to Botox dose or distribution today, as we are only approved for 100 units of Botox at this point. Due to the fact that he had an excellent response to Botox, but only 5-6 weeks of benefit, it is reasonable to believe he would benefit from an increase in dose for his next appointment. Plan will be to request up to 200 units of Botox for management of his oromandibular dystonia. Patient will continue to monitor response to Botox and report at next appointment.   2. Referrals: For his vertigo symptoms, PT referral placed to vestibular therapy. If no improvement, may consider Neurology referral.   3. Follow up: Neo Saeed was rescheduled for the next series of injections in 12 weeks, at which time we will evaluate response to today's injections. he may call the clinic prior with any questions or concerns prior to the next appointment.     I spent a total of 10 minutes, face-to-face and managing the care of Neo Saeed, excluding the procedure. Over 50% of my time was spent counseling the patient and coordinating care. Please see note for details.         Again, thank you for allowing me to participate in the care of your patient.        Sincerely,        Lea Rossi MD

## 2023-01-10 NOTE — PROGRESS NOTES
"  [facility]    PM&R CLINIC NOTE  BOTULINUM TOXIN PROCEDURE      HPI  Chief Complaint   Patient presents with     Procedure     Botox     Neo Saeed is a 27 year old male with a history of involuntary jaw clenching resulting in excessive wear and tear on his teeth who presents to clinic for botulinum toxin injections for management of oromandibular dystonia.     SINCE LAST VISIT  Neo Saeed was last seen in follow up on 10/4/2022, at which time he received 100 units of Botox, which was his first round of Botox injections.     Medically, he has been dealing with vertigo, nausea and dizziness since 11/2022. He notes that he has a history of similar issues in the past very intermittently, but it became more bothersome in recent months. He was initially diagnosed with a \"stomach issue\" but this did not resolve, so he then he went to see an ENT who did an audiogram which was completely normal. It was felt his symptoms were consistent with vestibular migraine but was told to see Neurology for further workup. He has been taking dramamine and meclizine (causes side effects).       RESPONSE TO PREVIOUS TREATMENT:    Neo Saeed received 100 units of Botox on 10/4/2022. This was his first series of injections. Overall, he found Botox to be extremely helpful. He noticed less tension and pain about a week after injections, with a wearing-off effect 4-6 weeks with a full return to baseline two weeks ago.     Problems following the previous series of neurotoxin injections included:  No problems reported other than soreness for 1-2 days and then full resolution.     BENEFITS BY PATIENT REPORT:    Pain Improvement: Yes. Percent Improvement: 85-90 %    Duration of Benefit:  5-6 weeks and followed by a gradual reduction in benefit.    Dystonia Improvement: Yes.  Percent Improvement: 80-85 %    Duration of Benefit:  5-6 weeks and followed by a gradual reduction in benefit.        PHYSICAL EXAM  VS: " /87 (BP Location: Right arm, Patient Position: Sitting, Cuff Size: Adult Regular)   Pulse 77    GEN: Pleasant and cooperative, in no acute distress  HEENT: No facial asymmetry, significant masseter and temporalis hypertrophy bilaterally.     ALLERGIES  No Known Allergies    CURRENT MEDICATIONS    Current Outpatient Medications:      albuterol (PROAIR HFA/PROVENTIL HFA/VENTOLIN HFA) 108 (90 Base) MCG/ACT inhaler, Inhale 1-2 puffs into the lungs every 4 hours as needed for shortness of breath / dyspnea or wheezing Take 1-2 puffs 20 minutes prior to exercise and every four hours as needed, Disp: 1 g, Rfl: 3     fluticasone-salmeterol (ADVAIR HFA) 115-21 MCG/ACT inhaler, Inhale 2 puffs into the lungs 2 times daily, Disp: 12 g, Rfl: 6     methylphenidate (RITALIN) 5 MG tablet, TAKE 1 1/2 TABLETS TWICE A DAY, Disp: , Rfl:      pantoprazole (PROTONIX) 20 MG EC tablet, Take 2 tablets (40 mg) by mouth every morning (before breakfast) For an additional month then reassess need, Disp: 60 tablet, Rfl: 1    Current Facility-Administered Medications:      botulinum toxin type A (BOTOX) 100 units injection 100 Units, 100 Units, Intramuscular, Q90 Days, Lea Rossi MD     botulinum toxin type A (BOTOX) 100 units injection 100 Units, 100 Units, Intramuscular, Q90 Days, Lea Rossi MD, 100 Units at 10/17/22 1203       BOTULINUM NEUROTOXIN INJECTION PROCEDURES    VERIFICATION OF PATIENT IDENTIFICATION AND PROCEDURE     Initials   Patient Name SES   Patient  SES   Procedure Verified by: BLANCO     Prior to the start of the procedure and with procedural staff participation, I verbally confirmed the patient s identity using two indicators, relevant allergies, that the procedure was appropriate and matched the consent or emergent situation, and that the correct equipment/implants were available. Immediately prior to starting the procedure I conducted the Time Out with the procedural staff and re-confirmed  the patient s name, procedure, and site/side. 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 His baseline symptoms have been recalcitrant to oral medications and conservative therapy.  He is here today for reinjection with Botox.    GOAL OF PROCEDURE  The goal of this procedure is to increase active range of motion and improve volitional motor control.      TOTAL DOSE ADMINISTERED  Dose Administered:  100 units  Botox (Botulinum Toxin Type A)       1:1 Dilution   Unavoidable Drug Waste: No  Diluent Used:  Preservative Free Normal Saline  Total Volume of Diluent Used:  1 ml  NDC #: Botox 100u ([address]-55)      CONSENT  The risks, benefits, and treatment options were discussed with Neo Saeed and he agreed to proceed.    Written consent was obtained by [facility].     EQUIPMENT USED  Needle-25mm stimulating/recording  EMG/NCS Machine    SKIN PREPARATION  Skin preparation was performed using an alcohol wipe.    GUIDANCE DESCRIPTION  Electro-myographic guidance was necessary throughout the procedure to accurately identify all areas of dystonic muscles while avoiding injection of non-dystonic muscles, neighboring nerves and nearby vascular structures.       AREA/MUSCLE INJECTED: 100 UNITS BOTOX = TOTAL DOSE, 1:1 DILUTION  1. JAW MUSCLES: 90 UNITS BOTOX = TOTAL DOSE, 1:1 DILUTION    Right Masseter - 10 units of Botox at 1 site/s.   Left Masseter - 10 units of Botox at 1 site/s.     Right Anterior Temporalis - 35 units of Botox at 2 site/s.   Left Anterior Temporalis - 35 units of Botox at 2 site/s.    2. NECK & SHOULDER GIRDLE MUSCLES: 10 UNITS Botox = TOTAL DOSE, 1:1 DILUTION   Right Levator Scapulae - 5 units of Botox at 1 site/s.   Left Levator Scapulae -  5 units of Botox at 1 site/s.      RESPONSE TO PROCEDURE  Neo Saeed tolerated the procedure well and there were no immediate complications. He was allowed to recover for an appropriate period of time and was discharged home in stable condition.    ASSESSMENT AND PLAN   1. Botulinum toxin injections: No changes made to Botox dose or distribution today, as we are only approved for 100 units of Botox at this point. Due to the fact that he had an excellent response to Botox, but only 5-6 weeks of benefit, it is reasonable to believe he would benefit from an increase in dose for his next appointment. Plan will be to request up to 200 units of Botox for management of his oromandibular dystonia. Patient will continue to monitor response to Botox and report at next appointment.   2. Referrals: For his vertigo symptoms, PT referral placed to vestibular therapy. If no improvement, may consider Neurology referral.   3. Follow up: Neo Saeed was rescheduled for the next series of injections in 12 weeks, at which time we will evaluate response to today's injections. he may call the clinic prior with any questions or concerns prior to the next appointment.     I spent a total of 10 minutes, face-to-face and managing the care of Neo Saeed, excluding the procedure. Over 50% of my time was spent counseling the patient and coordinating care. Please see note for details.

## 2023-02-20 ENCOUNTER — TELEPHONE (OUTPATIENT)
Dept: PULMONOLOGY | Facility: CLINIC | Age: 28
End: 2023-02-20
Payer: COMMERCIAL

## 2023-02-20 NOTE — TELEPHONE ENCOUNTER
Writer returned call to patient. Writer noted that it is great that patient is feeling good on current medications and also that it is necessary to be seen by the pulmonologist yearly to renew medications and check in on status. Patient said he will be coming to his appointment tomorrow.

## 2023-02-21 ENCOUNTER — OFFICE VISIT (OUTPATIENT)
Dept: PULMONOLOGY | Facility: CLINIC | Age: 28
End: 2023-02-21
Attending: INTERNAL MEDICINE
Payer: COMMERCIAL

## 2023-02-21 VITALS — OXYGEN SATURATION: 98 % | SYSTOLIC BLOOD PRESSURE: 123 MMHG | DIASTOLIC BLOOD PRESSURE: 71 MMHG | HEART RATE: 67 BPM

## 2023-02-21 DIAGNOSIS — J45.30 MILD PERSISTENT ASTHMA WITHOUT COMPLICATION: Primary | ICD-10-CM

## 2023-02-21 PROCEDURE — G0463 HOSPITAL OUTPT CLINIC VISIT: HCPCS | Performed by: INTERNAL MEDICINE

## 2023-02-21 PROCEDURE — 99213 OFFICE O/P EST LOW 20 MIN: CPT | Performed by: INTERNAL MEDICINE

## 2023-02-21 RX ORDER — ALBUTEROL SULFATE 90 UG/1
1-2 AEROSOL, METERED RESPIRATORY (INHALATION) EVERY 6 HOURS PRN
Qty: 18 G | Refills: 6 | Status: SHIPPED | OUTPATIENT
Start: 2023-02-21

## 2023-02-21 RX ORDER — FLUTICASONE PROPIONATE AND SALMETEROL XINAFOATE 115; 21 UG/1; UG/1
2 AEROSOL, METERED RESPIRATORY (INHALATION) 2 TIMES DAILY
Qty: 12 G | Refills: 6 | Status: SHIPPED | OUTPATIENT
Start: 2023-02-21

## 2023-02-21 ASSESSMENT — PAIN SCALES - GENERAL: PAINLEVEL: NO PAIN (0)

## 2023-02-21 NOTE — NURSING NOTE
Chief Complaint   Patient presents with     Follow Up     Return appt      Vitals were taken and medications were reconciled.     Areli Sarabia RMA  7:53 AM

## 2023-02-21 NOTE — LETTER
2/21/2023         RE: Neo Saeed  [address]  [place] [facility] [address]        Dear Colleague,    Thank you for referring your patient, Neo Saeed, to the [facility] [place]. Please see a copy of my visit note below.    Reason for Visit  Neo Saeed is a 27 year old year old male who is being seen for Follow Up (Return appt )    Pulmonary HPI  26 YO referred to the pulmonary clinic for evaluation of cough.  Symptoms have been present for almost one year.  Occur after running long distances of 3-4 miles, symptoms persist for half a day afterwards.  Has mild chest tightness with onset of cough.  Also does rowing and weight lifting but does not have symptoms.  Symptoms occur year round, no seasonal variation.  History of allergic rhinitis, mostly Spring; increased symptoms this Spring but responded to ipatropium bromide nasal spray and starting Singulair.  No history of sinus infections.  No history of asthma.  No history of exercise induced cough or SOB as child.  No history of GERD.    No tobacco use.  No occupational exposures.  No family history of lung disease.  CXR was personally reviewed in clinic- no acute findings.    Last seen one year ago.  Since his last visit he has been doing well.  Overall states asthma has been well controlled.  Using Advair daily, only uses albuterol prior to exercising, this has worked well for him.  States in the Spring he develops an increase in his asthma symptoms; at this time he notices eye watering and increased sinus congestion and drainage.  Uses OTC anti-histamines for his symptoms.  During the Spring season he may need to take his albuterol an additional time prior to exercise.    The patient was seen and examined by Italo Molina MD           Current Outpatient Medications   Medication     albuterol (PROAIR HFA/PROVENTIL HFA/VENTOLIN HFA) 108 (90 Base) MCG/ACT inhaler      fluticasone-salmeterol (ADVAIR HFA) 115-21 MCG/ACT inhaler     methylphenidate (RITALIN) 5 MG tablet     pantoprazole (PROTONIX) 20 MG EC tablet     Current Facility-Administered Medications   Medication     botulinum toxin type A (BOTOX) 100 units injection 100 Units     botulinum toxin type A (BOTOX) 100 units injection 100 Units     No Known Allergies  Past Medical History:   Diagnosis Date     Allergic state      Anxiety        Past Surgical History:   Procedure Laterality Date     CYSTECTOMY PILONIDAL N/A 8/4/2017    Procedure: CYSTECTOMY PILONIDAL;  Lay Open Pilonidal Cystectomy;  Surgeon: Jenaro Cuba MD;  Location: [facility] OR     DENTAL SURGERY      [place] teeth extraction     ENDOSCOPY       HERNIA REPAIR       HERNIA REPAIR         Social History     Socioeconomic History     Marital status: Single     Spouse name: Not on file     Number of children: Not on file     Years of education: Not on file     Highest education level: Bachelor's degree (e.g., BA, AB, BS)   Occupational History     Not on file   Tobacco Use     Smoking status: Never     Smokeless tobacco: Never   Vaping Use     Vaping Use: Never used   Substance and Sexual Activity     Alcohol use: Yes     Comment: Couple of drinks once a week     Drug use: No     Sexual activity: Yes     Partners: Male     Birth control/protection: Condom   Other Topics Concern     Not on file   Social History Narrative    Graduated 12/2017 in [employer] science. Works for [employer].      Social Determinants of Health     Financial Resource Strain: Not on file   Food Insecurity: Not on file   Transportation Needs: Not on file   Physical Activity: Not on file   Stress: Not on file   Social Connections: Not on file   Intimate Partner Violence: Not on file   Housing Stability: Not on file       ROS Pulmonary  A complete ROS was otherwise negative except as noted in the HPI.  There were no vitals taken for this visit.  Exam:   GENERAL APPEARANCE: Well developed,  well nourished, alert, and in no apparent distress.  EYES: PERRL, EOMI  HENT: Nasal mucosa with no edema and no hyperemia. No nasal polyps.  EARS: Canals clear, TMs normal  MOUTH: Oral mucosa is moist, without any lesions, no tonsillar enlargement, no oropharyngeal exudate.  NECK: supple, no masses, no thyromegaly.  LYMPHATICS: No significant axillary, cervical, or supraclavicular nodes.  RESP:  Good air flow throughout.  No crackles. No rhonchi. No wheezes.  CV: Normal S1, S2, regular rhythm, normal rate. No murmur.  No rub. No gallop. No LE edema.   ABDOMEN:  Bowel sounds normal, soft, nontender, no HSM or masses.   MS: extremities normal. No clubbing. No cyanosis.  SKIN: no rash on limited exam  NEURO: Mentation intact, speech normal, normal strength and tone, normal gait and stance  PSYCH: mentation appears normal. and affect normal/bright  Results:  No results found for this or any previous visit (from the past 168 hour(s)).    Assessment and plan:   28 YO with exercise induced asthma as well as allergic rhinitis. Overall has been doing well except for an increase in symptoms during the Spring.     1. Continue Advair  2. Continue albuterol prn; continue to pre-treat prior to activity  3. Advised to use OTC Flonase (or equivalent) and nasal saline rinses during the Spring.  Advised to start soon for this upcoming Spring.    RTC in one year.  Advised to contact the clinic with any questions or concerns.            Again, thank you for allowing me to participate in the care of your patient.      Sincerely,    Italo Molina MD

## 2023-02-21 NOTE — PROGRESS NOTES
Reason for Visit  Neo Saeed is a 27 year old year old male who is being seen for Follow Up (Return appt )    Pulmonary HPI  28 YO referred to the pulmonary clinic for evaluation of cough.  Symptoms have been present for almost one year.  Occur after running long distances of 3-4 miles, symptoms persist for half a day afterwards.  Has mild chest tightness with onset of cough.  Also does rowing and weight lifting but does not have symptoms.  Symptoms occur year round, no seasonal variation.  History of allergic rhinitis, mostly Spring; increased symptoms this Spring but responded to ipatropium bromide nasal spray and starting Singulair.  No history of sinus infections.  No history of asthma.  No history of exercise induced cough or SOB as child.  No history of GERD.    No tobacco use.  No occupational exposures.  No family history of lung disease.  CXR was personally reviewed in clinic- no acute findings.    Last seen one year ago.  Since his last visit he has been doing well.  Overall states asthma has been well controlled.  Using Advair daily, only uses albuterol prior to exercising, this has worked well for him.  States in the Spring he develops an increase in his asthma symptoms; at this time he notices eye watering and increased sinus congestion and drainage.  Uses OTC anti-histamines for his symptoms.  During the Spring season he may need to take his albuterol an additional time prior to exercise.    The patient was seen and examined by Italo Molina MD           Current Outpatient Medications   Medication     albuterol (PROAIR HFA/PROVENTIL HFA/VENTOLIN HFA) 108 (90 Base) MCG/ACT inhaler     fluticasone-salmeterol (ADVAIR HFA) 115-21 MCG/ACT inhaler     methylphenidate (RITALIN) 5 MG tablet     pantoprazole (PROTONIX) 20 MG EC tablet     Current Facility-Administered Medications   Medication     botulinum toxin type A (BOTOX) 100 units injection 100 Units     botulinum toxin type A (BOTOX) 100 units  injection 100 Units     No Known Allergies  Past Medical History:   Diagnosis Date     Allergic state      Anxiety        Past Surgical History:   Procedure Laterality Date     CYSTECTOMY PILONIDAL N/A 8/4/2017    Procedure: CYSTECTOMY PILONIDAL;  Lay Open Pilonidal Cystectomy;  Surgeon: Jenaro Cuba MD;  Location:  OR     DENTAL SURGERY      [place] teeth extraction     ENDOSCOPY       HERNIA REPAIR       HERNIA REPAIR         Social History     Socioeconomic History     Marital status: Single     Spouse name: Not on file     Number of children: Not on file     Years of education: Not on file     Highest education level: Bachelor's degree (e.g., BA, AB, BS)   Occupational History     Not on file   Tobacco Use     Smoking status: Never     Smokeless tobacco: Never   Vaping Use     Vaping Use: Never used   Substance and Sexual Activity     Alcohol use: Yes     Comment: Couple of drinks once a week     Drug use: No     Sexual activity: Yes     Partners: Male     Birth control/protection: Condom   Other Topics Concern     Not on file   Social History Narrative    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 Works for [employer].      Social Determinants of Health     Financial Resource Strain: Not on file   Food Insecurity: Not on file   Transportation Needs: Not on file   Physical Activity: Not on file   Stress: Not on file   Social Connections: Not on file   Intimate Partner Violence: Not on file   Housing Stability: Not on file       ROS Pulmonary  A complete ROS was otherwise negative except as noted in the HPI.  There were no vitals taken for this visit.  Exam:   GENERAL APPEARANCE: Well developed, well nourished, alert, and in no apparent distress.  EYES: PERRL, EOMI  HENT: Nasal mucosa with no edema and no hyperemia. No nasal polyps.  EARS: Canals clear, TMs normal  MOUTH: Oral mucosa is moist, without any lesions, no tonsillar enlargement, no oropharyngeal exudate.  NECK: supple, no masses, no  thyromegaly.  LYMPHATICS: No significant axillary, cervical, or supraclavicular nodes.  RESP:  Good air flow throughout.  No crackles. No rhonchi. No wheezes.  CV: Normal S1, S2, regular rhythm, normal rate. No murmur.  No rub. No gallop. No LE edema.   ABDOMEN:  Bowel sounds normal, soft, nontender, no HSM or masses.   MS: extremities normal. No clubbing. No cyanosis.  SKIN: no rash on limited exam  NEURO: Mentation intact, speech normal, normal strength and tone, normal gait and stance  PSYCH: mentation appears normal. and affect normal/bright  Results:  No results found for this or any previous visit (from the past 168 hour(s)).    Assessment and plan:   28 YO with exercise induced asthma as well as allergic rhinitis. Overall has been doing well except for an increase in symptoms during the Spring.     1. Continue Advair  2. Continue albuterol prn; continue to pre-treat prior to activity  3. Advised to use OTC Flonase (or equivalent) and nasal saline rinses during the Spring.  Advised to start soon for this upcoming Spring.    RTC in one year.  Advised to contact the clinic with any questions or concerns.

## 2023-03-03 ENCOUNTER — VIRTUAL VISIT (OUTPATIENT)
Dept: BEHAVIORAL HEALTH | Facility: CLINIC | Age: 28
End: 2023-03-03
Payer: COMMERCIAL

## 2023-03-03 DIAGNOSIS — F41.9 ANXIETY: Primary | ICD-10-CM

## 2023-03-03 PROCEDURE — 99207 PR NO CHARGE LOS: CPT | Mod: VID | Performed by: MARRIAGE & FAMILY THERAPIST

## 2023-03-03 NOTE — PROGRESS NOTES
Today Dexter reports he has moved to [place], and I explained that I cannot provide service to patients out of [facility]. He reports that work will continue to bring him to [place] with regularity, and we discussed scheduling visits during his times in state, if insurance will work for this.    Dexter is experiencing increased anxiety and some new symptoms of mild agoraphobia, health anxiety, and fear of flying/claustrophobia. I did explain how CBT might help with these and that we can explore underlying causes fot this increase in anxiety when we meet. Recommended return to meditation and Headspace ursula he has found helpful in the past.    Dexter will be in touch as he determines his schedule and can arrange dates and times when he will be in [place].    Javier Corley MA, LMFT, [facility]

## 2023-03-25 DIAGNOSIS — G24.4 IDIOPATHIC OROFACIAL DYSTONIA: Primary | ICD-10-CM

## 2023-04-06 ENCOUNTER — OFFICE VISIT (OUTPATIENT)
Dept: PHYSICAL MEDICINE AND REHAB | Facility: CLINIC | Age: 28
End: 2023-04-06
Payer: COMMERCIAL

## 2023-04-06 VITALS — SYSTOLIC BLOOD PRESSURE: 118 MMHG | DIASTOLIC BLOOD PRESSURE: 71 MMHG | HEART RATE: 71 BPM

## 2023-04-06 DIAGNOSIS — G24.4 IDIOPATHIC OROFACIAL DYSTONIA: Primary | ICD-10-CM

## 2023-04-06 PROCEDURE — 64612 DESTROY NERVE FACE MUSCLE: CPT | Mod: 50 | Performed by: PHYSICAL MEDICINE & REHABILITATION

## 2023-04-06 PROCEDURE — 64616 CHEMODENERV MUSC NECK DYSTON: CPT | Mod: 50 | Performed by: PHYSICAL MEDICINE & REHABILITATION

## 2023-04-06 PROCEDURE — 95874 GUIDE NERV DESTR NEEDLE EMG: CPT | Performed by: PHYSICAL MEDICINE & REHABILITATION

## 2023-04-06 RX ORDER — METOPROLOL SUCCINATE 25 MG/1
25 TABLET, EXTENDED RELEASE ORAL DAILY
COMMUNITY
Start: 2023-03-28

## 2023-04-06 NOTE — LETTER
4/6/2023         RE: Neo Saeed  [address] [address]        Dear Colleague,    Thank you for referring your patient, Neo Saeed, to the [facility]. Please see a copy of my visit note below.      [facility]    PM&R CLINIC NOTE  BOTULINUM TOXIN PROCEDURE      HPI  Chief Complaint   Patient presents with     Procedure     Botox     Neo Saeed is a 27 year old male with a history of involuntary jaw clenching resulting in excessive wear and tear on his teeth who presents to clinic for botulinum toxin injections for management of oromandibular dystonia.     SINCE LAST VISIT  Neo Saeed was last seen for Botox injections on 1/10/2023, at which time he received 100 units of Botox.     He went to the ED on 3/27/2023 for rapid heart rate and was found to have intermittent atrial fibrillation. He has been placed on a beta blocker, but is not tolerating this particularly well       RESPONSE TO PREVIOUS TREATMENT:    Problems following the previous series of neurotoxin injections included:  No problems reported.     Pain Improvement: Yes. Percent Improvement: 85-90 %    Duration of Benefit:  9-10 weeks and followed by a gradual reduction in benefit.    Dystonia Improvement: Yes.  Percent Improvement: 80-85 %    Duration of Benefit:  9-10 weeks and followed by a gradual reduction in benefit.      PHYSICAL EXAM  VS: /71 (BP Location: Right arm, Patient Position: Sitting, Cuff Size: Adult Regular)   Pulse 71    GEN: Pleasant and cooperative, in no acute distress  HEENT: No facial asymmetry, significant masseter and temporalis hypertrophy bilaterally.     ALLERGIES  No Known Allergies    CURRENT MEDICATIONS    Current Outpatient Medications:      albuterol (PROAIR HFA/PROVENTIL HFA/VENTOLIN HFA) 108 (90 Base) MCG/ACT inhaler, Inhale 1-2 puffs into the lungs every 4 hours as needed for shortness of breath / dyspnea or  wheezing Take 1-2 puffs 20 minutes prior to exercise and every four hours as needed, Disp: 1 g, Rfl: 3     fluticasone-salmeterol (ADVAIR HFA) 115-21 MCG/ACT inhaler, Inhale 2 puffs into the lungs 2 times daily, Disp: 12 g, Rfl: 6     metoprolol succinate ER (TOPROL XL) 25 MG 24 hr tablet, Take 25 mg by mouth daily, Disp: , Rfl:      albuterol (PROAIR HFA/PROVENTIL HFA/VENTOLIN HFA) 108 (90 Base) MCG/ACT inhaler, Inhale 1-2 puffs into the lungs every 6 hours as needed for shortness of breath, wheezing or cough (Patient not taking: Reported on 2023), Disp: 18 g, Rfl: 6     fluticasone-salmeterol (ADVAIR HFA) 115-21 MCG/ACT inhaler, Inhale 2 puffs into the lungs 2 times daily (Patient not taking: Reported on 2023), Disp: 12 g, Rfl: 6     methylphenidate (RITALIN) 5 MG tablet, TAKE 1 1/2 TABLETS TWICE A DAY, Disp: , Rfl:     Current Facility-Administered Medications:      botulinum toxin type A (BOTOX) 100 units injection 100 Units, 100 Units, Intramuscular, Q90 Days, Lea Rossi MD, 100 Units at 01/10/23 1726     botulinum toxin type A (BOTOX) 100 units injection 100 Units, 100 Units, Intramuscular, Q90 Days, Lea Rossi MD, 100 Units at 10/17/22 1203     botulinum toxin type A (BOTOX) 100 units injection 200 Units, 200 Units, Intramuscular, Q90 Days, Lea Rossi MD       BOTULINUM NEUROTOXIN INJECTION PROCEDURES    VERIFICATION OF PATIENT IDENTIFICATION AND PROCEDURE     Initials   Patient Name SES   Patient  SES   Procedure Verified by: SES     Prior to the start of the procedure and with procedural staff participation, I verbally confirmed the patient s identity using two indicators, relevant allergies, that the procedure was appropriate and matched the consent or emergent situation, and that the correct equipment/implants were available. Immediately prior to starting the procedure I conducted the Time Out with the procedural staff and re-confirmed the patient s  name, procedure, and site/side. 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 His baseline symptoms have been recalcitrant to oral medications and conservative therapy.  He is here today for reinjection with Botox.    GOAL OF PROCEDURE  The goal of this procedure is to increase active range of motion and improve volitional motor control.      TOTAL DOSE ADMINISTERED  Dose Administered:  150 units  Botox (Botulinum Toxin Type A)       2:1 Dilution   Unavoidable Drug Waste: Yes  Amount of drug waste (mL): 50 units Botox.  Reason for waste:  Single use vial  Diluent Used:  Preservative Free Normal Saline  Total Volume of Diluent Used:  3 ml  NDC #: Botox 100u ([address]-50)      CONSENT  The risks, benefits, and treatment options were discussed with Neo Saeed and he agreed to proceed.    Written consent was obtained by [facility].     EQUIPMENT USED  Needle-25mm stimulating/recording  EMG/NCS Machine    SKIN PREPARATION  Skin preparation was performed using an alcohol wipe.    GUIDANCE DESCRIPTION  Electro-myographic guidance was necessary throughout the procedure to accurately identify all areas of dystonic muscles while avoiding injection of non-dystonic muscles, neighboring nerves and nearby vascular structures.       AREA/MUSCLE INJECTED: 150 UNITS BOTOX = TOTAL DOSE, 2:1 DILUTION  1. FACIAL & JAW MUSCLES: 140 UNITS BOTOX = TOTAL DOSE    Right Frontalis - 5 units of Botox at 2 site/s.    Left Frontalis - 5 units of Botox at 2 site/s.      Right  - 2.5 units of Botox at 1 site/s.    Left  - 2.5 units of Botox at 1 site/s.      Procerus - 5 units of Botox at 1 site/s.      Right Masseter - 10 units of Botox at 1 site/s.   Left  Masseter - 10 units of Botox at 1 site/s.     Right Anterior Temporalis - 50 units of Botox at 5 site/s.   Left Anterior Temporalis - 50 units of Botox at 5 site/s.    2. NECK & SHOULDER GIRDLE MUSCLES: 10 UNITS BOTOX = TOTAL DOSE  Right Levator Scapulae - 5 units of Botox at 1 site/s.   Left Levator Scapulae - 5 units of Botox at 1 site/s.      RESPONSE TO PROCEDURE  Neo Saeed tolerated the procedure well and there were no immediate complications. He was allowed to recover for an appropriate period of time and was discharged home in stable condition.    ASSESSMENT AND PLAN   1. Botulinum toxin injections: Dose of Botox was increased from 100 units to 150 units in hopes of increasing effectiveness and prolonging duration of benefit of injections.  2. Referrals: None.   3. Follow up: Neo Saeed was rescheduled for the next series of injections in 12 weeks, at which time we will evaluate response to today's injections. he may call the clinic prior with any questions or concerns prior to the next appointment.       Again, thank you for allowing me to participate in the care of your patient.        Sincerely,        Lea Rossi MD

## 2023-04-06 NOTE — PROGRESS NOTES
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     SINCE LAST VISIT  Neo Saeed was last seen for Botox injections on 1/10/2023, at which time he received 100 units of Botox.     He went to the ED on 3/27/2023 for rapid heart rate and was found to have intermittent atrial fibrillation. He has been placed on a beta blocker, but is not tolerating this particularly well       RESPONSE TO PREVIOUS TREATMENT:    Problems following the previous series of neurotoxin injections included:  No problems reported.     Pain Improvement: Yes. Percent Improvement: 85-90 %    Duration of Benefit:  9-10 weeks and followed by a gradual reduction in benefit.    Dystonia Improvement: Yes.  Percent Improvement: 80-85 %    Duration of Benefit:  9-10 weeks and followed by a gradual reduction in benefit.      PHYSICAL EXAM  VS: /71 (BP Location: Right arm, Patient Position: Sitting, Cuff Size: Adult Regular)   Pulse 71    GEN: Pleasant and cooperative, in no acute distress  HEENT: No facial asymmetry, significant masseter and temporalis hypertrophy bilaterally.     ALLERGIES  No Known Allergies    CURRENT MEDICATIONS    Current Outpatient Medications:      albuterol (PROAIR HFA/PROVENTIL HFA/VENTOLIN HFA) 108 (90 Base) MCG/ACT inhaler, Inhale 1-2 puffs into the lungs every 4 hours as needed for shortness of breath / dyspnea or wheezing Take 1-2 puffs 20 minutes prior to exercise and every four hours as needed, Disp: 1 g, Rfl: 3     fluticasone-salmeterol (ADVAIR HFA) 115-21 MCG/ACT inhaler, Inhale 2 puffs into the lungs 2 times daily, Disp: 12 g, Rfl: 6     metoprolol succinate ER (TOPROL  XL) 25 MG 24 hr tablet, Take 25 mg by mouth daily, Disp: , Rfl:      albuterol (PROAIR HFA/PROVENTIL HFA/VENTOLIN HFA) 108 (90 Base) MCG/ACT inhaler, Inhale 1-2 puffs into the lungs every 6 hours as needed for shortness of breath, wheezing or cough (Patient not taking: Reported on 2023), Disp: 18 g, Rfl: 6     fluticasone-salmeterol (ADVAIR HFA) 115-21 MCG/ACT inhaler, Inhale 2 puffs into the lungs 2 times daily (Patient not taking: Reported on 2023), Disp: 12 g, Rfl: 6     methylphenidate (RITALIN) 5 MG tablet, TAKE 1 1/2 TABLETS TWICE A DAY, Disp: , Rfl:     Current Facility-Administered Medications:      botulinum toxin type A (BOTOX) 100 units injection 100 Units, 100 Units, Intramuscular, Q90 Days, Lea Rossi MD, 100 Units at 01/10/23 1726     botulinum toxin type A (BOTOX) 100 units injection 100 Units, 100 Units, Intramuscular, Q90 Days, Lea Rossi MD, 100 Units at 10/17/22 1203     botulinum toxin type A (BOTOX) 100 units injection 200 Units, 200 Units, Intramuscular, Q90 Days, Lea Rossi MD       BOTULINUM NEUROTOXIN INJECTION PROCEDURES    VERIFICATION OF PATIENT IDENTIFICATION AND PROCEDURE     Initials   Patient Name SES   Patient  SES   Procedure Verified by: SES     Prior to the start of the procedure and with procedural staff participation, I verbally confirmed the patient s identity using two indicators, relevant allergies, that the procedure was appropriate and matched the consent or emergent situation, and that the correct equipment/implants were available. Immediately prior to starting the procedure I conducted the Time Out with the procedural staff and re-confirmed the patient s name, procedure, and site/side. 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 His baseline symptoms have been recalcitrant to oral medications and conservative therapy.  He is here today for reinjection with Botox.    GOAL OF PROCEDURE  The goal of this procedure is to increase active range of motion and improve volitional motor control.      TOTAL DOSE ADMINISTERED  Dose Administered:  150 units  Botox (Botulinum Toxin Type A)       2:1 Dilution   Unavoidable Drug Waste: Yes  Amount of drug waste (mL): 50 units Botox.  Reason for waste:  Single use vial  Diluent Used:  Preservative Free Normal Saline  Total Volume of Diluent Used:  3 ml  NDC #: Botox 100u ([id number])      CONSENT  The risks, benefits, and treatment options were discussed with Neo Saeed and he agreed to proceed.    Written consent was obtained by [facility].     EQUIPMENT USED  Needle-25mm stimulating/recording  EMG/NCS Machine    SKIN PREPARATION  Skin preparation was performed using an alcohol wipe.    GUIDANCE DESCRIPTION  Electro-myographic guidance was necessary throughout the procedure to accurately identify all areas of dystonic muscles while avoiding injection of non-dystonic muscles, neighboring nerves and nearby vascular structures.       AREA/MUSCLE INJECTED: 150 UNITS BOTOX = TOTAL DOSE, 2:1 DILUTION  1. FACIAL & JAW MUSCLES: 140 UNITS BOTOX = TOTAL DOSE    Right Frontalis - 5 units of Botox at 2 site/s.    Left Frontalis - 5 units of Botox at 2 site/s.      Right  - 2.5 units of Botox at 1 site/s.    Left  - 2.5 units of Botox at 1 site/s.      Procerus - 5 units of Botox at 1 site/s.      Right Masseter - 10 units of Botox at 1 site/s.   Left Masseter - 10 units of Botox at 1 site/s.     Right Anterior Temporalis - 50 units of Botox at 5 site/s.   Left Anterior Temporalis - 50 units of Botox at 5 site/s.    2. NECK & SHOULDER GIRDLE MUSCLES: 10 UNITS BOTOX = TOTAL DOSE  Right Levator Scapulae - 5 units of Botox  at 1 site/s.   Left Levator Scapulae - 5 units of Botox at 1 site/s.      RESPONSE TO PROCEDURE  Neo Saeed tolerated the procedure well and there were no immediate complications. He was allowed to recover for an appropriate period of time and was discharged home in stable condition.    ASSESSMENT AND PLAN   1. Botulinum toxin injections: Dose of Botox was increased from 100 units to 150 units in hopes of increasing effectiveness and prolonging duration of benefit of injections.  2. Referrals: None.   3. Follow up: Neo Saeed was rescheduled for the next series of injections in 12 weeks, at which time we will evaluate response to today's injections. he may call the clinic prior with any questions or concerns prior to the next appointment.

## 2023-04-09 NOTE — NURSING NOTE
Chief Complaint   Patient presents with     Consult     Referred by: Ceferino Cartwright MD  Jaw pain        PEDIATRIC CARDIOLOGY DISCHARGE NOTE    PATIENT NAME: Bubba Guerra  PRIMARY CARDIOLOGIST: Dr. 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 Gonzalez), L vocal cord paresis, FTT, GT dependence for chronic dysmotility, presenting with 8 days of fever. Per MO, family had recently traveled to [place] for 3 weeks before returning on 3/30. In [place], [facility] says had about 3-4 fevers during duration of whole trip, but fever never sustained. Since 3/30, has fever every day (Tmax 103F, rectally) with associated cough and congestion. Was seen by PMD, who told family to keep monitoring. On 4/5, MOC noted that patient seemed much more tired and irritable. In addition, patient had 3-4 episodes of NBNB emesis. Denies any C/D, foul smelling urine, or rashes. Does state that patient seemed to be pulling at L ear. No void since 9AM today. +sick contacts at home. VUTD. Follows closely with Dr. Gonzalez, has been getting echoes every 2 weeks for concern for fluid overload.   [facility]: WBC 23, bicarb 17, Alk phos 3671, CRP 58. UA, RVP, and CXR unremarkable. BCx and UCx sent. A POCUS was performed with no concerns for decreased cardiac function. s/p 3xNSB. s/p CTX x1. Endo was consulted for elevated Alk Phos, recommended sending vitamin D and rechecking alk phos when patient is not acutely ill.   Started on CTX and ID consulted. BCx NGTD, UCX negative, malaria screen negative, RVP negative. Fever curve improved on ceftriaxone. Decision made to treat with Augmentin for sinusitis for 10 days.  Prior to discharge, Lasix dose was decreased from 1.5ml to 1ml BID due to resolution of pericardial and pleural effusions.       -  -  -  -  -  -  -  -  -  -  -  -  -  -  -  -  -  -  -  -  -  -  -  -  -  -  -  -  -  -  -  -  -  -  -  -  PHYSICAL EXAMINATION & VITAL SIGNS:  Weight (kg): 9.2 (04-07 @ 12:20)  T(C): 36.4 (04-09-23 @ 10:00), Max: 36.6 (04-08-23 @ 14:22)  HR: 135 (04-09-23 @ 10:00) (119 - 158)  BP: 105/69 (04-09-23 @ 10:00) (98/63 - 105/69)  RR: 32 (04-09-23 @ 10:00) (32 - 40)  SpO2: 97% (04-09-23 @ 10:00) (97% - 100%)  General - non-dysmorphic, well-developed. playful today  Skin - no rash, no cyanosis.  Eyes / ENT - external appearance of eyes, ears, & nares normal.  Pulmonary - normal inspiratory effort, no retractions, lungs clear bilaterally, no wheezes, no rales.  Cardiovascular - normal rate, regular rhythm, normal S1 & S2, no murmurs, no rubs, no gallops, capillary refill < 2sec, normal pulses.  Gastrointestinal - soft, no hepatomegaly.  Musculoskeletal - no clubbing, no edema.  Neurologic / Psychiatric - moves all extremities, normal tone.      CURRENT STUDIES:   Echocardiogram - 4/7/2023 - Summary:   1. D-TGA s/p arterial switch operation with the Kenny manuever, placement of pulmonary artery band, and aortic arch reconstruction (2021). s/p right bidirectional Vazquez, with RVOT remaining intact (1/20/2022). s/p VSD patch closure (essential septation with a portion of the LV apex excluded) with LV to Ao baffle of the VSD, and muscular VSD attempted closure, MPA takedown with MPA plasty on 12/5/2022.   2. H/o large, anterior malalignment type VSD with inlet extension and additional multiple muscular ventricular septal defects above the moderator band level and apical anterior septum. At least 2 residual muscular VSDs with aggregate moderate predominantly left to right shunt.   3. Flow acceleration across the RVOT (Cumulative peak gradient of 28 mm Hg with a mean gradient of 14 mm Hg).   4. The Vazquez anastomosis was not visualized - patient uncooperative.   5. The branch pulmonary arteries were not visualized.   6. Mild tricuspid valve regurgitation, peak systolic instantaneous gradient 31.6 mmHg.   7. Mild neoaortic regurgitation.   8. No obvious mass on the aortic valve leaflets.   9. No obvious mitral valve mass identified.  10. No obvious tricuspid valve mass identified.  11. Dilated left atrium.  12. Dilated left ventricle and globular left ventricle.  13. Qualitatively, there is good systolic excursion of the posterior wall. However, there is decreased contractility of the interventricular septum. Overall, the left ventricular systolic function appears mildly decreased as compared to the prior study ( in context of tachycardia).  14. No pericardial effusion.  15. No pleural effusion.  16. Patient uncooperative. Findings limited to above.       PEDIATRIC CARDIOLOGY DISCHARGE NOTE    PATIENT NAME: Bubba Guerra  PRIMARY CARDIOLOGIST: Dr. 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 Gonzalez), L vocal cord paresis, FTT, GT dependence for chronic dysmotility, presenting with 8 days of fever. Per MO, family had recently traveled to [place] for 3 weeks before returning on 3/30. In [place], [facility] says had about 3-4 fevers during duration of whole trip, but fever never sustained. Since 3/30, has fever every day (Tmax 103F, rectally) with associated cough and congestion. Was seen by PMD, who told family to keep monitoring. On 4/5, MOC noted that patient seemed much more tired and irritable. In addition, patient had 3-4 episodes of NBNB emesis. Denies any C/D, foul smelling urine, or rashes. Does state that patient seemed to be pulling at L ear. No void since 9AM today. +sick contacts at home. VUTD. Follows closely with Dr. Gonzalez, has been getting echoes every 2 weeks for concern for fluid overload.   [facility] [place] [facility]: WBC 23, bicarb 17, Alk phos 3671, CRP 58. UA, RVP, and CXR unremarkable. BCx and UCx sent. A POCUS was performed with no concerns for decreased cardiac function. s/p 3xNSB. s/p CTX x1. Endo was consulted for elevated Alk Phos, recommended sending vitamin D and rechecking alk phos when patient is not acutely ill.   Started on CTX and ID consulted. BCx NGTD, UCX negative, malaria screen negative, RVP negative. Fever curve improved on ceftriaxone. Decision made to treat with Augmentin for sinusitis for 10 days.  Prior to discharge, Lasix dose was decreased from 1.5ml to 1ml BID due to resolution of pericardial and pleural effusions.     -  -  -  -  -  -  -  -  -  -  -  -  -  -  -  -  -  -  -  -  -  -  -  -  -  -  -  -  -  -  -  -  -  -  -  -  PHYSICAL EXAMINATION & VITAL SIGNS:  Weight (kg): 9.2 (04-07 @ 12:20)  T(C): 36.4 (04-09-23 @ 10:00), Max: 36.6 (04-08-23 @ 14:22)  HR: 135 (04-09-23 @ 10:00) (119 - 158)  BP: 105/69 (04-09-23 @ 10:00) (98/63 - 105/69)  RR: 32 (04-09-23 @ 10:00) (32 - 40)  SpO2: 97% (04-09-23 @ 10:00) (97% - 100%)  General - non-dysmorphic, well-developed. playful today  Skin - no rash, no cyanosis.  Eyes / ENT - external appearance of eyes, ears, & nares normal.  Pulmonary - normal inspiratory effort, no retractions, lungs clear bilaterally, no wheezes, no rales.  Cardiovascular - normal rate, regular rhythm, normal S1 & S2, no murmurs, no rubs, no gallops, capillary refill < 2sec, normal pulses.  Gastrointestinal - soft, no hepatomegaly.  Musculoskeletal - no clubbing, no edema.  Neurologic / Psychiatric - moves all extremities, normal tone.    CURRENT STUDIES:   Echocardiogram - 4/7/2023 - Summary:   1. D-TGA s/p arterial switch operation with the Kenny manuever, placement of pulmonary artery band, and aortic arch reconstruction (2021). s/p right bidirectional Vazquez, with RVOT remaining intact (1/20/2022). s/p VSD patch closure (essential septation with a portion of the LV apex excluded) with LV to Ao baffle of the VSD, and muscular VSD attempted closure, MPA takedown with MPA plasty on 12/5/2022.   2. H/o large, anterior malalignment type VSD with inlet extension and additional multiple muscular ventricular septal defects above the moderator band level and apical anterior septum. At least 2 residual muscular VSDs with aggregate moderate predominantly left to right shunt.   3. Flow acceleration across the RVOT (Cumulative peak gradient of 28 mm Hg with a mean gradient of 14 mm Hg).   4. The Vazquez anastomosis was not visualized - patient uncooperative.   5. The branch pulmonary arteries were not visualized.   6. Mild tricuspid valve regurgitation, peak systolic instantaneous gradient 31.6 mmHg.   7. Mild neoaortic regurgitation.   8. No obvious mass on the aortic valve leaflets.   9. No obvious mitral valve mass identified.  10. No obvious tricuspid valve mass identified.  11. Dilated left atrium.  12. Dilated left ventricle and globular left ventricle.  13. Qualitatively, there is good systolic excursion of the posterior wall. However, there is decreased contractility of the interventricular septum. Overall, the left ventricular systolic function appears mildly decreased as compared to the prior study ( in context of tachycardia).  14. No pericardial effusion.  15. No pleural effusion.  16. Patient uncooperative. Findings limited to above.

## 2023-07-27 ENCOUNTER — OFFICE VISIT (OUTPATIENT)
Dept: PHYSICAL MEDICINE AND REHAB | Facility: CLINIC | Age: 28
End: 2023-07-27
Payer: COMMERCIAL

## 2023-07-27 VITALS — SYSTOLIC BLOOD PRESSURE: 138 MMHG | DIASTOLIC BLOOD PRESSURE: 89 MMHG | HEART RATE: 63 BPM

## 2023-07-27 DIAGNOSIS — G24.4 IDIOPATHIC OROFACIAL DYSTONIA: Primary | ICD-10-CM

## 2023-07-27 PROCEDURE — 95874 GUIDE NERV DESTR NEEDLE EMG: CPT | Performed by: PHYSICAL MEDICINE & REHABILITATION

## 2023-07-27 PROCEDURE — 64612 DESTROY NERVE FACE MUSCLE: CPT | Mod: 50 | Performed by: PHYSICAL MEDICINE & REHABILITATION

## 2023-07-27 NOTE — LETTER
7/27/2023         RE: Neo Saeed  [address]  [address]  [facility] [address]        Dear Colleague,    Thank you for referring your patient, Neo Saeed, to the [facility]. Please see a copy of my visit note below.      [facility]    PM&R CLINIC NOTE  BOTULINUM TOXIN PROCEDURE      HPI  Chief Complaint   Patient presents with     Procedure     Botox       Neo Saeed is a 27 year old male with a history of involuntary jaw clenching resulting in excessive wear and tear on his teeth who presents to clinic for botulinum toxin injections for management of oromandibular dystonia.     SINCE LAST VISIT  Neo Saeed was last seen for Botox injections on 4/6/2023, at which time he received 100 units of Botox.     Patient is on a heart monitor, as he was having episodes of afib in the spring. Additionally, he has been requiring more asthma treatments for poor air-quality this summer. Otherwise, he denies new medical diagnoses, illnesses, hospitalizations, emergency room visits, and injuries since the previous injection with botulinum neurotoxin.       RESPONSE TO PREVIOUS TREATMENT:    Problems following the previous series of neurotoxin injections included:  No problems reported.     Pain Improvement: Yes. Percent Improvement: 95 %    Duration of Benefit:   10-11 weeks and followed by a gradual reduction in benefit.    Dystonia Improvement: Yes.  Percent Improvement: 95 %    Duration of Benefit:   10-11 weeks and followed by a gradual reduction in benefit.      PHYSICAL EXAM  VS: /89 (BP Location: Right arm, Patient Position: Sitting)   Pulse 63    GEN: Pleasant and cooperative, in no acute distress  HEENT: No facial asymmetry, significant temporalis hypertrophy bilaterally.     ALLERGIES  No Known Allergies    CURRENT MEDICATIONS    Current Outpatient Medications:      albuterol (PROAIR HFA/PROVENTIL HFA/VENTOLIN HFA) 108 (90 Base) MCG/ACT  inhaler, Inhale 1-2 puffs into the lungs every 6 hours as needed for shortness of breath, wheezing or cough, Disp: 18 g, Rfl: 6     albuterol (PROAIR HFA/PROVENTIL HFA/VENTOLIN HFA) 108 (90 Base) MCG/ACT inhaler, Inhale 1-2 puffs into the lungs every 4 hours as needed for shortness of breath / dyspnea or wheezing Take 1-2 puffs 20 minutes prior to exercise and every four hours as needed, Disp: 1 g, Rfl: 3     fluticasone-salmeterol (ADVAIR HFA) 115-21 MCG/ACT inhaler, Inhale 2 puffs into the lungs 2 times daily, Disp: 12 g, Rfl: 6     fluticasone-salmeterol (ADVAIR HFA) 115-21 MCG/ACT inhaler, Inhale 2 puffs into the lungs 2 times daily, Disp: 12 g, Rfl: 6     metoprolol succinate ER (TOPROL XL) 25 MG 24 hr tablet, Take 25 mg by mouth daily, Disp: , Rfl:      methylphenidate (RITALIN) 5 MG tablet, TAKE 1 1/2 TABLETS TWICE A DAY, Disp: , Rfl:     Current Facility-Administered Medications:      botulinum toxin type A (BOTOX) 100 units injection 100 Units, 100 Units, Intramuscular, Q90 Days, Lea Rossi MD, 100 Units at 01/10/23 1726     botulinum toxin type A (BOTOX) 100 units injection 100 Units, 100 Units, Intramuscular, Q90 Days, Lea Rossi MD, 100 Units at 10/17/22 1203     botulinum toxin type A (BOTOX) 100 units injection 200 Units, 200 Units, Intramuscular, Q90 Days, Lea Rossi MD, 150 Units at 23 1614       BOTULINUM NEUROTOXIN INJECTION PROCEDURES    VERIFICATION OF PATIENT IDENTIFICATION AND PROCEDURE     Initials   Patient Name SES   Patient  SES   Procedure Verified by: BLANCO     Prior to the start of the procedure and with procedural staff participation, I verbally confirmed the patient s identity using two indicators, relevant allergies, that the procedure was appropriate and matched the consent or emergent situation, and that the correct equipment/implants were available. Immediately prior to starting the procedure I conducted the Time Out with the  procedural staff and re-confirmed the patient s name, procedure, and site/side. 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 His baseline symptoms have been recalcitrant to oral medications and conservative therapy.  He is here today for reinjection with Botox.    GOAL OF PROCEDURE  The goal of this procedure is to increase active range of motion and improve volitional motor control.      TOTAL DOSE ADMINISTERED  Dose Administered:  160 units  Botox (Botulinum Toxin Type A)       2:1 Dilution   Unavoidable Drug Waste: Yes  Amount of drug waste (mL): 40 units Botox.  Reason for waste:  Single use vial  Diluent Used:  Preservative Free Normal Saline  Total Volume of Diluent Used:  3 ml  NDC #: Botox 100u ([address]-54)      CONSENT  The risks, benefits, and treatment options were discussed with Neo Saeed and he agreed to proceed.    Written consent was obtained by  [facility] .     EQUIPMENT USED  Needle-25mm stimulating/recording  EMG/NCS Machine    SKIN PREPARATION  Skin preparation was performed using an alcohol wipe.    GUIDANCE DESCRIPTION  Electro-myographic guidance was necessary throughout the procedure to accurately identify all areas of dystonic muscles while avoiding injection of non-dystonic muscles, neighboring nerves and nearby vascular structures.       AREA/MUSCLE INJECTED: 160 UNITS BOTOX = TOTAL DOSE, 2:1 DILUTION  1. FACIAL & JAW MUSCLES: 150 UNITS BOTOX = TOTAL DOSE    Right Frontalis - 10 units of Botox at 2 site/s.    Left Frontalis -10 units of Botox at 2 site/s.      Right  - 2.5 units of Botox at 1 site/s.    Left  - 2.5 units of Botox at 1 site/s.      Procerus - 5 units of Botox at 1 site/s.      Right  Masseter - 10 units of Botox at 1 site/s.   Left Masseter - 10 units of Botox at 1 site/s.     Right Anterior Temporalis - 50 units of Botox at 5 site/s.   Left Anterior Temporalis - 50 units of Botox at 5 site/s.    2. NECK & SHOULDER GIRDLE MUSCLES: 10 UNITS BOTOX = TOTAL DOSE  Right Levator Scapulae - 5 units of Botox at 1 site/s.   Left Levator Scapulae - 5 units of Botox at 1 site/s.      RESPONSE TO PROCEDURE  Neo Saeed tolerated the procedure well and there were no immediate complications. He was allowed to recover for an appropriate period of time and was discharged home in stable condition.    ASSESSMENT AND PLAN   Botulinum toxin injections: Dose of Botox was increased slightly from 150 units to 160 units in hopes of increasing effectiveness and prolonging duration of benefit of injections. He had an excellent response to the last series of injections, and therefore, I do not anticipate need to increase much beyond our current dose in the future.   Referrals: None.   Follow up: Neo Saeed was rescheduled for the next series of injections in 12 weeks, at which time we will evaluate response to today's injections. he may call the clinic prior with any questions or concerns prior to the next appointment.       Again, thank you for allowing me to participate in the care of your patient.        Sincerely,        Lea oRssi MD

## 2023-07-27 NOTE — PROGRESS NOTES
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     SINCE LAST VISIT  Neo Saeed was last seen for Botox injections on 4/6/2023, at which time he received 100 units of Botox.     Patient is on a heart monitor, as he was having episodes of afib in the spring. Additionally, he has been requiring more asthma treatments for poor air-quality this summer. Otherwise, he denies new medical diagnoses, illnesses, hospitalizations, emergency room visits, and injuries since the previous injection with botulinum neurotoxin.       RESPONSE TO PREVIOUS TREATMENT:    Problems following the previous series of neurotoxin injections included:  No problems reported.     Pain Improvement: Yes. Percent Improvement: 95 %    Duration of Benefit:   10-11 weeks and followed by a gradual reduction in benefit.    Dystonia Improvement: Yes.  Percent Improvement: 95 %    Duration of Benefit:   10-11 weeks and followed by a gradual reduction in benefit.      PHYSICAL EXAM  VS: /89 (BP Location: Right arm, Patient Position: Sitting)   Pulse 63    GEN: Pleasant and cooperative, in no acute distress  HEENT: No facial asymmetry, significant temporalis hypertrophy bilaterally.     ALLERGIES  No Known Allergies    CURRENT MEDICATIONS    Current Outpatient Medications:     albuterol (PROAIR HFA/PROVENTIL HFA/VENTOLIN HFA) 108 (90 Base) MCG/ACT inhaler, Inhale 1-2 puffs into the lungs every 6 hours as needed for shortness of breath, wheezing or cough, Disp: 18 g, Rfl: 6    albuterol (PROAIR HFA/PROVENTIL HFA/VENTOLIN HFA) 108 (90 Base) MCG/ACT inhaler, Inhale 1-2 puffs into the lungs every 4 hours as  needed for shortness of breath / dyspnea or wheezing Take 1-2 puffs 20 minutes prior to exercise and every four hours as needed, Disp: 1 g, Rfl: 3    fluticasone-salmeterol (ADVAIR HFA) 115-21 MCG/ACT inhaler, Inhale 2 puffs into the lungs 2 times daily, Disp: 12 g, Rfl: 6    fluticasone-salmeterol (ADVAIR HFA) 115-21 MCG/ACT inhaler, Inhale 2 puffs into the lungs 2 times daily, Disp: 12 g, Rfl: 6    metoprolol succinate ER (TOPROL XL) 25 MG 24 hr tablet, Take 25 mg by mouth daily, Disp: , Rfl:     methylphenidate (RITALIN) 5 MG tablet, TAKE 1 1/2 TABLETS TWICE A DAY, Disp: , Rfl:     Current Facility-Administered Medications:     botulinum toxin type A (BOTOX) 100 units injection 100 Units, 100 Units, Intramuscular, Q90 Days, Lea Rossi MD, 100 Units at 01/10/23 1726    botulinum toxin type A (BOTOX) 100 units injection 100 Units, 100 Units, Intramuscular, Q90 Days, Lea Rossi MD, 100 Units at 10/17/22 1203    botulinum toxin type A (BOTOX) 100 units injection 200 Units, 200 Units, Intramuscular, Q90 Days, Lea Rossi MD, 150 Units at 23 1614       BOTULINUM NEUROTOXIN INJECTION PROCEDURES    VERIFICATION OF PATIENT IDENTIFICATION AND PROCEDURE     Initials   Patient Name SES   Patient  SES   Procedure Verified by: SES     Prior to the start of the procedure and with procedural staff participation, I verbally confirmed the patient s identity using two indicators, relevant allergies, that the procedure was appropriate and matched the consent or emergent situation, and that the correct equipment/implants were available. Immediately prior to starting the procedure I conducted the Time Out with the procedural staff and re-confirmed the patient s name, procedure, and site/side. 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 His baseline symptoms have been recalcitrant to oral medications and conservative therapy.  He is here today for reinjection with Botox.    GOAL OF PROCEDURE  The goal of this procedure is to increase active range of motion and improve volitional motor control.      TOTAL DOSE ADMINISTERED  Dose Administered:  160 units  Botox (Botulinum Toxin Type A)       2:1 Dilution   Unavoidable Drug Waste: Yes  Amount of drug waste (mL): 40 units Botox.  Reason for waste:  Single use vial  Diluent Used:  Preservative Free Normal Saline  Total Volume of Diluent Used:  3 ml  NDC #: Botox 100u ([address]-21)      CONSENT  The risks, benefits, and treatment options were discussed with Neo Saeed and he agreed to proceed.    Written consent was obtained by  [facility] .     EQUIPMENT USED  Needle-25mm stimulating/recording  EMG/NCS Machine    SKIN PREPARATION  Skin preparation was performed using an alcohol wipe.    GUIDANCE DESCRIPTION  Electro-myographic guidance was necessary throughout the procedure to accurately identify all areas of dystonic muscles while avoiding injection of non-dystonic muscles, neighboring nerves and nearby vascular structures.       AREA/MUSCLE INJECTED: 160 UNITS BOTOX = TOTAL DOSE, 2:1 DILUTION  1. FACIAL & JAW MUSCLES: 150 UNITS BOTOX = TOTAL DOSE    Right Frontalis - 10 units of Botox at 2 site/s.    Left Frontalis -10 units of Botox at 2 site/s.      Right  - 2.5 units of Botox at 1 site/s.    Left  - 2.5 units of Botox at 1 site/s.      Procerus - 5 units of Botox at 1 site/s.      Right Masseter - 10 units of Botox at 1 site/s.   Left Masseter - 10 units of Botox at 1 site/s.     Right Anterior Temporalis - 50 units of Botox at 5 site/s.   Left Anterior Temporalis - 50 units of Botox at 5 site/s.    2. NECK & SHOULDER GIRDLE MUSCLES: 10 UNITS  BOTOX = TOTAL DOSE  Right Levator Scapulae - 5 units of Botox at 1 site/s.   Left Levator Scapulae - 5 units of Botox at 1 site/s.      RESPONSE TO PROCEDURE  Neo Saeed tolerated the procedure well and there were no immediate complications. He was allowed to recover for an appropriate period of time and was discharged home in stable condition.    ASSESSMENT AND PLAN   Botulinum toxin injections: Dose of Botox was increased slightly from 150 units to 160 units in hopes of increasing effectiveness and prolonging duration of benefit of injections. He had an excellent response to the last series of injections, and therefore, I do not anticipate need to increase much beyond our current dose in the future.   Referrals: None.   Follow up: Neo Saeed was rescheduled for the next series of injections in 12 weeks, at which time we will evaluate response to today's injections. he may call the clinic prior with any questions or concerns prior to the next appointment.

## 2023-07-27 NOTE — NURSING NOTE
Chief Complaint   Patient presents with    Procedure     Lidiaox       Hyun Azul MA on 7/27/2023 at 8:02 AM

## 2023-09-05 ENCOUNTER — TELEPHONE (OUTPATIENT)
Dept: PULMONOLOGY | Facility: CLINIC | Age: 28
End: 2023-09-05
Payer: COMMERCIAL

## 2023-09-05 NOTE — TELEPHONE ENCOUNTER
Patient Contacted for the patient to call back and schedule the following:    Appointment type: DAYANA  Provider: TITA  Return date: FEBRUARY 2024  Specialty phone number: [phone]  Additional appointment(s) needed: NA  Additonal Notes: Pt declined to schedule at this time and preferred I send our scheduling phone number via [employer].

## 2023-10-31 ENCOUNTER — OFFICE VISIT (OUTPATIENT)
Dept: PHYSICAL MEDICINE AND REHAB | Facility: CLINIC | Age: 28
End: 2023-10-31
Payer: COMMERCIAL

## 2023-10-31 VITALS — SYSTOLIC BLOOD PRESSURE: 127 MMHG | DIASTOLIC BLOOD PRESSURE: 92 MMHG | HEART RATE: 76 BPM

## 2023-10-31 DIAGNOSIS — G24.4 IDIOPATHIC OROFACIAL DYSTONIA: Primary | ICD-10-CM

## 2023-10-31 PROCEDURE — 64612 DESTROY NERVE FACE MUSCLE: CPT | Mod: 50 | Performed by: PHYSICAL MEDICINE & REHABILITATION

## 2023-10-31 PROCEDURE — 95874 GUIDE NERV DESTR NEEDLE EMG: CPT | Performed by: PHYSICAL MEDICINE & REHABILITATION

## 2023-10-31 RX ORDER — LISDEXAMFETAMINE DIMESYLATE 30 MG/1
30 CAPSULE ORAL DAILY
COMMUNITY
Start: 2023-10-12

## 2023-10-31 RX ORDER — FINASTERIDE 1 MG/1
1 TABLET, FILM COATED ORAL
COMMUNITY
Start: 2023-04-21

## 2023-10-31 RX ORDER — MINOCYCLINE HYDROCHLORIDE 100 MG/1
1 CAPSULE ORAL DAILY
COMMUNITY
Start: 2023-09-12

## 2023-10-31 RX ORDER — BUDESONIDE AND FORMOTEROL FUMARATE DIHYDRATE 80; 4.5 UG/1; UG/1
2 AEROSOL RESPIRATORY (INHALATION)
COMMUNITY
Start: 2023-09-12

## 2023-10-31 NOTE — LETTER
10/31/2023         RE: Neo aSeed  [address]  [address]  [facility] [address]        Dear Colleague,    Thank you for referring your patient, Neo Saeed, to the [facility]. Please see a copy of my visit note below.      [facility]    PM&R CLINIC NOTE  BOTULINUM TOXIN PROCEDURE      HPI  Chief Complaint   Patient presents with     Procedure     Botox     Neo Saeed is a 27 year old male with a history of involuntary jaw clenching resulting in excessive wear and tear on his teeth who presents to clinic for botulinum toxin injections for management of oromandibular dystonia.     SINCE LAST VISIT  Neo Saeed was last seen for Botox injections on 7/27/2023, at which time he received 160 units of Botox.     Patient denies new medical diagnoses, illnesses, hospitalizations, emergency room visits, and injuries since the previous injection with botulinum neurotoxin.       RESPONSE TO PREVIOUS TREATMENT:    Side effects:  Mild difficult with chewing, which is ongoing to date .     Pain Improvement: Yes. Percent Improvement: 100% - he states that he has not had any migraine headaches since the last time we did injections.    Duration of Benefit:   12 weeks and ongoing to date.    Dystonia Improvement: Yes.  Percent Improvement: The nighttime bruxism has completely resolved, and he is feeling less jaw tightness and clenching.  Duration of Benefit:   12 weeks and followed by a gradual reduction in benefit.      PHYSICAL EXAM  VS: BP (!) 127/92 (BP Location: Right arm, Patient Position: Sitting)   Pulse 76    GEN: Pleasant and cooperative, in no acute distress  HEENT: No facial asymmetry, significant temporalis hypertrophy bilaterally.     ALLERGIES  No Known Allergies    CURRENT MEDICATIONS    Current Outpatient Medications:      albuterol (PROAIR HFA/PROVENTIL HFA/VENTOLIN HFA) 108 (90 Base) MCG/ACT inhaler, Inhale 1-2 puffs into the lungs every 6  hours as needed for shortness of breath, wheezing or cough, Disp: 18 g, Rfl: 6     albuterol (PROAIR HFA/PROVENTIL HFA/VENTOLIN HFA) 108 (90 Base) MCG/ACT inhaler, Inhale 1-2 puffs into the lungs every 4 hours as needed for shortness of breath / dyspnea or wheezing Take 1-2 puffs 20 minutes prior to exercise and every four hours as needed, Disp: 1 g, Rfl: 3     budesonide-formoterol (SYMBICORT) 80-4.5 MCG/ACT Inhaler, Inhale 2 puffs into the lungs, Disp: , Rfl:      finasteride (PROPECIA) 1 MG tablet, Take 1 mg by mouth, Disp: , Rfl:      metoprolol succinate ER (TOPROL XL) 25 MG 24 hr tablet, Take 25 mg by mouth daily, Disp: , Rfl:      minocycline (MINOCIN) 100 MG capsule, Take 1 capsule by mouth daily, Disp: , Rfl:      VYVANSE 30 MG capsule, Take 30 mg by mouth daily, Disp: , Rfl:      fluticasone-salmeterol (ADVAIR HFA) 115-21 MCG/ACT inhaler, Inhale 2 puffs into the lungs 2 times daily (Patient not taking: Reported on 10/31/2023), Disp: 12 g, Rfl: 6     fluticasone-salmeterol (ADVAIR HFA) 115-21 MCG/ACT inhaler, Inhale 2 puffs into the lungs 2 times daily (Patient not taking: Reported on 10/31/2023), Disp: 12 g, Rfl: 6     methylphenidate (RITALIN) 5 MG tablet, TAKE 1 1/2 TABLETS TWICE A DAY, Disp: , Rfl:     Current Facility-Administered Medications:      botulinum toxin type A (BOTOX) 100 units injection 100 Units, 100 Units, Intramuscular, Q90 Days, Lea Rossi MD, 100 Units at 01/10/23 1726     botulinum toxin type A (BOTOX) 100 units injection 100 Units, 100 Units, Intramuscular, Q90 Days, Lea Rossi MD, 100 Units at 10/17/22 1203     botulinum toxin type A (BOTOX) 100 units injection 200 Units, 200 Units, Intramuscular, Q90 Days, Lea Rossi MD, 160 Units at 23 1125       BOTULINUM NEUROTOXIN INJECTION PROCEDURES    VERIFICATION OF PATIENT IDENTIFICATION AND PROCEDURE     Initials   Patient Name SES   Patient  SES   Procedure Verified by: BLANCO     Prior to  the start of the procedure and with procedural staff participation, I verbally confirmed the patient s identity using two indicators, relevant allergies, that the procedure was appropriate and matched the consent or emergent situation, and that the correct equipment/implants were available. Immediately prior to starting the procedure I conducted the Time Out with the procedural staff and re-confirmed the patient s name, procedure, and site/side. 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 His baseline symptoms have been recalcitrant to oral medications and conservative therapy.  He is here today for reinjection with Botox.    GOAL OF PROCEDURE  The goal of this procedure is to increase active range of motion and improve volitional motor control.      TOTAL DOSE ADMINISTERED  Dose Administered:  160 units  Botox (Botulinum Toxin Type A)       2:1 Dilution   Unavoidable Drug Waste: Yes  Amount of drug waste (mL): 40 units Botox.  Reason for waste:  Single use vial  Diluent Used:  Preservative Free Normal Saline  Total Volume of Diluent Used:  3 ml  NDC #: Botox 100u ([address]-56)      CONSENT  The risks, benefits, and treatment options were discussed with Neo Saeed and he agreed to proceed.    Written consent was obtained by  [facility] .     EQUIPMENT USED  Needle-25mm stimulating/recording  EMG/NCS Machine    SKIN PREPARATION  Skin preparation was performed using an alcohol wipe.    GUIDANCE DESCRIPTION  Electro-myographic guidance was necessary throughout the procedure to accurately identify all areas of dystonic muscles while avoiding injection of non-dystonic muscles, neighboring nerves and nearby vascular structures.       AREA/MUSCLE INJECTED:  160 UNITS BOTOX = TOTAL DOSE, 2:1 DILUTION  1. FACIAL & JAW MUSCLES: 150 UNITS BOTOX = TOTAL DOSE    Right Frontalis - 10 units of Botox at 2 site/s.    Left Frontalis -10 units of Botox at 2 site/s.      Right  - 5 units of Botox at 1 site/s.    Left  - 5 units of Botox at 1 site/s.      Procerus - 5 units of Botox at 1 site/s.      Right Masseter - 5 units of Botox at 1 site/s.   Left Masseter - 10 units of Botox at 1 site/s.     Right Anterior Temporalis - 50 units of Botox at 5 site/s.   Left Anterior Temporalis - 50 units of Botox at 5 site/s.    2. NECK & SHOULDER GIRDLE MUSCLES: 10 UNITS BOTOX = TOTAL DOSE  Right Levator Scapulae - 5 units of Botox at 1 site/s.   Left Levator Scapulae - 5 units of Botox at 1 site/s.      RESPONSE TO PROCEDURE  Neo Saeed tolerated the procedure well and there were no immediate complications. He was allowed to recover for an appropriate period of time and was discharged home in stable condition.    ASSESSMENT AND PLAN   Botulinum toxin injections: Dose of Botox remained the same at 160 units, however, distribution changed slightly to address mild masseter weakness experienced with last series of Botox. He will continue to monitor his response to today's injections and report at next appointment.   Referrals: None.   Follow up: Neo Saeed was rescheduled for the next series of injections in 12 weeks, at which time we will evaluate response to today's injections. he may call the clinic prior with any questions or concerns prior to the next appointment.       Again, thank you for allowing me to participate in the care of your patient.        Sincerely,        Lea Rossi MD

## 2023-10-31 NOTE — NURSING NOTE
Chief Complaint   Patient presents with    Procedure     Botox     Hyun Azul MA on 10/31/2023 at 1:05 PM

## 2023-10-31 NOTE — PROGRESS NOTES
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     SINCE LAST VISIT  Neo Saeed was last seen for Botox injections on 7/27/2023, at which time he received 160 units of Botox.     Patient denies new medical diagnoses, illnesses, hospitalizations, emergency room visits, and injuries since the previous injection with botulinum neurotoxin.       RESPONSE TO PREVIOUS TREATMENT:    Side effects:  Mild difficult with chewing, which is ongoing to date .     Pain Improvement: Yes. Percent Improvement: 100% - he states that he has not had any migraine headaches since the last time we did injections.    Duration of Benefit:   12 weeks and ongoing to date.    Dystonia Improvement: Yes.  Percent Improvement: The nighttime bruxism has completely resolved, and he is feeling less jaw tightness and clenching.  Duration of Benefit:   12 weeks and followed by a gradual reduction in benefit.      PHYSICAL EXAM  VS: BP (!) 127/92 (BP Location: Right arm, Patient Position: Sitting)   Pulse 76    GEN: Pleasant and cooperative, in no acute distress  HEENT: No facial asymmetry, significant temporalis hypertrophy bilaterally.     ALLERGIES  No Known Allergies    CURRENT MEDICATIONS    Current Outpatient Medications:     albuterol (PROAIR HFA/PROVENTIL HFA/VENTOLIN HFA) 108 (90 Base) MCG/ACT inhaler, Inhale 1-2 puffs into the lungs every 6 hours as needed for shortness of breath, wheezing or cough, Disp: 18 g, Rfl: 6    albuterol (PROAIR HFA/PROVENTIL HFA/VENTOLIN HFA) 108 (90 Base) MCG/ACT inhaler, Inhale 1-2 puffs into the lungs every 4 hours as needed for shortness of breath / dyspnea or wheezing  Take 1-2 puffs 20 minutes prior to exercise and every four hours as needed, Disp: 1 g, Rfl: 3    budesonide-formoterol (SYMBICORT) 80-4.5 MCG/ACT Inhaler, Inhale 2 puffs into the lungs, Disp: , Rfl:     finasteride (PROPECIA) 1 MG tablet, Take 1 mg by mouth, Disp: , Rfl:     metoprolol succinate ER (TOPROL XL) 25 MG 24 hr tablet, Take 25 mg by mouth daily, Disp: , Rfl:     minocycline (MINOCIN) 100 MG capsule, Take 1 capsule by mouth daily, Disp: , Rfl:     VYVANSE 30 MG capsule, Take 30 mg by mouth daily, Disp: , Rfl:     fluticasone-salmeterol (ADVAIR HFA) 115-21 MCG/ACT inhaler, Inhale 2 puffs into the lungs 2 times daily (Patient not taking: Reported on 10/31/2023), Disp: 12 g, Rfl: 6    fluticasone-salmeterol (ADVAIR HFA) 115-21 MCG/ACT inhaler, Inhale 2 puffs into the lungs 2 times daily (Patient not taking: Reported on 10/31/2023), Disp: 12 g, Rfl: 6    methylphenidate (RITALIN) 5 MG tablet, TAKE 1 1/2 TABLETS TWICE A DAY, Disp: , Rfl:     Current Facility-Administered Medications:     botulinum toxin type A (BOTOX) 100 units injection 100 Units, 100 Units, Intramuscular, Q90 Days, Lea Rossi MD, 100 Units at 01/10/23 1726    botulinum toxin type A (BOTOX) 100 units injection 100 Units, 100 Units, Intramuscular, Q90 Days, Lea Rossi MD, 100 Units at 10/17/22 1203    botulinum toxin type A (BOTOX) 100 units injection 200 Units, 200 Units, Intramuscular, Q90 Days, Lea Rossi MD, 160 Units at 23 1125       BOTULINUM NEUROTOXIN INJECTION PROCEDURES    VERIFICATION OF PATIENT IDENTIFICATION AND PROCEDURE     Initials   Patient Name SES   Patient  SES   Procedure Verified by: SES     Prior to the start of the procedure and with procedural staff participation, I verbally confirmed the patient s identity using two indicators, relevant allergies, that the procedure was appropriate and matched the consent or emergent situation, and that the correct equipment/implants  were available. Immediately prior to starting the procedure I conducted the Time Out with the procedural staff and re-confirmed the patient s name, procedure, and site/side. 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 His baseline symptoms have been recalcitrant to oral medications and conservative therapy.  He is here today for reinjection with Botox.    GOAL OF PROCEDURE  The goal of this procedure is to increase active range of motion and improve volitional motor control.      TOTAL DOSE ADMINISTERED  Dose Administered:  160 units  Botox (Botulinum Toxin Type A)       2:1 Dilution   Unavoidable Drug Waste: Yes  Amount of drug waste (mL): 40 units Botox.  Reason for waste:  Single use vial  Diluent Used:  Preservative Free Normal Saline  Total Volume of Diluent Used:  3 ml  NDC #: Botox 100u ([address]-65)      CONSENT  The risks, benefits, and treatment options were discussed with Neo Saeed and he agreed to proceed.    Written consent was obtained by  [facility] .     EQUIPMENT USED  Needle-25mm stimulating/recording  EMG/NCS Machine    SKIN PREPARATION  Skin preparation was performed using an alcohol wipe.    GUIDANCE DESCRIPTION  Electro-myographic guidance was necessary throughout the procedure to accurately identify all areas of dystonic muscles while avoiding injection of non-dystonic muscles, neighboring nerves and nearby vascular structures.       AREA/MUSCLE INJECTED: 160 UNITS BOTOX = TOTAL DOSE, 2:1 DILUTION  1. FACIAL & JAW MUSCLES: 150 UNITS BOTOX = TOTAL DOSE    Right Frontalis - 10 units of Botox at 2 site/s.    Left Frontalis -10 units of Botox at 2 site/s.      Right  - 5 units of Botox at 1 site/s.    Left  - 5  units of Botox at 1 site/s.      Procerus - 5 units of Botox at 1 site/s.      Right Masseter - 5 units of Botox at 1 site/s.   Left Masseter - 10 units of Botox at 1 site/s.     Right Anterior Temporalis - 50 units of Botox at 5 site/s.   Left Anterior Temporalis - 50 units of Botox at 5 site/s.    2. NECK & SHOULDER GIRDLE MUSCLES: 10 UNITS BOTOX = TOTAL DOSE  Right Levator Scapulae - 5 units of Botox at 1 site/s.   Left Levator Scapulae - 5 units of Botox at 1 site/s.      RESPONSE TO PROCEDURE  Neo Saeed tolerated the procedure well and there were no immediate complications. He was allowed to recover for an appropriate period of time and was discharged home in stable condition.    ASSESSMENT AND PLAN   Botulinum toxin injections: Dose of Botox remained the same at 160 units, however, distribution changed slightly to address mild masseter weakness experienced with last series of Botox. He will continue to monitor his response to today's injections and report at next appointment.   Referrals: None.   Follow up: Neo Saeed was rescheduled for the next series of injections in 12 weeks, at which time we will evaluate response to today's injections. he may call the clinic prior with any questions or concerns prior to the next appointment.

## 2024-02-15 ENCOUNTER — OFFICE VISIT (OUTPATIENT)
Dept: PHYSICAL MEDICINE AND REHAB | Facility: CLINIC | Age: 29
End: 2024-02-15
Payer: COMMERCIAL

## 2024-02-15 VITALS — HEART RATE: 99 BPM | DIASTOLIC BLOOD PRESSURE: 78 MMHG | SYSTOLIC BLOOD PRESSURE: 133 MMHG

## 2024-02-15 DIAGNOSIS — G24.4 IDIOPATHIC OROFACIAL DYSTONIA: Primary | ICD-10-CM

## 2024-02-15 PROCEDURE — 64612 DESTROY NERVE FACE MUSCLE: CPT | Mod: 50 | Performed by: PHYSICAL MEDICINE & REHABILITATION

## 2024-02-15 PROCEDURE — 95874 GUIDE NERV DESTR NEEDLE EMG: CPT | Performed by: PHYSICAL MEDICINE & REHABILITATION

## 2024-02-15 NOTE — PROGRESS NOTES
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     SINCE LAST VISIT  Neo Saeed was last seen for Botox injections on 10/31/2023, at which time he received 160 units of Botox.     Dexter reports feeling well, and did not notice any significant headaches during this round of injections. He feels like the addition of areas on his forehead have helped with his vestibular headaches as well.     Patient denies new medical diagnoses, illnesses, hospitalizations, emergency room visits, and injuries since the previous injection with botulinum neurotoxin.       RESPONSE TO PREVIOUS TREATMENT:    Side effects: No problems reported.     Pain Improvement: Yes. Percent Improvement: 100% - he states that he has not had any migraine headaches since the last time we did injections.    Duration of Benefit:  12 weeks, with mild, gradual reduction of benefit over the last week.     Dystonia Improvement: Yes.  Percent Improvement: The nighttime bruxism has completely resolved, and he is feeling less jaw tightness and clenching.  Duration of Benefit:   12 weeks and followed by a gradual reduction in benefit.      PHYSICAL EXAM  VS: /78 (BP Location: Right arm, Patient Position: Sitting)   Pulse 99    GEN: Pleasant and cooperative, in no acute distress  HEENT: No facial asymmetry, significant temporalis hypertrophy bilaterally.     ALLERGIES  No Known Allergies    CURRENT MEDICATIONS    Current Outpatient Medications:     albuterol (PROAIR HFA/PROVENTIL HFA/VENTOLIN HFA) 108 (90 Base) MCG/ACT inhaler, Inhale 1-2 puffs into the lungs every 6 hours as needed for shortness of breath, wheezing or  cough, Disp: 18 g, Rfl: 6    albuterol (PROAIR HFA/PROVENTIL HFA/VENTOLIN HFA) 108 (90 Base) MCG/ACT inhaler, Inhale 1-2 puffs into the lungs every 4 hours as needed for shortness of breath / dyspnea or wheezing Take 1-2 puffs 20 minutes prior to exercise and every four hours as needed, Disp: 1 g, Rfl: 3    budesonide-formoterol (SYMBICORT) 80-4.5 MCG/ACT Inhaler, Inhale 2 puffs into the lungs, Disp: , Rfl:     finasteride (PROPECIA) 1 MG tablet, Take 1 mg by mouth, Disp: , Rfl:     VYVANSE 30 MG capsule, Take 30 mg by mouth daily, Disp: , Rfl:     fluticasone-salmeterol (ADVAIR HFA) 115-21 MCG/ACT inhaler, Inhale 2 puffs into the lungs 2 times daily (Patient not taking: Reported on 10/31/2023), Disp: 12 g, Rfl: 6    fluticasone-salmeterol (ADVAIR HFA) 115-21 MCG/ACT inhaler, Inhale 2 puffs into the lungs 2 times daily (Patient not taking: Reported on 10/31/2023), Disp: 12 g, Rfl: 6    methylphenidate (RITALIN) 5 MG tablet, TAKE 1 1/2 TABLETS TWICE A DAY, Disp: , Rfl:     metoprolol succinate ER (TOPROL XL) 25 MG 24 hr tablet, Take 25 mg by mouth daily (Patient not taking: Reported on 2/15/2024), Disp: , Rfl:     minocycline (MINOCIN) 100 MG capsule, Take 1 capsule by mouth daily (Patient not taking: Reported on 2/15/2024), Disp: , Rfl:     Current Facility-Administered Medications:     botulinum toxin type A (BOTOX) 100 units injection 100 Units, 100 Units, Intramuscular, Q90 Days, Lea Rossi MD, 100 Units at 01/10/23 1726    botulinum toxin type A (BOTOX) 100 units injection 100 Units, 100 Units, Intramuscular, Q90 Days, Lea Rossi MD, 100 Units at 10/17/22 1203    botulinum toxin type A (BOTOX) 100 units injection 200 Units, 200 Units, Intramuscular, Q90 Days, StandLea astorga MD, 200 Units at 02/15/24 1610       BOTULINUM NEUROTOXIN INJECTION PROCEDURES    VERIFICATION OF PATIENT IDENTIFICATION AND PROCEDURE     Initials   Patient Name SES   Patient  SES   Procedure  Verified by: BLANCO     Prior to the start of the procedure and with procedural staff participation, I verbally confirmed the patient s identity using two indicators, relevant allergies, that the procedure was appropriate and matched the consent or emergent situation, and that the correct equipment/implants were available. Immediately prior to starting the procedure I conducted the Time Out with the procedural staff and re-confirmed the patient s name, procedure, and site/side. 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 His baseline symptoms have been recalcitrant to oral medications and conservative therapy.  He is here today for reinjection with Botox.    GOAL OF PROCEDURE  The goal of this procedure is to increase active range of motion and improve volitional motor control.      TOTAL DOSE ADMINISTERED  Dose Administered:  160 units  Botox (Botulinum Toxin Type A)       2:1 Dilution   Unavoidable Drug Waste: Yes  Amount of drug waste (mL): 40 units Botox.  Reason for waste:  Single use vial  Diluent Used:  Preservative Free Normal Saline  Total Volume of Diluent Used:  3 ml  NDC #: Botox 100u ([address]-81)      CONSENT  The risks, benefits, and treatment options were discussed with Neo Saeed and he agreed to proceed.    Written consent was obtained by  [facility] .     EQUIPMENT USED  Needle-25mm stimulating/recording  EMG/NCS Machine    SKIN PREPARATION  Skin preparation was performed using an alcohol wipe.    GUIDANCE DESCRIPTION  Electro-myographic guidance was necessary throughout the procedure to accurately identify all areas of dystonic muscles while avoiding injection of non-dystonic muscles, neighboring nerves and nearby vascular structures.        AREA/MUSCLE INJECTED: 160 UNITS BOTOX = TOTAL DOSE, 2:1 DILUTION  1. FACIAL & JAW MUSCLES: 150 UNITS BOTOX = TOTAL DOSE    Right Frontalis - 10 units of Botox at 2 site/s.    Left Frontalis -10 units of Botox at 2 site/s.      Right  - 5 units of Botox at 1 site/s.    Left  - 5 units of Botox at 1 site/s.      Procerus - 5 units of Botox at 1 site/s.      Right Masseter - 5 units of Botox at 1 site/s.   Left Masseter - 10 units of Botox at 1 site/s.     Right Anterior Temporalis - 50 units of Botox at 5 site/s.   Left Anterior Temporalis - 50 units of Botox at 5 site/s.    2. NECK & SHOULDER GIRDLE MUSCLES: 10 UNITS BOTOX = TOTAL DOSE  Right Levator Scapulae - 5 units of Botox at 1 site/s.   Left Levator Scapulae - 5 units of Botox at 1 site/s.      RESPONSE TO PROCEDURE  Neo Saeed tolerated the procedure well and there were no immediate complications. He was allowed to recover for an appropriate period of time and was discharged home in stable condition.    ASSESSMENT AND PLAN   Botulinum toxin injections: No changes made to Botox dose or distribution today. Patient will continue to monitor response to Botox and report at next appointment.    Referrals: None.   Follow up: Neo Saeed was rescheduled for the next series of injections in 12 weeks, at which time we will evaluate response to today's injections. he may call the clinic prior with any questions or concerns prior to the next appointment.     Patient seen and discussed with PM&R staff attending, Dr. Rossi.    Carmen Mcarthur, DO  PGY-3  Physical Medicine and Rehabilitation      I, Lea Rossi MD, saw this patient with resident, Dr. Mcarthur, and agree with the findings and plan of care as documented in this note. I personally reviewed the chart (vitals signs, medications, labs and imaging). My key findings and key management decisions made are reflected in this note.  I was present for the entire procedure listed  above.      Lea Rossi MD

## 2024-02-15 NOTE — NURSING NOTE
Chief Complaint   Patient presents with    Procedure     Botox     Hyun Azul MA on 2/15/2024 at 2:32 PM

## 2024-02-15 NOTE — LETTER
2/15/2024         RE: Neo Saeed  [address] [address]        Dear Colleague,    Thank you for referring your patient, Neo Saeed, to the [facility]. Please see a copy of my visit note below.      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     SINCE LAST VISIT  Neo Saeed was last seen for Botox injections on 10/31/2023, at which time he received 160 units of Botox.     Dexter reports feeling well, and did not notice any significant headaches during this round of injections. He feels like the addition of areas on his forehead have helped with his vestibular headaches as well.     Patient denies new medical diagnoses, illnesses, hospitalizations, emergency room visits, and injuries since the previous injection with botulinum neurotoxin.       RESPONSE TO PREVIOUS TREATMENT:    Side effects: No problems reported.     Pain Improvement: Yes. Percent Improvement: 100% - he states that he has not had any migraine headaches since the last time we did injections.    Duration of Benefit:  12 weeks, with mild, gradual reduction of benefit over the last week.     Dystonia Improvement: Yes.  Percent Improvement: The nighttime bruxism has completely resolved, and he is feeling less jaw tightness and clenching.  Duration of Benefit:   12 weeks and followed by a gradual reduction in benefit.      PHYSICAL EXAM  VS: /78 (BP Location: Right arm, Patient Position: Sitting)   Pulse 99    GEN: Pleasant and cooperative, in no acute distress  HEENT: No facial asymmetry, significant temporalis hypertrophy bilaterally.      ALLERGIES  No Known Allergies    CURRENT MEDICATIONS    Current Outpatient Medications:      albuterol (PROAIR HFA/PROVENTIL HFA/VENTOLIN HFA) 108 (90 Base) MCG/ACT inhaler, Inhale 1-2 puffs into the lungs every 6 hours as needed for shortness of breath, wheezing or cough, Disp: 18 g, Rfl: 6     albuterol (PROAIR HFA/PROVENTIL HFA/VENTOLIN HFA) 108 (90 Base) MCG/ACT inhaler, Inhale 1-2 puffs into the lungs every 4 hours as needed for shortness of breath / dyspnea or wheezing Take 1-2 puffs 20 minutes prior to exercise and every four hours as needed, Disp: 1 g, Rfl: 3     budesonide-formoterol (SYMBICORT) 80-4.5 MCG/ACT Inhaler, Inhale 2 puffs into the lungs, Disp: , Rfl:      finasteride (PROPECIA) 1 MG tablet, Take 1 mg by mouth, Disp: , Rfl:      VYVANSE 30 MG capsule, Take 30 mg by mouth daily, Disp: , Rfl:      fluticasone-salmeterol (ADVAIR HFA) 115-21 MCG/ACT inhaler, Inhale 2 puffs into the lungs 2 times daily (Patient not taking: Reported on 10/31/2023), Disp: 12 g, Rfl: 6     fluticasone-salmeterol (ADVAIR HFA) 115-21 MCG/ACT inhaler, Inhale 2 puffs into the lungs 2 times daily (Patient not taking: Reported on 10/31/2023), Disp: 12 g, Rfl: 6     methylphenidate (RITALIN) 5 MG tablet, TAKE 1 1/2 TABLETS TWICE A DAY, Disp: , Rfl:      metoprolol succinate ER (TOPROL XL) 25 MG 24 hr tablet, Take 25 mg by mouth daily (Patient not taking: Reported on 2/15/2024), Disp: , Rfl:      minocycline (MINOCIN) 100 MG capsule, Take 1 capsule by mouth daily (Patient not taking: Reported on 2/15/2024), Disp: , Rfl:     Current Facility-Administered Medications:      botulinum toxin type A (BOTOX) 100 units injection 100 Units, 100 Units, Intramuscular, Q90 Days, Standal, Lea Richetr MD, 100 Units at 01/10/23 1726     botulinum toxin type A (BOTOX) 100 units injection 100 Units, 100 Units, Intramuscular, Q90 Days, Standal, Lea Richter MD, 100 Units at 10/17/22 1203     botulinum toxin type A (BOTOX) 100 units  injection 200 Units, 200 Units, Intramuscular, Q90 Days, Lea Rossi MD, 200 Units at 02/15/24 1610       BOTULINUM NEUROTOXIN INJECTION PROCEDURES    VERIFICATION OF PATIENT IDENTIFICATION AND PROCEDURE     Initials   Patient Name SES   Patient  SES   Procedure Verified by: SES     Prior to the start of the procedure and with procedural staff participation, I verbally confirmed the patient s identity using two indicators, relevant allergies, that the procedure was appropriate and matched the consent or emergent situation, and that the correct equipment/implants were available. Immediately prior to starting the procedure I conducted the Time Out with the procedural staff and re-confirmed the patient s name, procedure, and site/side. 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 His baseline symptoms have been recalcitrant to oral medications and conservative therapy.  He is here today for reinjection with Botox.    GOAL OF PROCEDURE  The goal of this procedure is to increase active range of motion and improve volitional motor control.      TOTAL DOSE ADMINISTERED  Dose Administered:  160 units  Botox (Botulinum Toxin Type A)       2:1 Dilution   Unavoidable Drug Waste: Yes  Amount of drug waste (mL): 40 units Botox.  Reason for waste:  Single use vial  Diluent Used:  Preservative Free Normal Saline  Total Volume of Diluent Used:  3 ml  NDC #: Botox 100u ([address]-35)      CONSENT  The risks, benefits, and treatment options were discussed with Neo Saeed and he agreed to proceed.    Written consent was obtained by  [facility] .     EQUIPMENT USED  Needle-25mm stimulating/recording  EMG/NCS Machine    SKIN PREPARATION  Skin preparation was  performed using an alcohol wipe.    GUIDANCE DESCRIPTION  Electro-myographic guidance was necessary throughout the procedure to accurately identify all areas of dystonic muscles while avoiding injection of non-dystonic muscles, neighboring nerves and nearby vascular structures.       AREA/MUSCLE INJECTED: 160 UNITS BOTOX = TOTAL DOSE, 2:1 DILUTION  1. FACIAL & JAW MUSCLES: 150 UNITS BOTOX = TOTAL DOSE    Right Frontalis - 10 units of Botox at 2 site/s.    Left Frontalis -10 units of Botox at 2 site/s.      Right  - 5 units of Botox at 1 site/s.    Left  - 5 units of Botox at 1 site/s.      Procerus - 5 units of Botox at 1 site/s.      Right Masseter - 5 units of Botox at 1 site/s.   Left Masseter - 10 units of Botox at 1 site/s.     Right Anterior Temporalis - 50 units of Botox at 5 site/s.   Left Anterior Temporalis - 50 units of Botox at 5 site/s.    2. NECK & SHOULDER GIRDLE MUSCLES: 10 UNITS BOTOX = TOTAL DOSE  Right Levator Scapulae - 5 units of Botox at 1 site/s.   Left Levator Scapulae - 5 units of Botox at 1 site/s.      RESPONSE TO PROCEDURE  Neo Saeed tolerated the procedure well and there were no immediate complications. He was allowed to recover for an appropriate period of time and was discharged home in stable condition.    ASSESSMENT AND PLAN   Botulinum toxin injections: No changes made to Botox dose or distribution today. Patient will continue to monitor response to Botox and report at next appointment.    Referrals: None.   Follow up: Neo Saeed was rescheduled for the next series of injections in 12 weeks, at which time we will evaluate response to today's injections. he may call the clinic prior with any questions or concerns prior to the next appointment.     Patient seen and discussed with PM&R staff attending, Dr. Rossi.    Carmen Mcarthur, DO  PGY-3  Physical Medicine and Rehabilitation      I, Lea Rossi MD, saw this patient with resident, Dr. Mcarthur, and  agree with the findings and plan of care as documented in this note. I personally reviewed the chart (vitals signs, medications, labs and imaging). My key findings and key management decisions made are reflected in this note.  I was present for the entire procedure listed above.      Lea Rossi MD         Again, thank you for allowing me to participate in the care of your patient.        Sincerely,        Lea Rossi MD

## 2024-04-28 ENCOUNTER — HEALTH MAINTENANCE LETTER (OUTPATIENT)
Age: 29
End: 2024-04-28

## 2024-05-10 DIAGNOSIS — G24.4 IDIOPATHIC OROFACIAL DYSTONIA: Primary | ICD-10-CM

## 2024-07-30 DIAGNOSIS — G24.4 IDIOPATHIC OROFACIAL DYSTONIA: Primary | ICD-10-CM

## 2025-04-05 ENCOUNTER — HEALTH MAINTENANCE LETTER (OUTPATIENT)
Age: 30
End: 2025-04-05

## (undated) DEVICE — TAPE DURAPORE 3" SILK 1538-3

## (undated) DEVICE — GLOVE PROTEXIS BLUE W/NEU-THERA 8.0  2D73EB80

## (undated) DEVICE — DECANTER VIAL 2006S

## (undated) DEVICE — PACK RECTAL KIT CUSTOM ASC

## (undated) DEVICE — TAPE MEDIPORE 2" 2962

## (undated) DEVICE — GLOVE PROTEXIS MICRO 7.5  2D73PM75

## (undated) DEVICE — STRAP UNIVERSAL POSITIONING 2-PIECE 4X47X76" 91-287

## (undated) DEVICE — DRAPE LAP W/ARMBOARD 29410

## (undated) DEVICE — LINEN TOWEL PACK X5 5464

## (undated) DEVICE — SOL BENZOIN 0.5OZ

## (undated) DEVICE — ESU GROUND PAD ADULT W/CORD E7507

## (undated) RX ORDER — CEFOTETAN DISODIUM 2 G/20ML
INJECTION, POWDER, FOR SOLUTION INTRAMUSCULAR; INTRAVENOUS
Status: DISPENSED
Start: 2017-08-04

## (undated) RX ORDER — ONDANSETRON 2 MG/ML
INJECTION INTRAMUSCULAR; INTRAVENOUS
Status: DISPENSED
Start: 2017-08-04

## (undated) RX ORDER — KETAMINE HYDROCHLORIDE 10 MG/ML
INJECTION, SOLUTION INTRAMUSCULAR; INTRAVENOUS
Status: DISPENSED
Start: 2017-08-04

## (undated) RX ORDER — OXYCODONE HYDROCHLORIDE 5 MG/1
TABLET ORAL
Status: DISPENSED
Start: 2017-08-04

## (undated) RX ORDER — PROPOFOL 10 MG/ML
INJECTION, EMULSION INTRAVENOUS
Status: DISPENSED
Start: 2017-08-04

## (undated) RX ORDER — DEXAMETHASONE SODIUM PHOSPHATE 4 MG/ML
INJECTION, SOLUTION INTRA-ARTICULAR; INTRALESIONAL; INTRAMUSCULAR; INTRAVENOUS; SOFT TISSUE
Status: DISPENSED
Start: 2017-08-04

## (undated) RX ORDER — ACETAMINOPHEN 325 MG/1
TABLET ORAL
Status: DISPENSED
Start: 2017-08-04